# Patient Record
Sex: FEMALE
[De-identification: names, ages, dates, MRNs, and addresses within clinical notes are randomized per-mention and may not be internally consistent; named-entity substitution may affect disease eponyms.]

---

## 2020-02-02 ENCOUNTER — TRANSCRIPTION ENCOUNTER (OUTPATIENT)
Age: 40
End: 2020-02-02

## 2020-02-03 ENCOUNTER — EMERGENCY (EMERGENCY)
Facility: HOSPITAL | Age: 40
LOS: 1 days | Discharge: ANOTHER HOSPITAL | End: 2020-02-03
Attending: EMERGENCY MEDICINE
Payer: COMMERCIAL

## 2020-02-03 ENCOUNTER — INPATIENT (INPATIENT)
Facility: HOSPITAL | Age: 40
LOS: 24 days | Discharge: NURSING, REHAB OR LONG-TERM CARE | DRG: 20 | End: 2020-02-28
Attending: NEUROLOGICAL SURGERY | Admitting: NEUROLOGICAL SURGERY
Payer: COMMERCIAL

## 2020-02-03 VITALS — OXYGEN SATURATION: 100 % | HEART RATE: 76 BPM

## 2020-02-03 VITALS
OXYGEN SATURATION: 98 % | HEART RATE: 70 BPM | RESPIRATION RATE: 18 BRPM | HEIGHT: 63 IN | DIASTOLIC BLOOD PRESSURE: 70 MMHG | WEIGHT: 130.07 LBS | SYSTOLIC BLOOD PRESSURE: 136 MMHG

## 2020-02-03 VITALS — HEART RATE: 82 BPM | SYSTOLIC BLOOD PRESSURE: 115 MMHG | DIASTOLIC BLOOD PRESSURE: 60 MMHG | RESPIRATION RATE: 16 BRPM

## 2020-02-03 DIAGNOSIS — I61.9 NONTRAUMATIC INTRACEREBRAL HEMORRHAGE, UNSPECIFIED: ICD-10-CM

## 2020-02-03 LAB
ALBUMIN SERPL ELPH-MCNC: 3.6 G/DL — SIGNIFICANT CHANGE UP (ref 3.5–5)
ALBUMIN SERPL ELPH-MCNC: 4.2 G/DL — SIGNIFICANT CHANGE UP (ref 3.3–5)
ALP SERPL-CCNC: 48 U/L — SIGNIFICANT CHANGE UP (ref 40–120)
ALP SERPL-CCNC: 48 U/L — SIGNIFICANT CHANGE UP (ref 40–120)
ALT FLD-CCNC: 26 U/L — SIGNIFICANT CHANGE UP (ref 10–45)
ALT FLD-CCNC: 33 U/L DA — SIGNIFICANT CHANGE UP (ref 10–60)
AMPHET UR-MCNC: NEGATIVE — SIGNIFICANT CHANGE UP
ANION GAP SERPL CALC-SCNC: 11 MMOL/L — SIGNIFICANT CHANGE UP (ref 5–17)
ANION GAP SERPL CALC-SCNC: 13 MMOL/L — SIGNIFICANT CHANGE UP (ref 5–17)
ANION GAP SERPL CALC-SCNC: 14 MMOL/L — SIGNIFICANT CHANGE UP (ref 5–17)
APAP SERPL-MCNC: <2 UG/ML — LOW (ref 10–30)
APPEARANCE UR: CLEAR — SIGNIFICANT CHANGE UP
APTT BLD: 27.1 SEC — LOW (ref 27.5–36.3)
AST SERPL-CCNC: 28 U/L — SIGNIFICANT CHANGE UP (ref 10–40)
AST SERPL-CCNC: 29 U/L — SIGNIFICANT CHANGE UP (ref 10–40)
BACTERIA # UR AUTO: ABNORMAL /HPF
BARBITURATES UR SCN-MCNC: NEGATIVE — SIGNIFICANT CHANGE UP
BASE EXCESS BLDA CALC-SCNC: -1.6 MMOL/L — SIGNIFICANT CHANGE UP (ref -2–2)
BASOPHILS # BLD AUTO: 0.03 K/UL — SIGNIFICANT CHANGE UP (ref 0–0.2)
BASOPHILS # BLD AUTO: 0.05 K/UL — SIGNIFICANT CHANGE UP (ref 0–0.2)
BASOPHILS NFR BLD AUTO: 0.2 % — SIGNIFICANT CHANGE UP (ref 0–2)
BASOPHILS NFR BLD AUTO: 0.5 % — SIGNIFICANT CHANGE UP (ref 0–2)
BENZODIAZ UR-MCNC: NEGATIVE — SIGNIFICANT CHANGE UP
BILIRUB SERPL-MCNC: 0.4 MG/DL — SIGNIFICANT CHANGE UP (ref 0.2–1.2)
BILIRUB SERPL-MCNC: 0.6 MG/DL — SIGNIFICANT CHANGE UP (ref 0.2–1.2)
BILIRUB UR-MCNC: NEGATIVE — SIGNIFICANT CHANGE UP
BLD GP AB SCN SERPL QL: NEGATIVE — SIGNIFICANT CHANGE UP
BLOOD GAS COMMENTS ARTERIAL: SIGNIFICANT CHANGE UP
BUN SERPL-MCNC: 13 MG/DL — SIGNIFICANT CHANGE UP (ref 7–23)
BUN SERPL-MCNC: 14 MG/DL — SIGNIFICANT CHANGE UP (ref 7–18)
BUN SERPL-MCNC: 8 MG/DL — SIGNIFICANT CHANGE UP (ref 7–23)
CALCIUM SERPL-MCNC: 7.6 MG/DL — LOW (ref 8.4–10.5)
CALCIUM SERPL-MCNC: 7.8 MG/DL — LOW (ref 8.4–10.5)
CALCIUM SERPL-MCNC: 8.4 MG/DL — SIGNIFICANT CHANGE UP (ref 8.4–10.5)
CARBAMAZEPINE SERPL-MCNC: <0.5 UG/ML — LOW (ref 4–12)
CHLORIDE SERPL-SCNC: 102 MMOL/L — SIGNIFICANT CHANGE UP (ref 96–108)
CHLORIDE SERPL-SCNC: 104 MMOL/L — SIGNIFICANT CHANGE UP (ref 96–108)
CHLORIDE SERPL-SCNC: 109 MMOL/L — HIGH (ref 96–108)
CO2 SERPL-SCNC: 18 MMOL/L — LOW (ref 22–31)
CO2 SERPL-SCNC: 20 MMOL/L — LOW (ref 22–31)
CO2 SERPL-SCNC: 21 MMOL/L — LOW (ref 22–31)
COCAINE METAB.OTHER UR-MCNC: NEGATIVE — SIGNIFICANT CHANGE UP
COLOR SPEC: YELLOW — SIGNIFICANT CHANGE UP
CREAT SERPL-MCNC: 0.56 MG/DL — SIGNIFICANT CHANGE UP (ref 0.5–1.3)
CREAT SERPL-MCNC: 0.63 MG/DL — SIGNIFICANT CHANGE UP (ref 0.5–1.3)
CREAT SERPL-MCNC: 0.87 MG/DL — SIGNIFICANT CHANGE UP (ref 0.5–1.3)
DIFF PNL FLD: ABNORMAL
EOSINOPHIL # BLD AUTO: 0 K/UL — SIGNIFICANT CHANGE UP (ref 0–0.5)
EOSINOPHIL # BLD AUTO: 0.07 K/UL — SIGNIFICANT CHANGE UP (ref 0–0.5)
EOSINOPHIL NFR BLD AUTO: 0 % — SIGNIFICANT CHANGE UP (ref 0–6)
EOSINOPHIL NFR BLD AUTO: 0.7 % — SIGNIFICANT CHANGE UP (ref 0–6)
EPI CELLS # UR: ABNORMAL /HPF
ETHANOL SERPL-MCNC: <3 MG/DL — SIGNIFICANT CHANGE UP (ref 0–10)
GAS PNL BLDA: SIGNIFICANT CHANGE UP
GAS PNL BLDA: SIGNIFICANT CHANGE UP
GLUCOSE SERPL-MCNC: 174 MG/DL — HIGH (ref 70–99)
GLUCOSE SERPL-MCNC: 183 MG/DL — HIGH (ref 70–99)
GLUCOSE SERPL-MCNC: 210 MG/DL — HIGH (ref 70–99)
GLUCOSE UR QL: 1000 MG/DL
HCG SERPL-ACNC: <1 MIU/ML — SIGNIFICANT CHANGE UP
HCG SERPL-ACNC: <2 MIU/ML — SIGNIFICANT CHANGE UP
HCG UR QL: NEGATIVE — SIGNIFICANT CHANGE UP
HCO3 BLDA-SCNC: 23 MMOL/L — SIGNIFICANT CHANGE UP (ref 23–27)
HCT VFR BLD CALC: 40.9 % — SIGNIFICANT CHANGE UP (ref 34.5–45)
HCT VFR BLD CALC: 42.8 % — SIGNIFICANT CHANGE UP (ref 34.5–45)
HGB BLD-MCNC: 13.6 G/DL — SIGNIFICANT CHANGE UP (ref 11.5–15.5)
HGB BLD-MCNC: 13.7 G/DL — SIGNIFICANT CHANGE UP (ref 11.5–15.5)
HOROWITZ INDEX BLDA+IHG-RTO: 50 — SIGNIFICANT CHANGE UP
IMM GRANULOCYTES NFR BLD AUTO: 0.4 % — SIGNIFICANT CHANGE UP (ref 0–1.5)
IMM GRANULOCYTES NFR BLD AUTO: 0.5 % — SIGNIFICANT CHANGE UP (ref 0–1.5)
INR BLD: 0.97 RATIO — SIGNIFICANT CHANGE UP (ref 0.88–1.16)
KETONES UR-MCNC: NEGATIVE — SIGNIFICANT CHANGE UP
LEUKOCYTE ESTERASE UR-ACNC: NEGATIVE — SIGNIFICANT CHANGE UP
LITHIUM SERPL-MCNC: <0.2 MMOL/L — LOW (ref 0.6–1.2)
LYMPHOCYTES # BLD AUTO: 1.03 K/UL — SIGNIFICANT CHANGE UP (ref 1–3.3)
LYMPHOCYTES # BLD AUTO: 2.8 K/UL — SIGNIFICANT CHANGE UP (ref 1–3.3)
LYMPHOCYTES # BLD AUTO: 26.8 % — SIGNIFICANT CHANGE UP (ref 13–44)
LYMPHOCYTES # BLD AUTO: 6.3 % — LOW (ref 13–44)
MAGNESIUM SERPL-MCNC: 1.7 MG/DL — SIGNIFICANT CHANGE UP (ref 1.6–2.6)
MCHC RBC-ENTMCNC: 29.6 PG — SIGNIFICANT CHANGE UP (ref 27–34)
MCHC RBC-ENTMCNC: 29.6 PG — SIGNIFICANT CHANGE UP (ref 27–34)
MCHC RBC-ENTMCNC: 32 GM/DL — SIGNIFICANT CHANGE UP (ref 32–36)
MCHC RBC-ENTMCNC: 33.3 GM/DL — SIGNIFICANT CHANGE UP (ref 32–36)
MCV RBC AUTO: 89.1 FL — SIGNIFICANT CHANGE UP (ref 80–100)
MCV RBC AUTO: 92.4 FL — SIGNIFICANT CHANGE UP (ref 80–100)
METHADONE UR-MCNC: NEGATIVE — SIGNIFICANT CHANGE UP
MONOCYTES # BLD AUTO: 0.6 K/UL — SIGNIFICANT CHANGE UP (ref 0–0.9)
MONOCYTES # BLD AUTO: 0.73 K/UL — SIGNIFICANT CHANGE UP (ref 0–0.9)
MONOCYTES NFR BLD AUTO: 4.4 % — SIGNIFICANT CHANGE UP (ref 2–14)
MONOCYTES NFR BLD AUTO: 5.7 % — SIGNIFICANT CHANGE UP (ref 2–14)
NEUTROPHILS # BLD AUTO: 14.61 K/UL — HIGH (ref 1.8–7.4)
NEUTROPHILS # BLD AUTO: 6.88 K/UL — SIGNIFICANT CHANGE UP (ref 1.8–7.4)
NEUTROPHILS NFR BLD AUTO: 65.9 % — SIGNIFICANT CHANGE UP (ref 43–77)
NEUTROPHILS NFR BLD AUTO: 88.6 % — HIGH (ref 43–77)
NITRITE UR-MCNC: NEGATIVE — SIGNIFICANT CHANGE UP
NRBC # BLD: 0 /100 WBCS — SIGNIFICANT CHANGE UP (ref 0–0)
NRBC # BLD: 0 /100 WBCS — SIGNIFICANT CHANGE UP (ref 0–0)
OPIATES UR-MCNC: NEGATIVE — SIGNIFICANT CHANGE UP
PCO2 BLDA: 39 MMHG — SIGNIFICANT CHANGE UP (ref 32–46)
PCP SPEC-MCNC: SIGNIFICANT CHANGE UP
PCP UR-MCNC: NEGATIVE — SIGNIFICANT CHANGE UP
PH BLDA: 7.38 — SIGNIFICANT CHANGE UP (ref 7.35–7.45)
PH UR: 6.5 — SIGNIFICANT CHANGE UP (ref 5–8)
PHOSPHATE SERPL-MCNC: 3 MG/DL — SIGNIFICANT CHANGE UP (ref 2.5–4.5)
PLATELET # BLD AUTO: 216 K/UL — SIGNIFICANT CHANGE UP (ref 150–400)
PLATELET # BLD AUTO: 221 K/UL — SIGNIFICANT CHANGE UP (ref 150–400)
PO2 BLDA: 251 MMHG — HIGH (ref 74–108)
POTASSIUM SERPL-MCNC: 3 MMOL/L — LOW (ref 3.5–5.3)
POTASSIUM SERPL-MCNC: 3.5 MMOL/L — SIGNIFICANT CHANGE UP (ref 3.5–5.3)
POTASSIUM SERPL-MCNC: 4.1 MMOL/L — SIGNIFICANT CHANGE UP (ref 3.5–5.3)
POTASSIUM SERPL-SCNC: 3 MMOL/L — LOW (ref 3.5–5.3)
POTASSIUM SERPL-SCNC: 3.5 MMOL/L — SIGNIFICANT CHANGE UP (ref 3.5–5.3)
POTASSIUM SERPL-SCNC: 4.1 MMOL/L — SIGNIFICANT CHANGE UP (ref 3.5–5.3)
PROT SERPL-MCNC: 6.6 G/DL — SIGNIFICANT CHANGE UP (ref 6–8.3)
PROT SERPL-MCNC: 6.9 G/DL — SIGNIFICANT CHANGE UP (ref 6–8.3)
PROT UR-MCNC: 15
PROTHROM AB SERPL-ACNC: 11.1 SEC — SIGNIFICANT CHANGE UP (ref 10–12.9)
RBC # BLD: 4.59 M/UL — SIGNIFICANT CHANGE UP (ref 3.8–5.2)
RBC # BLD: 4.63 M/UL — SIGNIFICANT CHANGE UP (ref 3.8–5.2)
RBC # FLD: 11.9 % — SIGNIFICANT CHANGE UP (ref 10.3–14.5)
RBC # FLD: 12.2 % — SIGNIFICANT CHANGE UP (ref 10.3–14.5)
RBC CASTS # UR COMP ASSIST: ABNORMAL /HPF (ref 0–2)
RH IG SCN BLD-IMP: POSITIVE — SIGNIFICANT CHANGE UP
RH IG SCN BLD-IMP: POSITIVE — SIGNIFICANT CHANGE UP
SALICYLATES SERPL-MCNC: <1.7 MG/DL — LOW (ref 2.8–20)
SAO2 % BLDA: 100 % — HIGH (ref 92–96)
SODIUM SERPL-SCNC: 136 MMOL/L — SIGNIFICANT CHANGE UP (ref 135–145)
SODIUM SERPL-SCNC: 136 MMOL/L — SIGNIFICANT CHANGE UP (ref 135–145)
SODIUM SERPL-SCNC: 140 MMOL/L — SIGNIFICANT CHANGE UP (ref 135–145)
SP GR SPEC: 1.01 — SIGNIFICANT CHANGE UP (ref 1.01–1.02)
THC UR QL: NEGATIVE — SIGNIFICANT CHANGE UP
TSH SERPL-MCNC: 1.83 UU/ML — SIGNIFICANT CHANGE UP (ref 0.34–4.82)
UROBILINOGEN FLD QL: NEGATIVE — SIGNIFICANT CHANGE UP
VALPROATE SERPL-MCNC: <3 UG/ML — LOW (ref 50–100)
WBC # BLD: 10.44 K/UL — SIGNIFICANT CHANGE UP (ref 3.8–10.5)
WBC # BLD: 16.48 K/UL — HIGH (ref 3.8–10.5)
WBC # FLD AUTO: 10.44 K/UL — SIGNIFICANT CHANGE UP (ref 3.8–10.5)
WBC # FLD AUTO: 16.48 K/UL — HIGH (ref 3.8–10.5)
WBC UR QL: SIGNIFICANT CHANGE UP /HPF (ref 0–5)

## 2020-02-03 PROCEDURE — 69990 MICROSURGERY ADD-ON: CPT

## 2020-02-03 PROCEDURE — 84443 ASSAY THYROID STIM HORMONE: CPT

## 2020-02-03 PROCEDURE — 71045 X-RAY EXAM CHEST 1 VIEW: CPT | Mod: 26

## 2020-02-03 PROCEDURE — 70496 CT ANGIOGRAPHY HEAD: CPT | Mod: 26

## 2020-02-03 PROCEDURE — 99291 CRITICAL CARE FIRST HOUR: CPT

## 2020-02-03 PROCEDURE — 81025 URINE PREGNANCY TEST: CPT

## 2020-02-03 PROCEDURE — 81001 URINALYSIS AUTO W/SCOPE: CPT

## 2020-02-03 PROCEDURE — 94002 VENT MGMT INPAT INIT DAY: CPT

## 2020-02-03 PROCEDURE — 70498 CT ANGIOGRAPHY NECK: CPT | Mod: 26

## 2020-02-03 PROCEDURE — 70450 CT HEAD/BRAIN W/O DYE: CPT

## 2020-02-03 PROCEDURE — 96374 THER/PROPH/DIAG INJ IV PUSH: CPT | Mod: XU

## 2020-02-03 PROCEDURE — 80156 ASSAY CARBAMAZEPINE TOTAL: CPT

## 2020-02-03 PROCEDURE — 93005 ELECTROCARDIOGRAM TRACING: CPT

## 2020-02-03 PROCEDURE — 31500 INSERT EMERGENCY AIRWAY: CPT

## 2020-02-03 PROCEDURE — 61322 CRNEC/CRNOT DCMPRV W/O LOBEC: CPT

## 2020-02-03 PROCEDURE — 36415 COLL VENOUS BLD VENIPUNCTURE: CPT

## 2020-02-03 PROCEDURE — 96376 TX/PRO/DX INJ SAME DRUG ADON: CPT | Mod: XU

## 2020-02-03 PROCEDURE — 70250 X-RAY EXAM OF SKULL: CPT | Mod: 26

## 2020-02-03 PROCEDURE — 84702 CHORIONIC GONADOTROPIN TEST: CPT

## 2020-02-03 PROCEDURE — 99291 CRITICAL CARE FIRST HOUR: CPT | Mod: 25

## 2020-02-03 PROCEDURE — 82962 GLUCOSE BLOOD TEST: CPT

## 2020-02-03 PROCEDURE — 93888 INTRACRANIAL LIMITED STUDY: CPT | Mod: 26

## 2020-02-03 PROCEDURE — 61697 BRAIN ANEURYSM REPR COMPLX: CPT

## 2020-02-03 PROCEDURE — 82803 BLOOD GASES ANY COMBINATION: CPT

## 2020-02-03 PROCEDURE — 70450 CT HEAD/BRAIN W/O DYE: CPT | Mod: 26,77,76

## 2020-02-03 PROCEDURE — 85027 COMPLETE CBC AUTOMATED: CPT

## 2020-02-03 PROCEDURE — 99223 1ST HOSP IP/OBS HIGH 75: CPT | Mod: 57

## 2020-02-03 PROCEDURE — 70450 CT HEAD/BRAIN W/O DYE: CPT | Mod: 26

## 2020-02-03 PROCEDURE — 80178 ASSAY OF LITHIUM: CPT

## 2020-02-03 PROCEDURE — 80164 ASSAY DIPROPYLACETIC ACD TOT: CPT

## 2020-02-03 PROCEDURE — 92240 ICG ANGIOGRAPHY I&R UNI/BI: CPT | Mod: 26

## 2020-02-03 PROCEDURE — 99292 CRITICAL CARE ADDL 30 MIN: CPT | Mod: 25

## 2020-02-03 PROCEDURE — 80307 DRUG TEST PRSMV CHEM ANLYZR: CPT

## 2020-02-03 PROCEDURE — 80053 COMPREHEN METABOLIC PANEL: CPT

## 2020-02-03 PROCEDURE — 96375 TX/PRO/DX INJ NEW DRUG ADDON: CPT | Mod: XU

## 2020-02-03 RX ORDER — ETOMIDATE 2 MG/ML
20 INJECTION INTRAVENOUS ONCE
Refills: 0 | Status: COMPLETED | OUTPATIENT
Start: 2020-02-03 | End: 2020-02-03

## 2020-02-03 RX ORDER — LEVETIRACETAM 250 MG/1
500 TABLET, FILM COATED ORAL EVERY 12 HOURS
Refills: 0 | Status: DISCONTINUED | OUTPATIENT
Start: 2020-02-03 | End: 2020-02-04

## 2020-02-03 RX ORDER — DEXMEDETOMIDINE HYDROCHLORIDE IN 0.9% SODIUM CHLORIDE 4 UG/ML
0.2 INJECTION INTRAVENOUS
Qty: 200 | Refills: 0 | Status: DISCONTINUED | OUTPATIENT
Start: 2020-02-03 | End: 2020-02-04

## 2020-02-03 RX ORDER — CALCIUM GLUCONATE 100 MG/ML
1 VIAL (ML) INTRAVENOUS ONCE
Refills: 0 | Status: COMPLETED | OUTPATIENT
Start: 2020-02-03 | End: 2020-02-04

## 2020-02-03 RX ORDER — CHLORHEXIDINE GLUCONATE 213 G/1000ML
15 SOLUTION TOPICAL EVERY 12 HOURS
Refills: 0 | Status: DISCONTINUED | OUTPATIENT
Start: 2020-02-03 | End: 2020-02-07

## 2020-02-03 RX ORDER — CHLORHEXIDINE GLUCONATE 213 G/1000ML
15 SOLUTION TOPICAL EVERY 12 HOURS
Refills: 0 | Status: DISCONTINUED | OUTPATIENT
Start: 2020-02-03 | End: 2020-02-04

## 2020-02-03 RX ORDER — PANTOPRAZOLE SODIUM 20 MG/1
40 TABLET, DELAYED RELEASE ORAL
Refills: 0 | Status: DISCONTINUED | OUTPATIENT
Start: 2020-02-03 | End: 2020-02-04

## 2020-02-03 RX ORDER — ACETAMINOPHEN 500 MG
650 TABLET ORAL EVERY 6 HOURS
Refills: 0 | Status: DISCONTINUED | OUTPATIENT
Start: 2020-02-03 | End: 2020-02-12

## 2020-02-03 RX ORDER — SODIUM CHLORIDE 9 MG/ML
1000 INJECTION INTRAMUSCULAR; INTRAVENOUS; SUBCUTANEOUS
Refills: 0 | Status: DISCONTINUED | OUTPATIENT
Start: 2020-02-03 | End: 2020-02-03

## 2020-02-03 RX ORDER — SODIUM CHLORIDE 9 MG/ML
500 INJECTION INTRAMUSCULAR; INTRAVENOUS; SUBCUTANEOUS ONCE
Refills: 0 | Status: COMPLETED | OUTPATIENT
Start: 2020-02-03 | End: 2020-02-03

## 2020-02-03 RX ORDER — NIMODIPINE 60 MG/10ML
60 SOLUTION ORAL EVERY 4 HOURS
Refills: 0 | Status: DISCONTINUED | OUTPATIENT
Start: 2020-02-03 | End: 2020-02-04

## 2020-02-03 RX ORDER — SODIUM CHLORIDE 9 MG/ML
3 INJECTION INTRAMUSCULAR; INTRAVENOUS; SUBCUTANEOUS ONCE
Refills: 0 | Status: COMPLETED | OUTPATIENT
Start: 2020-02-03 | End: 2020-02-03

## 2020-02-03 RX ORDER — SODIUM CHLORIDE 9 MG/ML
1000 INJECTION INTRAMUSCULAR; INTRAVENOUS; SUBCUTANEOUS
Refills: 0 | Status: DISCONTINUED | OUTPATIENT
Start: 2020-02-03 | End: 2020-02-04

## 2020-02-03 RX ORDER — MANNITOL
60 POWDER (GRAM) MISCELLANEOUS ONCE
Refills: 0 | Status: DISCONTINUED | OUTPATIENT
Start: 2020-02-03 | End: 2020-02-03

## 2020-02-03 RX ORDER — PROPOFOL 10 MG/ML
50 INJECTION, EMULSION INTRAVENOUS
Qty: 500 | Refills: 0 | Status: DISCONTINUED | OUTPATIENT
Start: 2020-02-03 | End: 2020-02-04

## 2020-02-03 RX ORDER — ONDANSETRON 8 MG/1
4 TABLET, FILM COATED ORAL ONCE
Refills: 0 | Status: COMPLETED | OUTPATIENT
Start: 2020-02-03 | End: 2020-02-03

## 2020-02-03 RX ORDER — NIMODIPINE 60 MG/10ML
60 SOLUTION ORAL EVERY 4 HOURS
Refills: 0 | Status: DISCONTINUED | OUTPATIENT
Start: 2020-02-03 | End: 2020-02-03

## 2020-02-03 RX ORDER — FOSPHENYTOIN 50 MG/ML
900 INJECTION INTRAMUSCULAR; INTRAVENOUS ONCE
Refills: 0 | Status: COMPLETED | OUTPATIENT
Start: 2020-02-03 | End: 2020-02-03

## 2020-02-03 RX ORDER — PROPOFOL 10 MG/ML
20 INJECTION, EMULSION INTRAVENOUS
Qty: 1000 | Refills: 0 | Status: DISCONTINUED | OUTPATIENT
Start: 2020-02-03 | End: 2020-02-03

## 2020-02-03 RX ORDER — PROPOFOL 10 MG/ML
10 INJECTION, EMULSION INTRAVENOUS
Qty: 1000 | Refills: 0 | Status: DISCONTINUED | OUTPATIENT
Start: 2020-02-03 | End: 2020-02-07

## 2020-02-03 RX ORDER — CHLORHEXIDINE GLUCONATE 213 G/1000ML
15 SOLUTION TOPICAL
Refills: 0 | Status: DISCONTINUED | OUTPATIENT
Start: 2020-02-03 | End: 2020-02-04

## 2020-02-03 RX ORDER — NICARDIPINE HYDROCHLORIDE 30 MG/1
5 CAPSULE, EXTENDED RELEASE ORAL
Qty: 40 | Refills: 0 | Status: DISCONTINUED | OUTPATIENT
Start: 2020-02-03 | End: 2020-02-04

## 2020-02-03 RX ORDER — SUCCINYLCHOLINE CHLORIDE 100 MG/5ML
100 SYRINGE (ML) INTRAVENOUS ONCE
Refills: 0 | Status: COMPLETED | OUTPATIENT
Start: 2020-02-03 | End: 2020-02-03

## 2020-02-03 RX ORDER — ONDANSETRON 8 MG/1
4 TABLET, FILM COATED ORAL ONCE
Refills: 0 | Status: DISCONTINUED | OUTPATIENT
Start: 2020-02-03 | End: 2020-02-07

## 2020-02-03 RX ORDER — CHLORHEXIDINE GLUCONATE 213 G/1000ML
1 SOLUTION TOPICAL
Refills: 0 | Status: DISCONTINUED | OUTPATIENT
Start: 2020-02-03 | End: 2020-02-21

## 2020-02-03 RX ORDER — FOSPHENYTOIN 50 MG/ML
900 INJECTION INTRAMUSCULAR; INTRAVENOUS ONCE
Refills: 0 | Status: DISCONTINUED | OUTPATIENT
Start: 2020-02-03 | End: 2020-02-03

## 2020-02-03 RX ORDER — LEVETIRACETAM 250 MG/1
1000 TABLET, FILM COATED ORAL ONCE
Refills: 0 | Status: COMPLETED | OUTPATIENT
Start: 2020-02-03 | End: 2020-02-03

## 2020-02-03 RX ADMIN — SODIUM CHLORIDE 3 MILLILITER(S): 9 INJECTION INTRAMUSCULAR; INTRAVENOUS; SUBCUTANEOUS at 12:04

## 2020-02-03 RX ADMIN — PROPOFOL 3.54 MICROGRAM(S)/KG/MIN: 10 INJECTION, EMULSION INTRAVENOUS at 11:24

## 2020-02-03 RX ADMIN — CHLORHEXIDINE GLUCONATE 1 APPLICATION(S): 213 SOLUTION TOPICAL at 22:11

## 2020-02-03 RX ADMIN — FENTANYL CITRATE 50 MICROGRAM(S): 50 INJECTION INTRAVENOUS at 19:30

## 2020-02-03 RX ADMIN — FOSPHENYTOIN 136 MILLIGRAM(S) PE: 50 INJECTION INTRAMUSCULAR; INTRAVENOUS at 11:45

## 2020-02-03 RX ADMIN — ONDANSETRON 4 MILLIGRAM(S): 8 TABLET, FILM COATED ORAL at 12:03

## 2020-02-03 RX ADMIN — LEVETIRACETAM 400 MILLIGRAM(S): 250 TABLET, FILM COATED ORAL at 19:35

## 2020-02-03 RX ADMIN — DEXMEDETOMIDINE HYDROCHLORIDE IN 0.9% SODIUM CHLORIDE 2.95 MICROGRAM(S)/KG/HR: 4 INJECTION INTRAVENOUS at 22:10

## 2020-02-03 RX ADMIN — ETOMIDATE 20 MILLIGRAM(S): 2 INJECTION INTRAVENOUS at 11:16

## 2020-02-03 RX ADMIN — Medication 2 MILLIGRAM(S): at 12:03

## 2020-02-03 RX ADMIN — Medication 2 MILLIGRAM(S): at 11:00

## 2020-02-03 RX ADMIN — SODIUM CHLORIDE 75 MILLILITER(S): 9 INJECTION INTRAMUSCULAR; INTRAVENOUS; SUBCUTANEOUS at 22:12

## 2020-02-03 RX ADMIN — CHLORHEXIDINE GLUCONATE 15 MILLILITER(S): 213 SOLUTION TOPICAL at 22:11

## 2020-02-03 RX ADMIN — NIMODIPINE 60 MILLIGRAM(S): 60 SOLUTION ORAL at 22:11

## 2020-02-03 RX ADMIN — LEVETIRACETAM 400 MILLIGRAM(S): 250 TABLET, FILM COATED ORAL at 22:11

## 2020-02-03 RX ADMIN — Medication 100 MILLIGRAM(S): at 11:16

## 2020-02-03 RX ADMIN — NICARDIPINE HYDROCHLORIDE 25 MG/HR: 30 CAPSULE, EXTENDED RELEASE ORAL at 23:00

## 2020-02-03 NOTE — ED PROVIDER NOTE - PROGRESS NOTE DETAILS
Attempts made to contact next of kin. Left message at pt employment "[employer]". No next of kin in previous encounter in [facility].

## 2020-02-03 NOTE — H&P ADULT - NSHPPHYSICALEXAM_GEN_ALL_CORE
Intubated, sedation held, R pupil 5 and reactive, L pupil 4 and reactive, flexion posturing uppers, TF lowers

## 2020-02-03 NOTE — ED PROVIDER NOTE - CLINICAL SUMMARY MEDICAL DECISION MAKING FREE TEXT BOX
40 yo with AMS unknown PMHx. Concern for acute neurological event, seizures, less likely tox/infectious. Will send to stat CT head with seizure precautions. Will obtain labs, toxicology screen and will reassess.

## 2020-02-03 NOTE — BRIEF OPERATIVE NOTE - NSICDXBRIEFPROCEDURE_GEN_ALL_CORE_FT
PROCEDURES:  Craniotomy for aneurysm repair 03-Feb-2020 17:35:06  Fernando Lund  Craniectomy for evacuation of intracerebellar hematoma 03-Feb-2020 17:34:57  Fernando Lund

## 2020-02-03 NOTE — ED PROVIDER NOTE - OBJECTIVE STATEMENT
38 yo female with unknown PMhx, BIBA from airport as notification. Pt found unresponsive in bathroom of airplane 15 minutes prior to landing. Patient had a 20 second episode of seizure like activity prior to arrival of ED. Patient has been non verbal but is moving all extremities. A complete history is limited due to mental status, history obtained primarily by EMS. 40 yo female with unknown PMhx, BIBA from airport as notification. Pt found unresponsive in bathroom of airplane 15 minutes prior to landing. Patient had a 20 second episode of seizure like activity prior to arrival of ED. Patient has been non verbal but is moving all extremities. A complete history is limited due to mental status, history obtained primarily through EMS.

## 2020-02-03 NOTE — PROGRESS NOTE ADULT - ASSESSMENT
Summary: 39F who was found unresponsive in an airplane and found to have IPH and SAH. HH5, MF 3. 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)    NEURO:  q1h neuro checks  L sided EVD attempted by NSGY residents  CT now per NSGY  CT tomorrow AM  Stroke code measures: A1c, LDL  Delayed Cerebral Ischemia: TCDs, euvolemia, Nimodipine 60 q4H  Pain control w/ Tylenol prn and Oxy 5/10 prn  Bedrest    PULM:  Intubated  Peridex  CXR, ABG    CARDS:  -160  Nicardipine gtt as needed    RENAL:  IVF    GASTRO:  NPO  Bowel regimen  ---> Stress ulcer prophylaxis:  PPI    HEME:  monitor H/H    ---> DVT prophylaxis: SCDs, hold anticoagulation given fresh post-op    ENDO:  euglycemia    ID:  Monitor for fevers    Code status:  Full code  Disposition:  ICU    This patient was at high risk of neurologic deterioration and/or death due to: re-hemorrhage, seizures, DCI.     Time spent:  45 minutes Summary: 39F who was found unresponsive in an airplane and found to have IPH and SAH. HH5, MF 3. 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)    NEURO:  q1h neuro checks  L sided EVD attempted by NSGY residents  CT now per NSGY  CT tomorrow AM  Stroke code measures: A1c, LDL  Delayed Cerebral Ischemia: TCDs, euvolemia, Nimodipine 60 q4H  Given Keppra 1g, and placed on Keppra 500 BID   Sedation w/ Precedex if needed  Bedrest    PULM:  Intubated  Peridex  CXR, ABG    CARDS:  -160  Nicardipine gtt as needed  TTE  EKG, Trop    RENAL:  IVF    GASTRO:  NPO  Bowel regimen  ---> Stress ulcer prophylaxis:  PPI    HEME:  monitor H/H    ---> DVT prophylaxis: SCDs, hold anticoagulation given fresh post-op    ENDO:  euglycemia    ID:  Monitor for fevers    Code status:  Full code  Disposition:  ICU    This patient was at high risk of neurologic deterioration and/or death due to: re-hemorrhage, seizures, DCI.     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)    NEURO:  q1h neuro checks  EVD in place for monitoring  CT tomorrow AM  Stroke code measures: A1c, LDL  Delayed Cerebral Ischemia: TCDs, euvolemia, Nimodipine  Given Keppra 1g, and placed on Keppra 500 BID   EEG r/o subclinical seizures  Sedation w/ Precedex if needed  Bedrest    PULM:  Intubated  Peridex  CXR, ABG  Wean vent as tolerated    CARDS:  -160mmHg  Nicardipine gtt as needed  TTE  EKG, Trop    RENAL:  IVF    GASTRO:  NPO  Bowel regimen  ---> Stress ulcer prophylaxis:  PPI    HEME:  monitor H/H    ---> DVT prophylaxis: SCDs, hold anticoagulation given fresh post-op    ENDO:  euglycemia    ID:  Monitor for fevers    Code status:  Full code  Disposition:  ICU    Updated family at bedside.    This patient was at high risk of neurologic deterioration and/or death due to: re-hemorrhage, seizures, DCI.     Time spent:  45 minutes

## 2020-02-03 NOTE — ED ADULT NURSE REASSESSMENT NOTE - NS ED NURSE REASSESS COMMENT FT1
neuro surgery resident to RN to open mannitol infusion wide open at this time. patient comfort and safety provided. neuro surgery Martin resident to RN to open mannitol infusion wide open at this time. patient comfort and safety provided.

## 2020-02-03 NOTE — ED ADULT TRIAGE NOTE - CHIEF COMPLAINT QUOTE
NOTIFICATION FOR UNRESPONSIVENESS. EMS REPORTS PT WAS FOUND UNRESPONSIVE ON PLANE 15 MINS PRIOR TO LANDING

## 2020-02-03 NOTE — ED ADULT NURSE NOTE - OBJECTIVE STATEMENT
pt brought in as notification pt was unresponsive . pt noticed having episode of seizure . pt was intubated with et tube size 7.5 lip line 22 .

## 2020-02-03 NOTE — ED PROVIDER NOTE - CLINICAL SUMMARY MEDICAL DECISION MAKING FREE TEXT BOX
Teodora: natasha intubated and posturing. Known head bleed. Atraumatic. Neurosurgery at bedside asap. May need OR for ICH.

## 2020-02-03 NOTE — H&P ADULT - HISTORY OF PRESENT ILLNESS
39F unknown medical Hx, found unresponsive on airplane, transferred to [place], subsequently transferred to [facility].    Arrive intubated and sedated, no family or medical Hx available.

## 2020-02-03 NOTE — ED PROVIDER NOTE - CHIEF COMPLAINT
The patient is a 39y Female complaining of The patient is a 39y Female complaining of intracerebral hemorrhage

## 2020-02-03 NOTE — ED ADULT NURSE NOTE - NSIMPLEMENTINTERV_GEN_ALL_ED
Implemented All Fall Risk Interventions:  [place] to call system. Call bell, personal items and telephone within reach. Instruct patient to call for assistance. Room bathroom lighting operational. Non-slip footwear when patient is off stretcher. Physically safe environment: no spills, clutter or unnecessary equipment. Stretcher in lowest position, wheels locked, appropriate side rails in place. Provide visual cue, wrist band, yellow gown, etc. Monitor gait and stability. Monitor for mental status changes and reorient to person, place, and time. Review medications for side effects contributing to fall risk. Reinforce activity limits and safety measures with patient and family.

## 2020-02-03 NOTE — BRIEF OPERATIVE NOTE - PRIMARY SURGEON
bj Consent (Nose)/Introductory Paragraph: The rationale for Mohs was explained to the patient and consent was obtained. The risks, benefits and alternatives to therapy were discussed in detail. Specifically, the risks of nasal deformity, changes in the flow of air through the nose, infection, scarring, bleeding, prolonged wound healing, incomplete removal, allergy to anesthesia, nerve injury and recurrence were addressed. Prior to the procedure, the treatment site was clearly identified and confirmed by the patient. All components of Universal Protocol/PAUSE Rule completed.

## 2020-02-03 NOTE — ED PROVIDER NOTE - PHYSICAL EXAMINATION
General: Intubated and sedated, not responsive  HEENT: PERRLA, moist mucous membranes  Neurology: Obtunded, sedated, decorticate posturing, withdraws to pain  Respiratory: On ventilator, intubated, CTA B/L, normal respiratory effort, no wheezes, crackles, rales  CV: RRR, S1S2, no murmurs, rubs or gallops  Abdominal: Soft, NT, ND +BS  Extremities: No edema, + peripheral pulses  Incisions: no abrasions or signs of truama  Tubes: PIV, ETT

## 2020-02-03 NOTE — PROGRESS NOTE ADULT - SUBJECTIVE AND OBJECTIVE BOX
SUMMARY: 39F unknown medical Hx, found unresponsive on airplane, transferred to [place], subsequently transferred to [facility].    Arrive intubated and sedated, no family or medical Hx available. (03 Feb 2020 14:10)    After coming to [facility] ED, patient underwent repeat imaging and was taken to OR emergently.     ADMISSION SCORES:  GCS: 3T  Hunt-Gonzalez: 5  modified Couch: 3  ICH score: 2    VITALS:  T(C): , Max: 37.7 (02-03-20 @ 18:00)  HR:  (72 - 95)  BP:  (127/52 - 144/96)  ABP:  (140/64 - 164/71)  RR:  (14 - 20)  SpO2:  (100% - 100%)  Wt(kg): --  Device: Avea, Mode: AC/ CMV (Assist Control/ Continuous Mandatory Ventilation), RR (machine): 20, TV (machine): 400, FiO2: 50, PEEP: 5, ITime: 1, MAP: 8, PIP: 17    LABS:  Na: 136 (02-03 @ 13:14)  K: 3.5 (02-03 @ 13:14)  Cl: 102 (02-03 @ 13:14)  CO2: 20 (02-03 @ 13:14)  BUN: 13 (02-03 @ 13:14)  Cr: 0.56 (02-03 @ 13:14)  Glu: 183(02-03 @ 13:14)    Hgb: 13.6 (02-03 @ 13:14)  Hct: 40.9 (02-03 @ 13:14)  WBC: 16.48 (02-03 @ 13:14)  Plt: 221 (02-03 @ 13:14)  PT: 11.1 (02-03 @ 13:14)  INR: 0.97 (02-03 @ 13:14)  aPTT: 27.1 (02-03 @ 13:14)    IMAGING:   Recent imaging studies were reviewed.    MEDICATIONS:  acetaminophen   Tablet .. 650 milliGRAM(s) Oral every 6 hours PRN  chlorhexidine 4% Liquid 1 Application(s) Topical <User Schedule>  levETIRAcetam  IVPB 500 milliGRAM(s) IV Intermittent every 12 hours  levETIRAcetam  IVPB 1000 milliGRAM(s) IV Intermittent once  niCARdipine Infusion 5 mG/Hr IV Continuous <Continuous>  pantoprazole    Tablet 40 milliGRAM(s) Oral before breakfast  sodium chloride 0.9%. 1000 milliLiter(s) IV Continuous <Continuous>    EXAMINATION:  General:  Intubated.  HEENT:  MMM. Pupils 3mm, sluggishly reactive b/l.  Neuro: No EO. b/l UE - extension. b/l LE - trace TF  Cards:  RRR.   Respiratory:  Limited exam. CTAB.  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 She arrived intubated and sedated. 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     ADMISSION SCORES:  GCS: 3T  Hunt-Gonzalez: 5  modified Couch: 3  ICH score: 2    VITALS/DATA/ORDERS: [x] Reviewed    EXAMINATION:  General:  Intubated.  HEENT:  MMM. Right pupil reactive, left slightly smaller and sluggish. +Corneal on the right, but not left, absent Doll's.  Neuro: No eye opening, no commands, RUE localizes, LUE flexion, RLE w/d, LLE TF  Cards:  RRR.   Respiratory: Clear  Abdomen:  soft, +BS  Extremities:  no edema  Skin:  warm/dry

## 2020-02-03 NOTE — H&P ADULT - ASSESSMENT
39F unknown PMHx transfer from , CTA in ED shows R PComm aneurysm and associated SAH and IPH  - Mannitol 60g given  - Emergent OR for R crani and clipping

## 2020-02-03 NOTE — ED ADULT NURSE NOTE - OBJECTIVE STATEMENT
CHECK ONBASE FOR SCAN     patient is a 39 year old female who presents to the ED from [facility] (transfer) via EMS for neuro consult. as per EMS, patient was flying to [facility] when 15 minutes before landing patient was found unresponsive in the bathroom of the airplane. patient then sent to [facility] where patient was intubated with ET tube of 7.5, and lip of 22. patient was found to have right sided intraparenchymal bleed with positive 3mm by 3mm shift. patient was given phosphenotone and ativan for seizures at [facility] and started on propofol 40mcg/kg with a weight of 59kg. patient has kay placed with 50cc clear yellow urine at [facility]. patient arrives to the ED, intubated with ET tube of 7.5 at a lip of 22. vent settings of PEEP 5, TV= 400, RR=20, O2= 50%. propofol infusing at 40mcg/kg/hr and paused as per MD Jackson for neuro resident assessment and restarted as ordered after assessment. patient has right upper extremity posturing noted, responds to pain on upper and lower extremity. patient does not respond to pain stimulation on left upper and lower extremity. PERRL. 4mm pupils noted bilaterally. lung sounds CTA bilaterally. cap refill <3sec. +2 radial and dorsal pulses noted. skin intact. belongings sent to safe in ED. patient sent to CT scan and to OR. NSR on monitor. VSS. MD at the bedside. patient is a 39 year old female who presents to the ED from [facility] (transfer) via EMS for neuro consult. as per EMS, patient was flying to [facility] when 15 minutes before landing patient was found unresponsive in the bathroom of the airplane and given Narcan with no change. patient then sent to [facility] where patient was intubated with ET tube of 7.5, and lip of 22. patient was found to have right sided intraparenchymal bleed with positive 3mm by 3mm shift. patient was given phosphenotone and ativan for seizures at [facility] and started on propofol 40mcg/kg with a weight of 59kg. patient has kay placed with 50cc clear yellow urine at [facility]. patient arrives to the ED, intubated with ET tube of 7.5 at a lip of 22. vent settings of PEEP 5, TV= 400, RR=20, O2= 50% as ordered. propofol infusing at 40mcg/kg/hr and paused as per MD Watson for neuro resident assessment and restarted as ordered after assessment. patient has right upper extremity posturing noted, responds to pain on right upper and lower extremities. patient does not respond to pain stimulation on left upper and lower extremity. PERRL. 4mm pupils noted bilaterally. lung sounds CTA bilaterally. cap refill <3sec. +2 radial and dorsal pulses noted. skin intact. belongings sent to safe in ED. patient sent to CT scan and to OR. NSR on monitor. VSS. MD at the bedside.

## 2020-02-03 NOTE — CHART NOTE - NSCHARTNOTEFT_GEN_A_CORE
CAPRINI SCORE [CLOT] Score on Admission for     AGE RELATED RISK FACTORS                                                       MOBILITY RELATED FACTORS  [ ] Age 41-60 years                                            (1 Point)                  [ ] Bed rest                                                        (1 Point)  [ ] Age: 61-74 years                                           (2 Points)                [ ] Plaster cast                                                   (2 Points)  [ ] Age= 75 years                                              (3 Points)                  [ ] Bed bound for more than 72 hours         (2 Points)    DISEASE RELATED RISK FACTORS                                               GENDER SPECIFIC FACTORS  [ ] Edema in the lower extremities                       (1 Point)           [ ] Pregnancy                                                          (1 Point)  [ ] Varicose veins                                               (1 Point)                  [ ] Post-partum < 6 weeks                                   (1 Point)             [ ] BMI > 25 Kg/m2                                            (1 Point)                  [ ] Hormonal therapy  or oral contraception   (1 Point)                 [ ] Sepsis (in the previous month)                        (1 Point)            [ ] History of pregnancy complications             (1 point)  [ ] Pneumonia or serious lung disease                                            [ ] Unexplained or recurrent               (1 Point)           (in the previous month)                               (1 Point)  [ ] Abnormal pulmonary function test                     (1 Point)                 SURGERY RELATED RISK FACTORS (include planned surgeries)  [ ] Acute myocardial infarction                              (1 Point)                 [ ]  Section                                             (1 Point)  [ ] Congestive heart failure (in the previous month)  (1 Point)        [ ] Minor surgery                                                  (1 Point)   [ ] Inflammatory bowel disease                             (1 Point)                 [ ] Arthroscopic surgery                                        (2 Points)  [ ] Central venous access                                      (2 Points)  [ ] History / presence of malignancy                   (2 points)   [X] General surgery lasting more than 45 minutes   (2 Points)       [X] Stroke (in the previous month)                          (5 Points)               [ ] Elective arthroplasty                                         (5 Points)                                                                                                                                               HEMATOLOGY RELATED FACTORS                                                 TRAUMA RELATED RISK FACTORS  [ ] Prior episodes of VTE                                     (3 Points)                [ ] Fracture of the hip, pelvis, or leg                       (5 Points)  [ ] Positive family history for VTE                         (3 Points)             [ ] Acute spinal cord injury (in the previous month)  (5 Points)  [ ] Prothrombin 82811 A                                     (3 Points)                [ ] Paralysis  (less than 1 month)                             (5 Points)  [ ] Factor V Leiden                                             (3 Points)                   [ ] Multiple Trauma within 1 month                        (5 Points)  [ ] Lupus anticoagulants                                     (3 Points)                                                           [ ] Anticardiolipin antibodies                               (3 Points)                                                       [ ] High homocysteine in the blood                      (3 Points)                                             [ ] Other congenital or acquired thrombophilia      (3 Points)                                                [ ] Heparin induced thrombocytopenia                  (3 Points)                                          Total Score [       7   ]    Risk:  Very low 0   Low 1 to 2   Moderate 3 to 4   High =5       VTE Prophylaxis Recommendations:  [X] mechanical pneumatic compression devices                                      [ ] contraindicated: _____________________  [ ] chemoprophylaxis                                                                                    [X] contraindicated ___fresh post op__________________    **** HIGH LIKELIHOOD DVT PRESENT ON ADMISSION  [X] (please order LE dopplers within 24 hours of admission)

## 2020-02-03 NOTE — ED PROVIDER NOTE - OBJECTIVE STATEMENT
39F no known med hx transferred from OSH intubated sedated 39F no known med hx transferred from OSH intubated sedated after being found to have an large intracerebral hemorrhage. Pt was on a plane flight back from [place] to [place] when she was found unreponsive in a airplane bathroom. SHe was brought to OSH where she was intuabted for airway protection. CT scans showed large hemorrhage. Pt transferred to [facility] for nsgy intervention. Pt arrives on prop drip with stable vitals, decorticate posturing.

## 2020-02-04 LAB
ANION GAP SERPL CALC-SCNC: 9 MMOL/L — SIGNIFICANT CHANGE UP (ref 5–17)
APTT BLD: 27.7 SEC — SIGNIFICANT CHANGE UP (ref 27.5–36.3)
BUN SERPL-MCNC: 8 MG/DL — SIGNIFICANT CHANGE UP (ref 7–23)
CALCIUM SERPL-MCNC: 8 MG/DL — LOW (ref 8.4–10.5)
CHLORIDE SERPL-SCNC: 106 MMOL/L — SIGNIFICANT CHANGE UP (ref 96–108)
CHOLEST SERPL-MCNC: 133 MG/DL — SIGNIFICANT CHANGE UP (ref 10–199)
CO2 SERPL-SCNC: 24 MMOL/L — SIGNIFICANT CHANGE UP (ref 22–31)
CREAT SERPL-MCNC: 0.55 MG/DL — SIGNIFICANT CHANGE UP (ref 0.5–1.3)
GAS PNL BLDA: SIGNIFICANT CHANGE UP
GLUCOSE SERPL-MCNC: 142 MG/DL — HIGH (ref 70–99)
HBA1C BLD-MCNC: 5.3 % — SIGNIFICANT CHANGE UP (ref 4–5.6)
HCG SERPL-ACNC: <2 MIU/ML — SIGNIFICANT CHANGE UP
HCT VFR BLD CALC: 36.2 % — SIGNIFICANT CHANGE UP (ref 34.5–45)
HDLC SERPL-MCNC: 65 MG/DL — SIGNIFICANT CHANGE UP
HGB BLD-MCNC: 11.8 G/DL — SIGNIFICANT CHANGE UP (ref 11.5–15.5)
INR BLD: 1.13 RATIO — SIGNIFICANT CHANGE UP (ref 0.88–1.16)
LACTATE SERPL-SCNC: 1.7 MMOL/L — SIGNIFICANT CHANGE UP (ref 0.7–2)
LIPID PNL WITH DIRECT LDL SERPL: 62 MG/DL — SIGNIFICANT CHANGE UP
MAGNESIUM SERPL-MCNC: 2.1 MG/DL — SIGNIFICANT CHANGE UP (ref 1.6–2.6)
MCHC RBC-ENTMCNC: 29.4 PG — SIGNIFICANT CHANGE UP (ref 27–34)
MCHC RBC-ENTMCNC: 32.6 GM/DL — SIGNIFICANT CHANGE UP (ref 32–36)
MCV RBC AUTO: 90.3 FL — SIGNIFICANT CHANGE UP (ref 80–100)
NRBC # BLD: 0 /100 WBCS — SIGNIFICANT CHANGE UP (ref 0–0)
PCP SPEC-MCNC: SIGNIFICANT CHANGE UP
PHOSPHATE SERPL-MCNC: 2.2 MG/DL — LOW (ref 2.5–4.5)
PLATELET # BLD AUTO: 194 K/UL — SIGNIFICANT CHANGE UP (ref 150–400)
POTASSIUM SERPL-MCNC: 3.5 MMOL/L — SIGNIFICANT CHANGE UP (ref 3.5–5.3)
POTASSIUM SERPL-SCNC: 3.5 MMOL/L — SIGNIFICANT CHANGE UP (ref 3.5–5.3)
PROTHROM AB SERPL-ACNC: 13 SEC — HIGH (ref 10–12.9)
RBC # BLD: 4.01 M/UL — SIGNIFICANT CHANGE UP (ref 3.8–5.2)
RBC # FLD: 12.2 % — SIGNIFICANT CHANGE UP (ref 10.3–14.5)
SODIUM SERPL-SCNC: 139 MMOL/L — SIGNIFICANT CHANGE UP (ref 135–145)
TOTAL CHOLESTEROL/HDL RATIO MEASUREMENT: 2 RATIO — LOW (ref 3.3–7.1)
TRIGL SERPL-MCNC: 29 MG/DL — SIGNIFICANT CHANGE UP (ref 10–149)
WBC # BLD: 11.37 K/UL — HIGH (ref 3.8–10.5)
WBC # FLD AUTO: 11.37 K/UL — HIGH (ref 3.8–10.5)

## 2020-02-04 PROCEDURE — 93306 TTE W/DOPPLER COMPLETE: CPT | Mod: 26

## 2020-02-04 PROCEDURE — 99292 CRITICAL CARE ADDL 30 MIN: CPT

## 2020-02-04 PROCEDURE — 71045 X-RAY EXAM CHEST 1 VIEW: CPT | Mod: 26

## 2020-02-04 PROCEDURE — 70496 CT ANGIOGRAPHY HEAD: CPT | Mod: 26

## 2020-02-04 PROCEDURE — 93888 INTRACRANIAL LIMITED STUDY: CPT | Mod: 26

## 2020-02-04 PROCEDURE — 99291 CRITICAL CARE FIRST HOUR: CPT

## 2020-02-04 PROCEDURE — 93970 EXTREMITY STUDY: CPT | Mod: 26

## 2020-02-04 RX ORDER — PANTOPRAZOLE SODIUM 20 MG/1
40 TABLET, DELAYED RELEASE ORAL DAILY
Refills: 0 | Status: DISCONTINUED | OUTPATIENT
Start: 2020-02-04 | End: 2020-02-04

## 2020-02-04 RX ORDER — SODIUM CHLORIDE 5 G/100ML
1000 INJECTION, SOLUTION INTRAVENOUS
Refills: 0 | Status: DISCONTINUED | OUTPATIENT
Start: 2020-02-04 | End: 2020-02-06

## 2020-02-04 RX ORDER — FENTANYL CITRATE 50 UG/ML
12.5 INJECTION INTRAVENOUS
Refills: 0 | Status: DISCONTINUED | OUTPATIENT
Start: 2020-02-04 | End: 2020-02-05

## 2020-02-04 RX ORDER — FAMOTIDINE 10 MG/ML
20 INJECTION INTRAVENOUS
Refills: 0 | Status: DISCONTINUED | OUTPATIENT
Start: 2020-02-04 | End: 2020-02-05

## 2020-02-04 RX ORDER — LEVETIRACETAM 250 MG/1
500 TABLET, FILM COATED ORAL
Refills: 0 | Status: DISCONTINUED | OUTPATIENT
Start: 2020-02-04 | End: 2020-02-05

## 2020-02-04 RX ORDER — MAGNESIUM SULFATE 500 MG/ML
2 VIAL (ML) INJECTION ONCE
Refills: 0 | Status: COMPLETED | OUTPATIENT
Start: 2020-02-04 | End: 2020-02-04

## 2020-02-04 RX ORDER — NIMODIPINE 60 MG/10ML
60 SOLUTION ORAL EVERY 4 HOURS
Refills: 0 | Status: DISCONTINUED | OUTPATIENT
Start: 2020-02-04 | End: 2020-02-19

## 2020-02-04 RX ORDER — SODIUM CHLORIDE 9 MG/ML
1000 INJECTION INTRAMUSCULAR; INTRAVENOUS; SUBCUTANEOUS
Refills: 0 | Status: DISCONTINUED | OUTPATIENT
Start: 2020-02-04 | End: 2020-02-04

## 2020-02-04 RX ORDER — POTASSIUM CHLORIDE 20 MEQ
40 PACKET (EA) ORAL ONCE
Refills: 0 | Status: COMPLETED | OUTPATIENT
Start: 2020-02-04 | End: 2020-02-04

## 2020-02-04 RX ORDER — FENTANYL CITRATE 50 UG/ML
25 INJECTION INTRAVENOUS
Refills: 0 | Status: DISCONTINUED | OUTPATIENT
Start: 2020-02-04 | End: 2020-02-04

## 2020-02-04 RX ORDER — IPRATROPIUM/ALBUTEROL SULFATE 18-103MCG
3 AEROSOL WITH ADAPTER (GRAM) INHALATION ONCE
Refills: 0 | Status: COMPLETED | OUTPATIENT
Start: 2020-02-04 | End: 2020-02-04

## 2020-02-04 RX ORDER — LEVETIRACETAM 250 MG/1
500 TABLET, FILM COATED ORAL
Refills: 0 | Status: DISCONTINUED | OUTPATIENT
Start: 2020-02-04 | End: 2020-02-04

## 2020-02-04 RX ORDER — SENNA PLUS 8.6 MG/1
2 TABLET ORAL AT BEDTIME
Refills: 0 | Status: DISCONTINUED | OUTPATIENT
Start: 2020-02-04 | End: 2020-02-09

## 2020-02-04 RX ORDER — CALCIUM GLUCONATE 100 MG/ML
2 VIAL (ML) INTRAVENOUS ONCE
Refills: 0 | Status: DISCONTINUED | OUTPATIENT
Start: 2020-02-04 | End: 2020-02-04

## 2020-02-04 RX ORDER — CALCIUM GLUCONATE 100 MG/ML
1 VIAL (ML) INTRAVENOUS ONCE
Refills: 0 | Status: COMPLETED | OUTPATIENT
Start: 2020-02-04 | End: 2020-02-04

## 2020-02-04 RX ORDER — NICARDIPINE HYDROCHLORIDE 30 MG/1
3 CAPSULE, EXTENDED RELEASE ORAL
Qty: 40 | Refills: 0 | Status: DISCONTINUED | OUTPATIENT
Start: 2020-02-04 | End: 2020-02-05

## 2020-02-04 RX ORDER — DEXMEDETOMIDINE HYDROCHLORIDE IN 0.9% SODIUM CHLORIDE 4 UG/ML
0.2 INJECTION INTRAVENOUS
Qty: 200 | Refills: 0 | Status: DISCONTINUED | OUTPATIENT
Start: 2020-02-04 | End: 2020-02-05

## 2020-02-04 RX ORDER — FENTANYL CITRATE 50 UG/ML
50 INJECTION INTRAVENOUS ONCE
Refills: 0 | Status: DISCONTINUED | OUTPATIENT
Start: 2020-02-04 | End: 2020-02-03

## 2020-02-04 RX ORDER — POLYETHYLENE GLYCOL 3350 17 G/17G
17 POWDER, FOR SOLUTION ORAL
Refills: 0 | Status: DISCONTINUED | OUTPATIENT
Start: 2020-02-04 | End: 2020-02-09

## 2020-02-04 RX ORDER — POTASSIUM PHOSPHATE, MONOBASIC POTASSIUM PHOSPHATE, DIBASIC 236; 224 MG/ML; MG/ML
15 INJECTION, SOLUTION INTRAVENOUS ONCE
Refills: 0 | Status: COMPLETED | OUTPATIENT
Start: 2020-02-04 | End: 2020-02-04

## 2020-02-04 RX ADMIN — NIMODIPINE 60 MILLIGRAM(S): 60 SOLUTION ORAL at 17:48

## 2020-02-04 RX ADMIN — Medication 40 MILLIEQUIVALENT(S): at 23:31

## 2020-02-04 RX ADMIN — Medication 3 MILLILITER(S): at 23:23

## 2020-02-04 RX ADMIN — NIMODIPINE 60 MILLIGRAM(S): 60 SOLUTION ORAL at 10:01

## 2020-02-04 RX ADMIN — SODIUM CHLORIDE 500 MILLILITER(S): 9 INJECTION INTRAMUSCULAR; INTRAVENOUS; SUBCUTANEOUS at 00:06

## 2020-02-04 RX ADMIN — CHLORHEXIDINE GLUCONATE 1 APPLICATION(S): 213 SOLUTION TOPICAL at 21:51

## 2020-02-04 RX ADMIN — CHLORHEXIDINE GLUCONATE 15 MILLILITER(S): 213 SOLUTION TOPICAL at 05:44

## 2020-02-04 RX ADMIN — Medication 100 GRAM(S): at 00:05

## 2020-02-04 RX ADMIN — NIMODIPINE 60 MILLIGRAM(S): 60 SOLUTION ORAL at 06:08

## 2020-02-04 RX ADMIN — FENTANYL CITRATE 25 MICROGRAM(S): 50 INJECTION INTRAVENOUS at 09:19

## 2020-02-04 RX ADMIN — Medication 200 GRAM(S): at 02:16

## 2020-02-04 RX ADMIN — Medication 50 GRAM(S): at 01:00

## 2020-02-04 RX ADMIN — POTASSIUM PHOSPHATE, MONOBASIC POTASSIUM PHOSPHATE, DIBASIC 62.5 MILLIMOLE(S): 236; 224 INJECTION, SOLUTION INTRAVENOUS at 23:30

## 2020-02-04 RX ADMIN — FENTANYL CITRATE 25 MICROGRAM(S): 50 INJECTION INTRAVENOUS at 19:15

## 2020-02-04 RX ADMIN — NIMODIPINE 60 MILLIGRAM(S): 60 SOLUTION ORAL at 21:50

## 2020-02-04 RX ADMIN — Medication 100 GRAM(S): at 23:31

## 2020-02-04 RX ADMIN — SODIUM CHLORIDE 70 MILLILITER(S): 9 INJECTION INTRAMUSCULAR; INTRAVENOUS; SUBCUTANEOUS at 23:00

## 2020-02-04 RX ADMIN — NICARDIPINE HYDROCHLORIDE 15 MG/HR: 30 CAPSULE, EXTENDED RELEASE ORAL at 23:00

## 2020-02-04 RX ADMIN — DEXMEDETOMIDINE HYDROCHLORIDE IN 0.9% SODIUM CHLORIDE 2.95 MICROGRAM(S)/KG/HR: 4 INJECTION INTRAVENOUS at 23:00

## 2020-02-04 RX ADMIN — FENTANYL CITRATE 25 MICROGRAM(S): 50 INJECTION INTRAVENOUS at 19:30

## 2020-02-04 RX ADMIN — FENTANYL CITRATE 25 MICROGRAM(S): 50 INJECTION INTRAVENOUS at 09:34

## 2020-02-04 RX ADMIN — CHLORHEXIDINE GLUCONATE 15 MILLILITER(S): 213 SOLUTION TOPICAL at 17:17

## 2020-02-04 RX ADMIN — Medication 650 MILLIGRAM(S): at 06:00

## 2020-02-04 RX ADMIN — FAMOTIDINE 20 MILLIGRAM(S): 10 INJECTION INTRAVENOUS at 17:17

## 2020-02-04 RX ADMIN — NIMODIPINE 60 MILLIGRAM(S): 60 SOLUTION ORAL at 13:45

## 2020-02-04 RX ADMIN — LEVETIRACETAM 400 MILLIGRAM(S): 250 TABLET, FILM COATED ORAL at 05:45

## 2020-02-04 RX ADMIN — Medication 1 DROP(S): at 05:45

## 2020-02-04 RX ADMIN — LEVETIRACETAM 500 MILLIGRAM(S): 250 TABLET, FILM COATED ORAL at 17:17

## 2020-02-04 RX ADMIN — Medication 650 MILLIGRAM(S): at 06:30

## 2020-02-04 NOTE — DIETITIAN INITIAL EVALUATION ADULT. - REASON INDICATOR FOR ASSESSMENT
Pt initial assessment for NSCU length of stay    Source: RN, Medical record Pt initial assessment for NSCU length of stay    Source: RN, Medical record, Family members at bedside (Brother, Mother, Father)

## 2020-02-04 NOTE — DIETITIAN INITIAL EVALUATION ADULT. - ENERGY NEEDS
Pertinent information: 39 year old female found unresponsive on a plane, transferred to [place], then to [facility] on 2/3.  Found R PCOM aneurysm, associated SAH and IPH.  Mannitol dose given yesterday.  Pt now POD#1 s/p emergent right craniectomy, removal of hematoma, and clipping of PCOM aneurysm.  Left EVD placed yesterday (2/3).  Plan for CTA today (2/4).  Pt currently intubated, on CPAP per RN.

## 2020-02-04 NOTE — PHARMACOTHERAPY INTERVENTION NOTE - COMMENTS
Confirmed home medications with patient's significant other, updated in Outpatient Medication Review.    Patient is not on any medications, but takes supplements regularly at unknown doses or frequency.    probiotics  vitamin C  vitamin B    Edilma Vogel, PharmD  PGY-1 Pharmacy Resident  [phone]

## 2020-02-04 NOTE — DIETITIAN INITIAL EVALUATION ADULT. - ETIOLOGY
Increased physiological demand in setting of wound healing Increased physiological demand in setting of brain injury and neurosurgical intervention

## 2020-02-04 NOTE — DIETITIAN INITIAL EVALUATION ADULT. - OTHER INFO
Pt intubated at this time, no family at bedside.  u Pt intubated at this time.  Subjective information obtained from family members at bedside and RN.    PTA: Family noted that pt had good appetite and PO intakes PTA.  Mother notes that pt ate a "generally healthy diet" (fish, salad, steak).  Did not follow any particular meal plan PTA but avoided shellfish (rash) and gluten (stomach upset) due to food allergies.  Family confirmed pt with no history of chewing/swallowing difficulties or GI distress.  Per pharmacy, pt was taking probiotics, vitamin C and vitamin B PTA.    Initial assessment: Pt with no current diet order at time of interview.  RN notes that pt had no GI distress (N+V, constipation, diarrhea) since admission yesterday (2/3).  Per flowsheet, no pressure injuries or edema.    Nutrition education deferred at this time, given that pt is intubated and has no diet order.

## 2020-02-04 NOTE — DIETITIAN INITIAL EVALUATION ADULT. - ADD RECOMMEND
1) If EN initiated, see EN recommendations above.  2) If PO diet initiated, recommend Regular diet (no therapeutic restrictions) as tolerated.  Defer diet consistency to team.  3) Monitor nutritional intake, tolerance to diet prescription, weights, labs and skin integrity.  4) Continue to obtain subjective information as able.  5) RD will remain available to make recommendations as needed.

## 2020-02-04 NOTE — PROGRESS NOTE ADULT - SUBJECTIVE AND OBJECTIVE BOX
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 She arrived intubated and sedated. 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     ADMISSION SCORES:  GCS: 3T  Hunt-Gonzalez: 5  modified Couch: 3  ICH score: 2     ICU Vital Signs Last 24 Hrs  T(C): 36.9 (2020 03:00), Max: 37.9 (2020 19:00)  T(F): 98.4 (2020 03:00), Max: 100.2 (2020 19:00)  HR: 74 (2020 06:00) (54 - 107)  BP: 127/52 (2020 12:52) (127/52 - 144/96)  ABP: 135/85 (2020 06:00) (99/47 - 164/71)  ABP(mean): 97 (2020 06:00) (27 - 108)  RR: 17 (2020 06:00) (13 - 20)  SpO2: 100% (2020 06:00) (100% - 100%)      2020 07:01  -  2020 07:00  --------------------------------------------------------  IN:    dexmedetomidine Infusion: 36 mL    IV PiggyBack: 500 mL    niCARdipine Infusion: 30 mL    propofol Infusion: 36 mL    Sodium Chloride 0.9% IV Bolus: 500 mL    sodium chloride 0.9%.: 975 mL  Total IN: 2077 mL    OUT:    Bulb: 160 mL    External Ventricular Device: 5 mL    Incontinent per Collection Ba mL    Indwelling Catheter - Urethral: 1175 mL  Total OUT: 1490 mL    Total NET: 587 mL    Home Medications:  MEDICATIONS  (STANDING):  chlorhexidine 0.12% Liquid 15 milliLiter(s) Oral Mucosa every 12 hours  chlorhexidine 0.12% Liquid 15 milliLiter(s) Oral Mucosa two times a day  chlorhexidine 4% Liquid 1 Application(s) Topical <User Schedule>  levETIRAcetam  IVPB 500 milliGRAM(s) IV Intermittent every 12 hours  niMODipine Oral Solution 60 milliGRAM(s) Oral every 4 hours  pantoprazole    Tablet 40 milliGRAM(s) Oral before breakfast  sodium chloride 0.9%. 1000 milliLiter(s) (75 mL/Hr) IV Continuous <Continuous>    MEDICATIONS  (PRN):  acetaminophen   Tablet .. 650 milliGRAM(s) Oral every 6 hours PRN Temp greater or equal to 38C (100.4F), Mild Pain (1 - 3)  artificial tears (preservative free) Ophthalmic Solution 1 Drop(s) Both EYES three times a day PRN Dry Eyes  fentaNYL    Injectable 25 MICROGram(s) IV Push every 2 hours PRN severe agitation               11.8   11.37 )-----------( 194      ( 2020 00:10 )             36.2     PT/INR - ( 2020 13:14 )   PT: 11.1 sec;   INR: 0.97 ratio         PTT - ( 2020 13:14 )  PTT:27.1 sec  02-    140  |  109<H>  |  8   ----------------------------<  174<H>  4.1   |  18<L>  |  0.63    Ca    7.6<L>      2020 23:13  Phos  3.0     02-03  Mg     1.7     02-03      TPro  6.6  /  Alb  4.2  /  TBili  0.6  /  DBili  x   /  AST  28  /  ALT  26  /  AlkPhos  48  02-03  Lactate- 3.o  Lipids- NL  HGBA1C- 5.3    CT Head No Cont (20 @ 13:08) >  IMPRESSION: Right temporal parenchymal hemorrhage with diffuse subarachnoid hemorrhage slightly greater on the right. Slight prominence of the left temporal horn.    EXAMINATION:  General:  Intubated.  HEENT:  MMM. Right pupil reactive, left slightly smaller and sluggish. +Corneal on the right, but not left, absent Doll's.  Neuro: No eye opening, no commands, RUE localizes, LUE flexion, RLE w/d, LLE TF  Cards:  RRR.   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. She arrived intubated and sedated. After coming to 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     2/3/20- Clipping R PCOMM              EVD placed      ADMISSION SCORES:  GCS: 3T  Hunt-Gonzalez: 5  modified Couch: 3  ICH score: 2     ICU Vital Signs Last 24 Hrs  T(C): 36.9 (2020 03:00), Max: 37.9 (2020 19:00)  T(F): 98.4 (2020 03:00), Max: 100.2 (2020 19:00)  HR: 74 (2020 06:00) (54 - 107)  BP: 127/52 (2020 12:52) (127/52 - 144/96)  ABP: 135/85 (2020 06:00) (99/47 - 164/71)  ABP(mean): 97 (2020 06:00) (27 - 108)  RR: 17 (2020 06:00) (13 - 20)  SpO2: 100% (2020 06:00) (100% - 100%)      2020 07:01  -  2020 07:00  --------------------------------------------------------  IN:    dexmedetomidine Infusion: 36 mL    IV PiggyBack: 500 mL    niCARdipine Infusion: 30 mL    propofol Infusion: 36 mL    Sodium Chloride 0.9% IV Bolus: 500 mL    sodium chloride 0.9%.: 975 mL  Total IN: 2077 mL    OUT:    Bulb: 160 mL    External Ventricular Device: 5 mL    Incontinent per Collection Ba mL    Indwelling Catheter - Urethral: 1175 mL  Total OUT: 1490 mL    Total NET: 587 mL    Home Medications:  MEDICATIONS  (STANDING):  chlorhexidine 0.12% Liquid 15 milliLiter(s) Oral Mucosa every 12 hours  chlorhexidine 0.12% Liquid 15 milliLiter(s) Oral Mucosa two times a day  chlorhexidine 4% Liquid 1 Application(s) Topical <User Schedule>  levETIRAcetam  IVPB 500 milliGRAM(s) IV Intermittent every 12 hours  niMODipine Oral Solution 60 milliGRAM(s) Oral every 4 hours  pantoprazole    Tablet 40 milliGRAM(s) Oral before breakfast  sodium chloride 0.9%. 1000 milliLiter(s) (75 mL/Hr) IV Continuous <Continuous>    MEDICATIONS  (PRN):  acetaminophen   Tablet .. 650 milliGRAM(s) Oral every 6 hours PRN Temp greater or equal to 38C (100.4F), Mild Pain (1 - 3)  artificial tears (preservative free) Ophthalmic Solution 1 Drop(s) Both EYES three times a day PRN Dry Eyes  fentaNYL    Injectable 25 MICROGram(s) IV Push every 2 hours PRN severe agitation    TCD - Nl                11.8   11.37 )-----------( 194      ( 2020 00:10 )             36.2     PT/INR - ( 2020 13:14 )   PT: 11.1 sec;   INR: 0.97 ratio         PTT - ( 2020 13:14 )  PTT:27.1 sec  02-03    140  |  109<H>  |  8   ----------------------------<  174<H>  4.1   |  18<L>  |  0.63    Ca    7.6<L>      2020 23:13  Phos  3.0     02-03  Mg     1.7     -03      TPro  6.6  /  Alb  4.2  /  TBili  0.6  /  DBili  x   /  AST  28  /  ALT  26  /  AlkPhos  48  02-03  Lactate- 3.o  Lipids- NL  HGBA1C- 5.3    ABG - ( 2020 23:09 )  pH, Arterial: 7.40  pH, Blood: x     /  pCO2: 33    /  pO2: 211   / HCO3: 20    / Base Excess: -3.8  /  SaO2: 100       CT Head No Cont (20 @ 13:08) >  IMPRESSION: Right temporal parenchymal hemorrhage with diffuse subarachnoid hemorrhage slightly greater on the right. Slight prominence of the left temporal horn.    EXAMINATION:  General:  Intubated.  HEENT:  MMM. Right pupil reactive, left slightly smaller and sluggish. +Corneal on the right, but not left, absent Doll's.  Neuro: No eye opening, no commands, RUE localizes, LUE flexion, RLE w/d, LLE TF  Cards:  RRR.   Respiratory: Clear  Abdomen:  soft, +BS  Extremities:  no edema

## 2020-02-04 NOTE — DIETITIAN INITIAL EVALUATION ADULT. - PHYSICAL APPEARANCE
well nourished/other (specify)/Height: 5'3", Current weight: 130 lbs +/- 10%, BMI: 23, IBW: 115 lbs +/- 10%, 113%IBW Weight history: Family unable to recall UBW but denies any recent significant weight changes for the patient; father notes pt has had the same weight "for 15 years."    Per interview observation, no visual signs of fat loss or muscle wasting.

## 2020-02-04 NOTE — SPEECH LANGUAGE PATHOLOGY EVALUATION - SLP DIAGNOSIS
Consult received and appreciated. As per discussion with LAY Kirk, Pt remains intubated, on sedation. Therefore not appropriate for assessment of communication at this time. Will f/u at a later date, as appropriate.

## 2020-02-04 NOTE — PROGRESS NOTE ADULT - SUBJECTIVE AND OBJECTIVE BOX
PBD #1 pcomm clip, CTA done today showing patent pcomm    vitals/labs/meds/imaging reviewed  intubated EO to voice, nods appropriately, oriented x3 given choice, PERRL, EOMI, RUE 4+/5, RLE 3/5, L spont moves fingers/toes but ext/TF with stimulation

## 2020-02-04 NOTE — PROGRESS NOTE ADULT - ASSESSMENT
s/p Rt craniectomy, clipping of Pcomm aneurysm, evacuation of hematoma  - s/p EVD@ 15. ICP low teens. CTH with tract hemorrhage.  Repeat in AM  - Angio at some point

## 2020-02-04 NOTE — PROGRESS NOTE ADULT - ASSESSMENT
ASSESSMENT/PLAN: HH5, MF 3 subarachnoid hemorrhage, post-bleed day 1  s/p emergent R craniectomy and removal of hematoma, and clipping of PCOM aneurysm (2/3/2020)    NEURO:  q1h neuro checks  EVD in place for monitoring  CT tomorrow AM  Stroke code measures: A1c, LDL  Delayed Cerebral Ischemia: TCDs, euvolemia, Nimodipine  Given Keppra 1g, and placed on Keppra 500 BID   EEG r/o subclinical seizures  Sedation w/ Precedex if needed  Bedrest    PULM:  Intubated  Peridex  CXR, ABG  Wean vent as tolerated    CARDS:  -160mmHg  Nicardipine gtt as needed  TTE  EKG, Trop    RENAL:  IVF    GASTRO:  NPO  Bowel regimen  ---> Stress ulcer prophylaxis:  PPI    HEME:  monitor H/H    ---> DVT prophylaxis: SCDs, hold anticoagulation given fresh post-op    ENDO:  euglycemia    ID:  Monitor for fevers    Code status:  Full code  Disposition:  ICU    Updated family at bedside.    This patient was at high risk of neurologic deterioration and/or death due to: re-hemorrhage, seizures, DCI.     Time spent:  45 minutes ASSESSMENT/PLAN: HH5, MF 3 subarachnoid hemorrhage, post-bleed day 1  s/p emergent R craniectomy and removal of hematoma, and clipping of PCOM aneurysm (2/3/2020)    NEURO:  q1h neuro checks  EVD in place for monitoring  CT/ CTA today   Stroke code measures: A1c, LDL  Delayed Cerebral Ischemia: TCDs, euvolemia, Nimodipine  Given Keppra 1g, and placed on Keppra 500 BID   EEG r/o subclinical seizures  Sedation w/ Precedex if needed  Bedrest    PULM:  Intubated  Peridex  CXR, ABG  Wean vent as tolerated    CARDS:  -160mmHg  Nicardipine gtt as needed  TTE  EKG, Trop    RENAL:  IVF    GASTRO:  NPO  Bowel regimen  ---> Stress ulcer prophylaxis:  PPI    HEME:  monitor H/H    ---> DVT prophylaxis: SCDs, hold anticoagulation given fresh post-op    ENDO:  euglycemia    ID:  Monitor for fevers    Code status:  Full code  Disposition:  ICU    Updated family at bedside.    This patient was at high risk of neurologic deterioration and/or death due to: re-hemorrhage, seizures, DCI.     Time spent:  45 minutes ASSESSMENT/PLAN: HH5, MF 3 subarachnoid hemorrhage, post-bleed day 1  s/p emergent R craniectomy and removal of hematoma, and clipping of PCOM aneurysm (2/3/2020)    NEURO:  q1h neuro checks  EVD in place for monitoring  CT/ CTA today   Stroke code measures: A1c, LDL- as above   Delayed Cerebral Ischemia: TCDs, euvolemia, Nimodipine  Given Keppra 1g, and placed on Keppra 500 BID   Sedation  Precedex if needed  POT/OT - splint LUE and LLE    PULM:  Intubated  Wean CPAP - 5/5   Peridex  CXR,- NL  ABG- Nl   Wean vent as tolerated  Repeat Lactate     CARDS:  -160mmHg  Nicardipine gtt as needed  TTE- P   EKG, Trop    RENAL:  IVF- total fluids at 70 cc/hr     GASTRO:  NPO  Bowel regimen  ---> Stress ulcer prophylaxis:  PPI    HEME:  monitor H/H - No CBC    ---> DVT prophylaxis: SCDs, hold anticoagulation given fresh post-op- start after CT in am     ENDO:  euglycemia    ID:  Monitor for fevers    Code status:  Full code  Disposition:  ICU    Updated family at bedside.    This patient was at high risk of neurologic deterioration and/or death due to: re-hemorrhage, seizures, DCI.     Time spent:  45 minutes ASSESSMENT/PLAN: HH5, MF 3 subarachnoid hemorrhage, post-bleed day 1  s/p emergent R craniectomy and removal of hematoma, and clipping of PCOM aneurysm (2/3/2020)    NEURO:  q1h neuro checks  EVD in place for monitoring  CT/ CTA today   Stroke code measures: A1c, LDL- as above   Delayed Cerebral Ischemia: TCDs, euvolemia, Nimodipine  Given Keppra 1g, and placed on Keppra 500 BID   Sedation  Precedex if needed  POT/OT - splint LUE and LLE  Check TOX screen    PULM:  Intubated  Wean CPAP - 5/5   Peridex  CXR,- NL  ABG- Nl   Wean vent as tolerated  Repeat Lactate     CARDS:  -160mmHg  Nicardipine gtt as needed  TTE- P   EKG, Trop    RENAL:  IVF- total fluids at 70 cc/hr     GASTRO:  NPO  Bowel regimen  ---> Stress ulcer prophylaxis:  PPI    HEME:  monitor H/H - No CBC    ---> DVT prophylaxis: SCDs, hold anticoagulation given fresh post-op- start after CT in am     ENDO:  euglycemia    ID:  Monitor for fevers    Code status:  Full code  Disposition:  ICU    Updated family at bedside.    This patient was at high risk of neurologic deterioration and/or death due to: re-hemorrhage, seizures, DCI.     Time spent:  45 minutes

## 2020-02-04 NOTE — DIETITIAN INITIAL EVALUATION ADULT. - FACTORS AFF FOOD INTAKE
Pt with no diet order at this time; Currently intubated Pt with no diet order at this time; Intubation status

## 2020-02-04 NOTE — PROGRESS NOTE ADULT - SUBJECTIVE AND OBJECTIVE BOX
Patient seen and examined at bedside.    --Anticoagulation--    T(C): 37.9 (02-03-20 @ 19:00), Max: 37.9 (02-03-20 @ 19:00)  HR: 74 (02-04-20 @ 00:18) (72 - 107)  BP: 127/52 (02-03-20 @ 12:52) (127/52 - 144/96)  RR: 17 (02-03-20 @ 21:30) (13 - 20)  SpO2: 100% (02-04-20 @ 00:18) (100% - 100%)  Wt(kg): --    Exam:  Intubated,   Pupils R 3 reactive, L 2 sluggish, R corneal, L no corneal, cough and gag+ve   RUE localizing, LUE flexor posturing, LLE TF, RLE wds

## 2020-02-04 NOTE — DIETITIAN INITIAL EVALUATION ADULT. - ENTERAL
1) If EN initiated, start Jevity 1.2 @20 ml/hr and advance by 10 ml/hr q 4-6 hours as tolerated by patient to reach goal rate of 70 ml/hr x 24 hours.  Tube feeding regimen at goal rate provides 2016 calories, 93 grams of protein, 34 calories/kg and 1.57 grams of protein/kg of dosing wt 59 kg.

## 2020-02-04 NOTE — AIRWAY REMOVAL NOTE  ADULT & PEDS - ARTIFICAL AIRWAY REMOVAL COMMENTS
Written order for extubation verified. The patient was identified by full name and birth date compared to the identification band. Present during the procedure was Roxane RIVAS.

## 2020-02-04 NOTE — PROGRESS NOTE ADULT - ASSESSMENT
[id number] R temporal ICH s/p craniectomy and clip of pcomm aneurysm PBD#1    repeat CTH in am for tract hemorrhage follow up  pre op for clip check angio  minimized sedation  adequate pain control  VSP/DCI  d/c keppra  -160; nicardipine gtt prn  intubated, VAP bundle, pressure support as tolerated  bowel regimen  IVF  SCDs  monitor for fevers  maintain euglycemia    family--mother and sister updated at bedside

## 2020-02-04 NOTE — CHART NOTE - NSCHARTNOTEFT_GEN_A_CORE
Transcranial doppler exam #1 (2/4/2020) 1045am  mean velocity  cm/sec                              Left         Right  MIKHAIL                    57           74  MCA                   55           72  PCA                     23,24       x  VERT -Could not insonate, patient is intubated.  BA                                x  Technically difficult study.  Official report to follow.  Karen

## 2020-02-05 ENCOUNTER — TRANSCRIPTION ENCOUNTER (OUTPATIENT)
Age: 40
End: 2020-02-05

## 2020-02-05 ENCOUNTER — APPOINTMENT (OUTPATIENT)
Dept: NEUROLOGY | Facility: CLINIC | Age: 40
End: 2020-02-05
Payer: COMMERCIAL

## 2020-02-05 LAB
ANION GAP SERPL CALC-SCNC: 10 MMOL/L — SIGNIFICANT CHANGE UP (ref 5–17)
ANION GAP SERPL CALC-SCNC: 10 MMOL/L — SIGNIFICANT CHANGE UP (ref 5–17)
ANION GAP SERPL CALC-SCNC: 11 MMOL/L — SIGNIFICANT CHANGE UP (ref 5–17)
BASOPHILS # BLD AUTO: 0.04 K/UL — SIGNIFICANT CHANGE UP (ref 0–0.2)
BASOPHILS NFR BLD AUTO: 0.3 % — SIGNIFICANT CHANGE UP (ref 0–2)
BUN SERPL-MCNC: 5 MG/DL — LOW (ref 7–23)
BUN SERPL-MCNC: 6 MG/DL — LOW (ref 7–23)
BUN SERPL-MCNC: 6 MG/DL — LOW (ref 7–23)
CALCIUM SERPL-MCNC: 8 MG/DL — LOW (ref 8.4–10.5)
CALCIUM SERPL-MCNC: 8.2 MG/DL — LOW (ref 8.4–10.5)
CALCIUM SERPL-MCNC: 8.5 MG/DL — SIGNIFICANT CHANGE UP (ref 8.4–10.5)
CHLORIDE SERPL-SCNC: 107 MMOL/L — SIGNIFICANT CHANGE UP (ref 96–108)
CHLORIDE SERPL-SCNC: 112 MMOL/L — HIGH (ref 96–108)
CHLORIDE SERPL-SCNC: 112 MMOL/L — HIGH (ref 96–108)
CO2 SERPL-SCNC: 20 MMOL/L — LOW (ref 22–31)
CO2 SERPL-SCNC: 21 MMOL/L — LOW (ref 22–31)
CO2 SERPL-SCNC: 22 MMOL/L — SIGNIFICANT CHANGE UP (ref 22–31)
CREAT SERPL-MCNC: 0.44 MG/DL — LOW (ref 0.5–1.3)
CREAT SERPL-MCNC: 0.44 MG/DL — LOW (ref 0.5–1.3)
CREAT SERPL-MCNC: 0.5 MG/DL — SIGNIFICANT CHANGE UP (ref 0.5–1.3)
EOSINOPHIL # BLD AUTO: 0 K/UL — SIGNIFICANT CHANGE UP (ref 0–0.5)
EOSINOPHIL NFR BLD AUTO: 0 % — SIGNIFICANT CHANGE UP (ref 0–6)
GLUCOSE SERPL-MCNC: 129 MG/DL — HIGH (ref 70–99)
GLUCOSE SERPL-MCNC: 130 MG/DL — HIGH (ref 70–99)
GLUCOSE SERPL-MCNC: 132 MG/DL — HIGH (ref 70–99)
HCT VFR BLD CALC: 32.1 % — LOW (ref 34.5–45)
HGB BLD-MCNC: 10.5 G/DL — LOW (ref 11.5–15.5)
IMM GRANULOCYTES NFR BLD AUTO: 0.5 % — SIGNIFICANT CHANGE UP (ref 0–1.5)
LYMPHOCYTES # BLD AUTO: 1.4 K/UL — SIGNIFICANT CHANGE UP (ref 1–3.3)
LYMPHOCYTES # BLD AUTO: 9.4 % — LOW (ref 13–44)
MAGNESIUM SERPL-MCNC: 2 MG/DL — SIGNIFICANT CHANGE UP (ref 1.6–2.6)
MCHC RBC-ENTMCNC: 29.9 PG — SIGNIFICANT CHANGE UP (ref 27–34)
MCHC RBC-ENTMCNC: 32.7 GM/DL — SIGNIFICANT CHANGE UP (ref 32–36)
MCV RBC AUTO: 91.5 FL — SIGNIFICANT CHANGE UP (ref 80–100)
MONOCYTES # BLD AUTO: 0.94 K/UL — HIGH (ref 0–0.9)
MONOCYTES NFR BLD AUTO: 6.3 % — SIGNIFICANT CHANGE UP (ref 2–14)
NEUTROPHILS # BLD AUTO: 12.5 K/UL — HIGH (ref 1.8–7.4)
NEUTROPHILS NFR BLD AUTO: 83.5 % — HIGH (ref 43–77)
NRBC # BLD: 0 /100 WBCS — SIGNIFICANT CHANGE UP (ref 0–0)
PHOSPHATE SERPL-MCNC: 2.2 MG/DL — LOW (ref 2.5–4.5)
PLATELET # BLD AUTO: 171 K/UL — SIGNIFICANT CHANGE UP (ref 150–400)
POTASSIUM SERPL-MCNC: 3.6 MMOL/L — SIGNIFICANT CHANGE UP (ref 3.5–5.3)
POTASSIUM SERPL-MCNC: 3.6 MMOL/L — SIGNIFICANT CHANGE UP (ref 3.5–5.3)
POTASSIUM SERPL-MCNC: 3.8 MMOL/L — SIGNIFICANT CHANGE UP (ref 3.5–5.3)
POTASSIUM SERPL-SCNC: 3.6 MMOL/L — SIGNIFICANT CHANGE UP (ref 3.5–5.3)
POTASSIUM SERPL-SCNC: 3.6 MMOL/L — SIGNIFICANT CHANGE UP (ref 3.5–5.3)
POTASSIUM SERPL-SCNC: 3.8 MMOL/L — SIGNIFICANT CHANGE UP (ref 3.5–5.3)
RBC # BLD: 3.51 M/UL — LOW (ref 3.8–5.2)
RBC # FLD: 12.5 % — SIGNIFICANT CHANGE UP (ref 10.3–14.5)
SODIUM SERPL-SCNC: 139 MMOL/L — SIGNIFICANT CHANGE UP (ref 135–145)
SODIUM SERPL-SCNC: 143 MMOL/L — SIGNIFICANT CHANGE UP (ref 135–145)
SODIUM SERPL-SCNC: 143 MMOL/L — SIGNIFICANT CHANGE UP (ref 135–145)
WBC # BLD: 14.96 K/UL — HIGH (ref 3.8–10.5)
WBC # FLD AUTO: 14.96 K/UL — HIGH (ref 3.8–10.5)

## 2020-02-05 PROCEDURE — 36226 PLACE CATH VERTEBRAL ART: CPT | Mod: 50,58

## 2020-02-05 PROCEDURE — 70450 CT HEAD/BRAIN W/O DYE: CPT | Mod: 26

## 2020-02-05 PROCEDURE — 99292 CRITICAL CARE ADDL 30 MIN: CPT

## 2020-02-05 PROCEDURE — 36224 PLACE CATH CAROTD ART: CPT | Mod: 50,58

## 2020-02-05 PROCEDURE — 76377 3D RENDER W/INTRP POSTPROCES: CPT | Mod: 26

## 2020-02-05 PROCEDURE — 36227 PLACE CATH XTRNL CAROTID: CPT | Mod: 50,58

## 2020-02-05 PROCEDURE — 99291 CRITICAL CARE FIRST HOUR: CPT

## 2020-02-05 RX ORDER — OXYCODONE HYDROCHLORIDE 5 MG/1
10 TABLET ORAL EVERY 4 HOURS
Refills: 0 | Status: DISCONTINUED | OUTPATIENT
Start: 2020-02-05 | End: 2020-02-12

## 2020-02-05 RX ORDER — POTASSIUM CHLORIDE 20 MEQ
40 PACKET (EA) ORAL ONCE
Refills: 0 | Status: COMPLETED | OUTPATIENT
Start: 2020-02-05 | End: 2020-02-05

## 2020-02-05 RX ORDER — SODIUM CHLORIDE 5 G/100ML
500 INJECTION, SOLUTION INTRAVENOUS
Refills: 0 | Status: DISCONTINUED | OUTPATIENT
Start: 2020-02-05 | End: 2020-02-05

## 2020-02-05 RX ORDER — IPRATROPIUM/ALBUTEROL SULFATE 18-103MCG
3 AEROSOL WITH ADAPTER (GRAM) INHALATION EVERY 6 HOURS
Refills: 0 | Status: DISCONTINUED | OUTPATIENT
Start: 2020-02-05 | End: 2020-02-05

## 2020-02-05 RX ORDER — SODIUM CHLORIDE 9 MG/ML
500 INJECTION INTRAMUSCULAR; INTRAVENOUS; SUBCUTANEOUS ONCE
Refills: 0 | Status: COMPLETED | OUTPATIENT
Start: 2020-02-05 | End: 2020-02-05

## 2020-02-05 RX ORDER — ENOXAPARIN SODIUM 100 MG/ML
40 INJECTION SUBCUTANEOUS
Refills: 0 | Status: DISCONTINUED | OUTPATIENT
Start: 2020-02-05 | End: 2020-02-14

## 2020-02-05 RX ORDER — OXYCODONE HYDROCHLORIDE 5 MG/1
5 TABLET ORAL EVERY 4 HOURS
Refills: 0 | Status: DISCONTINUED | OUTPATIENT
Start: 2020-02-05 | End: 2020-02-12

## 2020-02-05 RX ORDER — IPRATROPIUM/ALBUTEROL SULFATE 18-103MCG
3 AEROSOL WITH ADAPTER (GRAM) INHALATION EVERY 6 HOURS
Refills: 0 | Status: DISCONTINUED | OUTPATIENT
Start: 2020-02-05 | End: 2020-02-28

## 2020-02-05 RX ADMIN — NIMODIPINE 60 MILLIGRAM(S): 60 SOLUTION ORAL at 05:33

## 2020-02-05 RX ADMIN — NIMODIPINE 60 MILLIGRAM(S): 60 SOLUTION ORAL at 17:50

## 2020-02-05 RX ADMIN — NIMODIPINE 60 MILLIGRAM(S): 60 SOLUTION ORAL at 14:19

## 2020-02-05 RX ADMIN — ENOXAPARIN SODIUM 40 MILLIGRAM(S): 100 INJECTION SUBCUTANEOUS at 21:28

## 2020-02-05 RX ADMIN — Medication 650 MILLIGRAM(S): at 05:30

## 2020-02-05 RX ADMIN — CHLORHEXIDINE GLUCONATE 1 APPLICATION(S): 213 SOLUTION TOPICAL at 21:28

## 2020-02-05 RX ADMIN — Medication 40 MILLIEQUIVALENT(S): at 14:19

## 2020-02-05 RX ADMIN — SODIUM CHLORIDE 1000 MILLILITER(S): 9 INJECTION INTRAMUSCULAR; INTRAVENOUS; SUBCUTANEOUS at 23:27

## 2020-02-05 RX ADMIN — FAMOTIDINE 20 MILLIGRAM(S): 10 INJECTION INTRAVENOUS at 05:33

## 2020-02-05 RX ADMIN — NIMODIPINE 60 MILLIGRAM(S): 60 SOLUTION ORAL at 21:28

## 2020-02-05 RX ADMIN — SODIUM CHLORIDE 50 MILLILITER(S): 5 INJECTION, SOLUTION INTRAVENOUS at 03:00

## 2020-02-05 RX ADMIN — NIMODIPINE 60 MILLIGRAM(S): 60 SOLUTION ORAL at 01:37

## 2020-02-05 RX ADMIN — NIMODIPINE 60 MILLIGRAM(S): 60 SOLUTION ORAL at 10:43

## 2020-02-05 RX ADMIN — SODIUM CHLORIDE 30 MILLILITER(S): 5 INJECTION, SOLUTION INTRAVENOUS at 07:00

## 2020-02-05 RX ADMIN — Medication 650 MILLIGRAM(S): at 06:00

## 2020-02-05 NOTE — CHART NOTE - NSCHARTNOTEFT_GEN_A_CORE
Interventional Neuro Radiology  Pre-Procedure Note ASTER    This is a 39y ____ hand dominant Female      HPI:  39F unknown medical Hx, found unresponsive on airplane, transferred to [place], subsequently transferred to [facility].    Arrive intubated and sedated, no family or medical Hx available. (03 Feb 2020 14:10)      Allergies: Gluten (Stomach Upset)  penicillins (Anaphylaxis; Hives)  sulfa drugs (Unknown)      PAST MEDICAL & SURGICAL HISTORY:  No pertinent past medical history  No significant past surgical history      Social History:     FAMILY HISTORY:      Current Medications: acetaminophen   Tablet .. 650 milliGRAM(s) Oral every 6 hours PRN  albuterol/ipratropium for Nebulization. 3 milliLiter(s) Nebulizer every 6 hours  artificial tears (preservative free) Ophthalmic Solution 1 Drop(s) Both EYES three times a day PRN  chlorhexidine 4% Liquid 1 Application(s) Topical <User Schedule>  famotidine    Tablet 20 milliGRAM(s) Oral two times a day  niCARdipine Infusion 3 mG/Hr IV Continuous <Continuous>  niMODipine Oral Solution 60 milliGRAM(s) Oral every 4 hours  oxyCODONE    IR 5 milliGRAM(s) Oral every 4 hours PRN  oxyCODONE    IR 10 milliGRAM(s) Oral every 4 hours PRN  polyethylene glycol 3350 17 Gram(s) Oral two times a day  senna 2 Tablet(s) Oral at bedtime  sodium chloride 2% . 1000 milliLiter(s) IV Continuous <Continuous>  sodium chloride 3%. 500 milliLiter(s) IV Continuous <Continuous>      Labs:                         11.8   11.37 )-----------( 194      ( 04 Feb 2020 00:10 )             36.2       02-05    139  |  107  |  6<L>  ----------------------------<  129<H>  3.6   |  22  |  0.50    Ca    8.2<L>      05 Feb 2020 05:46  Phos  2.2     02-05  Mg     2.0     02-05    TPro  6.6  /  Alb  4.2  /  TBili  0.6  /  DBili  x   /  AST  28  /  ALT  26  /  AlkPhos  48  02-03      HCG Quantitative, Serum: <2.0 mIU/mL (02-04 @ 21:09)      Blood Bank: 02-03-20  O  --  Positive        Assessment/Plan:     This is a 39y  year old right  hand dominant Female  presents with   Patient presents to neuro-IR for selective cerebral angiography.   Procedure, goals, risks, benefits and alternatives  were discussed with patient and patient's family.  All questions were answered.  Risks include but are not limited to stroke, vessel injury, hemorrhage, and or right  groin hematoma.  Patient demonstrates understanding  of all risks involved with this procedure and wishes to continue.   Appropriate  content was obtained from patient and consent is in the patient's chart. Interventional Neuro Radiology  Pre-Procedure Note PAELVI    This is a 39 year old right hand dominant female with unknown medical Hx, found unresponsive on airplane, transferred to [place], subsequently transferred to [facility].    Arrive intubated and sedated, no family or medical Hx available. (03 Feb 2020 14:10)    Allergies: Gluten (Stomach Upset)  penicillins (Anaphylaxis; Hives)  sulfa drugs (Unknown)  no pertinent past medical history  no significant past surgical history  Social History:   FAMILY HISTORY:    Current Medications: acetaminophen   Tablet 650 milliGRAM(s) Oral every 6 hours PRN  albuterol/ipratropium for Nebulization. 3 milliLiter(s) Nebulizer every 6 hours  artificial tears (preservative free) Ophthalmic Solution 1 Drop(s) Both EYES three times a day PRN  chlorhexidine 4% Liquid 1 Application(s) Topical <User Schedule>  famotidine    Tablet 20 milliGRAM(s) Oral two times a day  niCARdipine Infusion 3 mG/Hr IV Continuous <Continuous>  niMODipine Oral Solution 60 milliGRAM(s) Oral every 4 hours  oxyCODONE    IR 5 milliGRAM(s) Oral every 4 hours PRN  oxyCODONE    IR 10 milliGRAM(s) Oral every 4 hours PRN  polyethylene glycol 3350 17 Gram(s) Oral two times a day  senna 2 Tablet(s) Oral at bedtime  sodium chloride 2% . 1000 milliLiter(s) IV Continuous   sodium chloride 3%. 500 milliLiter(s) IV Continuous       Labs:                         11.8   11.37 )-----------( 194      ( 04 Feb 2020 00:10 )             36.2       02-05    139  |  107  |  6<L>  ----------------------------<  129<H>  3.6   |  22  |  0.50    Ca    8.2<L>      05 Feb 2020 05:46  Phos  2.2     02-05  Mg     2.0     02-05    TPro  6.6  /  Alb  4.2  /  TBili  0.6  /  DBili  x   /  AST  28  /  ALT  26  /  AlkPhos  48  02-03    HCG Quantitative, Serum: <2.0 mIU/mL (02-04 @ 21:09)    Blood Bank: 0 positive available        Assessment/Plan:   This is a 39y  year old right  hand dominant Female  presents with   Patient presents to neuro-IR for selective cerebral angiography.   Procedure, goals, risks, benefits and alternatives  were discussed with patient and patient's family.  All questions were answered.  Risks include but are not limited to stroke, vessel injury, hemorrhage, and or right  groin hematoma.  Patient demonstrates understanding  of all risks involved with this procedure and wishes to continue.   Appropriate  content was obtained from patient and consent is in the patient's chart. Interventional Neuro Radiology  Pre-Procedure Note ASTER    This is a 39 year old right hand dominant female with unknown medical Hx, found unresponsive on airplane, transferred to [place], subsequently transferred to [facility].    Arrive intubated and sedated, no family or medical Hx available. (03 Feb 2020 14:10)    Allergies: Gluten (Stomach Upset)  penicillins (Anaphylaxis; Hives)  sulfa drugs (Unknown)  PMHX: no pertinent past medical history  no significant past surgical history  Social History: non-smoker   FAMILY HISTORY: non-contributory     Current Medications: acetaminophen   Tablet 650 milliGRAM(s) Oral every 6 hours PRN  albuterol/ipratropium for Nebulization. 3 milliLiter(s) Nebulizer every 6 hours  artificial tears (preservative free) Ophthalmic Solution 1 Drop(s) Both EYES three times a day PRN  chlorhexidine 4% Liquid 1 Application(s) Topical <User Schedule>  famotidine    Tablet 20 milliGRAM(s) Oral two times a day  niCARdipine Infusion 3 mG/Hr IV Continuous <Continuous>  niMODipine Oral Solution 60 milliGRAM(s) Oral every 4 hours  oxyCODONE    IR 5 milliGRAM(s) Oral every 4 hours PRN  oxyCODONE    IR 10 milliGRAM(s) Oral every 4 hours PRN  polyethylene glycol 3350 17 Gram(s) Oral two times a day  senna 2 Tablet(s) Oral at bedtime  sodium chloride 2% . 1000 milliLiter(s) IV Continuous   sodium chloride 3%. 500 milliLiter(s) IV Continuous       Labs:                         11.8   11.37 )-----------( 194      ( 04 Feb 2020 00:10 )             36.2       02-05    139  |  107  |  6<L>  ----------------------------<  129<H>  3.6   |  22  |  0.50    Ca    8.2<L>      05 Feb 2020 05:46  Phos  2.2     02-05  Mg     2.0     02-05    TPro  6.6  /  Alb  4.2  /  TBili  0.6  /  DBili  x   /  AST  28  /  ALT  26  /  AlkPhos  48  02-03    HCG Quantitative, Serum: <2.0 mIU/mL (02-04 @ 21:09)    Blood Bank: 0 positive available        Assessment/Plan:   This is a 39y  year old right  hand dominant Female  presents with   Patient presents to neuro-IR for selective cerebral angiography.   Procedure, goals, risks, benefits and alternatives  were discussed with patient and patient's family.  All questions were answered.  Risks include but are not limited to stroke, vessel injury, hemorrhage, and or right  groin hematoma.  Patient demonstrates understanding  of all risks involved with this procedure and wishes to continue.   Appropriate  content was obtained from patient and consent is in the patient's chart. Interventional Neuro Radiology  Pre-Procedure Note PA-C    This is a 39 year old right hand dominant female with unknown medical Hx, found unresponsive on airplane, transferred to [place], subsequently transferred to [facility]. Patient is now transported to Neuro IR for a selective cerebral angiography to monitor aneurysm.     Allergies: Gluten (Stomach Upset), penicillins (Anaphylaxis; Hives), sulfa drugs (Unknown)  PMHX: no pertinent past medical history  PSHX: no significant past surgical history  Social History: non-smoker   FAMILY HISTORY: non-contributory     Current Medications: acetaminophen   Tablet 650 milliGRAM(s) Oral every 6 hours PRN  albuterol/ipratropium for Nebulization. 3 milliLiter(s) Nebulizer every 6 hours  artificial tears (preservative free) Ophthalmic Solution 1 Drop(s) Both EYES three times a day PRN  chlorhexidine 4% Liquid 1 Application(s) Topical <User Schedule>  famotidine    Tablet 20 milliGRAM(s) Oral two times a day  niCARdipine Infusion 3 mG/Hr IV Continuous <Continuous>  niMODipine Oral Solution 60 milliGRAM(s) Oral every 4 hours  oxyCODONE    IR 5 milliGRAM(s) Oral every 4 hours PRN  oxyCODONE    IR 10 milliGRAM(s) Oral every 4 hours PRN  polyethylene glycol 3350 17 Gram(s) Oral two times a day  senna 2 Tablet(s) Oral at bedtime  sodium chloride 2% . 1000 milliLiter(s) IV Continuous   sodium chloride 3%. 500 milliLiter(s) IV Continuous       Labs:                         11.8   11.37 )-----------( 194      ( 04 Feb 2020 00:10 )             36.2       02-05    139  |  107  |  6<L>  ----------------------------<  129<H>  3.6   |  22  |  0.50    Ca    8.2<L>      05 Feb 2020 05:46  Phos  2.2     02-05  Mg     2.0     02-05    TPro  6.6  /  Alb  4.2  /  TBili  0.6  /  DBili  x   /  AST  28  /  ALT  26  /  AlkPhos  48  02-03    HCG Quantitative, Serum: <2.0 mIU/mL (02-04 @ 21:09)    Blood Bank: 0 positive available        Assessment/Plan:   This is a 39y  year old right  hand dominant Female  presents with   Patient presents to neuro-IR for selective cerebral angiography.   Procedure, goals, risks, benefits and alternatives  were discussed with patient and patient's family.  All questions were answered.  Risks include but are not limited to stroke, vessel injury, hemorrhage, and or right  groin hematoma.  Patient demonstrates understanding  of all risks involved with this procedure and wishes to continue.   Appropriate  content was obtained from patient and consent is in the patient's chart.

## 2020-02-05 NOTE — PROGRESS NOTE ADULT - SUBJECTIVE AND OBJECTIVE BOX
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 She arrived intubated and sedated. 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     ADMISSION SCORES:  GCS: 3T  Hunt-Gonzalez: 5  modified Couch: 3  ICH score: 2    HOSPITAL COURSE:  2/3/20- Clipping R PCOMM              EVD placed   20 - Extubated overnight    ICU Vital Signs Last 24 Hrs  T(C): 37.7 (2020 05:00), Max: 37.7 (2020 05:00)  T(F): 99.9 (2020 05:00), Max: 99.9 (2020 05:00)  HR: 84 (2020 06:00) (66 - 107)  BP: --  BP(mean): --  ABP: 132/48 (2020 06:00) (111/50 - 155/59)  ABP(mean): 74 (2020 06:00) (68 - 101)  RR: 22 (2020 06:00) (9 - 25)  SpO2: 100% (2020 06:00) (98% - 100%)    I&O's Detail    2020 07:01  -  2020 07:00  --------------------------------------------------------  IN:    dexmedetomidine Infusion: 40 mL    Enteral Tube Flush: 370 mL    IV PiggyBack: 300 mL    niCARdipine Infusion: 340 mL    sodium chloride 0.9%: 850 mL    sodium chloride 2% .: 400 mL    sodium chloride 3%.: 30 mL  Total IN: 2330 mL    OUT:    Bulb: 100 mL    External Ventricular Device: 3 mL    Incontinent per Collection Ba mL  Total OUT: 2253 mL    Total NET: 77 mL    MEDICATIONS  (STANDING):  albuterol/ipratropium for Nebulization. 3 milliLiter(s) Nebulizer every 6 hours  chlorhexidine 4% Liquid 1 Application(s) Topical <User Schedule>  famotidine    Tablet 20 milliGRAM(s) Oral two times a day  niCARdipine Infusion 3 mG/Hr (15 mL/Hr) IV Continuous <Continuous>  niMODipine Oral Solution 60 milliGRAM(s) Oral every 4 hours  polyethylene glycol 3350 17 Gram(s) Oral two times a day  senna 2 Tablet(s) Oral at bedtime  sodium chloride 2% . 1000 milliLiter(s) (50 mL/Hr) IV Continuous <Continuous>  sodium chloride 3%. 500 milliLiter(s) (30 mL/Hr) IV Continuous <Continuous>    MEDICATIONS  (PRN):  acetaminophen   Tablet .. 650 milliGRAM(s) Oral every 6 hours PRN Temp greater or equal to 38C (100.4F), Mild Pain (1 - 3)  artificial tears (preservative free) Ophthalmic Solution 1 Drop(s) Both EYES three times a day PRN Dry Eyes  oxyCODONE    IR 5 milliGRAM(s) Oral every 4 hours PRN Moderate Pain (4 - 6)  oxyCODONE    IR 10 milliGRAM(s) Oral every 4 hours PRN Severe Pain (7 - 10)    LABS:                          11.8   11.37 )-----------( 194      ( 2020 00:10 )             36.2     02-05    139  |  107  |  6<L>  ----------------------------<  129<H>  3.6   |  22  |  0.50    Ca    8.2<L>      2020 05:46  Phos  2.2     02-05  Mg     2.0     -05    TPro  6.6  /  Alb  4.2  /  TBili  0.6  /  DBili  x   /  AST  28  /  ALT  26  /  AlkPhos  48  02-03    Lactate- 3.o  Lipids- NL  HGBA1C- 5.3      CT Head No Cont (20 @ 13:08) >  IMPRESSION: Right temporal parenchymal hemorrhage with diffuse subarachnoid hemorrhage slightly greater on the right. Slight prominence of the left temporal horn.    EXAMINATION:  General:  Intubated.  HEENT:  MMM. Right pupil reactive, left slightly smaller and sluggish. +Corneal on the right, but not left, absent Doll's.  Neuro: No eye opening, no commands, RUE localizes, LUE flexion, RLE w/d, LLE TF  Cards:  RRR.   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 She arrived intubated and sedated. 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     ADMISSION SCORES:  GCS: 3T  Hunt-Gonzalez: 5  modified Couch: 3  ICH score: 2    HOSPITAL COURSE:  2/3/20- Clipping R PCOMM              EVD placed   20 - Extubated overnight    ICU Vital Signs Last 24 Hrs  T(C): 37.7 (2020 05:00), Max: 37.7 (2020 05:00)  T(F): 99.9 (2020 05:00), Max: 99.9 (2020 05:00)  HR: 84 (2020 06:00) (66 - 107)  BP: --  BP(mean): --  ABP: 132/48 (2020 06:00) (111/50 - 155/59)  ABP(mean): 74 (2020 06:00) (68 - 101)  RR: 22 (2020 06:00) (9 - 25)  SpO2: 100% (2020 06:00) (98% - 100%)    I&O's Detail    2020 07:01  -  2020 07:00  --------------------------------------------------------  IN:    dexmedetomidine Infusion: 40 mL    Enteral Tube Flush: 370 mL    IV PiggyBack: 300 mL    niCARdipine Infusion: 340 mL    sodium chloride 0.9%: 850 mL    sodium chloride 2% .: 400 mL    sodium chloride 3%.: 30 mL  Total IN: 2330 mL    OUT:    Bulb: 100 mL    External Ventricular Device: 3 mL    Incontinent per Collection Ba mL  Total OUT: 2253 mL    Total NET: 77 mL    MEDICATIONS  (STANDING):  albuterol/ipratropium for Nebulization. 3 milliLiter(s) Nebulizer every 6 hours  chlorhexidine 4% Liquid 1 Application(s) Topical <User Schedule>  famotidine    Tablet 20 milliGRAM(s) Oral two times a day  niCARdipine Infusion 3 mG/Hr (15 mL/Hr) IV Continuous <Continuous>  niMODipine Oral Solution 60 milliGRAM(s) Oral every 4 hours  polyethylene glycol 3350 17 Gram(s) Oral two times a day  senna 2 Tablet(s) Oral at bedtime  sodium chloride 2% . 1000 milliLiter(s) (50 mL/Hr) IV Continuous <Continuous>  sodium chloride 3%. 500 milliLiter(s) (30 mL/Hr) IV Continuous <Continuous>    MEDICATIONS  (PRN):  acetaminophen   Tablet .. 650 milliGRAM(s) Oral every 6 hours PRN Temp greater or equal to 38C (100.4F), Mild Pain (1 - 3)  artificial tears (preservative free) Ophthalmic Solution 1 Drop(s) Both EYES three times a day PRN Dry Eyes  oxyCODONE    IR 5 milliGRAM(s) Oral every 4 hours PRN Moderate Pain (4 - 6)  oxyCODONE    IR 10 milliGRAM(s) Oral every 4 hours PRN Severe Pain (7 - 10)    LABS:                          11.8   11.37 )-----------( 194      ( 2020 00:10 )             36.2     02-05    139  |  107  |  6<L>  ----------------------------<  129<H>  3.6   |  22  |  0.50    Ca    8.2<L>      2020 05:46  Phos  2.2     02-05  Mg     2.0     -05    TPro  6.6  /  Alb  4.2  /  TBili  0.6  /  DBili  x   /  AST  28  /  ALT  26  /  AlkPhos  48  02-03    Lactate- 3.o  Lipids- NL  HGBA1C- 5.3      CT Head No Cont (20 @ 13:08) >  IMPRESSION: Right temporal parenchymal hemorrhage with diffuse subarachnoid hemorrhage slightly greater on the right. Slight prominence of the left temporal horn.    EXAMINATION:  General:  Intubated.  HEENT:  MMM. Right pupil reactive, left slightly smaller and sluggish. +Corneal on the right, but not left, absent Doll's.  Neuro: No eye opening, no commands, RUE localizes, LUE 2/5, RLE w/d, LLE TF  Cards:  RRR.   Respiratory: Clear  Abdomen:  soft, +BS  Extremities:  no edema

## 2020-02-05 NOTE — PROGRESS NOTE ADULT - ASSESSMENT
[id number] R temporal ICH s/p craniectomy and clip of pcomm aneurysm PBD#2    adequate pain control  VSP/DCI ppx  d/c keppra  -180  euvolemia  chest PT/respiratory care  tube feeding/bowel regimen  IVL  SCDs, lovenox ppx  monitor for fevers  maintain euglycemia [id number] R temporal ICH s/p craniectomy and clip of pcomm aneurysm PBD#2    adequate pain control  VSP/DCI ppx  d/c keppra  -180  euvolemia  chest PT/respiratory care  tube feeding/bowel regimen  hypertonica, Na goal 135-145  SCDs, lovenox ppx  monitor for fevers  maintain euglycemia

## 2020-02-05 NOTE — PROGRESS NOTE ADULT - ASSESSMENT
s/p Rt craniectomy, clipping of Pcomm aneurysm, evacuation of hematoma  - s/p EVD@ 15. ICP low teens.   - Extubated overnight  - CTH in AM  - Angio today s/p Rt craniectomy, clipping of Pcomm aneurysm, evacuation of hematoma  - s/p EVD@ 15. ICP low teens.   - Extubated overnight  - CTH in AM  - Angio today  -TCDs

## 2020-02-05 NOTE — PHYSICAL THERAPY INITIAL EVALUATION ADULT - GENERAL OBSERVATIONS, REHAB EVAL
received supine with head of bed elevated +evd, +ngt, +jolanta, +iv, + bilateral sequential compression devices, +R wrist restraint

## 2020-02-05 NOTE — PROGRESS NOTE ADULT - SUBJECTIVE AND OBJECTIVE BOX
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 She arrived intubated and sedated. 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     2/3/20- Clipping R PCOMM              EVD placed      ADMISSION SCORES:  GCS: 3T  Hunt-Gonzalez: 5  modified Couch: 3  ICH score: 2    ICU Vital Signs Last 24 Hrs  T(C): 37.7 (2020 05:00), Max: 37.7 (2020 05:00)  T(F): 99.9 (2020 05:00), Max: 99.9 (2020 05:00)  HR: 84 (2020 06:00) (66 - 107)  ABP: 132/48 (2020 06:00) (111/50 - 155/59)  ABP(mean): 74 (2020 06:00) (68 - 101)  RR: 22 (2020 06:00) (9 - 25)  SpO2: 100% (2020 06:00) (98% - 100%)      2020 07:01  -  2020 07:00  --------------------------------------------------------  IN:    dexmedetomidine Infusion: 40 mL    Enteral Tube Flush: 370 mL    IV PiggyBack: 300 mL    niCARdipine Infusion: 340 mL    sodium chloride 0.9%: 850 mL    sodium chloride 2% .: 400 mL    sodium chloride 3%.: 30 mL  Total IN: 2330 mL    OUT:    Bulb: 100 mL    External Ventricular Device: 3 mL    Incontinent per Collection Ba mL  Total OUT: 2253 mL    Total NET: 77 mL      MEDICATIONS  (STANDING):  albuterol/ipratropium for Nebulization. 3 milliLiter(s) Nebulizer every 6 hours  chlorhexidine 4% Liquid 1 Application(s) Topical <User Schedule>  famotidine    Tablet 20 milliGRAM(s) Oral two times a day  niCARdipine Infusion 3 mG/Hr (15 mL/Hr) IV Continuous <Continuous>  niMODipine Oral Solution 60 milliGRAM(s) Oral every 4 hours  polyethylene glycol 3350 17 Gram(s) Oral two times a day  senna 2 Tablet(s) Oral at bedtime  sodium chloride 2% . 1000 milliLiter(s) (50 mL/Hr) IV Continuous <Continuous>  sodium chloride 3%. 500 milliLiter(s) (30 mL/Hr) IV Continuous <Continuous>    MEDICATIONS  (PRN):  acetaminophen   Tablet .. 650 milliGRAM(s) Oral every 6 hours PRN Temp greater or equal to 38C (100.4F), Mild Pain (1 - 3)  artificial tears (preservative free) Ophthalmic Solution 1 Drop(s) Both EYES three times a day PRN Dry Eyes  oxyCODONE    IR 5 milliGRAM(s) Oral every 4 hours PRN Moderate Pain (4 - 6)  oxyCODONE    IR 10 milliGRAM(s) Oral every 4 hours PRN Severe Pain (7 - 10)      TCD - Nl                           11.8   11.37 )-----------( 194      ( 2020 00:10 )             36.2                  11.8   11.37 )-----------( 194      ( 2020 00:10 )             36.2     PT/INR - ( 2020 13:14 )   PT: 11.1 sec;   INR: 0.97 ratio         PTT - ( 2020 13:14 )  PTT:27.1 sec        139  |  107  |  6<L>  ----------------------------<  129<H>  3.6   |  22  |  0.50    Ca    8.2<L>      2020 05:46  Phos  2.2       Mg     2.0         TOX SCREEN- NEG    TPro  6.6  /  Alb  4.2  /  TBili  0.6  /  DBili  x   /  AST  28  /  ALT  26  /  AlkPhos  48  --    140  |  109<H>  |  8   ----------------------------<  174<H>  4.1   |  18<L>  |  0.63    Ca    7.6<L>      2020 23:13  Phos  3.0     02-03  Mg     1.7     02-03      TPro  6.6  /  Alb  4.2  /  TBili  0.6  /  DBili  x   /  AST  28  /  ALT  26  /  AlkPhos  48  02-03  Lactate- 3.o  Lipids- NL  HGBA1C- 5.3    ABG - ( 2020 23:09 )  pH, Arterial: 7.40  pH, Blood: x     /  pCO2: 33    /  pO2: 211   / HCO3: 20    / Base Excess: -3.8  /  SaO2: 100       CT Angio Head w/ IV Cont (20 @ 14:49) >  FINDINGS:     Brain CT:    Redemonstration of right pterional craniectomy and drainage catheter overlying the right frontal and temporal lobes. Extra-axial/extracalvarial hemorrhage is similar in appearance.    Redemonstration of left frontal approach ventriculostomy catheter in unchanged position. Hemorrhagic left frontal shunt tract is similar in appearance.    Increased, but still small hemorrhage layering in the occipital horns. Ventricles slightly increased in size, but no hydrocephalus. No midline shift.    Redemonstration of right paraclinoid aneurysm clip.    Interval appearance of large acute right MCA territory infarct which involves the posterior limb of the right internal capsule, suggesting additional compromise of the right anterior choroidal artery.    Decreased cisternal subarachnoid hemorrhage. Similar scattered parenchymal hemorrhage in the right anterior temporal region.    CTA brain:    There is good flow-related enhancement of the bilateral anterior, middle, and posterior cerebral arteries, and within the basilar artery. The intracranial / skull base internal carotid and intracranial vertebral arteries demonstrate normal flow-related enhancement.     There is no patent vascular aneurysm or flow-limiting stenosis. Right posterior communicating artery is patent.    IMPRESSION:    CT brain:    Interval appearance of a large acute right MCA territory infarct which involves the posterior limb of the right internal capsule, suggesting additional compromise of the right anterior choroidal artery.    Decreased cisternal subarachnoid hemorrhage. Similar scattered parenchymal hemorrhage in the right anterior temporal region.    : VA Duplex Lower Ext Vein Scan, Bilat (20 @ 13:42) >    IMPRESSION:     No evidence of deep venous thrombosis in either lower extremity.            CT Head No Cont (20 @ 13:08) >  IMPRESSION: Right temporal parenchymal hemorrhage with diffuse subarachnoid hemorrhage slightly greater on the right. Slight prominence of the left temporal horn.    EXAMINATION:  General:  Intubated.  HEENT:  MMM. Right pupil reactive, left slightly smaller and sluggish. +Corneal on the right, but not left, absent Doll's.  Neuro: No eye opening, no commands, RUE localizes, LUE flexion, RLE w/d, LLE TF  Cards:  RRR.   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 She arrived intubated and sedated. 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     2/3/20- Clipping R PCOMM              EVD placed  -  Extubated overnight. Clip check angio this AM - clip in good position. No vasospasm    ADMISSION SCORES:  GCS: 3T  Hunt-Gonzalez: 5  modified Couch: 3  ICH score: 2    ICU Vital Signs Last 24 Hrs  T(C): 37.7 (2020 05:00), Max: 37.7 (2020 05:00)  T(F): 99.9 (2020 05:00), Max: 99.9 (2020 05:00)  HR: 84 (2020 06:00) (66 - 107)  ABP: 132/48 (2020 06:00) (111/50 - 155/59)  ABP(mean): 74 (2020 06:00) (68 - 101)  RR: 22 (2020 06:00) (9 - 25)  SpO2: 100% (2020 06:00) (98% - 100%)-  RA      2020 07:01  -  2020 07:00  --------------------------------------------------------  IN:    dexmedetomidine Infusion: 40 mL    Enteral Tube Flush: 370 mL    IV PiggyBack: 300 mL    niCARdipine Infusion: 340 mL    sodium chloride 0.9%: 850 mL    sodium chloride 2% .: 400 mL    sodium chloride 3%.: 30 mL  Total IN: 2330 mL    OUT:    Bulb: 100 mL    External Ventricular Device: 3 mL    Incontinent per Collection Ba mL  Total OUT: 2253 mL    Total NET: 77 mL      MEDICATIONS  (STANDING):  albuterol/ipratropium for Nebulization. 3 milliLiter(s) Nebulizer every 6 hours prn  chlorhexidine 4% Liquid 1 Application(s) Topical <User Schedule>  famotidine    Tablet 20 milliGRAM(s) Oral two times a day  niMODipine Oral Solution 60 milliGRAM(s) Oral every 4 hours  polyethylene glycol 3350 17 Gram(s) Oral two times a day  senna 2 Tablet(s) Oral at bedtime  sodium chloride 2% . 1000 milliLiter(s) (50 mL/Hr) IV Continuous <Continuous>  sodium chloride 3%. 500 milliLiter(s) (30 mL/Hr) IV Continuous <Continuous>    MEDICATIONS  (PRN):  acetaminophen   Tablet .. 650 milliGRAM(s) Oral every 6 hours PRN Temp greater or equal to 38C (100.4F), Mild Pain (1 - 3)  artificial tears (preservative free) Ophthalmic Solution 1 Drop(s) Both EYES three times a day PRN Dry Eyes  oxyCODONE    IR 5 milliGRAM(s) Oral every 4 hours PRN Moderate Pain (4 - 6)  oxyCODONE    IR 10 milliGRAM(s) Oral every 4 hours PRN Severe Pain (7 - 10)      TCD - Nl                           11.8   11.37 )-----------( 194      ( 2020 00:10 )             36.2                  11.8   11.37 )-----------( 194      ( 2020 00:10 )             36.2     PT/INR - ( 2020 13:14 )   PT: 11.1 sec;   INR: 0.97 ratio         PTT - ( 2020 13:14 )  PTT:27.1 sec        139  |  107  |  6<L>  ----------------------------<  129<H>  3.6   |  22  |  0.50    Ca    8.2<L>      2020 05:46  Phos  2.2       Mg     2.0         TOX SCREEN- NEG    TPro  6.6  /  Alb  4.2  /  TBili  0.6  /  DBili  x   /  AST  28  /  ALT  26  /  AlkPhos  48  --    140  |  109<H>  |  8   ----------------------------<  174<H>  4.1   |  18<L>  |  0.63    Ca    7.6<L>      2020 23:13  Phos  3.0     02-03  Mg     1.7     02-03      TPro  6.6  /  Alb  4.2  /  TBili  0.6  /  DBili  x   /  AST  28  /  ALT  26  /  AlkPhos  48  02-03  Lactate- 3.o  Lipids- NL  HGBA1C- 5.3    ABG - ( 2020 23:09 )  pH, Arterial: 7.40  pH, Blood: x     /  pCO2: 33    /  pO2: 211   / HCO3: 20    / Base Excess: -3.8  /  SaO2: 100           CT Angio Head w/ IV Cont (20 @ 14:49) >  FINDINGS: < from: CT Head No Cont (20 @ 08:05) >  IMPRESSION:    Scattered foci of parenchymal hemorrhage and edema in the right anterior temporal region redemonstrated.    Similar-appearing acute infarct involving the posterior limb of the right internal capsule.    Previously seen low density involving the right frontal and temporal lobes suggesting acute right MCA territory infarct is less prominent on the current examination.    < end of copied text >      Brain CT:    Redemonstration of right pterional craniectomy and drainage catheter overlying the right frontal and temporal lobes. Extra-axial/extracalvarial hemorrhage is similar in appearance.    Redemonstration of left frontal approach ventriculostomy catheter in unchanged position. Hemorrhagic left frontal shunt tract is similar in appearance.    Increased, but still small hemorrhage layering in the occipital horns. Ventricles slightly increased in size, but no hydrocephalus. No midline shift.    Redemonstration of right paraclinoid aneurysm clip.    Interval appearance of large acute right MCA territory infarct which involves the posterior limb of the right internal capsule, suggesting additional compromise of the right anterior choroidal artery.    Decreased cisternal subarachnoid hemorrhage. Similar scattered parenchymal hemorrhage in the right anterior temporal region.    CTA brain:    There is good flow-related enhancement of the bilateral anterior, middle, and posterior cerebral arteries, and within the basilar artery. The intracranial / skull base internal carotid and intracranial vertebral arteries demonstrate normal flow-related enhancement.     There is no patent vascular aneurysm or flow-limiting stenosis. Right posterior communicating artery is patent.    IMPRESSION:    CT brain:    Interval appearance of a large acute right MCA territory infarct which involves the posterior limb of the right internal capsule, suggesting additional compromise of the right anterior choroidal artery.    Decreased cisternal subarachnoid hemorrhage. Similar scattered parenchymal hemorrhage in the right anterior temporal region.    : VA Duplex Lower Ext Vein Scan, Bilat (20 @ 13:42) >    IMPRESSION:     No evidence of deep venous thrombosis in either lower extremity.            CT Head No Cont (20 @ 13:08) >  IMPRESSION: Right temporal parenchymal hemorrhage with diffuse subarachnoid hemorrhage slightly greater on the right. Slight prominence of the left temporal horn.    EXAMINATION:  General:  extubated.  HEENT:  MMM. hypohonic , oriented x2 2mm and reactive B/L , Track B/L , L facial   Neuro: Follows commands RUE 5/5 and RLE - safeguard and wiggles toes, LUE - spont LUE antigravity weaker than R, LLE - flexes knee yet unable to elevate distal leg off bed  Cards:  RRR.   Respiratory: Clear  Abdomen:  soft, +BS  Extremities:  PATRICE hand edema

## 2020-02-05 NOTE — CHART NOTE - NSCHARTNOTEFT_GEN_A_CORE
Interventional Neuro- Radiology   Procedure Note PA-KARYN    Procedure: Selective Cerebral Angiography   Pre- Procedure Diagnosis:  Post- Procedure Diagnosis:    : Dr Peña Laird  Physician Assistant: Lara Gao PA-C    Nurse:  Radiologic Tech:  Anesthesiologist:  Sheath:      I/Os: EBL less than 10cc  IV fluids:     cc   Urine output     cc  Contrast Omnipaque 240           Vitals: BP         HR      Spo2         Preliminary Report:  Using a 4 [place] short sheath to the right groin under MAC sedation via left vertebral artery,  left internal carotid artery, left external carotid artery, right vertebral artery, right internal carotid artery, right external carotid artery a selective cerebral angiography was performed and demonstrated                       Official note to follow.  Patient tolerated procedure well, hemodynamically stable, no change in neurological status compared to baseline.  Results discussed with neuro ICU team, patient and patient's family. Right groin sheath was removed, manual compression held to hemostasis for 20 minutes, no active bleeding, no hematoma, quick clot and safeguard balloon dressing applied at Interventional Neuro- Radiology   Procedure Note PA-C    Procedure: Selective Cerebral Angiography   Pre- Procedure Diagnosis:  Post- Procedure Diagnosis:    : Dr Peña Laird  Physician Assistant:     Nurse:  Radiologic Tech:  Anesthesiologist:  Sheath:      I/Os: EBL less than 10cc  IV fluids:     cc   Urine output     cc  Contrast Omnipaque 240           Vitals: BP         HR      Spo2         Preliminary Report:  Using a 4 [place] short sheath to the right groin under MAC sedation via left vertebral artery,  left internal carotid artery, left external carotid artery, right vertebral artery, right internal carotid artery, right external carotid artery a selective cerebral angiography was performed and demonstrated                       Official note to follow.  Patient tolerated procedure well, hemodynamically stable, no change in neurological status compared to baseline.  Results discussed with neuro ICU team, patient and patient's family. Right groin sheath was removed, manual compression held to hemostasis for 20 minutes, no active bleeding, no hematoma, quick clot and safeguard balloon dressing applied at

## 2020-02-05 NOTE — PROGRESS NOTE ADULT - ASSESSMENT
ASSESSMENT/PLAN: HH5, MF 3 subarachnoid hemorrhage, post-bleed day 2  s/p emergent R craniectomy and removal of hematoma, and clipping of PCOM aneurysm (2/3/2020)    NEURO:  q1h neuro checks  EVD in place for monitoring  Stroke code measures: A1c, LDL- as above   Delayed Cerebral Ischemia: TCDs, euvolemia, Nimodipine  Given Keppra 1g, and placed on Keppra 500 BID   Sedation  Precedex if needed  POT/OT - splint LUE and LLE  TOX screen    PULM:  Intubated  Wean CPAP - 5/5   Peridex  CXR,- NL  ABG- Nl   Wean vent as tolerated  Repeat Lactate     CARDS:  -160mmHg  Nicardipine gtt as needed  TTE-   Nl LV systolic function and Nl Valves      RENAL:  IVF- total fluids at 70 cc/hr     GASTRO:  NPO  Bowel regimen  ---> Stress ulcer prophylaxis:  PPI    HEME:  monitor H/H - No CBC    ---> DVT prophylaxis: SCDs, hold anticoagulation given fresh post-op- start after CT in am     ENDO:  euglycemia    ID:  Monitor for fevers    Code status:  Full code  Disposition:  ICU    Updated family at bedside.    This patient was at high risk of neurologic deterioration and/or death due to: re-hemorrhage, seizures, DCI.     Time spent:  45 minutes ASSESSMENT/PLAN: HH5, MF 3 subarachnoid hemorrhage, post-bleed day 3  s/p emergent R craniectomy and removal of hematoma, and clipping of PCOM aneurysm (2/3/2020)    NEURO:  q1h neuro checks  EVD in place for monitoring  Stroke code measures: A1c, LDL- as above   Delayed Cerebral Ischemia: TCDs, euvolemia, Nimodipine  Given Keppra 1g, and placed on Keppra 500 BID   POT/OT - splint LUE and LLE  TOX screen- neg  Helmet ordered     PULM: Extubated   CXR,- NL  ABG- Nl   Maintain Sats 92%  If remains hypophonic ENT review her vocal cords     CARDS:  -180mmHg    TTE-Nl LV systolic function and Nl Valves      RENAL:  IVF- 2 % naCl t 75 cc/hr     GASTRO:  Jevity 60cc/hr  Bowel regimen  ---> Stress ulcer prophylaxis: No need   Last BM 2/5/20    HEME:  monitor H/H - No CBC    ---> DVT prophylaxis: SCDs,lovenoc 40mg q hs    ENDO:  euglycemia    ID:  Monitor for fevers    Code status:  Full code  Disposition:  ICU    Updated family at bedside.    This patient was at high risk of neurologic deterioration and/or death due to: re-hemorrhage, seizures, DCI.     Time spent:  45 minutes

## 2020-02-05 NOTE — PHYSICAL THERAPY INITIAL EVALUATION ADULT - ADDITIONAL COMMENTS
per sister/brother/partner at bedside: lives alone in apartment, no steps to enter or inside, right hand dominant, no glasses, doesn't drive (uses mass transit) per sister/brother/partner at bedside: lives alone in apartment, no steps to enter or inside, right hand dominant, no glasses, doesn't drive (uses mass transit)    +inattention to L side  +impulsiveness and decreased safety awareness noted throughout session

## 2020-02-05 NOTE — PROGRESS NOTE ADULT - SUBJECTIVE AND OBJECTIVE BOX
Patient seen and examined at bedside.    --Anticoagulation--    T(C): 37.2 (02-04-20 @ 23:00), Max: 37.7 (02-04-20 @ 07:00)  HR: 85 (02-05-20 @ 01:00) (54 - 107)  BP: --  RR: 17 (02-05-20 @ 01:00) (9 - 25)  SpO2: 100% (02-05-20 @ 01:00) (98% - 100%)  Wt(kg): --    Exam:  EO to voice, pupils 4 mm R b/l,  FC,   RUE 4+/5, LUE flexor, RLE AG, LLE TF

## 2020-02-05 NOTE — CHART NOTE - NSCHARTNOTEFT_GEN_A_CORE
Interventional Neuro- Radiology   Procedure Note    Procedure: Selective Cerebral Angiography   Pre- Procedure Diagnosis: Right PCOMM artery aneurysm s/p clipping   Post- Procedure Diagnosis: Complete micro surgical clipping of right PCOMM artery aneurysm     : Dr. Sisi MD    Physician Assistant: Chelsey Collins PA-C    RN: Leonor     Anesthesia: Dr. Susana MD (MAC)      I/Os:  Fluids: 200cc DTV  EVD: 6cc   Contrast: 104cc   Estimated Blood Loss: <10cc    Preliminary Report:  Under MAC, using a 4Fr short sheath to the right/ left/ bilateral groin examination of left vertebral artery/ left common carotid artery/ left external carotid artery/ right vertebral artery/ right common carotid artery/ right external carotid artery via selective cerebral angiography demonstrates complete aneurysm occlusion of right pcomm artery aneurysm, right pcomm and anterior choroidal artery patent, non flow limiting stenosis of the right PCOMM. Left scalp fistula at the EVD site. ( Official note to follow).    Patient tolerated procedure well, vital signs stable, hemodynamically stable, no change in neurological status compared to baseline. Results discussed with neurosurgery/ patient's family. Groin sheath d/c'ed, manual compression held to hemostasis, no active bleeding, no hematoma, quick clot and safeguard balloon dressing applied at 9:45h. STAT labs, CBC/BMP at 13:00h. Patient transferred to [facility] for further care/ monitoring.    Chelsey Collins PA-C  [phone]

## 2020-02-05 NOTE — PROGRESS NOTE ADULT - SUBJECTIVE AND OBJECTIVE BOX
PBD #2 pcomm clip, CTA done today showing patent pcomm  clip check angio today, good position    vitals/labs/meds/imaging reviewed  EO to voice, nods appropriately, able to mouth her name, oriented x3 given choice, PERRL, EOMI, RUE 4+/5, RLE 3/5,*** PBD #2 pcomm clip, R craniectomy  clip check angio today, good position    vitals/labs/meds/imaging reviewed  EO to stim, nods appropriately, able to say her name/hospital/2020, PERRL, RUE 5/5, RLE 5/5, LUE minimal WD, LLE spont proximally, can bend knee up, but unable to hold foot up antigravity

## 2020-02-05 NOTE — PRE-ANESTHESIA EVALUATION ADULT - NSANTHADDINFOFT_GEN_ALL_CORE
IVAS; GETA as required.  Plan d/w Dr. Laird, Pt's mother. Risks/alternatives d/w pt's mother.  All questions answered.

## 2020-02-05 NOTE — PHYSICAL THERAPY INITIAL EVALUATION ADULT - PERTINENT HX OF CURRENT PROBLEM, REHAB EVAL
Found unresponsive on airplane, taken to [facility] 2/3, +SAH, transferred to [facility].  arrived intub & sedated. HH: 5, mFisher: 3, ICH score: 2. CT 2/3: R temporal parenchymal hemorrhage with diffuse SAH slightly greater on R, Slight prominence of the L temporal horn. CTA 2/3: R posterior communicating artery aneurysm. s/p emergent R craniectomy, pcom aneurysm clipped, removal of hematoma 2/3, s/p L EVD 2/3. s/p angio 2/5: Complete micro surgical clipping of R PCOMM artery aneurysm

## 2020-02-05 NOTE — CHART NOTE - NSCHARTNOTEFT_GEN_A_CORE
Measure and deliver Danmar hard shell protective helmet. left bedside to be used for out of bed activity. Reviewed with family present. Contact information given. To notify office with any issues questions or concerns.  Evin KAMARA  [facility]  [phone]

## 2020-02-05 NOTE — PHYSICAL THERAPY INITIAL EVALUATION ADULT - PRECAUTIONS/LIMITATIONS, REHAB EVAL
CT brain 2/4: Interval appearance of a large acute R MCA territory infarct which involves the posterior limb of the R internal capsule, suggesting additional compromise of the R anterior choroidal artery, Decreased cisternal SAH, Similar scattered parenchymal hemorrhage in the R anterior temporal region, Increased, but still small hemorrhage layering in the occipital horns, Ventricles slightly increased in size, but no hydrocephalus. No midline shift. CTA brain 2/4: No patent vascular aneurysm or flow-limiting stenosis. Patent right posterior communicating artery. CT brain 2/4: Interval appearance of a large acute R MCA territory infarct which involves the posterior limb of the R internal capsule, suggesting additional compromise of the R anterior choroidal artery, Decreased cisternal SAH, Similar scattered parenchymal hemorrhage in the R anterior temporal region, Increased, but still small hemorrhage layering in the occipital horns, Ventricles slightly increased in size, but no hydrocephalus. No midline shift. CTA brain 2/4: No patent vascular aneurysm or flow-limiting stenosis. Patent right posterior communicating artery./fall precautions

## 2020-02-06 LAB
AMPHET UR-MCNC: NEGATIVE — SIGNIFICANT CHANGE UP
ANION GAP SERPL CALC-SCNC: 13 MMOL/L — SIGNIFICANT CHANGE UP (ref 5–17)
ANION GAP SERPL CALC-SCNC: 14 MMOL/L — SIGNIFICANT CHANGE UP (ref 5–17)
BARBITURATES, URINE.: NEGATIVE — SIGNIFICANT CHANGE UP
BENZODIAZ UR-MCNC: NEGATIVE — SIGNIFICANT CHANGE UP
BUN SERPL-MCNC: 6 MG/DL — LOW (ref 7–23)
BUN SERPL-MCNC: 8 MG/DL — SIGNIFICANT CHANGE UP (ref 7–23)
CALCIUM SERPL-MCNC: 8.5 MG/DL — SIGNIFICANT CHANGE UP (ref 8.4–10.5)
CALCIUM SERPL-MCNC: 8.8 MG/DL — SIGNIFICANT CHANGE UP (ref 8.4–10.5)
CHLORIDE SERPL-SCNC: 112 MMOL/L — HIGH (ref 96–108)
CHLORIDE SERPL-SCNC: 115 MMOL/L — HIGH (ref 96–108)
CO2 SERPL-SCNC: 20 MMOL/L — LOW (ref 22–31)
CO2 SERPL-SCNC: 20 MMOL/L — LOW (ref 22–31)
COCAINE METAB.OTHER UR-MCNC: NEGATIVE — SIGNIFICANT CHANGE UP
CREAT SERPL-MCNC: 0.45 MG/DL — LOW (ref 0.5–1.3)
CREAT SERPL-MCNC: 0.47 MG/DL — LOW (ref 0.5–1.3)
CREATININE, URINE THERAPEUTIC: 75 MG/DL — SIGNIFICANT CHANGE UP
GLUCOSE SERPL-MCNC: 139 MG/DL — HIGH (ref 70–99)
GLUCOSE SERPL-MCNC: 168 MG/DL — HIGH (ref 70–99)
MAGNESIUM SERPL-MCNC: 2.1 MG/DL — SIGNIFICANT CHANGE UP (ref 1.6–2.6)
METHADONE UR-MCNC: NEGATIVE — SIGNIFICANT CHANGE UP
METHAQUALONE UR QL: NEGATIVE — SIGNIFICANT CHANGE UP
METHAQUALONE UR-MCNC: NEGATIVE — SIGNIFICANT CHANGE UP
OPIATES UR-MCNC: NEGATIVE — SIGNIFICANT CHANGE UP
PCP UR-MCNC: NEGATIVE — SIGNIFICANT CHANGE UP
PHOSPHATE SERPL-MCNC: 2.2 MG/DL — LOW (ref 2.5–4.5)
POTASSIUM SERPL-MCNC: 3.5 MMOL/L — SIGNIFICANT CHANGE UP (ref 3.5–5.3)
POTASSIUM SERPL-MCNC: 3.6 MMOL/L — SIGNIFICANT CHANGE UP (ref 3.5–5.3)
POTASSIUM SERPL-SCNC: 3.5 MMOL/L — SIGNIFICANT CHANGE UP (ref 3.5–5.3)
POTASSIUM SERPL-SCNC: 3.6 MMOL/L — SIGNIFICANT CHANGE UP (ref 3.5–5.3)
PROPOXYPH UR QL: NEGATIVE — SIGNIFICANT CHANGE UP
SODIUM SERPL-SCNC: 145 MMOL/L — SIGNIFICANT CHANGE UP (ref 135–145)
SODIUM SERPL-SCNC: 149 MMOL/L — HIGH (ref 135–145)
THC UR QL: NEGATIVE — SIGNIFICANT CHANGE UP

## 2020-02-06 PROCEDURE — 99292 CRITICAL CARE ADDL 30 MIN: CPT

## 2020-02-06 PROCEDURE — 99291 CRITICAL CARE FIRST HOUR: CPT

## 2020-02-06 PROCEDURE — 93888 INTRACRANIAL LIMITED STUDY: CPT | Mod: 26

## 2020-02-06 RX ORDER — POTASSIUM PHOSPHATE, MONOBASIC POTASSIUM PHOSPHATE, DIBASIC 236; 224 MG/ML; MG/ML
15 INJECTION, SOLUTION INTRAVENOUS ONCE
Refills: 0 | Status: COMPLETED | OUTPATIENT
Start: 2020-02-06 | End: 2020-02-06

## 2020-02-06 RX ORDER — POTASSIUM CHLORIDE 20 MEQ
20 PACKET (EA) ORAL ONCE
Refills: 0 | Status: COMPLETED | OUTPATIENT
Start: 2020-02-06 | End: 2020-02-06

## 2020-02-06 RX ADMIN — NIMODIPINE 60 MILLIGRAM(S): 60 SOLUTION ORAL at 01:42

## 2020-02-06 RX ADMIN — CHLORHEXIDINE GLUCONATE 1 APPLICATION(S): 213 SOLUTION TOPICAL at 22:11

## 2020-02-06 RX ADMIN — NIMODIPINE 60 MILLIGRAM(S): 60 SOLUTION ORAL at 14:28

## 2020-02-06 RX ADMIN — NIMODIPINE 60 MILLIGRAM(S): 60 SOLUTION ORAL at 10:45

## 2020-02-06 RX ADMIN — OXYCODONE HYDROCHLORIDE 5 MILLIGRAM(S): 5 TABLET ORAL at 01:35

## 2020-02-06 RX ADMIN — Medication 20 MILLIEQUIVALENT(S): at 01:41

## 2020-02-06 RX ADMIN — POTASSIUM PHOSPHATE, MONOBASIC POTASSIUM PHOSPHATE, DIBASIC 62.5 MILLIMOLE(S): 236; 224 INJECTION, SOLUTION INTRAVENOUS at 13:07

## 2020-02-06 RX ADMIN — SODIUM CHLORIDE 50 MILLILITER(S): 5 INJECTION, SOLUTION INTRAVENOUS at 13:08

## 2020-02-06 RX ADMIN — NIMODIPINE 60 MILLIGRAM(S): 60 SOLUTION ORAL at 05:23

## 2020-02-06 RX ADMIN — NIMODIPINE 60 MILLIGRAM(S): 60 SOLUTION ORAL at 18:32

## 2020-02-06 RX ADMIN — ENOXAPARIN SODIUM 40 MILLIGRAM(S): 100 INJECTION SUBCUTANEOUS at 22:11

## 2020-02-06 RX ADMIN — OXYCODONE HYDROCHLORIDE 5 MILLIGRAM(S): 5 TABLET ORAL at 00:49

## 2020-02-06 RX ADMIN — NIMODIPINE 60 MILLIGRAM(S): 60 SOLUTION ORAL at 22:11

## 2020-02-06 NOTE — SWALLOW BEDSIDE ASSESSMENT ADULT - PHARYNGEAL PHASE
wet quality after 2nd trial. Pt suctioned/Delayed pharyngeal swallow/Decreased laryngeal elevation/Wet vocal quality post oral intake/Multiple swallows

## 2020-02-06 NOTE — PROGRESS NOTE ADULT - ASSESSMENT
s/p Rt craniectomy, clipping of Pcomm aneurysm, evacuation of hematoma  - s/p EVD@ 15. ICP low teens.   - CTH in AM  - TCDs

## 2020-02-06 NOTE — SWALLOW BEDSIDE ASSESSMENT ADULT - SLP GENERAL OBSERVATIONS
OOB to chair, L HP and L facial droop. R wrist restraint in place that pt continually looked to remove, restless and moving continually in chair, highly distractible and perseverative (motor and verbal), kept eyes closed mainly and required prompts to alert, very poor eye contact- fleeting (1-2 sec, with max prompts), inattentive to L. Speech lacked prosody/intonation. Slow rate, poor articulatory precision., hypophonic. + EVD OOB to chair, L HP and L facial droop. + Helmet+ R wrist restraint in place that pt continually looked to remove, restless and moving continually in chair, highly distractible and perseverative (motor and verbal), kept eyes closed mainly and required prompts to alert, very poor eye contact- fleeting (1-2 sec, with max prompts), inattentive to L. Speech lacked prosody/intonation. Slow rate, poor articulatory precision., hypophonic. + EVD

## 2020-02-06 NOTE — DISCHARGE NOTE NURSING/CASE MANAGEMENT/SOCIAL WORK - PATIENT PORTAL LINK FT
You can access the FollowMyHealth Patient Portal offered by [facility] by registering at the following website: [email or web address]. By joining [facility]’s FollowMyHealth portal, you will also be able to view your health information using other applications (apps) compatible with our system.

## 2020-02-06 NOTE — OCCUPATIONAL THERAPY INITIAL EVALUATION ADULT - GENERAL OBSERVATIONS, REHAB EVAL
Patient received semi-supine in bed, +tele, BP cuff, pulse ox, +IV, a-line, EVD, primafit sequentials donned

## 2020-02-06 NOTE — PROGRESS NOTE ADULT - ASSESSMENT
HH4MF3 R temporal ICH s/p craniectomy and clip of pcomm aneurysm PBD#3    adequate pain control  VSP/DCI ppx  -180  euvolemia  chest PT/respiratory care  tube feeding/bowel regimen  s/s re eval  d/c hypertonics, Na goal 135-145  SCDs, lovenox ppx  monitor for fevers  maintain euglycemia HH4MF3 R temporal ICH s/p craniectomy and clip of pcomm aneurysm PBD#3    adequate pain control  VSP/DCI ppx, tolerating nimodipine  -180  euvolemia  incentive spirometry  tube feeding/bowel regimen  s/s re eval  d/c hypertonics, Na goal 135-145  SCDs, lovenox ppx  monitor for fevers  maintain euglycemia

## 2020-02-06 NOTE — SPEECH LANGUAGE PATHOLOGY EVALUATION - SLP EXECUTIVE FUNCTION DEFICITS NOTED
Periods of apnea noted for about 20 seconds. Oral cavity suctioned for same thick yellow mucus with minimal gag reflex or cough noted. Repositioned frequently. Skin is warm to the touch. mental flexibility/self-monitoring/impulsivity/inhibition/insight/awareness/self-correction

## 2020-02-06 NOTE — OCCUPATIONAL THERAPY INITIAL EVALUATION ADULT - PERTINENT HX OF CURRENT PROBLEM, REHAB EVAL
39F unknown medical Hx, found unresponsive on airplane, transferred to [place], subsequently transferred to [facility].

## 2020-02-06 NOTE — CHART NOTE - NSCHARTNOTEFT_GEN_A_CORE
Transcranial doppler exam #1 (2/4/2020) 1045am  mean velocity  cm/sec                              Left         Right  MIKHAIL                    57           74  MCA                   55           72  PCA                     23,24       x  VERT -Could not insonate, patient is intubated.  BA                                x  Technically difficult study.  Official report to follow.  Karen    Transcranial doppler exam #2 (2/6/2020) 11:15am  mean velocity  cm/sec                              Left         Right  MIKHAIL                   74            73  MCA                  78            124  PCA                    23,30        x  Official report to follow.  Karen

## 2020-02-06 NOTE — SPEECH LANGUAGE PATHOLOGY EVALUATION - COMMENTS
OOB to chair, L HP and L facial droop. R wrist restraint in place that pt continually looked to remove, restless and moving continually in chair, highly distractible and perseverative (motor and verbal), kept eyes closed mainly and required prompts to alert, very poor eye contact- fleeting (1-2 sec, with max prompts), inattentive to L. Speech lacked prosody/intonation. Slow rate, poor articulatory precision., hypophonic. + EVD

## 2020-02-06 NOTE — OCCUPATIONAL THERAPY INITIAL EVALUATION ADULT - PLANNED THERAPY INTERVENTIONS, OT EVAL
strengthening/ADL retraining/bed mobility training/cognitive, visual perceptual/transfer training/balance training/neuromuscular re-education/ROM

## 2020-02-06 NOTE — OCCUPATIONAL THERAPY INITIAL EVALUATION ADULT - LIVES WITH, PROFILE
Pt lives with parents in a pvt home with 2 steps to enter, 1 flight to bedroom. (I) ADLs and functional transfers without AD/DME. +/parents

## 2020-02-06 NOTE — PROGRESS NOTE ADULT - SUBJECTIVE AND OBJECTIVE BOX
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 She arrived intubated and sedated. 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     ADMISSION SCORES:  GCS: 3T  Hunt-Gonzalez: 5  modified Couch: 3  ICH score: 2    HOSPITAL COURSE:  2/3/20- Clipping R PCOMM              EVD placed   20 - Extubated overnight    ICU Vital Signs Last 24 Hrs  T(C): 37.1 (2020 05:00), Max: 37.7 (2020 08:17)  T(F): 98.7 (2020 05:00), Max: 99.2 (2020 23:00)  HR: 80 (2020 07:00) (71 - 106)  BP: --  BP(mean): --  ABP: 165/78 (2020 07:00) (119/67 - 165/78)  ABP(mean): 103 (2020 07:00) (78 - 108)  RR: 19 (2020 07:00) (15 - 24)  SpO2: 100% (2020 07:00) (96% - 100%)    I&O's Detail    2020 07:01  -  2020 07:00  --------------------------------------------------------  IN:    Enteral Tube Flush: 120 mL    ns in tub fed  [id number]: 360 mL    Sodium Chloride 0.9% IV Bolus: 500 mL    sodium chloride 2% .: 1400 mL    sodium chloride 3%: 150 mL  Total IN: 2530 mL    OUT:    Bulb: 65 mL    External Ventricular Device: 74 mL    Incontinent per Collection Ba mL  Total OUT: 2389 mL    Total NET: 141 mL    MEDICATIONS  (STANDING):  chlorhexidine 4% Liquid 1 Application(s) Topical <User Schedule>  enoxaparin Injectable 40 milliGRAM(s) SubCutaneous <User Schedule>  niMODipine Oral Solution 60 milliGRAM(s) Oral every 4 hours  polyethylene glycol 3350 17 Gram(s) Oral two times a day  senna 2 Tablet(s) Oral at bedtime  sodium chloride 2% . 1000 milliLiter(s) (50 mL/Hr) IV Continuous <Continuous>    MEDICATIONS  (PRN):  acetaminophen   Tablet .. 650 milliGRAM(s) Oral every 6 hours PRN Temp greater or equal to 38C (100.4F), Mild Pain (1 - 3)  albuterol/ipratropium for Nebulization. 3 milliLiter(s) Nebulizer every 6 hours PRN Shortness of Breath and/or Wheezing  artificial tears (preservative free) Ophthalmic Solution 1 Drop(s) Both EYES three times a day PRN Dry Eyes  oxyCODONE    IR 5 milliGRAM(s) Oral every 4 hours PRN Moderate Pain (4 - 6)  oxyCODONE    IR 10 milliGRAM(s) Oral every 4 hours PRN Severe Pain (7 - 10)    LABS:                           10.5   14.96 )-----------( 171      ( 2020 14:31 )             32.1     02-06    145  |  112<H>  |  6<L>  ----------------------------<  139<H>  3.5   |  20<L>  |  0.47<L>    Ca    8.5      2020 04:16  Phos  2.2     02-06  Mg     2.1     02-06    Lactate- 3.o  Lipids- NL  HGBA1C- 5.3      CT Head No Cont (20 @ 13:08) >  IMPRESSION: Right temporal parenchymal hemorrhage with diffuse subarachnoid hemorrhage slightly greater on the right. Slight prominence of the left temporal horn.    EXAMINATION:  General:  Intubated.  HEENT:  MMM. Right pupil reactive, left slightly smaller and sluggish. +Corneal on the right, but not left, absent Doll's.  Neuro: No eye opening, no commands, RUE localizes, LUE 2/5, RLE w/d, LLE TF  Cards:  RRR.   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 She arrived intubated and sedated. 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     ADMISSION SCORES:  GCS: 3T  Hunt-Gonzalez: 5  modified Couch: 3  ICH score: 2    HOSPITAL COURSE:  2/3/20- Clipping R PCOMM              EVD placed   20 - Extubated overnight  20- 500cc Bolus    ICU Vital Signs Last 24 Hrs  T(C): 37.1 (2020 05:00), Max: 37.7 (2020 08:17)  T(F): 98.7 (2020 05:00), Max: 99.2 (2020 23:00)  HR: 80 (2020 07:00) (71 - 106)  ABP: 165/78 (2020 07:00) (119/67 - 165/78)  ABP(mean): 103 (2020 07:00) (78 - 108)  RR: 19 (2020 07:00) (15 - 24)  SpO2: 100% (2020 07:00) (96% - 100%)    I&O's Detail    2020 07:01  -  2020 07:00  --------------------------------------------------------  IN:    Enteral Tube Flush: 120 mL    ns in tub fed  [id number]: 360 mL    Sodium Chloride 0.9% IV Bolus: 500 mL    sodium chloride 2% .: 1400 mL    sodium chloride 3%: 150 mL  Total IN: 2530 mL    OUT:    Bulb: 65 mL    External Ventricular Device: 74 mL    Incontinent per Collection Ba mL  Total OUT: 2389 mL    Total NET: 141 mL- EXt catheter- no incont    MEDICATIONS  (STANDING):  chlorhexidine 4% Liquid 1 Application(s) Topical <User Schedule>  enoxaparin Injectable 40 milliGRAM(s) SubCutaneous <User Schedule>  niMODipine Oral Solution 60 milliGRAM(s) Oral every 4 hours  polyethylene glycol 3350 17 Gram(s) Oral two times a day  senna 2 Tablet(s) Oral at bedtime  sodium chloride 2% . 1000 milliLiter(s) (50 mL/Hr) IV Continuous <Continuous>    MEDICATIONS  (PRN):  acetaminophen   Tablet .. 650 milliGRAM(s) Oral every 6 hours PRN Temp greater or equal to 38C (100.4F), Mild Pain (1 - 3)  albuterol/ipratropium for Nebulization. 3 milliLiter(s) Nebulizer every 6 hours PRN Shortness of Breath and/or Wheezing  artificial tears (preservative free) Ophthalmic Solution 1 Drop(s) Both EYES three times a day PRN Dry Eyes  oxyCODONE    IR 5 milliGRAM(s) Oral every 4 hours PRN Moderate Pain (4 - 6)  oxyCODONE    IR 10 milliGRAM(s) Oral every 4 hours PRN Severe Pain (7 - 10)    LABS:                           10.5   14.96 )-----------( 171      ( 2020 14:31 )             32.1     02-06    145  |  112<H>  |  6<L>  ----------------------------<  139<H>  3.5   |  20<L>  |  0.47<L>    Ca    8.5      2020 04:16  Phos  2.2     02-06  Mg     2.1     02-06    Lactate- 3.o  Lipids- NL  HGBA1C- 5.3      CT Head No Cont (20 @ 13:08) >  IMPRESSION: Right temporal parenchymal hemorrhage with diffuse subarachnoid hemorrhage slightly greater on the right. Slight prominence of the left temporal horn.    EXAMINATION:  General:  Intubated.  HEENT:  MMM. Right pupil reactive, left slightly smaller and sluggish. +Corneal on the right, but not left, absent Doll's.  Neuro: No eye opening, no commands, RUE localizes, LUE 2/5, RLE w/d, LLE TF  Cards:  RRR.   Respiratory: Clear  Abdomen:  soft, +BS  Extremities:  no edema

## 2020-02-06 NOTE — OCCUPATIONAL THERAPY INITIAL EVALUATION ADULT - COGNITIVE, VISUAL PERCEPTUAL, OT EVAL
Pt will attend to simple task for 3 min without cues for increased arousal/redirection within 4 weeks

## 2020-02-06 NOTE — PROGRESS NOTE ADULT - ASSESSMENT
ASSESSMENT/PLAN: HH5, MF 3 subarachnoid hemorrhage, post-bleed day 3  s/p emergent R craniectomy and removal of hematoma, and clipping of PCOM aneurysm (2/3/2020)    NEURO:  q1h neuro checks  EVD in place for monitoring  Repeat angio today per NSGY  Stroke code measures: A1c, LDL- as above   Delayed Cerebral Ischemia: TCDs, euvolemia, Nimodipine  Given Keppra 1g, and placed on Keppra 500 BID   Sedation  Precedex if needed  POT/OT - splint LUE and LLE  Check TOX screen    PULM:  Extubated 2/5  SpO2 > 92%  Incentive spirometry, if able    CARDS:  -160mmHg  Nicardipine gtt as needed  TTE- P   EKG, Trop    RENAL:  IVF- total fluids at 70 cc/hr     GASTRO:  NPO  Bowel regimen  ---> Stress ulcer prophylaxis:  PPI    HEME:  monitor H/H - No CBC    ---> DVT prophylaxis: SCDs, Lovenox 40 sc daily    ENDO:  euglycemia    ID:  Monitor for fevers    Code status:  Full code  Disposition:  ICU    Updated family at bedside.    This patient was at high risk of neurologic deterioration and/or death due to: re-hemorrhage, seizures, DCI.     Time spent:  45 minutes ASSESSMENT/PLAN: HH5, MF 3 subarachnoid hemorrhage, post-bleed day 3  s/p emergent R craniectomy and removal of hematoma, and clipping of PCOM aneurysm (2/3/2020)    NEURO:  q1h neuro checks  EVD in place for monitoring  Repeat angio today per NSGY  Stroke code measures: A1c, LDL- as above   Delayed Cerebral Ischemia: TCDs, euvolemia, Nimodipine  Given Keppra 1g, and placed on Keppra 500 BID   Sedation  Precedex if needed  POT/OT - splint LUE and LLE  Check TOX screen    PULM:  Extubated 2/5  SpO2 > 92%  Incentive spirometry, if able    CARDS:  -160mmHg  Nicardipine gtt as needed  TTE- P   EKG, Trop    RENAL:  IVF- total fluids at 70 cc/hr     GASTRO:  NPO  Bowel regimen- last BM - 2/6/20  ---> Stress ulcer prophylaxis:  PPI    HEME:  monitor H/H - No CBC    ---> DVT prophylaxis: SCDs, Lovenox 40 sc daily    ENDO:  euglycemia    ID:  Monitor for fevers    Code status:  Full code  Disposition:  ICU    Updated family at bedside.    This patient was at high risk of neurologic deterioration and/or death due to: re-hemorrhage, seizures, DCI.     Time spent:  45 minutes ASSESSMENT/PLAN: HH5, MF 3 subarachnoid hemorrhage, post-bleed day 3  s/p emergent R craniectomy and removal of hematoma, and clipping of PCOM aneurysm (2/3/2020)    NEURO:  q1h neuro checks  Continue EVD @ 15cm  Stroke code measures: A1c, LDL- as above   Delayed Cerebral Ischemia: TCDs, euvolemia, Nimodipine  Given Keppra 1g, and placed on Keppra 500 BID   Pain control w/ Tylenol prn, Oxy 5/10 prn  POT/OT - splint LUE and LLE  Check TOX screen    PULM:  Extubated 2/5  SpO2 > 92%  Incentive spirometry, if able    CARDS:  - 180 mHg  TTE- Nl LV function, no wall motion abnormalities    GASTRO:  TF  Speech/Swallow eval  Bowel regimen- last BM - 2/6/20  ---> Stress ulcer prophylaxis:  Not indicated    RENAL:  Goal: Eunatremia  On 2%NS @ 50 ml/hr  BMP q12H    ENDO:  euglycemia    HEME:  monitor H/H - No CBC    ---> DVT prophylaxis: SCDs, Lovenox 40 sc daily    ID:  Monitor for fevers    MISC: Tea colored urine - Monitor for now  Cr normal.     Code status:  Full code  Disposition:  ICU    Updated family at bedside.    This patient was at high risk of neurologic deterioration and/or death due to: re-hemorrhage, seizures, DCI.     Time spent:  45 minutes

## 2020-02-06 NOTE — DISCHARGE NOTE NURSING/CASE MANAGEMENT/SOCIAL WORK - NSDCPEPTSTRK_GEN_ALL_CORE
Stroke education booklet/Stroke warning signs and symptoms/Signs and symptoms of stroke/Risk factors for stroke/Stroke support groups for patients, families, and friends/Call 911 for stroke/Need for follow up after discharge/Prescribed medications

## 2020-02-06 NOTE — SWALLOW BEDSIDE ASSESSMENT ADULT - SWALLOW EVAL: DIAGNOSIS
Pt s/p SAH 2/2 aneurysm-> clipping. Patient presents with: 1) nasim-pharyngeal dysphagia with poor oral management, delay in trigger of the swallow reflex, s/s suggestive of pharyngeal residue, and s/s aspiration/laryngeal penetration on the most conservative p.o. texture 2) Dysarthria 3) Cognitive -linguistic impairment Pt s/p SAH 2/2 aneurysm-> crani/clipping. Patient presents with: 1) nasim-pharyngeal dysphagia with poor oral management, delay in trigger of the swallow reflex, s/s suggestive of pharyngeal residue, and s/s aspiration/laryngeal penetration on the most conservative p.o. texture 2) Dysarthria 3) Cognitive -linguistic impairment

## 2020-02-06 NOTE — SPEECH LANGUAGE PATHOLOGY EVALUATION - SLP DIAGNOSIS
Pt adm with SAH 2/2 aneurysm rupture-> crani. Now presents with marked cognitive-linguistic deficits and dysarthria Pt adm with SAH 2/2 aneurysm rupture-> crani/clipping Now presents with marked cognitive-linguistic deficits and dysarthria

## 2020-02-06 NOTE — PROGRESS NOTE ADULT - SUBJECTIVE AND OBJECTIVE BOX
Discussed with anesthesia, felt surgery might be the best course of action although risks are high. Proceed to surgery. TCD--R     vitals/labs/meds/imaging reviewed  EO to stim, nods appropriately, able to say her name/hospital/2020, [facility], RUE 5/5, RLE 5/5, LUE 3/5, LLE 4/5 TCD--R     vitals/labs/meds/imaging reviewed  EO spont, more alert, fully oriented, PERRL, RUE 5/5, RLE 5/5, LUE + drift, HG 3/5, tri 4-/5, bi 4+/5, del 4-/5, LLE 4/5 prox 5/5 PF/DF

## 2020-02-06 NOTE — OCCUPATIONAL THERAPY INITIAL EVALUATION ADULT - ADDITIONAL COMMENTS
CT Head 2/5-Scattered foci of parenchymal hemorrhage and edema in the right anterior temporal region redemonstrated. Similar-appearing acute infarct involving the posterior limb of the right internal capsule. Previously seen low density involving the right frontal and temporal lobes suggesting acute right MCA territory infarct is less prominent on the current examination.

## 2020-02-06 NOTE — PROGRESS NOTE ADULT - SUBJECTIVE AND OBJECTIVE BOX
Patient seen and examined at bedside.    --Anticoagulation--  enoxaparin Injectable 40 milliGRAM(s) SubCutaneous <User Schedule>    T(C): 37.1 (02-06-20 @ 05:00), Max: 37.7 (02-05-20 @ 08:17)  HR: 80 (02-06-20 @ 07:00) (71 - 106)  BP: --  RR: 19 (02-06-20 @ 07:00) (15 - 24)  SpO2: 100% (02-06-20 @ 07:00) (96% - 100%)  Wt(kg): --    Exam:  EO to voice, pupils 4 mm R b/l,  FC,   RUE 4+/5, LUE flexor, RLE AG, LLE wds

## 2020-02-07 LAB
ANION GAP SERPL CALC-SCNC: 14 MMOL/L — SIGNIFICANT CHANGE UP (ref 5–17)
ANION GAP SERPL CALC-SCNC: 14 MMOL/L — SIGNIFICANT CHANGE UP (ref 5–17)
APPEARANCE CSF: ABNORMAL
APPEARANCE UR: CLEAR — SIGNIFICANT CHANGE UP
APTT BLD: 33.5 SEC — SIGNIFICANT CHANGE UP (ref 27.5–36.3)
BILIRUB UR-MCNC: NEGATIVE — SIGNIFICANT CHANGE UP
BUN SERPL-MCNC: 10 MG/DL — SIGNIFICANT CHANGE UP (ref 7–23)
BUN SERPL-MCNC: 12 MG/DL — SIGNIFICANT CHANGE UP (ref 7–23)
CALCIUM SERPL-MCNC: 9.1 MG/DL — SIGNIFICANT CHANGE UP (ref 8.4–10.5)
CALCIUM SERPL-MCNC: 9.1 MG/DL — SIGNIFICANT CHANGE UP (ref 8.4–10.5)
CHLORIDE SERPL-SCNC: 113 MMOL/L — HIGH (ref 96–108)
CHLORIDE SERPL-SCNC: 113 MMOL/L — HIGH (ref 96–108)
CO2 SERPL-SCNC: 21 MMOL/L — LOW (ref 22–31)
CO2 SERPL-SCNC: 22 MMOL/L — SIGNIFICANT CHANGE UP (ref 22–31)
COLOR CSF: ABNORMAL
COLOR SPEC: SIGNIFICANT CHANGE UP
CREAT SERPL-MCNC: 0.48 MG/DL — LOW (ref 0.5–1.3)
CREAT SERPL-MCNC: 0.51 MG/DL — SIGNIFICANT CHANGE UP (ref 0.5–1.3)
DIFF PNL FLD: NEGATIVE — SIGNIFICANT CHANGE UP
GLUCOSE CSF-MCNC: 97 MG/DL — HIGH (ref 40–70)
GLUCOSE SERPL-MCNC: 144 MG/DL — HIGH (ref 70–99)
GLUCOSE SERPL-MCNC: 168 MG/DL — HIGH (ref 70–99)
GLUCOSE UR QL: NEGATIVE — SIGNIFICANT CHANGE UP
GRAM STN FLD: SIGNIFICANT CHANGE UP
INR BLD: 1.18 RATIO — HIGH (ref 0.88–1.16)
KETONES UR-MCNC: NEGATIVE — SIGNIFICANT CHANGE UP
LACTATE CSF-MCNC: 2.7 MMOL/L — HIGH (ref 1.1–2.4)
LEUKOCYTE ESTERASE UR-ACNC: NEGATIVE — SIGNIFICANT CHANGE UP
LYMPHOCYTES # CSF: 22 % — LOW (ref 40–80)
MAGNESIUM SERPL-MCNC: 2.2 MG/DL — SIGNIFICANT CHANGE UP (ref 1.6–2.6)
MONOS+MACROS NFR CSF: 19 % — SIGNIFICANT CHANGE UP (ref 15–45)
NEUTROPHILS # CSF: 43 % — HIGH (ref 0–6)
NITRITE UR-MCNC: NEGATIVE — SIGNIFICANT CHANGE UP
NRBC NFR CSF: 1 /UL — SIGNIFICANT CHANGE UP (ref 0–5)
NRBC NFR CSF: 2 % — HIGH (ref 0–0)
OTHER CELLS CSF MANUAL: 16 % — HIGH (ref 0–0)
PH UR: 7 — SIGNIFICANT CHANGE UP (ref 5–8)
PHOSPHATE SERPL-MCNC: 4.3 MG/DL — SIGNIFICANT CHANGE UP (ref 2.5–4.5)
POTASSIUM SERPL-MCNC: 3.3 MMOL/L — LOW (ref 3.5–5.3)
POTASSIUM SERPL-MCNC: 3.7 MMOL/L — SIGNIFICANT CHANGE UP (ref 3.5–5.3)
POTASSIUM SERPL-SCNC: 3.3 MMOL/L — LOW (ref 3.5–5.3)
POTASSIUM SERPL-SCNC: 3.7 MMOL/L — SIGNIFICANT CHANGE UP (ref 3.5–5.3)
PROT CSF-MCNC: 20 MG/DL — SIGNIFICANT CHANGE UP (ref 15–45)
PROT UR-MCNC: ABNORMAL
PROTHROM AB SERPL-ACNC: 13.6 SEC — HIGH (ref 10–12.9)
RBC # CSF: HIGH /UL (ref 0–0)
SODIUM SERPL-SCNC: 148 MMOL/L — HIGH (ref 135–145)
SODIUM SERPL-SCNC: 149 MMOL/L — HIGH (ref 135–145)
SP GR SPEC: 1.03 — HIGH (ref 1.01–1.02)
SPECIMEN SOURCE: SIGNIFICANT CHANGE UP
TUBE TYPE: SIGNIFICANT CHANGE UP
UROBILINOGEN FLD QL: NEGATIVE — SIGNIFICANT CHANGE UP

## 2020-02-07 PROCEDURE — 93888 INTRACRANIAL LIMITED STUDY: CPT | Mod: 26

## 2020-02-07 PROCEDURE — 88108 CYTOPATH CONCENTRATE TECH: CPT | Mod: 26

## 2020-02-07 PROCEDURE — 61026 INJECTION INTO BRAIN CANAL: CPT

## 2020-02-07 PROCEDURE — 99291 CRITICAL CARE FIRST HOUR: CPT

## 2020-02-07 PROCEDURE — 99292 CRITICAL CARE ADDL 30 MIN: CPT

## 2020-02-07 PROCEDURE — 70450 CT HEAD/BRAIN W/O DYE: CPT | Mod: 26

## 2020-02-07 PROCEDURE — 99221 1ST HOSP IP/OBS SF/LOW 40: CPT

## 2020-02-07 PROCEDURE — 71045 X-RAY EXAM CHEST 1 VIEW: CPT | Mod: 26

## 2020-02-07 RX ORDER — POTASSIUM CHLORIDE 20 MEQ
20 PACKET (EA) ORAL ONCE
Refills: 0 | Status: COMPLETED | OUTPATIENT
Start: 2020-02-07 | End: 2020-02-07

## 2020-02-07 RX ORDER — POTASSIUM CHLORIDE 20 MEQ
40 PACKET (EA) ORAL ONCE
Refills: 0 | Status: COMPLETED | OUTPATIENT
Start: 2020-02-07 | End: 2020-02-07

## 2020-02-07 RX ORDER — VANCOMYCIN HCL 1 G
500 VIAL (EA) INTRAVENOUS EVERY 12 HOURS
Refills: 0 | Status: DISCONTINUED | OUTPATIENT
Start: 2020-02-07 | End: 2020-02-08

## 2020-02-07 RX ORDER — POTASSIUM CHLORIDE 20 MEQ
20 PACKET (EA) ORAL
Refills: 0 | Status: COMPLETED | OUTPATIENT
Start: 2020-02-07 | End: 2020-02-07

## 2020-02-07 RX ORDER — ACETAMINOPHEN 500 MG
1000 TABLET ORAL ONCE
Refills: 0 | Status: COMPLETED | OUTPATIENT
Start: 2020-02-07 | End: 2020-02-07

## 2020-02-07 RX ADMIN — Medication 650 MILLIGRAM(S): at 07:47

## 2020-02-07 RX ADMIN — Medication 1000 MILLIGRAM(S): at 03:05

## 2020-02-07 RX ADMIN — Medication 20 MILLIEQUIVALENT(S): at 05:20

## 2020-02-07 RX ADMIN — Medication 400 MILLIGRAM(S): at 02:35

## 2020-02-07 RX ADMIN — Medication 650 MILLIGRAM(S): at 19:15

## 2020-02-07 RX ADMIN — NIMODIPINE 60 MILLIGRAM(S): 60 SOLUTION ORAL at 05:20

## 2020-02-07 RX ADMIN — CHLORHEXIDINE GLUCONATE 1 APPLICATION(S): 213 SOLUTION TOPICAL at 22:29

## 2020-02-07 RX ADMIN — OXYCODONE HYDROCHLORIDE 5 MILLIGRAM(S): 5 TABLET ORAL at 13:00

## 2020-02-07 RX ADMIN — ENOXAPARIN SODIUM 40 MILLIGRAM(S): 100 INJECTION SUBCUTANEOUS at 22:00

## 2020-02-07 RX ADMIN — Medication 650 MILLIGRAM(S): at 20:00

## 2020-02-07 RX ADMIN — Medication 20 MILLIEQUIVALENT(S): at 05:21

## 2020-02-07 RX ADMIN — OXYCODONE HYDROCHLORIDE 5 MILLIGRAM(S): 5 TABLET ORAL at 12:30

## 2020-02-07 RX ADMIN — NIMODIPINE 60 MILLIGRAM(S): 60 SOLUTION ORAL at 14:18

## 2020-02-07 RX ADMIN — NIMODIPINE 60 MILLIGRAM(S): 60 SOLUTION ORAL at 02:39

## 2020-02-07 RX ADMIN — NIMODIPINE 60 MILLIGRAM(S): 60 SOLUTION ORAL at 17:33

## 2020-02-07 RX ADMIN — NIMODIPINE 60 MILLIGRAM(S): 60 SOLUTION ORAL at 22:29

## 2020-02-07 RX ADMIN — Medication 650 MILLIGRAM(S): at 08:17

## 2020-02-07 RX ADMIN — NIMODIPINE 60 MILLIGRAM(S): 60 SOLUTION ORAL at 09:39

## 2020-02-07 RX ADMIN — Medication 40 MILLIEQUIVALENT(S): at 17:33

## 2020-02-07 RX ADMIN — Medication 20 MILLIEQUIVALENT(S): at 03:38

## 2020-02-07 NOTE — PROGRESS NOTE ADULT - ASSESSMENT
HH4 mF3 subarachnoid hemorrhage  - TCDs, -180mmHg  - Nimodipine  - Start antibiotics for cellulitis  - UE doppler

## 2020-02-07 NOTE — PROGRESS NOTE ADULT - SUBJECTIVE AND OBJECTIVE BOX
Left antecubital redness and cord.     Awake, alert, fully oriented, mild left facial, LUE 4/5, LLE 4/5, right side 5/5

## 2020-02-07 NOTE — CHART NOTE - NSCHARTNOTEFT_GEN_A_CORE
Nutrition Follow Up Note  Patient seen for: Nutrition follow up     Pt is a 40 yo F who was found unresponsive on an airplane; taken to [facility] where she was found to have a subarachnoid hemorrhage and subsequently transferred to [facility]. Arrived intubated and sedated. Taken to OR emergently for craniectomy and clipping of right posterior communication artery aneurysm and placement of EVD. Extubated 20. SG drain removed this morning ().     Source: RN, comprehensive chart review, interdisciplinary medical rounds     Diet: NPO with tube feed via nasogastric feeds: Jevity 1.2 at 50ml/hr x 24 hrs. Provides: 1200ml, 1440kcal and 67g protein. Per discussion with RN, no signs/symptoms of EN intolerance. See below for EN provision and stool count. Per RN, pt failed dysphagia screen.     EN provision (per nursing flow sheet):   (): 450ml (thus far)  (): 1160ml (97% of goal)  (): 80ml (EN initiated)    Stool count (per nursing flow sheet): on bowel regimen as ordered. Per RN, rate of BM's have slowed down.   (): x 5  (): x 5    Daily Weight in k.9 (02-04)  % Weight Change    Pertinent Medications: MEDICATIONS  (STANDING):  chlorhexidine 4% Liquid 1 Application(s) Topical <User Schedule>  enoxaparin Injectable 40 milliGRAM(s) SubCutaneous <User Schedule>  niMODipine Oral Solution 60 milliGRAM(s) Oral every 4 hours  polyethylene glycol 3350 17 Gram(s) Oral two times a day  senna 2 Tablet(s) Oral at bedtime    MEDICATIONS  (PRN):  acetaminophen   Tablet .. 650 milliGRAM(s) Oral every 6 hours PRN Temp greater or equal to 38C (100.4F), Mild Pain (1 - 3)  albuterol/ipratropium for Nebulization. 3 milliLiter(s) Nebulizer every 6 hours PRN Shortness of Breath and/or Wheezing  artificial tears (preservative free) Ophthalmic Solution 1 Drop(s) Both EYES three times a day PRN Dry Eyes  oxyCODONE    IR 5 milliGRAM(s) Oral every 4 hours PRN Moderate Pain (4 - 6)  oxyCODONE    IR 10 milliGRAM(s) Oral every 4 hours PRN Severe Pain (7 - 10)    Pertinent Labs:  @ 03:50: Na 149<H>, BUN 10, Cr 0.51, <H>, K+ 3.3<L>, Phos 4.3, Mg 2.2, Alk Phos --, ALT/SGPT --, AST/SGOT --, HbA1c --   @ 16:58: Na 149<H>, BUN 8, Cr 0.45<L>, <H>, K+ 3.6, Phos --, Mg --, Alk Phos --, ALT/SGPT --, AST/SGOT --, HbA1c --    Finger Sticks:      Skin per nursing documentation: surgical incision right head s/p craniectomy  Edema: 2+ generalized;  left arm;  right arm    Estimated Needs:   [x] no change since previous assessment: based on dosing wt 59kg  (30-35kcal/kg): 1767-2061kcal   (1.4-1.6g protein/kg): 82-94g protein  [ ] recalculated:     Previous Nutrition Diagnosis: Increased nutrient needs  Nutrition Diagnosis is: Remains appropriate, ongoing with provision of enteral feeds    Interventions:     Recommend  1) Recommend increase EN goal rate: Jevity 1.2 at 70ml/hr x 24 hrs. To provide 1680ml, 2016kcal and 93g protein. Based on dosing wt 59kg, provides: 34kcal/kg and 1.6g protein/kg.   2) Monitor GI tolerance. RD to remain available to adjust EN formulary, volume/rate PRN.   3) Monitor wt trends, nutrition related labs, skin integrity, hydration status, bowel regularity.   4) If PO diet initiated, consider no therapeutic restrictions with addition of Ensure Enlive twice daily (thickened appropriately) 2/2 increased energy needs.     Monitoring and Evaluation:     Continue to monitor Nutritional intake, Tolerance to diet prescription, weights, labs, skin integrity    RD remains available upon request and will follow up per protocol

## 2020-02-07 NOTE — CONSULT NOTE ADULT - SUBJECTIVE AND OBJECTIVE BOX
Patient is a 39y old  Female who presents with a chief complaint of SAH, IPH (07 Feb 2020 07:21)    HPI:  EDER WALTERS is a 38yo Female with unknown medical Hx who was found unresponsive on an airplane and taken to [place] on 2/3/20, where she was found to have a subarachnoid hemorrhage and subsequently transferred to [facility]. She arrived intubated and sedated.     Hospital Course:    At [facility] ED, CTA showed R temporal IPH, diffuse SAH, and R PComm artery aneurysm. Taken to the OR emergently for craniectomy and clipping of right PCOM artery aneurysm.     2/3/20- Craniectomy for clipping R PCOMM and evacuation of hematoma. EVD placed   2/4/20 - Extubated  2/5/20 - Selective cerebral angiography and Complete micro surgical clipping of right PCOMM artery aneurysm by Neuro-IR Sisi.  2/6/20- 500cc Bolus  2/7/20 EVD clamped    On keppra for seizure ppx. Repeat CT head 2/7 shows post op changes and hemorrhagic transformation in the right temporal lobe.    SLP eval 2/6 showed marked cognitive-linguistic deficits, dysarthria and nasim-pharyngeal dysphagia; recommend NPO. NG tube in place, on tube feeds.    REVIEW OF SYSTEMS  Constitutional - Denies fevers, chills  HEENT - Denies changes in vision or hearing  Respiratory - Denies cough, dyspnea  Cardiovascular - Denies chest pain, palpitations  Gastrointestinal - Denies n/v, constipation, bowel incontinence  Genitourinary - +external female catether  Neurological - Denies weakness, numbness, headaches  Skin - Denies rashes  Musculoskeletal - Denies arthralgia, myalgias, back pain  Psychiatric - Denies depressed mood, anxiety    PAST MEDICAL & SURGICAL HISTORY  No pertinent past medical history  No significant past surgical history    SOCIAL HISTORY  Lives alone in [facility], [facility]. May live with parents after discharge, in , [place], 1 flight to bedroom.  PTA Independent in ambulation and ADLs. Right handed. Doesn't drive (takes public transit). Works as physical therapist??    CURRENT FUNCTIONAL STATUS  Bed mobility - Leeann  Transfers - Leeann  Gait - modA 3 steps bed to chair  ADL's - maxA    FAMILY HISTORY: noncontributory     ALLERGIES  Gluten (Stomach Upset)  penicillins (Anaphylaxis; Hives)  shellfish (Anaphylaxis)  sulfa drugs (Unknown)    MEDICATIONS   acetaminophen   Tablet .. 650 milliGRAM(s) Oral every 6 hours PRN  albuterol/ipratropium for Nebulization. 3 milliLiter(s) Nebulizer every 6 hours PRN  artificial tears (preservative free) Ophthalmic Solution 1 Drop(s) Both EYES three times a day PRN  chlorhexidine 4% Liquid 1 Application(s) Topical <User Schedule>  enoxaparin Injectable 40 milliGRAM(s) SubCutaneous <User Schedule>  niMODipine Oral Solution 60 milliGRAM(s) Oral every 4 hours  oxyCODONE    IR 5 milliGRAM(s) Oral every 4 hours PRN  oxyCODONE    IR 10 milliGRAM(s) Oral every 4 hours PRN  polyethylene glycol 3350 17 Gram(s) Oral two times a day  senna 2 Tablet(s) Oral at bedtime    ----------------------------------------------------------------------------------------  PHYSICAL EXAM  VITALS  T(C): 37.2 (02-07 @ 07:00)  HR: 72 (02-07 @ 07:00)  BP: 163/79 (02-06 @ 18:00)  RR: 19 (02-07 @ 07:00)  SpO2: 100% (02-07 @ 07:00)    Constitutional - NAD, Comfortable  HEENT - NCAT. +NG tube  Chest - No respiratory distress. No use of accessory muscles of respiration.  Cardiovascular - Warm, well perfused  Abdomen - nondistended  Extremities - no C/C/E  Neurologic Exam -                    Cognitive - Awake, Alert, AAO to self, place, date, year, situation     Communication - Fluent, No dysarthria     Cranial Nerves - L facial droop     Motor -                     LEFT    UE - ShAB 5/5, EF 5/5, EE 5/5, WE 5/5,  5/5                    RIGHT UE - ShAB 5/5, EF 5/5, EE 5/5, WE 5/5,  5/5                    LEFT    LE - HF 5/5, KE 5/5, DF 5/5, PF 5/5                    RIGHT LE - HF 5/5, KE 5/5, DF 5/5, PF 5/5        Sensory - Intact to LT     Reflexes - DTR Intact, No primitive reflexive     Coordination - FTN intact  Psychiatric - Mood stable, Affect WNL    RECENT LABS/IMAGING                        10.5   14.96 )-----------( 171      ( 05 Feb 2020 14:31 )             32.1     02-07    149<H>  |  113<H>  |  10  ----------------------------<  168<H>  3.3<L>   |  22  |  0.51    Ca    9.1      07 Feb 2020 03:50  Phos  4.3     02-07  Mg     2.2     02-07    < from: CT Angio Head w/ IV Cont (02.03.20 @ 13:08) >  IMPRESSION: Right temporal parenchymal hemorrhage with diffuse subarachnoid hemorrhage slightly greater on the right. Slight prominence of the left temporal horn. CTA demonstrates a right posterior communicating artery aneurysm.    < from: CT Head No Cont (02.07.20 @ 08:10) >  INTERPRETATION:  Clinical indication: Status post surgery. Subarachnoid hemorrhage.  This exam is compared with prior noncontrast head CT performed on February 5, 2020. Postop changes compatible with a right temporal frontal craniectomy is again seen. There is increased herniation of brain parenchyma through the craniectomy defect identified.     Abnormal low-attenuation involving the right temporal cortical and subcortical region is again identified. This finding does demonstrate small area of associated high attenuation and is likely compatible with an evolving hemorrhagic infarct in the right MCA territory. Evolving infarct is also seen involving the anterior aspect of the right thalamus.    There is evidence of left frontal shunt catheter again seen and unchanged in position. Associated area of hemorrhage is again identified along shunt catheter tract with as well as edema. Slight increase in size of the temporal horns are identified. Aneurysm clips involving the right parasellar region are again identified. Bifrontal and posterior interhemispheric subdural collections are identified. Right maxilla polyp is retention cyst is seen.   Both mastoid eminence appear clear. Right-sided NG tube is seen Extracalvarial soft tissue swelling and staples are seen in the postop region. Small amount of extra-axial air seen in the right frontal and postop region.   Impression: Increase parenchyma is seen herniating through the craniectomy defect. Evolving infarcts are identified as described above. Slight increase in size of the temporal horns seen when compared prior exam. Patient is a 39y old  Female who presents with a chief complaint of SAH, IPH (07 Feb 2020 07:21)    HPI:  EDER WALTERS is a 38yo Female with unknown medical Hx who was found unresponsive on an airplane and taken to [place] on 2/3/20, where she was found to have a subarachnoid hemorrhage and subsequently transferred to [facility]. She arrived intubated and sedated.     Hospital Course:    At [facility] ED, CTA showed R temporal IPH, diffuse SAH, and R PComm artery aneurysm. Taken to the OR emergently for craniectomy and clipping of right PCOM artery aneurysm.     2/3/20- Craniectomy for clipping R PCOMM and evacuation of hematoma. EVD placed   2/4/20 - Extubated  2/5/20 - Selective cerebral angiography and Complete micro surgical clipping of right PCOMM artery aneurysm by Neuro-IR Sisi.  2/6/20- 500cc Bolus  2/7/20 EVD clamped    On keppra for seizure ppx. Repeat CT head 2/7 shows post op changes and hemorrhagic transformation in the right temporal lobe.    SLP eval 2/6 showed marked cognitive-linguistic deficits, dysarthria and nasim-pharyngeal dysphagia; recommend NPO. NG tube in place, on tube feeds.    REVIEW OF SYSTEMS  Constitutional - Denies fevers, chills  HEENT - Denies changes in vision or hearing  Respiratory - Denies cough, dyspnea  Cardiovascular - Denies chest pain, palpitations  Gastrointestinal - Denies n/v, constipation, bowel incontinence  Genitourinary - +external female catether  Neurological - Denies weakness, numbness, headaches  Skin - Denies rashes  Musculoskeletal - Denies arthralgia, myalgias, back pain  Psychiatric - Denies depressed mood, anxiety    PAST MEDICAL & SURGICAL HISTORY  No pertinent past medical history  No significant past surgical history    SOCIAL HISTORY  Lives alone in [facility], [facility]. Parents and 2 sisters live in [place]. Brother lives in [place]. 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  PTA Independent in ambulation and ADLs. Right handed. Doesn't drive (takes public transit). 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    CURRENT FUNCTIONAL STATUS  Bed mobility - Leeann  Transfers - Leeann  Gait - modA 3 steps bed to chair  ADL's - maxA    FAMILY HISTORY: noncontributory     ALLERGIES  Gluten (Stomach Upset)  penicillins (Anaphylaxis; Hives)  shellfish (Anaphylaxis)  sulfa drugs (Unknown)    MEDICATIONS   acetaminophen   Tablet .. 650 milliGRAM(s) Oral every 6 hours PRN  albuterol/ipratropium for Nebulization. 3 milliLiter(s) Nebulizer every 6 hours PRN  artificial tears (preservative free) Ophthalmic Solution 1 Drop(s) Both EYES three times a day PRN  chlorhexidine 4% Liquid 1 Application(s) Topical <User Schedule>  enoxaparin Injectable 40 milliGRAM(s) SubCutaneous <User Schedule>  niMODipine Oral Solution 60 milliGRAM(s) Oral every 4 hours  oxyCODONE    IR 5 milliGRAM(s) Oral every 4 hours PRN  oxyCODONE    IR 10 milliGRAM(s) Oral every 4 hours PRN  polyethylene glycol 3350 17 Gram(s) Oral two times a day  senna 2 Tablet(s) Oral at bedtime    ----------------------------------------------------------------------------------------  PHYSICAL EXAM  VITALS  T(C): 37.2 (02-07 @ 07:00)  HR: 72 (02-07 @ 07:00)  BP: 163/79 (02-06 @ 18:00)  RR: 19 (02-07 @ 07:00)  SpO2: 100% (02-07 @ 07:00)    Constitutional - NAD, Comfortable  HEENT - Craniectomy dressing intact, +NG tube  Chest - No respiratory distress. No use of accessory muscles of respiration.  Cardiovascular - Warm, well perfused  Abdomen - nondistended  Extremities - RUE restraint  Neurologic Exam -                    Cognitive - Lethargic but arousable to verbal stimuli, Alert, AAO to self, place, month, year, situation     Communication - Fluent, dysarthric. Slowed responses. Follows commands.      Memory - recalls all addresses to home and workplace appropriately     Concentration - unable to recite reverse months     Cranial Nerves - L facial droop, tongue left deviated. Left pupil more dilated, reactive bilateral.      Motor -                     LEFT    UE - ShAB 4+/5, EF 5/5, EE 5/5, WE 4/5,  1/5                    RIGHT UE - 5/5                    LEFT    LE - HF 5/5, KE 5/5, DF45/5, PF 5/5                    RIGHT LE - 5/5        Sensory - Intact to LT     Reflexes - 2+ bilateral patellar, 1+ left biceps, right biceps deferred due to nearby IV     Coordination - FTN impaired on left, apraxic  Psychiatric - Mood stable, Affect WNL    RECENT LABS/IMAGING                        10.5   14.96 )-----------( 171      ( 05 Feb 2020 14:31 )             32.1     02-07    149<H>  |  113<H>  |  10  ----------------------------<  168<H>  3.3<L>   |  22  |  0.51    Ca    9.1      07 Feb 2020 03:50  Phos  4.3     02-07  Mg     2.2     02-07    < from: CT Angio Head w/ IV Cont (02.03.20 @ 13:08) >  IMPRESSION: Right temporal parenchymal hemorrhage with diffuse subarachnoid hemorrhage slightly greater on the right. Slight prominence of the left temporal horn. CTA demonstrates a right posterior communicating artery aneurysm.    < from: CT Head No Cont (02.07.20 @ 08:10) >  INTERPRETATION:  Clinical indication: Status post surgery. Subarachnoid hemorrhage.  This exam is compared with prior noncontrast head CT performed on February 5, 2020. Postop changes compatible with a right temporal frontal craniectomy is again seen. There is increased herniation of brain parenchyma through the craniectomy defect identified.     Abnormal low-attenuation involving the right temporal cortical and subcortical region is again identified. This finding does demonstrate small area of associated high attenuation and is likely compatible with an evolving hemorrhagic infarct in the right MCA territory. Evolving infarct is also seen involving the anterior aspect of the right thalamus.    There is evidence of left frontal shunt catheter again seen and unchanged in position. Associated area of hemorrhage is again identified along shunt catheter tract with as well as edema. Slight increase in size of the temporal horns are identified. Aneurysm clips involving the right parasellar region are again identified. Bifrontal and posterior interhemispheric subdural collections are identified. Right maxilla polyp is retention cyst is seen.   Both mastoid eminence appear clear. Right-sided NG tube is seen Extracalvarial soft tissue swelling and staples are seen in the postop region. Small amount of extra-axial air seen in the right frontal and postop region.   Impression: Increase parenchyma is seen herniating through the craniectomy defect. Evolving infarcts are identified as described above. Slight increase in size of the temporal horns seen when compared prior exam. Patient is a 39y old  Female who presents with a chief complaint of SAH, IPH (07 Feb 2020 07:21)    HPI:  EDER WALTERS is a 38yo Female with unknown medical Hx who was found unresponsive on an airplane and taken to [place] on 2/3/20, where she was found to have a subarachnoid hemorrhage and subsequently transferred to [facility]. She arrived intubated and sedated.     Hospital Course:    At [facility] ED, CTA showed R temporal IPH, diffuse SAH, and R PComm artery aneurysm. Taken to the OR emergently for craniectomy and clipping of right PCOM artery aneurysm.     2/3/20- Craniectomy for clipping R PCOMM and evacuation of hematoma. EVD placed   2/4/20 - Extubated  2/5/20 - Selective cerebral angiography and Complete micro surgical clipping of right PCOMM artery aneurysm by Neuro-IR Sisi.  2/6/20- 500cc Bolus  2/7/20 EVD clamped    On keppra for seizure ppx. Repeat CT head 2/7 shows post op changes and hemorrhagic transformation in the right temporal lobe.    SLP eval 2/6 showed marked cognitive-linguistic deficits, dysarthria and nasim-pharyngeal dysphagia; recommend NPO. NG tube in place, on tube feeds.    REVIEW OF SYSTEMS  Constitutional - +Sleepy  HEENT - Denies changes in vision or hearing  Respiratory - Denies cough, dyspnea  Cardiovascular - Denies chest pain, palpitations  Gastrointestinal - Denies n/v, constipation, bowel incontinence  Genitourinary - +external female catether  Neurological - +Dizziness +HA  Skin - Denies rashes  Musculoskeletal - Denies arthralgia, myalgias, back pain  Psychiatric - Denies depressed mood, anxiety    PAST MEDICAL & SURGICAL HISTORY  No pertinent past medical history  No significant past surgical history    SOCIAL HISTORY  Lives alone in [facility], [facility]. Parents and 2 sisters live in [place]. Brother lives in [place]. 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  PTA Independent in ambulation and ADLs. Right handed. Doesn't drive (takes public transit). 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    CURRENT FUNCTIONAL STATUS  Bed mobility - Leeann  Transfers - Leeann  Gait - modA 3 steps bed to chair  ADL's - maxA    FAMILY HISTORY: noncontributory     ALLERGIES  Gluten (Stomach Upset)  penicillins (Anaphylaxis; Hives)  shellfish (Anaphylaxis)  sulfa drugs (Unknown)    MEDICATIONS   acetaminophen   Tablet .. 650 milliGRAM(s) Oral every 6 hours PRN  albuterol/ipratropium for Nebulization. 3 milliLiter(s) Nebulizer every 6 hours PRN  artificial tears (preservative free) Ophthalmic Solution 1 Drop(s) Both EYES three times a day PRN  chlorhexidine 4% Liquid 1 Application(s) Topical <User Schedule>  enoxaparin Injectable 40 milliGRAM(s) SubCutaneous <User Schedule>  niMODipine Oral Solution 60 milliGRAM(s) Oral every 4 hours  oxyCODONE    IR 5 milliGRAM(s) Oral every 4 hours PRN  oxyCODONE    IR 10 milliGRAM(s) Oral every 4 hours PRN  polyethylene glycol 3350 17 Gram(s) Oral two times a day  senna 2 Tablet(s) Oral at bedtime    ----------------------------------------------------------------------------------------  PHYSICAL EXAM  VITALS  T(C): 37.2 (02-07 @ 07:00)  HR: 72 (02-07 @ 07:00)  BP: 163/79 (02-06 @ 18:00)  RR: 19 (02-07 @ 07:00)  SpO2: 100% (02-07 @ 07:00)    Constitutional - NAD, Comfortable  HEENT - Craniectomy dressing intact, +NG tube  Chest - No respiratory distress. No use of accessory muscles of respiration.  Cardiovascular - Warm, well perfused  Abdomen - nondistended  Extremities - RUE restraint  Neurologic Exam -                    Cognitive - Lethargic but arousable to verbal stimuli, Alert, AAO to self, place, month, year, situation     Communication - Fluent, dysarthric. Slowed responses. Follows commands.      Memory - recalls all addresses to home and workplace appropriately     Concentration - unable to recite reverse months     Cranial Nerves - L facial droop, tongue left deviated. Left pupil more dilated, reactive bilateral.      Motor -                     LEFT    UE - ShAB 4+/5, EF 5/5, EE 5/5, WE 4/5,  1/5                    RIGHT UE - 5/5                    LEFT    LE - HF 5/5, KE 5/5, DF45/5, PF 5/5                    RIGHT LE - 5/5        Sensory - Intact to LT     Reflexes - 2+ bilateral patellar, 1+ left biceps, right biceps deferred due to nearby IV     Coordination - FTN impaired on left, apraxic  Psychiatric - Mood stable, Affect WNL    RECENT LABS/IMAGING                        10.5   14.96 )-----------( 171      ( 05 Feb 2020 14:31 )             32.1     02-07    149<H>  |  113<H>  |  10  ----------------------------<  168<H>  3.3<L>   |  22  |  0.51    Ca    9.1      07 Feb 2020 03:50  Phos  4.3     02-07  Mg     2.2     02-07    < from: CT Angio Head w/ IV Cont (02.03.20 @ 13:08) >  IMPRESSION: Right temporal parenchymal hemorrhage with diffuse subarachnoid hemorrhage slightly greater on the right. Slight prominence of the left temporal horn. CTA demonstrates a right posterior communicating artery aneurysm.    < from: CT Head No Cont (02.07.20 @ 08:10) >  INTERPRETATION:  Clinical indication: Status post surgery. Subarachnoid hemorrhage.  This exam is compared with prior noncontrast head CT performed on February 5, 2020. Postop changes compatible with a right temporal frontal craniectomy is again seen. There is increased herniation of brain parenchyma through the craniectomy defect identified.     Abnormal low-attenuation involving the right temporal cortical and subcortical region is again identified. This finding does demonstrate small area of associated high attenuation and is likely compatible with an evolving hemorrhagic infarct in the right MCA territory. Evolving infarct is also seen involving the anterior aspect of the right thalamus.    There is evidence of left frontal shunt catheter again seen and unchanged in position. Associated area of hemorrhage is again identified along shunt catheter tract with as well as edema. Slight increase in size of the temporal horns are identified. Aneurysm clips involving the right parasellar region are again identified. Bifrontal and posterior interhemispheric subdural collections are identified. Right maxilla polyp is retention cyst is seen.   Both mastoid eminence appear clear. Right-sided NG tube is seen Extracalvarial soft tissue swelling and staples are seen in the postop region. Small amount of extra-axial air seen in the right frontal and postop region.   Impression: Increase parenchyma is seen herniating through the craniectomy defect. Evolving infarcts are identified as described above. Slight increase in size of the temporal horns seen when compared prior exam.

## 2020-02-07 NOTE — CONSULT NOTE ADULT - ASSESSMENT
EDER WALTERS is a 40yo Female admitted for IPH/SAH due to PCOM aneurysmal rupture s/p craniectomy and IR clipping of R PCOM, now with dysphagia, gait instability, ADL impairments and functional impairments.     Precautions: Fall, aspiration    Rehab:  ***INCOMPLETE*** EDER WALTERS is a 40yo Female admitted for IPH/SAH due to PCOM aneurysmal rupture s/p craniectomy and IR clipping of R PCOM, now with dysphagia, gait instability, ADL impairments and functional impairments.     Precautions: Fall, aspiration. Wear helmet when OOB.    Rehab:  - Continue bedside therapy as well as OOB throughout the day with mobilization with staff to maintain cardiopulmonary function and prevention of secondary complications related to debility.   - When medically stable, recommend ACUTE inpatient rehabilitation consisting of 3 hours of therapy/day & 24 hour RN/daily PM&R physician for comorbid medical management. Patient will be able to tolerate 3 hours a day.

## 2020-02-07 NOTE — PROGRESS NOTE ADULT - SUBJECTIVE AND OBJECTIVE BOX
Patient seen and examined at bedside.    --Anticoagulation--  enoxaparin Injectable 40 milliGRAM(s) SubCutaneous <User Schedule>    T(C): 37.4 (02-06-20 @ 23:00), Max: 37.4 (02-06-20 @ 23:00)  HR: 91 (02-07-20 @ 02:00) (63 - 91)  BP: 163/79 (02-06-20 @ 18:00) (134/78 - 163/79)  RR: 17 (02-07-20 @ 02:00) (17 - 24)  SpO2: 100% (02-07-20 @ 02:00) (98% - 100%)  Wt(kg): --    Exam:  Arousable, EO spont,  OX3  pupils 4 mm R b/l,  FC,   RUE 5/5, RLE 5/5, LUE 4/5 HG 3/5, LLE 4/5, DF/PF 5/5

## 2020-02-07 NOTE — PROGRESS NOTE ADULT - ASSESSMENT
s/p Rt craniectomy, clipping of Pcomm aneurysm, evacuation of hematoma  - s/p EVD@ 15. ICP low teens.   - Trend SG FELICIA.  Dc this morning   - CTH in AM  - TCDs

## 2020-02-07 NOTE — CHART NOTE - NSCHARTNOTEFT_GEN_A_CORE
EDER [id number]      Drain type: [ ]SD [x]SG [] FELICIA [] HMV [] Lumbar drain [] EVD [] ICP [place] [] Abd drain     Patient's position while drain removed: Supine, EVD was clamped during procedure    [ x ] Patient tolerated well [] No complications [] complications:     Exit Site secured with: [x] __3 staples [] __ suture (please specify how many of each)     Additional Info: Follow up CT to follow

## 2020-02-07 NOTE — CHART NOTE - NSCHARTNOTEFT_GEN_A_CORE
Diagnosis: Bleeding in brain due to brain aneurysm   Indication: Fever workup    Using sterile technique, External Ventricular device was accessed for collection of CSF sample.  EVD was clamped distally and approximately 1cc of CSF was sterilely withdrawn and discarded.  Another 2cc of CSF was withdrawn and sent to lab in 2 separate vials for culture and analysis.  Access port was closed with disinfecting cap then EVD was unclamped.  ICPs monitored and patient tolerated procedure well. No complications.

## 2020-02-07 NOTE — PROGRESS NOTE ADULT - ASSESSMENT
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)    NEURO:  q1h neuro checks  Continue EVD @ 15cm  Stroke code measures: A1c, LDL- as above   Delayed Cerebral Ischemia: TCDs, euvolemia, Nimodipine  Given Keppra 1g, and placed on Keppra 500 BID   Pain control w/ Tylenol prn, Oxy 5/10 prn  POT/OT - splint LUE and LLE  Check TOX screen    PULM:  Extubated 2/5  SpO2 > 92%  Incentive spirometry, if able    CARDS:  - 180 mHg  TTE- Nl LV function, no wall motion abnormalities    GASTRO:  TF  Speech/Swallow eval  Bowel regimen- last BM - 2/6/20  ---> Stress ulcer prophylaxis:  Not indicated    RENAL:  Goal: Eunatremia  On 2%NS @ 50 ml/hr  BMP q12H    ENDO:  euglycemia    HEME:  monitor H/H - No CBC    ---> DVT prophylaxis: SCDs, Lovenox 40 sc daily    ID:  Monitor for fevers    MISC: Tea colored urine - Monitor for now  Cr normal.     Code status:  Full code  Disposition:  ICU    Updated family at bedside.    This patient was at high risk of neurologic deterioration and/or death due to: re-hemorrhage, seizures, DCI.     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)    NEURO:  q1h neuro checks  Continue EVD @ 15cm- 2cc/hr   Stroke code measures: A1c, LDL- as above   Delayed Cerebral Ischemia: TCDs-  R MCA , euvolemia, Nimodipine  Pain control w/ Tylenol prn, Oxy 5/10 prn  POT/OT - splint LUE and LLE  Check TOX screen    PULM:  Extubated 2/5  SpO2 > 92%  Incentive spirometry, if able    CARDS:  - 180 mHg  TTE- Nl LV function, no wall motion abnormalities    GASTRO:  TF  Speech/Swallow eval- failed and will return 2/10/20  Bowel regimen- last BM - 2/6/20  ---> Stress ulcer prophylaxis:  Not indicated    RENAL:  Goal: Eunatremia  BMP q12H- check BMP     ENDO:  euglycemia    HEME:  monitor H/H - No CBC    ---> DVT prophylaxis: SCDs, Lovenox 40 sc daily    ID:  Monitor for fevers    MISC: Tea colored urine - Monitor for now  Cr normal.     Code status:  Full code  Disposition:  ICU    Updated family at bedside.    This patient was at high risk of neurologic deterioration and/or death due to: re-hemorrhage, seizures, DCI.     Time spent:  40 minutes

## 2020-02-07 NOTE — PROGRESS NOTE ADULT - SUBJECTIVE AND OBJECTIVE BOX
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 She arrived intubated and sedated. 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     ADMISSION SCORES:  GCS: 3T  Hunt-Gonzalez: 5  modified Couch: 3  ICH score: 2    HOSPITAL COURSE:  2/3/20- Clipping R PCOMM              EVD placed   20 - Extubated overnight  20- 500cc Bolus    ICU Vital Signs Last 24 Hrs  T(C): 36.3 (2020 05:00), Max: 37.4 (2020 23:00)  T(F): 97.3 (2020 05:00), Max: 99.4 (2020 23:00)  HR: 90 (2020 06:00) (63 - 91)  BP: 163/79 (2020 18:00) (134/78 - 163/79)  BP(mean): 99 (2020 18:00) (93 - 99)  ABP: 149/84 (2020 06:00) (121/74 - 176/90)  ABP(mean): 111 (2020 06:00) (65 - 124)  RR: 24 (2020 06:00) (17 - 24)  SpO2: 100% (2020 06:00) (100% - 100%)    I&O's Detail    2020 07:01  -  2020 07:00  --------------------------------------------------------  IN:    Enteral Tube Flush: 150 mL    IV PiggyBack: 350 mL    ns in tub fed  [id number]: 1230 mL    sodium chloride 2%: 650 mL  Total IN: 2380 mL    OUT:    Bulb: 15 mL    External Ventricular Device: 31 mL    Incontinent per Collection Ba mL  Total OUT: 1346 mL    Total NET: 1034 mL    MEDICATIONS  (STANDING):  chlorhexidine 4% Liquid 1 Application(s) Topical <User Schedule>  enoxaparin Injectable 40 milliGRAM(s) SubCutaneous <User Schedule>  niMODipine Oral Solution 60 milliGRAM(s) Oral every 4 hours  polyethylene glycol 3350 17 Gram(s) Oral two times a day  senna 2 Tablet(s) Oral at bedtime    MEDICATIONS  (PRN):  acetaminophen   Tablet .. 650 milliGRAM(s) Oral every 6 hours PRN Temp greater or equal to 38C (100.4F), Mild Pain (1 - 3)  albuterol/ipratropium for Nebulization. 3 milliLiter(s) Nebulizer every 6 hours PRN Shortness of Breath and/or Wheezing  artificial tears (preservative free) Ophthalmic Solution 1 Drop(s) Both EYES three times a day PRN Dry Eyes  oxyCODONE    IR 5 milliGRAM(s) Oral every 4 hours PRN Moderate Pain (4 - 6)  oxyCODONE    IR 10 milliGRAM(s) Oral every 4 hours PRN Severe Pain (7 - 10)      LABS:                          10.5   14.96 )-----------( 171      ( 2020 14:31 )             32.1     02-07    149<H>  |  113<H>  |  10  ----------------------------<  168<H>  3.3<L>   |  22  |  0.51    Ca    9.1      2020 03:50  Phos  4.3     02-07  Mg     2.2     02-07    Lactate- 3.o  Lipids- NL  HGBA1C- 5.3      CT Head No Cont (20 @ 13:08) >  IMPRESSION: Right temporal parenchymal hemorrhage with diffuse subarachnoid hemorrhage slightly greater on the right. Slight prominence of the left temporal horn.    EXAMINATION:  General:  Intubated.  HEENT:  MMM. Right pupil reactive, left slightly smaller and sluggish. +Corneal on the right, but not left, absent Doll's.  Neuro: No eye opening, no commands, RUE localizes, LUE 2/5, RLE w/d, LLE TF  Cards:  RRR.   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 She arrived intubated and sedated. 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     ADMISSION SCORES:  GCS: 3T  Hunt-Gonzalez: 5  modified Couch: 3  ICH score: 2    HOSPITAL COURSE:  2/3/20- Clipping R PCOMM              EVD placed   20 - Extubated overnight  20- 500cc Bolus      ICU Vital Signs Last 24 Hrs  T(C): 36.3 (2020 05:00), Max: 37.4 (2020 23:00)  T(F): 97.3 (2020 05:00), Max: 99.4 (2020 23:00)  HR: 90 (2020 06:00) (63 - 91)  BP: 163/79 (2020 18:00) (134/78 - 163/79)  BP(mean): 99 (2020 18:00) (93 - 99)  ABP: 149/84 (2020 06:00) (121/74 - 176/90)  ABP(mean): 111 (2020 06:00) (65 - 124)  RR: 24 (2020 06:00) (17 - 24)  SpO2: 100% (2020 06:00) (100% - 100%)    I&O's Detail    2020 07:01  -  2020 07:00  --------------------------------------------------------  IN:    Enteral Tube Flush: 150 mL    IV PiggyBack: 350 mL    ns in tub fed  [id number]: 1230 mL    sodium chloride 2%: 650 mL  Total IN: 2380 mL    OUT:    Bulb: 15 mL    External Ventricular Device: 31 mL    Incontinent per Collection Ba mL  Total OUT: 1346 mL    Total NET: 1034 mL- Incont    MEDICATIONS  (STANDING):  chlorhexidine 4% Liquid 1 Application(s) Topical <User Schedule>  enoxaparin Injectable 40 milliGRAM(s) SubCutaneous <User Schedule>  niMODipine Oral Solution 60 milliGRAM(s) Oral every 4 hours  polyethylene glycol 3350 17 Gram(s) Oral two times a day  senna 2 Tablet(s) Oral at bedtime    MEDICATIONS  (PRN):  acetaminophen   Tablet .. 650 milliGRAM(s) Oral every 6 hours PRN Temp greater or equal to 38C (100.4F), Mild Pain (1 - 3)  albuterol/ipratropium for Nebulization. 3 milliLiter(s) Nebulizer every 6 hours PRN Shortness of Breath and/or Wheezing  artificial tears (preservative free) Ophthalmic Solution 1 Drop(s) Both EYES three times a day PRN Dry Eyes  oxyCODONE    IR 5 milliGRAM(s) Oral every 4 hours PRN Moderate Pain (4 - 6)  oxyCODONE    IR 10 milliGRAM(s) Oral every 4 hours PRN Severe Pain (7 - 10)      LABS:                          10.5   14.96 )-----------( 171      ( 2020 14:31 )             32.1     02-07    149<H>  |  113<H>  |  10  ----------------------------<  168<H>  3.3<L>   |  22  |  0.51    Ca    9.1      2020 03:50  Phos  4.3     02-07  Mg     2.2     02-07    PT/INR - ( 2020 03:50 )   PT: 13.6 sec;   INR: 1.18 ratio         PTT - ( 2020 03:50 )  PTT:33.5 sec    Lactate- 3.o  Lipids- NL  HGBA1C- 5.3     CT Head No Cont (20 @ 08:10) >    INTERPRETATION:  Clinical indication: Status post surgery. Subarachnoid hemorrhage.    Multiple axial sections were performed base of skull to vertex without contrastenhancement.    This exam is compared with prior noncontrast head CT performed on 2020.    Postop changes compatible with a right temporal frontal craniectomy is again seen.    There is increased herniation of brain parenchyma through thecraniectomy defect identified.     Abnormal low-attenuation involving the right temporal cortical and subcortical region is again identified. This finding does demonstrate small area of associated high attenuation and is likely compatible with an evolving hemorrhagic infarct in the right MCA territory. Evolving infarct is also seen involving the anterior aspect of the right thalamus.    There is evidence of left frontal shunt catheter again seen and unchanged in position. Associated area of hemorrhage is again identified along shunt catheter tract with as well as edema.    Slight increase in size of the temporal horns are identified.    Aneurysm clips involving the right parasellar region are again identified.    Bifrontal and posterior interhemispheric subdural collections are identified.    Right maxilla polyp is retention cyst is seen.     Both mastoid eminence appear clear.    Right-sided NG tube is seen    Extracalvarial soft tissue swelling and staples are seen in the postop region.    Small amount of extra-axial air seen in the right frontal and postop region.     Impression: Increase parenchyma is seen herniating through the craniectomy defect.     Evolving infarcts are identified as described above.     Slight increase in size of the temporal horns seen when compared prior exam.    < end of copied text >    CT Head No Cont (20 @ 13:08) >  IMPRESSION: Right temporal parenchymal hemorrhage with diffuse subarachnoid hemorrhage slightly greater on the right. Slight prominence of the left temporal horn.    EXAMINATION:  General:  Intubated.  HEENT:  MMM. Right pupil reactive, left slightly smaller and sluggish. +Corneal on the right, but not left, absent Doll's.  Neuro: No eye opening, no commands, RUE localizes, LUE 2/5, RLE w/d, LLE TF  Cards:  RRR.   Respiratory: Clear  Abdomen:  soft, +BS  Extremities:  no edema

## 2020-02-07 NOTE — CHART NOTE - NSCHARTNOTEFT_GEN_A_CORE
Transcranial doppler exam #1 (2/4/2020) 1045am  mean velocity  cm/sec                              Left         Right  MIKHAIL                    57           74  MCA                   55           72  PCA                     23,24       x  VERT -Could not insonate, patient is intubated.  BA                                x  Technically difficult study.  Official report to follow.  Karen    Transcranial doppler exam #2 (2/6/2020) 11:15am  mean velocity  cm/sec                              Left         Right  MIKHAIL                   74            73  MCA                  78            124  PCA                    23,30        x  Official report to follow.  Karen    Transcranial doppler exam #3 (2/7/2020) 1045am  mean velocity  cm/sec                              Left         Right  MIKHAIL                    67           91  MCA                   79           125  PCA                      P2-34      x  Technically difficult study.  Official report to follow.  Karen

## 2020-02-08 LAB
ALBUMIN SERPL ELPH-MCNC: 3.4 G/DL — SIGNIFICANT CHANGE UP (ref 3.3–5)
ALP SERPL-CCNC: 82 U/L — SIGNIFICANT CHANGE UP (ref 40–120)
ALT FLD-CCNC: 38 U/L — SIGNIFICANT CHANGE UP (ref 10–45)
ANION GAP SERPL CALC-SCNC: 13 MMOL/L — SIGNIFICANT CHANGE UP (ref 5–17)
ANION GAP SERPL CALC-SCNC: 14 MMOL/L — SIGNIFICANT CHANGE UP (ref 5–17)
AST SERPL-CCNC: 30 U/L — SIGNIFICANT CHANGE UP (ref 10–40)
BILIRUB DIRECT SERPL-MCNC: <0.1 MG/DL — SIGNIFICANT CHANGE UP (ref 0–0.2)
BILIRUB INDIRECT FLD-MCNC: >0.2 MG/DL — SIGNIFICANT CHANGE UP (ref 0.2–1)
BILIRUB SERPL-MCNC: 0.3 MG/DL — SIGNIFICANT CHANGE UP (ref 0.2–1.2)
BUN SERPL-MCNC: 17 MG/DL — SIGNIFICANT CHANGE UP (ref 7–23)
BUN SERPL-MCNC: 18 MG/DL — SIGNIFICANT CHANGE UP (ref 7–23)
CALCIUM SERPL-MCNC: 9 MG/DL — SIGNIFICANT CHANGE UP (ref 8.4–10.5)
CALCIUM SERPL-MCNC: 9.2 MG/DL — SIGNIFICANT CHANGE UP (ref 8.4–10.5)
CHLORIDE SERPL-SCNC: 109 MMOL/L — HIGH (ref 96–108)
CHLORIDE SERPL-SCNC: 110 MMOL/L — HIGH (ref 96–108)
CO2 SERPL-SCNC: 23 MMOL/L — SIGNIFICANT CHANGE UP (ref 22–31)
CO2 SERPL-SCNC: 25 MMOL/L — SIGNIFICANT CHANGE UP (ref 22–31)
CREAT SERPL-MCNC: 0.49 MG/DL — LOW (ref 0.5–1.3)
CREAT SERPL-MCNC: 0.56 MG/DL — SIGNIFICANT CHANGE UP (ref 0.5–1.3)
GLUCOSE SERPL-MCNC: 133 MG/DL — HIGH (ref 70–99)
GLUCOSE SERPL-MCNC: 135 MG/DL — HIGH (ref 70–99)
HCT VFR BLD CALC: 34.2 % — LOW (ref 34.5–45)
HCT VFR BLD CALC: 35.3 % — SIGNIFICANT CHANGE UP (ref 34.5–45)
HGB BLD-MCNC: 11.1 G/DL — LOW (ref 11.5–15.5)
HGB BLD-MCNC: 11.2 G/DL — LOW (ref 11.5–15.5)
LIDOCAIN IGE QN: 49 U/L — SIGNIFICANT CHANGE UP (ref 7–60)
MAGNESIUM SERPL-MCNC: 2.3 MG/DL — SIGNIFICANT CHANGE UP (ref 1.6–2.6)
MAGNESIUM SERPL-MCNC: 2.3 MG/DL — SIGNIFICANT CHANGE UP (ref 1.6–2.6)
MCHC RBC-ENTMCNC: 29.2 PG — SIGNIFICANT CHANGE UP (ref 27–34)
MCHC RBC-ENTMCNC: 29.8 PG — SIGNIFICANT CHANGE UP (ref 27–34)
MCHC RBC-ENTMCNC: 31.7 GM/DL — LOW (ref 32–36)
MCHC RBC-ENTMCNC: 32.5 GM/DL — SIGNIFICANT CHANGE UP (ref 32–36)
MCV RBC AUTO: 91.7 FL — SIGNIFICANT CHANGE UP (ref 80–100)
MCV RBC AUTO: 92.2 FL — SIGNIFICANT CHANGE UP (ref 80–100)
NRBC # BLD: 0 /100 WBCS — SIGNIFICANT CHANGE UP (ref 0–0)
NRBC # BLD: 0 /100 WBCS — SIGNIFICANT CHANGE UP (ref 0–0)
PHOSPHATE SERPL-MCNC: 4.1 MG/DL — SIGNIFICANT CHANGE UP (ref 2.5–4.5)
PHOSPHATE SERPL-MCNC: 4.2 MG/DL — SIGNIFICANT CHANGE UP (ref 2.5–4.5)
PLATELET # BLD AUTO: 254 K/UL — SIGNIFICANT CHANGE UP (ref 150–400)
PLATELET # BLD AUTO: 271 K/UL — SIGNIFICANT CHANGE UP (ref 150–400)
POTASSIUM SERPL-MCNC: 3.7 MMOL/L — SIGNIFICANT CHANGE UP (ref 3.5–5.3)
POTASSIUM SERPL-MCNC: 3.8 MMOL/L — SIGNIFICANT CHANGE UP (ref 3.5–5.3)
POTASSIUM SERPL-SCNC: 3.7 MMOL/L — SIGNIFICANT CHANGE UP (ref 3.5–5.3)
POTASSIUM SERPL-SCNC: 3.8 MMOL/L — SIGNIFICANT CHANGE UP (ref 3.5–5.3)
PROCALCITONIN SERPL-MCNC: 0.05 NG/ML — SIGNIFICANT CHANGE UP (ref 0.02–0.1)
PROT SERPL-MCNC: 7.3 G/DL — SIGNIFICANT CHANGE UP (ref 6–8.3)
RBC # BLD: 3.73 M/UL — LOW (ref 3.8–5.2)
RBC # BLD: 3.83 M/UL — SIGNIFICANT CHANGE UP (ref 3.8–5.2)
RBC # FLD: 12.2 % — SIGNIFICANT CHANGE UP (ref 10.3–14.5)
RBC # FLD: 12.3 % — SIGNIFICANT CHANGE UP (ref 10.3–14.5)
SODIUM SERPL-SCNC: 146 MMOL/L — HIGH (ref 135–145)
SODIUM SERPL-SCNC: 148 MMOL/L — HIGH (ref 135–145)
WBC # BLD: 11.41 K/UL — HIGH (ref 3.8–10.5)
WBC # BLD: 12.57 K/UL — HIGH (ref 3.8–10.5)
WBC # FLD AUTO: 11.41 K/UL — HIGH (ref 3.8–10.5)
WBC # FLD AUTO: 12.57 K/UL — HIGH (ref 3.8–10.5)

## 2020-02-08 PROCEDURE — 93888 INTRACRANIAL LIMITED STUDY: CPT | Mod: 26

## 2020-02-08 PROCEDURE — 99291 CRITICAL CARE FIRST HOUR: CPT

## 2020-02-08 RX ORDER — POTASSIUM CHLORIDE 20 MEQ
40 PACKET (EA) ORAL ONCE
Refills: 0 | Status: COMPLETED | OUTPATIENT
Start: 2020-02-08 | End: 2020-02-08

## 2020-02-08 RX ORDER — VANCOMYCIN HCL 1 G
750 VIAL (EA) INTRAVENOUS EVERY 12 HOURS
Refills: 0 | Status: DISCONTINUED | OUTPATIENT
Start: 2020-02-08 | End: 2020-02-10

## 2020-02-08 RX ADMIN — Medication 100 MILLIGRAM(S): at 00:47

## 2020-02-08 RX ADMIN — OXYCODONE HYDROCHLORIDE 5 MILLIGRAM(S): 5 TABLET ORAL at 18:55

## 2020-02-08 RX ADMIN — NIMODIPINE 60 MILLIGRAM(S): 60 SOLUTION ORAL at 21:25

## 2020-02-08 RX ADMIN — NIMODIPINE 60 MILLIGRAM(S): 60 SOLUTION ORAL at 06:23

## 2020-02-08 RX ADMIN — NIMODIPINE 60 MILLIGRAM(S): 60 SOLUTION ORAL at 10:08

## 2020-02-08 RX ADMIN — OXYCODONE HYDROCHLORIDE 10 MILLIGRAM(S): 5 TABLET ORAL at 02:45

## 2020-02-08 RX ADMIN — OXYCODONE HYDROCHLORIDE 5 MILLIGRAM(S): 5 TABLET ORAL at 23:00

## 2020-02-08 RX ADMIN — NIMODIPINE 60 MILLIGRAM(S): 60 SOLUTION ORAL at 17:56

## 2020-02-08 RX ADMIN — OXYCODONE HYDROCHLORIDE 5 MILLIGRAM(S): 5 TABLET ORAL at 23:30

## 2020-02-08 RX ADMIN — Medication 650 MILLIGRAM(S): at 23:30

## 2020-02-08 RX ADMIN — OXYCODONE HYDROCHLORIDE 5 MILLIGRAM(S): 5 TABLET ORAL at 14:30

## 2020-02-08 RX ADMIN — OXYCODONE HYDROCHLORIDE 5 MILLIGRAM(S): 5 TABLET ORAL at 13:58

## 2020-02-08 RX ADMIN — Medication 650 MILLIGRAM(S): at 09:00

## 2020-02-08 RX ADMIN — Medication 650 MILLIGRAM(S): at 23:00

## 2020-02-08 RX ADMIN — NIMODIPINE 60 MILLIGRAM(S): 60 SOLUTION ORAL at 15:00

## 2020-02-08 RX ADMIN — OXYCODONE HYDROCHLORIDE 10 MILLIGRAM(S): 5 TABLET ORAL at 01:45

## 2020-02-08 RX ADMIN — NIMODIPINE 60 MILLIGRAM(S): 60 SOLUTION ORAL at 01:45

## 2020-02-08 RX ADMIN — OXYCODONE HYDROCHLORIDE 5 MILLIGRAM(S): 5 TABLET ORAL at 18:25

## 2020-02-08 RX ADMIN — Medication 100 MILLIGRAM(S): at 06:23

## 2020-02-08 RX ADMIN — Medication 650 MILLIGRAM(S): at 08:05

## 2020-02-08 RX ADMIN — CHLORHEXIDINE GLUCONATE 1 APPLICATION(S): 213 SOLUTION TOPICAL at 21:24

## 2020-02-08 RX ADMIN — ENOXAPARIN SODIUM 40 MILLIGRAM(S): 100 INJECTION SUBCUTANEOUS at 21:24

## 2020-02-08 RX ADMIN — Medication 250 MILLIGRAM(S): at 17:55

## 2020-02-08 RX ADMIN — Medication 40 MILLIEQUIVALENT(S): at 22:50

## 2020-02-08 NOTE — PROGRESS NOTE ADULT - ASSESSMENT
s/p Rt craniectomy, clipping of Pcomm aneurysm, evacuation of hematoma  - s/p EVD@ 15. ICP low teens.   - KAROL DUARTE dc'd  - CTH in AM  - TCDs  - LUE doppler   -  reval

## 2020-02-08 NOTE — CHART NOTE - NSCHARTNOTEFT_GEN_A_CORE
Transcranial doppler exam #1 (2/4/2020) 1045am  mean velocity  cm/sec                              Left         Right  MIKHAIL                    57           74  MCA                   55           72  PCA                     23,24       x  VERT -Could not insonate, patient is intubated.  BA                                x  Technically difficult study.  Official report to follow.  Karen    Transcranial doppler exam #2 (2/6/2020) 11:15am  mean velocity  cm/sec                              Left         Right  MIKHAIL                   74            73  MCA                  78            124  PCA                    23,30        x  Official report to follow.  Karen    Transcranial doppler exam #3 (2/7/2020) 1045am  mean velocity  cm/sec                              Left         Right  MIKHAIL                    67           91  MCA                   79           125  PCA                      P2-34      x  Technically difficult study.  Official report to follow.  Karen    Transcranial doppler exam #4 (2/8/2020) 1015am  mean velocity  cm/sec                              Left         Right  MIKHAIL                    73           84  MCA                   68           115  PCA                     31,28        x  Official report to follow.  Karen

## 2020-02-08 NOTE — PROGRESS NOTE ADULT - SUBJECTIVE AND OBJECTIVE BOX
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. She arrived intubated and sedated. After coming to 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     ADMISSION SCORES:  GCS: 3T  Hunt-Gonzalez: 5  modified Couch: 3  ICH score: 2    HOSPITAL COURSE:  2/3/20- Clipping R PCOMM              EVD placed   20 - Extubated overnight  20- 500cc Bolus  20 - Following commands on all extremities    ICU Vital Signs Last 24 Hrs  T(C): 37.1 (2020 05:00), Max: 38.4 (2020 19:00)  T(F): 98.8 (2020 05:00), Max: 101.2 (2020 19:00)  HR: 77 (2020 06:00) (61 - 83)  BP: 133/89 (2020 06:00) (128/67 - 151/84)  BP(mean): 103 (2020 06:00) (84 - 103)  ABP: 112/90 (2020 18:00) (112/90 - 171/87)  ABP(mean): 102 (2020 18:00) (98 - 123)  RR: 20 (2020 06:00) (16 - 22)  SpO2: 100% (2020 06:00) (98% - 100%)    I&O's Detail    2020 07:01  -  2020 07:00  --------------------------------------------------------  IN:    Enteral Tube Flush: 140 mL    IV PiggyBack: 200 mL    ns in tub fed  [id number]: 1150 mL  Total IN: 1490 mL    OUT:    External Ventricular Device: 1 mL    Incontinent per Collection Ba mL    Intermittent Catheterization - Urethral: 150 mL  Total OUT: 1151 mL    Total NET: 339 mL      LABS:    02-07    148<H>  |  113<H>  |  12  ----------------------------<  144<H>  3.7   |  21<L>  |  0.48<L>    Ca    9.1      2020 15:18  Phos  4.3       Mg     2.2     -      PT/INR - ( 2020 03:50 )   PT: 13.6 sec;   INR: 1.18 ratio         PTT - ( 2020 03:50 )  PTT:33.5 sec    Lactate- 3.o  Lipids- NL  HGBA1C- 5.3    MEDICATIONS  (STANDING):  chlorhexidine 4% Liquid 1 Application(s) Topical <User Schedule>  enoxaparin Injectable 40 milliGRAM(s) SubCutaneous <User Schedule>  niMODipine Oral Solution 60 milliGRAM(s) Oral every 4 hours  polyethylene glycol 3350 17 Gram(s) Oral two times a day  senna 2 Tablet(s) Oral at bedtime  vancomycin  IVPB 500 milliGRAM(s) IV Intermittent every 12 hours    MEDICATIONS  (PRN):  acetaminophen   Tablet .. 650 milliGRAM(s) Oral every 6 hours PRN Temp greater or equal to 38C (100.4F), Mild Pain (1 - 3)  albuterol/ipratropium for Nebulization. 3 milliLiter(s) Nebulizer every 6 hours PRN Shortness of Breath and/or Wheezing  artificial tears (preservative free) Ophthalmic Solution 1 Drop(s) Both EYES three times a day PRN Dry Eyes  oxyCODONE    IR 5 milliGRAM(s) Oral every 4 hours PRN Moderate Pain (4 - 6)  oxyCODONE    IR 10 milliGRAM(s) Oral every 4 hours PRN Severe Pain (7 - 10)       CT Head No Cont (20 @ 08:10) >    INTERPRETATION:  Clinical indication: Status post surgery. Subarachnoid hemorrhage.    Multiple axial sections were performed base of skull to vertex without contrastenhancement.    This exam is compared with prior noncontrast head CT performed on 2020.    Postop changes compatible with a right temporal frontal craniectomy is again seen.    There is increased herniation of brain parenchyma through thecraniectomy defect identified.     Abnormal low-attenuation involving the right temporal cortical and subcortical region is again identified. This finding does demonstrate small area of associated high attenuation and is likely compatible with an evolving hemorrhagic infarct in the right MCA territory. Evolving infarct is also seen involving the anterior aspect of the right thalamus.    There is evidence of left frontal shunt catheter again seen and unchanged in position. Associated area of hemorrhage is again identified along shunt catheter tract with as well as edema.    Slight increase in size of the temporal horns are identified.    Aneurysm clips involving the right parasellar region are again identified.    Bifrontal and posterior interhemispheric subdural collections are identified.    Right maxilla polyp is retention cyst is seen.     Both mastoid eminence appear clear.    Right-sided NG tube is seen    Extracalvarial soft tissue swelling and staples are seen in the postop region.    Small amount of extra-axial air seen in the right frontal and postop region.     Impression: Increase parenchyma is seen herniating through the craniectomy defect.     Evolving infarcts are identified as described above.     Slight increase in size of the temporal horns seen when compared prior exam.    < end of copied text >    CT Head No Cont (20 @ 13:08) >  IMPRESSION: Right temporal parenchymal hemorrhage with diffuse subarachnoid hemorrhage slightly greater on the right. Slight prominence of the left temporal horn.    EXAMINATION:  General:  Intubated.  HEENT:  MMM. Right pupil reactive, left slightly smaller and sluggish. +Corneal on the right, but not left, absent Doll's.  Neuro: No eye opening, no commands, RUE localizes, LUE 2/5, RLE w/d, LLE TF  Cards:  RRR.   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 She arrived intubated and sedated. 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     ADMISSION SCORES:  GCS: 3T  Hunt-Gonzalez: 5  modified Couch: 3  ICH score: 2    HOSPITAL COURSE:  2/3/20- Clipping R PCOMM              EVD placed   20 - Extubated overnight  20- 500cc Bolus  20 - Following commands on all extremities    ICU Vital Signs Last 24 Hrs  T(C): 37.1 (2020 05:00), Max: 38.4 (2020 19:00)  T(F): 98.8 (2020 05:00), Max: 101.2 (2020 19:00)  HR: 77 (2020 06:00) (61 - 83)  BP: 133/89 (2020 06:00) (128/67 - 151/84)  BP(mean): 103 (2020 06:00) (84 - 103)  ABP: 112/90 (2020 18:00) (112/90 - 171/87)  ABP(mean): 102 (2020 18:00) (98 - 123)  RR: 20 (2020 06:00) (16 - 22)  SpO2: 100% (2020 06:00) (98% - 100%)    I&O's Detail      2020 07:01  -  2020 07:00  --------------------------------------------------------  IN:    Enteral Tube Flush: 140 mL    IV PiggyBack: 200 mL    ns in tub fed  [id number]: 1200 mL  Total IN: 1540 mL    OUT:    External Ventricular Device: 1 mL    Incontinent per Collection Ba mL    Intermittent Catheterization - Urethral: 150 mL  Total OUT: 1151 mL    Total NET: 389 mL      2020 07:01  -  2020 12:36  --------------------------------------------------------  IN:    Enteral Tube Flush: 100 mL    ns in tub fed  [id number]: 200 mL  Total IN: 300 mL    OUT:    Incontinent per Collection Ba mL  Total OUT: 250 mL    Total NET: 50 mL            LABS:        148<H>  |  113<H>  |  12  ----------------------------<  144<H>  3.7   |  21<L>  |  0.48<L>    Ca    9.1      2020 15:18  Phos  4.3     02-07  Mg     2.2     02-07      PT/INR - ( 2020 03:50 )   PT: 13.6 sec;   INR: 1.18 ratio         PTT - ( 2020 03:50 )  PTT:33.5 sec    Lactate- 3.o  Lipids- NL  HGBA1C- 5.3    MEDICATIONS  (STANDING):  chlorhexidine 4% Liquid 1 Application(s) Topical <User Schedule>  enoxaparin Injectable 40 milliGRAM(s) SubCutaneous <User Schedule>  niMODipine Oral Solution 60 milliGRAM(s) Oral every 4 hours  polyethylene glycol 3350 17 Gram(s) Oral two times a day  senna 2 Tablet(s) Oral at bedtime  vancomycin  IVPB 500 milliGRAM(s) IV Intermittent every 12 hours    MEDICATIONS  (PRN):  acetaminophen   Tablet .. 650 milliGRAM(s) Oral every 6 hours PRN Temp greater or equal to 38C (100.4F), Mild Pain (1 - 3)  albuterol/ipratropium for Nebulization. 3 milliLiter(s) Nebulizer every 6 hours PRN Shortness of Breath and/or Wheezing  artificial tears (preservative free) Ophthalmic Solution 1 Drop(s) Both EYES three times a day PRN Dry Eyes  oxyCODONE    IR 5 milliGRAM(s) Oral every 4 hours PRN Moderate Pain (4 - 6)  oxyCODONE    IR 10 milliGRAM(s) Oral every 4 hours PRN Severe Pain (7 - 10)       Xray Chest 1 View- PORTABLE-Urgent (20 @ 13:10) >    INTERPRETATION:  CLINICAL INFORMATION: Line Placement    EXAM: Chest X-ray, AP View    COMPARISON: Chest x-ray 2020    FINDINGS:    Enteric tube courses below the diaphragm with the tip in the stomach. Right-sided PICC line with the tip in the SVC.    The lungs are clear.    Heart size cannot be accurately assessed in this projection.    No acute osseous findings.      IMPRESSION:     Clear lungs. Lines and tubes as above.         CT Head No Cont (20 @ 08:10) >    INTERPRETATION:  Clinical indication: Status post surgery. Subarachnoid hemorrhage.    Multiple axial sections were performed base of skull to vertex without contrastenhancement.    This exam is compared with prior noncontrast head CT performed on 2020.    Postop changes compatible with a right temporal frontal craniectomy is again seen.    There is increased herniation of brain parenchyma through thecraniectomy defect identified.     Abnormal low-attenuation involving the right temporal cortical and subcortical region is again identified. This finding does demonstrate small area of associated high attenuation and is likely compatible with an evolving hemorrhagic infarct in the right MCA territory. Evolving infarct is also seen involving the anterior aspect of the right thalamus.    There is evidence of left frontal shunt catheter again seen and unchanged in position. Associated area of hemorrhage is again identified along shunt catheter tract with as well as edema.    Slight increase in size of the temporal horns are identified.    Aneurysm clips involving the right parasellar region are again identified.    Bifrontal and posterior interhemispheric subdural collections are identified.    Right maxilla polyp is retention cyst is seen.     Both mastoid eminence appear clear.    Right-sided NG tube is seen    Extracalvarial soft tissue swelling and staples are seen in the postop region.    Small amount of extra-axial air seen in the right frontal and postop region.     Impression: Increase parenchyma is seen herniating through the craniectomy defect.     Evolving infarcts are identified as described above.     Slight increase in size of the temporal horns seen when compared prior exam.    < end of copied text >    CT Head No Cont (20 @ 13:08) >  IMPRESSION: Right temporal parenchymal hemorrhage with diffuse subarachnoid hemorrhage slightly greater on the right. Slight prominence of the left temporal horn.    EXAMINATION:  General:  Intubated.  HEENT:  MMM. Right pupil reactive, left slightly smaller and sluggish. +Corneal on the right, but not left, absent Doll's.  Neuro: No eye opening, no commands, RUE localizes, LUE 2/5, RLE w/d, LLE TF  Cards:  RRR.   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 She arrived intubated and sedated. 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     ADMISSION SCORES:  GCS: 3T  Hunt-Gonzalez: 5  modified Couch: 3  ICH score: 2    HOSPITAL COURSE:  2/3/20- Clipping R PCOMM              EVD placed   20 - Extubated overnight  20- 500cc Bolus  20 - Following commands on all extremities    ICU Vital Signs Last 24 Hrs  T(C): 37.1 (2020 05:00), Max: 38.4 (2020 19:00)  T(F): 98.8 (2020 05:00), Max: 101.2 (2020 19:00)  HR: 77 (2020 06:00) (61 - 83)  BP: 133/89 (2020 06:00) (128/67 - 151/84)  BP(mean): 103 (2020 06:00) (84 - 103)  ABP: 112/90 (2020 18:00) (112/90 - 171/87)  ABP(mean): 102 (2020 18:00) (98 - 123)  RR: 20 (2020 06:00) (16 - 22)  SpO2: 100% (2020 06:00) (98% - 100%)    I&O's Detail      2020 07:01  -  2020 07:00  --------------------------------------------------------  IN:    Enteral Tube Flush: 140 mL    IV PiggyBack: 200 mL    ns in tub fed  [id number]: 1200 mL  Total IN: 1540 mL    OUT:    External Ventricular Device: 1 mL    Incontinent per Collection Ba mL    Intermittent Catheterization - Urethral: 150 mL  Total OUT: 1151 mL    Total NET: 389 mL      2020 07:01  -  2020 12:36  --------------------------------------------------------  IN:    Enteral Tube Flush: 100 mL    ns in tub fed  [id number]: 200 mL  Total IN: 300 mL    OUT:    Incontinent per Collection Ba mL  Total OUT: 250 mL    Total NET: 50 mL            LABS:        148<H>  |  113<H>  |  12  ----------------------------<  144<H>  3.7   |  21<L>  |  0.48<L>    Ca    9.1      2020 15:18  Phos  4.3     02-07  Mg     2.2     02-07      PT/INR - ( 2020 03:50 )   PT: 13.6 sec;   INR: 1.18 ratio         PTT - ( 2020 03:50 )  PTT:33.5 sec    Lactate- 3.o  Lipids- NL  HGBA1C- 5.3    MEDICATIONS  (STANDING):  chlorhexidine 4% Liquid 1 Application(s) Topical <User Schedule>  enoxaparin Injectable 40 milliGRAM(s) SubCutaneous <User Schedule>  niMODipine Oral Solution 60 milliGRAM(s) Oral every 4 hours  polyethylene glycol 3350 17 Gram(s) Oral two times a day  senna 2 Tablet(s) Oral at bedtime  vancomycin  IVPB 500 milliGRAM(s) IV Intermittent every 12 hours    MEDICATIONS  (PRN):  acetaminophen   Tablet .. 650 milliGRAM(s) Oral every 6 hours PRN Temp greater or equal to 38C (100.4F), Mild Pain (1 - 3)  albuterol/ipratropium for Nebulization. 3 milliLiter(s) Nebulizer every 6 hours PRN Shortness of Breath and/or Wheezing  artificial tears (preservative free) Ophthalmic Solution 1 Drop(s) Both EYES three times a day PRN Dry Eyes  oxyCODONE    IR 5 milliGRAM(s) Oral every 4 hours PRN Moderate Pain (4 - 6)  oxyCODONE    IR 10 milliGRAM(s) Oral every 4 hours PRN Severe Pain (7 - 10)       Xray Chest 1 View- PORTABLE-Urgent (20 @ 13:10) >    INTERPRETATION:  CLINICAL INFORMATION: Line Placement    EXAM: Chest X-ray, AP View    COMPARISON: Chest x-ray 2020    FINDINGS:    Enteric tube courses below the diaphragm with the tip in the stomach. Right-sided PICC line with the tip in the SVC.    The lungs are clear.    Heart size cannot be accurately assessed in this projection.    No acute osseous findings.      IMPRESSION:     Clear lungs. Lines and tubes as above.         CT Head No Cont (20 @ 08:10) >    INTERPRETATION:  Clinical indication: Status post surgery. Subarachnoid hemorrhage.    Multiple axial sections were performed base of skull to vertex without contrastenhancement.    This exam is compared with prior noncontrast head CT performed on 2020.    Postop changes compatible with a right temporal frontal craniectomy is again seen.    There is increased herniation of brain parenchyma through thecraniectomy defect identified.     Abnormal low-attenuation involving the right temporal cortical and subcortical region is again identified. This finding does demonstrate small area of associated high attenuation and is likely compatible with an evolving hemorrhagic infarct in the right MCA territory. Evolving infarct is also seen involving the anterior aspect of the right thalamus.    There is evidence of left frontal shunt catheter again seen and unchanged in position. Associated area of hemorrhage is again identified along shunt catheter tract with as well as edema.    Slight increase in size of the temporal horns are identified.    Aneurysm clips involving the right parasellar region are again identified.    Bifrontal and posterior interhemispheric subdural collections are identified.    Right maxilla polyp is retention cyst is seen.     Both mastoid eminence appear clear.    Right-sided NG tube is seen    Extracalvarial soft tissue swelling and staples are seen in the postop region.    Small amount of extra-axial air seen in the right frontal and postop region.     Impression: Increase parenchyma is seen herniating through the craniectomy defect.     Evolving infarcts are identified as described above.     Slight increase in size of the temporal horns seen when compared prior exam.    < end of copied text >    CT Head No Cont (20 @ 13:08) >  IMPRESSION: Right temporal parenchymal hemorrhage with diffuse subarachnoid hemorrhage slightly greater on the right. Slight prominence of the left temporal horn.    EXAMINATION:  General:  Calm.   HEENT:  MMM. EO spontaneously.   Neuro: A&O x3. L facial. EOMI. Dysarthria - improved.   RUE - 5/5  LUE drift; LUE 4+/5 flexion, 4/5 extension, 4/5 HG  RLE - 5/5  LLE - 4/5 hip flexion, 4+/5 knee extension, 5/5 DF and PF  Cards:  RRR.   Respiratory: Clear  Abdomen:  soft, +BS  Extremities:  no edema. LUE - erythema, swelling. Cord palpated.  RUE - erythema - marked.

## 2020-02-08 NOTE — PROGRESS NOTE ADULT - ASSESSMENT
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)    NEURO:  q1h neuro checks  Continue EVD @ 15cm- 2cc/hr   Stroke code measures: A1c, LDL- as above   Delayed Cerebral Ischemia: TCDs-  R MCA , euvolemia, Nimodipine  Pain control w/ Tylenol prn, Oxy 5/10 prn  POT/OT - splint LUE and LLE  Check TOX screen    PULM:  Extubated 2/5  SpO2 > 92%  Incentive spirometry, if able    CARDS:  - 180 mHg  TTE- Nl LV function, no wall motion abnormalities    GASTRO:  TF  Speech/Swallow eval- failed and will return 2/10/20  Bowel regimen- last BM - 2/6/20  ---> Stress ulcer prophylaxis:  Not indicated    RENAL:  Goal: Eunatremia  BMP q12H- check BMP     ENDO:  euglycemia    HEME:  monitor H/H - No CBC    ---> DVT prophylaxis: SCDs, Lovenox 40 sc daily    ID:  Monitor for fevers    MISC: Tea colored urine - Monitor for now  Cr normal.     Code status:  Full code  Disposition:  ICU    Updated family at bedside.    This patient was at high risk of neurologic deterioration and/or death due to: re-hemorrhage, seizures, DCI.     Time spent:  40 minutes 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)    NEURO:  q1h neuro checks  Continue EVD @ 15cm  Stroke code measures: A1c, LDL- as above   Delayed Cerebral Ischemia: TCDs, euvolemia, Nimodipine  Pain control w/ Tylenol prn, Oxy 5/10 prn  POT/OT - splint LUE and LLE  Check TOX screen    PULM:  Extubated 2/5  SpO2 > 92%  Incentive spirometry, if able    CARDS:  - 180 mHg  TTE- Nl LV function, no wall motion abnormalities    GASTRO:  TF  Speech/Swallow eval- failed and will return 2/10/20  Bowel regimen- last BM - 2/7/20  ---> Stress ulcer prophylaxis:  Not indicated    RENAL:  Goal: Eunatremia  Hypernatremia - Will recheck BMP, and start freewater if hypernatremic    ENDO:  euglycemia    HEME:  monitor H/H  ---> DVT prophylaxis: SCDs, Lovenox 40 sc daily    ID:  LUE swelling/erythema  - Will increase Vanco to 750 mg q12h  - Vanco trough after 4th dose (prior to 2/10 AM dose)  - LUE doppler    Code status:  Full code  Disposition:  ICU    Updated family at bedside.    This patient was at high risk of neurologic deterioration and/or death due to: re-hemorrhage, seizures, DCI.     Time spent:  40 minutes 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)    NEURO:  q1h neuro checks  Continue EVD @ 15cm  Stroke code measures: A1c, LDL- as above   Delayed Cerebral Ischemia: TCDs, euvolemia, Nimodipine  Pain control w/ Tylenol prn, Oxy 5/10 prn  OOB with helmet/PT/OT    PULM:  Extubated 2/5  SpO2 > 92%  Incentive spirometry, if able    CARDS:  - 180 mHg  TTE- Nl LV function, no wall motion abnormalities    GASTRO:  TF  Speech/Swallow eval- failed and will return 2/10/20  Bowel regimen- last BM - 2/7/20  ---> Stress ulcer prophylaxis:  Not indicated    RENAL:  Goal: Eunatremia  Hypernatremia - Will recheck BMP, and start freewater if hypernatremic    ENDO:  euglycemia    HEME:  monitor H/H  ---> DVT prophylaxis: SCDs, Lovenox 40 sc daily    ID:  LUE swelling/erythema  - Will increase Vanco to 750 mg q12h  - Vanco trough after 4th dose (prior to 2/10 AM dose)  - LUE doppler    Code status:  Full code  Disposition:  ICU    Updated family at bedside.    This patient was at high risk of neurologic deterioration and/or death due to: re-hemorrhage, seizures, DCI.     Time spent:  40 minutes

## 2020-02-08 NOTE — PROGRESS NOTE ADULT - ASSESSMENT
HH4 mF3 subarachnoid hemorrhage  - TCDs, -180mmHg  - Nimodipine  - empiric vancomycin for cellulitis  - UE doppler pending  Continue EVD @ 15cm HH4 mF3 subarachnoid hemorrhage  - TCDs, -180mmHg  - Nimodipine  - empiric vancomycin for cellulitis  - UE doppler pending  - Continue EVD @ 15cm  - Na goal 140-150  - Tube feeds at goal HH4 mF3 subarachnoid hemorrhage  - TCDs, -180mmHg  - Nimodipine  - empiric vancomycin for cellulitis  - UE doppler pending  - Continue EVD @ 15cm  - Na goal 140-150  - Tube feeds at goal- speech and swallow reeval 2/10

## 2020-02-08 NOTE — PROGRESS NOTE ADULT - SUBJECTIVE AND OBJECTIVE BOX
Chief complaint:   Patient is a 39y old  Female who presents with a chief complaint of SAH, IPH (08 Feb 2020 07:36)    HPI:  39F unknown medical Hx, found unresponsive on airplane, transferred to [place], subsequently transferred to [facility].    Arrive intubated and sedated, no family or medical Hx available. (03 Feb 2020 14:10)      DAY EVENTS:      ROS: [ ]  Unable to assess due to mental status   All other systems negative    -----------------------------------------------------------------------------------------------------------------------------------------------------------------------------------  ICU Vital Signs Last 24 Hrs  T(C): 37.8 (08 Feb 2020 19:00), Max: 38.3 (08 Feb 2020 07:00)  T(F): 100 (08 Feb 2020 19:00), Max: 101 (08 Feb 2020 07:00)  HR: 84 (08 Feb 2020 20:00) (66 - 93)  BP: 137/85 (08 Feb 2020 20:00) (128/67 - 185/147)  BP(mean): 100 (08 Feb 2020 20:00) (84 - 160)  ABP: --  ABP(mean): --  RR: 19 (08 Feb 2020 20:00) (11 - 24)  SpO2: 100% (08 Feb 2020 20:00) (100% - 100%)      I&O's Summary    07 Feb 2020 07:01  -  08 Feb 2020 07:00  --------------------------------------------------------  IN: 1540 mL / OUT: 1151 mL / NET: 389 mL    08 Feb 2020 07:01  -  08 Feb 2020 20:53  --------------------------------------------------------  IN: 1100 mL / OUT: 458 mL / NET: 642 mL        MEDICATIONS  (STANDING):  chlorhexidine 4% Liquid 1 Application(s) Topical <User Schedule>  enoxaparin Injectable 40 milliGRAM(s) SubCutaneous <User Schedule>  niMODipine Oral Solution 60 milliGRAM(s) Oral every 4 hours  polyethylene glycol 3350 17 Gram(s) Oral two times a day  senna 2 Tablet(s) Oral at bedtime  vancomycin  IVPB 750 milliGRAM(s) IV Intermittent every 12 hours    NEUROIMAGING:   Recent imaging studies were reviewed.    LAB RESULTS:                          11.1   12.57 )-----------( 254      ( 08 Feb 2020 13:18 )             34.2       PT/INR - ( 07 Feb 2020 03:50 )   PT: 13.6 sec;   INR: 1.18 ratio         PTT - ( 07 Feb 2020 03:50 )  PTT:33.5 sec    02-08    146<H>  |  109<H>  |  17  ----------------------------<  133<H>  3.7   |  23  |  0.49<L>    Ca    9.0      08 Feb 2020 13:19  Phos  4.2     02-08  Mg     2.3     02-08    TPro  7.3  /  Alb  3.4  /  TBili  0.3  /  DBili  <0.1  /  AST  30  /  ALT  38  /  AlkPhos  82  02-08    Culture - CSF with Gram Stain (collected 02-07-20 @ 22:53)  Source: .CSF CSF  Gram Stain (02-07-20 @ 23:28):    polymorphonuclear leukocytes seen    No organisms seen    by cytocentrifuge  Preliminary Report (02-08-20 @ 15:58):    No growth      -----------------------------------------------------------------------------------------------------------------------------------------------------------------------------------    PHYSICAL EXAM:  General: Calm, laying in bed  HEENT: MMM  Neuro:  -Mental status- No acute distress, AOx3, conversational, following commands  -CN- PERRL 3mm, EOMI, tongue midline, face symmetric  -Motor- full strength in all ext  -Sensation- intact to LT   -Coordination- no dysmetria noted    CV: RRR  Pulm: Clear to auscultation  Abd: Soft, nontender, nondistended  Ext: No edema  Skin: warm, dry Chief complaint:   Patient is a 39y old  Female who presents with a chief complaint of SAH, IPH (08 Feb 2020 07:36)    HPI:  39F unknown medical Hx, found unresponsive on airplane, transferred to [place], subsequently transferred to [facility].    Arrive intubated and sedated, no family or medical Hx available. (03 Feb 2020 14:10)      DAY EVENTS: bilateral phlebitis from IV infiltration  vanco dose increased      ROS: no complaints  All other systems negative    -----------------------------------------------------------------------------------------------------------------------------------------------------------------------------------  ICU Vital Signs Last 24 Hrs  T(C): 37.8 (08 Feb 2020 19:00), Max: 38.3 (08 Feb 2020 07:00)  T(F): 100 (08 Feb 2020 19:00), Max: 101 (08 Feb 2020 07:00)  HR: 84 (08 Feb 2020 20:00) (66 - 93)  BP: 137/85 (08 Feb 2020 20:00) (128/67 - 185/147)  BP(mean): 100 (08 Feb 2020 20:00) (84 - 160)  ABP: --  ABP(mean): --  RR: 19 (08 Feb 2020 20:00) (11 - 24)  SpO2: 100% (08 Feb 2020 20:00) (100% - 100%)      I&O's Summary    07 Feb 2020 07:01  -  08 Feb 2020 07:00  --------------------------------------------------------  IN: 1540 mL / OUT: 1151 mL / NET: 389 mL    08 Feb 2020 07:01  -  08 Feb 2020 20:53  --------------------------------------------------------  IN: 1100 mL / OUT: 458 mL / NET: 642 mL        MEDICATIONS  (STANDING):  chlorhexidine 4% Liquid 1 Application(s) Topical <User Schedule>  enoxaparin Injectable 40 milliGRAM(s) SubCutaneous <User Schedule>  niMODipine Oral Solution 60 milliGRAM(s) Oral every 4 hours  polyethylene glycol 3350 17 Gram(s) Oral two times a day  senna 2 Tablet(s) Oral at bedtime  vancomycin  IVPB 750 milliGRAM(s) IV Intermittent every 12 hours    NEUROIMAGING:   Recent imaging studies were reviewed.    LAB RESULTS:                          11.1   12.57 )-----------( 254      ( 08 Feb 2020 13:18 )             34.2       PT/INR - ( 07 Feb 2020 03:50 )   PT: 13.6 sec;   INR: 1.18 ratio         PTT - ( 07 Feb 2020 03:50 )  PTT:33.5 sec    02-08    146<H>  |  109<H>  |  17  ----------------------------<  133<H>  3.7   |  23  |  0.49<L>    Ca    9.0      08 Feb 2020 13:19  Phos  4.2     02-08  Mg     2.3     02-08    TPro  7.3  /  Alb  3.4  /  TBili  0.3  /  DBili  <0.1  /  AST  30  /  ALT  38  /  AlkPhos  82  02-08    Culture - CSF with Gram Stain (collected 02-07-20 @ 22:53)  Source: .CSF CSF  Gram Stain (02-07-20 @ 23:28):    polymorphonuclear leukocytes seen    No organisms seen    by cytocentrifuge  Preliminary Report (02-08-20 @ 15:58):    No growth    -----------------------------------------------------------------------------------------------------------------------------------------------------------------------------------    PHYSICAL EXAM:  General: Calm, laying in bed  HEENT: MMM  Neuro:  -Mental status- No acute distress, AOx3, conversational, following commands  -CN- PERRL 3mm, EOMI, tongue midline, face symmetric  -Motor-   LUE 4/5  LLE 4/5  RUE/RLE 5/5  -Sensation- intact to LT   -Coordination- no dysmetria noted    CV: RRR  Pulm: Clear to auscultation  Abd: Soft, nontender, nondistended  Ext: No edema  Skin: warm, dry Chief complaint:   Patient is a 39y old  Female who presents with a chief complaint of SAH, IPH (08 Feb 2020 07:36)    HPI:  39F unknown medical Hx, found unresponsive on airplane, transferred to [place], subsequently transferred to [facility].    Arrive intubated and sedated, no family or medical Hx available. (03 Feb 2020 14:10)      DAY EVENTS: bilateral phlebitis from IV infiltration  vanco dose increased      ROS: no complaints  All other systems negative    -----------------------------------------------------------------------------------------------------------------------------------------------------------------------------------  ICU Vital Signs Last 24 Hrs  T(C): 37.8 (08 Feb 2020 19:00), Max: 38.3 (08 Feb 2020 07:00)  T(F): 100 (08 Feb 2020 19:00), Max: 101 (08 Feb 2020 07:00)  HR: 84 (08 Feb 2020 20:00) (66 - 93)  BP: 137/85 (08 Feb 2020 20:00) (128/67 - 185/147)  BP(mean): 100 (08 Feb 2020 20:00) (84 - 160)  ABP: --  ABP(mean): --  RR: 19 (08 Feb 2020 20:00) (11 - 24)  SpO2: 100% (08 Feb 2020 20:00) (100% - 100%)      I&O's Summary    07 Feb 2020 07:01  -  08 Feb 2020 07:00  --------------------------------------------------------  IN: 1540 mL / OUT: 1151 mL / NET: 389 mL    08 Feb 2020 07:01  -  08 Feb 2020 20:53  --------------------------------------------------------  IN: 1100 mL / OUT: 458 mL / NET: 642 mL        MEDICATIONS  (STANDING):  chlorhexidine 4% Liquid 1 Application(s) Topical <User Schedule>  enoxaparin Injectable 40 milliGRAM(s) SubCutaneous <User Schedule>  niMODipine Oral Solution 60 milliGRAM(s) Oral every 4 hours  polyethylene glycol 3350 17 Gram(s) Oral two times a day  senna 2 Tablet(s) Oral at bedtime  vancomycin  IVPB 750 milliGRAM(s) IV Intermittent every 12 hours    NEUROIMAGING:   Recent imaging studies were reviewed.    LAB RESULTS:                          11.1   12.57 )-----------( 254      ( 08 Feb 2020 13:18 )             34.2       PT/INR - ( 07 Feb 2020 03:50 )   PT: 13.6 sec;   INR: 1.18 ratio         PTT - ( 07 Feb 2020 03:50 )  PTT:33.5 sec    02-08    146<H>  |  109<H>  |  17  ----------------------------<  133<H>  3.7   |  23  |  0.49<L>    Ca    9.0      08 Feb 2020 13:19  Phos  4.2     02-08  Mg     2.3     02-08    TPro  7.3  /  Alb  3.4  /  TBili  0.3  /  DBili  <0.1  /  AST  30  /  ALT  38  /  AlkPhos  82  02-08    Culture - CSF with Gram Stain (collected 02-07-20 @ 22:53)  Source: .CSF CSF  Gram Stain (02-07-20 @ 23:28):    polymorphonuclear leukocytes seen    No organisms seen    by cytocentrifuge  Preliminary Report (02-08-20 @ 15:58):    No growth    -----------------------------------------------------------------------------------------------------------------------------------------------------------------------------------    PHYSICAL EXAM:  General: Calm, laying in bed  HEENT: MMM  Neuro:  -Mental status- No acute distress, AOx3, dysarthria, following commands, fluctuating exam  -CN- PERRL 3mm, EOMI, tongue midline, L facial droop  -Motor-   LUE 4/5  LLE 4/5  RUE/RLE 5/5  -Sensation- intact to LT   -Coordination- no dysmetria noted    CV: RRR  Pulm: Clear to auscultation  Abd: Soft, nontender, nondistended  Ext: No edema  Skin: warm, dry

## 2020-02-08 NOTE — PROGRESS NOTE ADULT - SUBJECTIVE AND OBJECTIVE BOX
Patient seen and examined at bedside.    --Anticoagulation--  enoxaparin Injectable 40 milliGRAM(s) SubCutaneous <User Schedule>    T(C): 37.9 (02-08-20 @ 03:00), Max: 38.4 (02-07-20 @ 19:00)  HR: 66 (02-08-20 @ 03:00) (61 - 90)  BP: 140/83 (02-08-20 @ 03:00) (128/67 - 151/84)  RR: 20 (02-08-20 @ 03:00) (16 - 24)  SpO2: 100% (02-08-20 @ 03:00) (98% - 100%)  Wt(kg): --    Exam:  AOX3, EO spont, pupils 4 mm R b/l,  mild left facial, FC,   R 5/5, L 4/5

## 2020-02-09 LAB
ANION GAP SERPL CALC-SCNC: 13 MMOL/L — SIGNIFICANT CHANGE UP (ref 5–17)
BUN SERPL-MCNC: 23 MG/DL — SIGNIFICANT CHANGE UP (ref 7–23)
CALCIUM SERPL-MCNC: 9.4 MG/DL — SIGNIFICANT CHANGE UP (ref 8.4–10.5)
CHLORIDE SERPL-SCNC: 107 MMOL/L — SIGNIFICANT CHANGE UP (ref 96–108)
CO2 SERPL-SCNC: 24 MMOL/L — SIGNIFICANT CHANGE UP (ref 22–31)
CREAT SERPL-MCNC: 0.54 MG/DL — SIGNIFICANT CHANGE UP (ref 0.5–1.3)
GLUCOSE SERPL-MCNC: 147 MG/DL — HIGH (ref 70–99)
HCT VFR BLD CALC: 36.5 % — SIGNIFICANT CHANGE UP (ref 34.5–45)
HGB BLD-MCNC: 11.5 G/DL — SIGNIFICANT CHANGE UP (ref 11.5–15.5)
MAGNESIUM SERPL-MCNC: 2.3 MG/DL — SIGNIFICANT CHANGE UP (ref 1.6–2.6)
MCHC RBC-ENTMCNC: 29 PG — SIGNIFICANT CHANGE UP (ref 27–34)
MCHC RBC-ENTMCNC: 31.5 GM/DL — LOW (ref 32–36)
MCV RBC AUTO: 91.9 FL — SIGNIFICANT CHANGE UP (ref 80–100)
NRBC # BLD: 0 /100 WBCS — SIGNIFICANT CHANGE UP (ref 0–0)
PHOSPHATE SERPL-MCNC: 4 MG/DL — SIGNIFICANT CHANGE UP (ref 2.5–4.5)
PLATELET # BLD AUTO: 293 K/UL — SIGNIFICANT CHANGE UP (ref 150–400)
POTASSIUM SERPL-MCNC: 3.6 MMOL/L — SIGNIFICANT CHANGE UP (ref 3.5–5.3)
POTASSIUM SERPL-SCNC: 3.6 MMOL/L — SIGNIFICANT CHANGE UP (ref 3.5–5.3)
RBC # BLD: 3.97 M/UL — SIGNIFICANT CHANGE UP (ref 3.8–5.2)
RBC # FLD: 11.9 % — SIGNIFICANT CHANGE UP (ref 10.3–14.5)
SODIUM SERPL-SCNC: 144 MMOL/L — SIGNIFICANT CHANGE UP (ref 135–145)
WBC # BLD: 12.47 K/UL — HIGH (ref 3.8–10.5)
WBC # FLD AUTO: 12.47 K/UL — HIGH (ref 3.8–10.5)

## 2020-02-09 PROCEDURE — 99292 CRITICAL CARE ADDL 30 MIN: CPT

## 2020-02-09 PROCEDURE — 99291 CRITICAL CARE FIRST HOUR: CPT

## 2020-02-09 RX ADMIN — Medication 250 MILLIGRAM(S): at 18:00

## 2020-02-09 RX ADMIN — NIMODIPINE 60 MILLIGRAM(S): 60 SOLUTION ORAL at 14:38

## 2020-02-09 RX ADMIN — OXYCODONE HYDROCHLORIDE 5 MILLIGRAM(S): 5 TABLET ORAL at 11:15

## 2020-02-09 RX ADMIN — NIMODIPINE 60 MILLIGRAM(S): 60 SOLUTION ORAL at 09:49

## 2020-02-09 RX ADMIN — Medication 650 MILLIGRAM(S): at 20:00

## 2020-02-09 RX ADMIN — NIMODIPINE 60 MILLIGRAM(S): 60 SOLUTION ORAL at 21:53

## 2020-02-09 RX ADMIN — OXYCODONE HYDROCHLORIDE 5 MILLIGRAM(S): 5 TABLET ORAL at 04:45

## 2020-02-09 RX ADMIN — OXYCODONE HYDROCHLORIDE 5 MILLIGRAM(S): 5 TABLET ORAL at 04:15

## 2020-02-09 RX ADMIN — ENOXAPARIN SODIUM 40 MILLIGRAM(S): 100 INJECTION SUBCUTANEOUS at 21:53

## 2020-02-09 RX ADMIN — Medication 650 MILLIGRAM(S): at 20:30

## 2020-02-09 RX ADMIN — NIMODIPINE 60 MILLIGRAM(S): 60 SOLUTION ORAL at 17:22

## 2020-02-09 RX ADMIN — OXYCODONE HYDROCHLORIDE 5 MILLIGRAM(S): 5 TABLET ORAL at 11:45

## 2020-02-09 RX ADMIN — CHLORHEXIDINE GLUCONATE 1 APPLICATION(S): 213 SOLUTION TOPICAL at 21:54

## 2020-02-09 RX ADMIN — NIMODIPINE 60 MILLIGRAM(S): 60 SOLUTION ORAL at 05:58

## 2020-02-09 RX ADMIN — Medication 250 MILLIGRAM(S): at 05:04

## 2020-02-09 RX ADMIN — OXYCODONE HYDROCHLORIDE 5 MILLIGRAM(S): 5 TABLET ORAL at 15:42

## 2020-02-09 RX ADMIN — NIMODIPINE 60 MILLIGRAM(S): 60 SOLUTION ORAL at 02:07

## 2020-02-09 NOTE — PROVIDER CONTACT NOTE (OTHER) - ASSESSMENT
Patient has bilateral upper extremity drift. Patient is alert and oriented x4. Patient has no drift on lower extremities.

## 2020-02-09 NOTE — PROGRESS NOTE ADULT - SUBJECTIVE AND OBJECTIVE BOX
Patient seen and examined at bedside.    --Anticoagulation--  enoxaparin Injectable 40 milliGRAM(s) SubCutaneous <User Schedule>    T(C): 37.6 (02-09-20 @ 03:00), Max: 38.7 (02-08-20 @ 23:00)  HR: 74 (02-09-20 @ 02:00) (66 - 99)  BP: 144/86 (02-09-20 @ 02:00) (129/71 - 185/147)  RR: 17 (02-09-20 @ 02:00) (11 - 24)  SpO2: 100% (02-09-20 @ 02:00) (100% - 100%)  Wt(kg): --    Exam: AOX3, EO spont, pupils 4 mm R b/l,  mild left facial, FC,   R 5/5, L 4/5

## 2020-02-09 NOTE — PROGRESS NOTE ADULT - ASSESSMENT
HH4 mF3 subarachnoid hemorrhage  - TCDs, -180mmHg  - Nimodipine  - empiric vancomycin for cellulitis  - UE doppler pending  - Continue EVD @ 15cm  - Na goal 140-150  - Tube feeds at goal- speech and swallow reeval 2/10 HH4 mF3 subarachnoid hemorrhage  - TCDs, -180mmHg  - Nimodipine  - empiric vancomycin for cellulitis  - UE doppler pending  - Continue EVD @ 15cm  - Na goal 140-150  - Tube feeds at goal- speech and swallow reeval 2/10  - Wound consult for IV infiltration?

## 2020-02-09 NOTE — PROGRESS NOTE ADULT - ASSESSMENT
s/p Rt craniectomy, clipping of Pcomm aneurysm, evacuation of hematoma    - s/p EVD@ 15. ICP low teens.   - SBP goal 100-180  - SG FELICIA dc'd  - TCDs  - LUE doppler   - SS re-eval   - Pan cultured due to fever, including CSF which appears sterile

## 2020-02-09 NOTE — PROGRESS NOTE ADULT - SUBJECTIVE AND OBJECTIVE BOX
Chief complaint:   Patient is a 39y old  Female who presents with a chief complaint of SAH, IPH (09 Feb 2020 06:25)    HPI:  39F unknown medical Hx, found unresponsive on airplane, transferred to [place], subsequently transferred to [facility].    Arrive intubated and sedated, no family or medical Hx available. (03 Feb 2020 14:10)      Stay Summary:      OVERNIGHT EVENTS:      ROS: [ ]  Unable to assess due to mental status   All other systems negative    -----------------------------------------------------------------------------------------------------------------------------------------------------------------------------------  ICU Vital Signs Last 24 Hrs  T(C): 37 (09 Feb 2020 15:00), Max: 38.7 (08 Feb 2020 23:00)  T(F): 98.6 (09 Feb 2020 15:00), Max: 101.7 (08 Feb 2020 23:00)  HR: 96 (09 Feb 2020 18:00) (70 - 99)  BP: 129/90 (09 Feb 2020 18:00) (124/74 - 150/90)  BP(mean): 103 (09 Feb 2020 18:00) (90 - 120)  ABP: --  ABP(mean): --  RR: 20 (09 Feb 2020 18:00) (16 - 23)  SpO2: 100% (09 Feb 2020 18:00) (100% - 100%)      I&O's Summary    08 Feb 2020 07:01  -  09 Feb 2020 07:00  --------------------------------------------------------  IN: 2215 mL / OUT: 958 mL / NET: 1257 mL    09 Feb 2020 07:01  -  09 Feb 2020 19:50  --------------------------------------------------------  IN: 685 mL / OUT: 556 mL / NET: 129 mL        MEDICATIONS  (STANDING):  chlorhexidine 4% Liquid 1 Application(s) Topical <User Schedule>  enoxaparin Injectable 40 milliGRAM(s) SubCutaneous <User Schedule>  niMODipine Oral Solution 60 milliGRAM(s) Oral every 4 hours  vancomycin  IVPB 750 milliGRAM(s) IV Intermittent every 12 hours      RESPIRATORY:        NEUROIMAGING:   Recent imaging studies were reviewed.    LAB RESULTS:                          11.2   11.41 )-----------( 271      ( 08 Feb 2020 21:29 )             35.3           02-08    148<H>  |  110<H>  |  18  ----------------------------<  135<H>  3.8   |  25  |  0.56    Ca    9.2      08 Feb 2020 21:29  Phos  4.1     02-08  Mg     2.3     02-08    TPro  7.3  /  Alb  3.4  /  TBili  0.3  /  DBili  <0.1  /  AST  30  /  ALT  38  /  AlkPhos  82  02-08                -----------------------------------------------------------------------------------------------------------------------------------------------------------------------------------      PHYSICAL EXAM:  General: Calm, laying in bed  HEENT: MMM  Neuro:  -Mental status- No acute distress, AOx3, dysarthria, following commands, fluctuating exam  -CN- PERRL 3mm, EOMI, tongue midline, L facial droop  -Motor-   LUE 4/5 drift  LLE 4/5  RUE 5/5 drift  RLE 5/5  -Sensation- intact to LT   -Coordination- no dysmetria noted    CV: RRR  Pulm: Clear to auscultation  Abd: Soft, nontender, nondistended  Ext: No edema  Skin: warm, dry Chief complaint:   Patient is a 39y old  Female who presents with a chief complaint of SAH, IPH (09 Feb 2020 06:25)    HPI:  39F unknown medical Hx, found unresponsive on airplane, transferred to [place], subsequently transferred to [facility].    Arrive intubated and sedated, no family or medical Hx available. (03 Feb 2020 14:10)    DAY EVENTS: increased drift in upper ext  EVD at 15    ROS: headache improved oxy and tylenol  All other systems negative    -----------------------------------------------------------------------------------------------------------------------------------------------------------------------------------  ICU Vital Signs Last 24 Hrs  T(C): 37 (09 Feb 2020 15:00), Max: 38.7 (08 Feb 2020 23:00)  T(F): 98.6 (09 Feb 2020 15:00), Max: 101.7 (08 Feb 2020 23:00)  HR: 96 (09 Feb 2020 18:00) (70 - 99)  BP: 129/90 (09 Feb 2020 18:00) (124/74 - 150/90)  BP(mean): 103 (09 Feb 2020 18:00) (90 - 120)  ABP: --  ABP(mean): --  RR: 20 (09 Feb 2020 18:00) (16 - 23)  SpO2: 100% (09 Feb 2020 18:00) (100% - 100%)      I&O's Summary    08 Feb 2020 07:01  -  09 Feb 2020 07:00  --------------------------------------------------------  IN: 2215 mL / OUT: 958 mL / NET: 1257 mL    09 Feb 2020 07:01  -  09 Feb 2020 19:50  --------------------------------------------------------  IN: 685 mL / OUT: 556 mL / NET: 129 mL        MEDICATIONS  (STANDING):  chlorhexidine 4% Liquid 1 Application(s) Topical <User Schedule>  enoxaparin Injectable 40 milliGRAM(s) SubCutaneous <User Schedule>  niMODipine Oral Solution 60 milliGRAM(s) Oral every 4 hours  vancomycin  IVPB 750 milliGRAM(s) IV Intermittent every 12 hours    NEUROIMAGING:   Recent imaging studies were reviewed.    LAB RESULTS:                          11.2   11.41 )-----------( 271      ( 08 Feb 2020 21:29 )             35.3     02-08    148<H>  |  110<H>  |  18  ----------------------------<  135<H>  3.8   |  25  |  0.56    Ca    9.2      08 Feb 2020 21:29  Phos  4.1     02-08  Mg     2.3     02-08    TPro  7.3  /  Alb  3.4  /  TBili  0.3  /  DBili  <0.1  /  AST  30  /  ALT  38  /  AlkPhos  82  02-08      -----------------------------------------------------------------------------------------------------------------------------------------------------------------------------------      PHYSICAL EXAM:  General: Calm, laying in bed  HEENT: MMM  Neuro:  -Mental status- No acute distress, AOx3, dysarthria, following commands, fluctuating exam  -CN- PERRL 3mm, EOMI, tongue midline, L facial droop  -Motor-   LUE 4/5 drift  LLE 4/5  RUE 5/5 drift  RLE 5/5  -Sensation- intact to LT   -Coordination- no dysmetria noted    CV: RRR  Pulm: Clear to auscultation  Abd: Soft, nontender, nondistended  Ext: No edema  Skin: warm, dry

## 2020-02-09 NOTE — PROGRESS NOTE ADULT - ASSESSMENT
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)    NEURO:  q1h neuro checks  Continue EVD @ 15cm  Stroke code measures: A1c, LDL- as above   Delayed Cerebral Ischemia: TCDs, euvolemia, Nimodipine  Pain control w/ Tylenol prn, Oxy 5/10 prn  OOB with helmet/PT/OT    PULM:  Extubated 2/5  SpO2 > 92%  Incentive spirometry, if able    CARDS:  - 180 mHg  TTE- Nl LV function, no wall motion abnormalities    GASTRO:  TF  Speech/Swallow eval- failed and will return 2/10/20  Bowel regimen- last BM - 2/7/20  ---> Stress ulcer prophylaxis:  Not indicated    RENAL:  Goal: Eunatremia  Hypernatremia - Will recheck BMP, and start freewater if hypernatremic    ENDO:  euglycemia    HEME:  monitor H/H  ---> DVT prophylaxis: SCDs, Lovenox 40 sc daily    ID:  LUE swelling/erythema  - Will increase Vanco to 750 mg q12h  - Vanco trough after 4th dose (prior to 2/10 AM dose)  - LUE doppler    Code status:  Full code  Disposition:  ICU    Updated family at bedside.    This patient was at high risk of neurologic deterioration and/or death due to: re-hemorrhage, seizures, DCI.     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)    NEURO:  q1h neuro checks  Continue EVD @ 15cm- 8 cc/24 hr- consider clamping EVD early week ( has Craniectomy)  Stroke code measures: A1c, LDL- as above   Delayed Cerebral Ischemia: TCD- improvings, euvolemia, Nimodipine  Pain control w/ Tylenol prn, Oxy 5/10 prn  OOB with helmet/PT/OT    PULM:  Extubated 2/5  SpO2 > 92%  Incentive spirometry, if able    CARDS:  - 180 mHg  TTE- Nl LV function, no wall motion abnormalities    GASTRO:  TF  Speech/Swallow eval- failed and will return 2/10/20  Bowel regimen loose and D/C'd 2/8/20- last BM - 2/9/20  ---> Stress ulcer prophylaxis:  Not indicated    RENAL:  Goal: Eunatremia  Hypernatremia - Will recheck BMP, and start freewater if hypernatremic    ENDO:  euglycemia    HEME:  monitor H/H  ---> DVT prophylaxis: SCDs, Lovenox 40 sc daily    ID:  MONICA septic  thrombophlebitis- on Vanco hx of allergy to PCN /Cephalosporin   - Will increase Vanco to 750 mg q12h  - Vanco trough after 4th dose (2/10 - 6 am )  - LUE doppler- P  Repeat cultures - 2/8 - neg   Check PCR for staph Aureus    Code status:  Full code  Disposition:  ICU    Updated family at bedside.    This patient was at high risk of neurologic deterioration and/or death due to: re-hemorrhage, seizures, DCI.     Time spent:  40 minutes

## 2020-02-09 NOTE — PROGRESS NOTE ADULT - SUBJECTIVE AND OBJECTIVE BOX
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 She arrived intubated and sedated. After coming to [facility] ED, she underwent repeat imaging and was taken to the OR emergently for craniectomy and clipping of right posterior communicating artery aneurysm.     ADMISSION SCORES:  GCS: 3T  Hunt-Gonzalez: 5  modified Couch: 3  ICH score: 2    HOSPITAL COURSE:  2/3/20- Clipping R PCOMM              EVD placed   20 - Extubated overnight  20- 500cc Bolus  20 - Following commands on all extremities        ICU Vital Signs Last 24 Hrs  T(C): 37.3 (2020 05:00), Max: 38.7 (2020 23:00)  T(F): 99.2 (2020 05:00), Max: 101.7 (2020 23:00)  HR: 75 (2020 06:00) (66 - 99)  BP: 150/90 (2020 06:00) (129/86 - 185/147)  BP(mean): 108 (2020 06:00) (92 - 160)  RR: 17 (2020 06:00) (11 - 24)  SpO2: 100% (2020 06:00) (100% - 100%)      20 @ 07:  -  20 @ 07:00  --------------------------------------------------------  IN: 1540 mL / OUT: 1151 mL / NET: 389 mL    20 @ 07:01  -  20 @ 06:26  --------------------------------------------------------  IN: 2165 mL / OUT: 958 mL / NET: 1207 mL        acetaminophen   Tablet .. 650 milliGRAM(s) Oral every 6 hours PRN  albuterol/ipratropium for Nebulization. 3 milliLiter(s) Nebulizer every 6 hours PRN  artificial tears (preservative free) Ophthalmic Solution 1 Drop(s) Both EYES three times a day PRN  chlorhexidine 4% Liquid 1 Application(s) Topical <User Schedule>  enoxaparin Injectable 40 milliGRAM(s) SubCutaneous <User Schedule>  niMODipine Oral Solution 60 milliGRAM(s) Oral every 4 hours  oxyCODONE    IR 5 milliGRAM(s) Oral every 4 hours PRN  oxyCODONE    IR 10 milliGRAM(s) Oral every 4 hours PRN  vancomycin  IVPB 750 milliGRAM(s) IV Intermittent every 12 hours                          11.2   11.41 )-----------( 271      ( 2020 21:29 )             35.3     02-08    148<H>  |  110<H>  |  18  ----------------------------<  135<H>  3.8   |  25  |  0.56    Ca    9.2      2020 21:29  Phos  4.1     02-08  Mg     2.3     02-08    TPro  7.3  /  Alb  3.4  /  TBili  0.3  /  DBili  <0.1  /  AST  30  /  ALT  38  /  AlkPhos  82  02-08    LIVER FUNCTIONS - ( 2020 13:19 )  Alb: 3.4 g/dL / Pro: 7.3 g/dL / ALK PHOS: 82 U/L / ALT: 38 U/L / AST: 30 U/L / GGT: x               I&O's Detail      2020 07:01  -  2020 07:00  --------------------------------------------------------  IN:    Enteral Tube Flush: 140 mL    IV PiggyBack: 200 mL    ns in tub fed  [id number]: 1200 mL  Total IN: 1540 mL    OUT:    External Ventricular Device: 1 mL    Incontinent per Collection Ba mL    Intermittent Catheterization - Urethral: 150 mL  Total OUT: 1151 mL    Total NET: 389 mL      2020 07:01  -  2020 12:36  --------------------------------------------------------  IN:    Enteral Tube Flush: 100 mL    ns in tub fed  [id number]: 200 mL  Total IN: 300 mL    OUT:    Incontinent per Collection Ba mL  Total OUT: 250 mL    Total NET: 50 mL            LABS:        148<H>  |  113<H>  |  12  ----------------------------<  144<H>  3.7   |  21<L>  |  0.48<L>    Ca    9.1      2020 15:18  Phos  4.3     02  Mg     2.2     02-07      PT/INR - ( 2020 03:50 )   PT: 13.6 sec;   INR: 1.18 ratio         PTT - ( 2020 03:50 )  PTT:33.5 sec    Lactate- 3.o  Lipids- NL  HGBA1C- 5.3       Xray Chest 1 View- PORTABLE-Urgent (20 @ 13:10) >    INTERPRETATION:  CLINICAL INFORMATION: Line Placement    EXAM: Chest X-ray, AP View    COMPARISON: Chest x-ray 2020    FINDINGS:    Enteric tube courses below the diaphragm with the tip in the stomach. Right-sided PICC line with the tip in the SVC.    The lungs are clear.    Heart size cannot be accurately assessed in this projection.    No acute osseous findings.      IMPRESSION:     Clear lungs. Lines and tubes as above.         CT Head No Cont (20 @ 08:10) >    INTERPRETATION:  Clinical indication: Status post surgery. Subarachnoid hemorrhage.    Multiple axial sections were performed base of skull to vertex without contrastenhancement.    This exam is compared with prior noncontrast head CT performed on 2020.    Postop changes compatible with a right temporal frontal craniectomy is again seen.    There is increased herniation of brain parenchyma through thecraniectomy defect identified.     Abnormal low-attenuation involving the right temporal cortical and subcortical region is again identified. This finding does demonstrate small area of associated high attenuation and is likely compatible with an evolving hemorrhagic infarct in the right MCA territory. Evolving infarct is also seen involving the anterior aspect of the right thalamus.    There is evidence of left frontal shunt catheter again seen and unchanged in position. Associated area of hemorrhage is again identified along shunt catheter tract with as well as edema.    Slight increase in size of the temporal horns are identified.    Aneurysm clips involving the right parasellar region are again identified.    Bifrontal and posterior interhemispheric subdural collections are identified.    Right maxilla polyp is retention cyst is seen.     Both mastoid eminence appear clear.    Right-sided NG tube is seen    Extracalvarial soft tissue swelling and staples are seen in the postop region.    Small amount of extra-axial air seen in the right frontal and postop region.     Impression: Increase parenchyma is seen herniating through the craniectomy defect.     Evolving infarcts are identified as described above.     Slight increase in size of the temporal horns seen when compared prior exam.    < end of copied text >    CT Head No Cont (20 @ 13:08) >  IMPRESSION: Right temporal parenchymal hemorrhage with diffuse subarachnoid hemorrhage slightly greater on the right. Slight prominence of the left temporal horn.    EXAMINATION:  General:  Calm.   HEENT:  MMM. EO spontaneously.   Neuro: A&O x3. L facial. EOMI. Dysarthria - improved.   RUE - 5/5  LUE drift; LUE 4+/5 flexion, 4/5 extension, 4/5 HG  RLE - 5/5  LLE - 4/5 hip flexion, 4+/5 knee extension, 5/5 DF and PF  Cards:  RRR.   Respiratory: Clear  Abdomen:  soft, +BS  Extremities:  no edema. LUE - erythema, swelling. Cord palpated.  RUE - erythema - marked. 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 She arrived intubated and sedated. 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     ADMISSION SCORES:  GCS: 3T  Hunt-Gonzalez: 5  modified Couch: 3  ICH score: 2    HOSPITAL COURSE:  2/3/20- Clipping R PCOMM              EVD placed   20 - Extubated overnight  20- 500cc Bolus  20 - Following commands on all extremities  20- R  cm2 from 129        ICU Vital Signs Last 24 Hrs  T(C): 37.3 (2020 05:00), Max: 38.7 (2020 23:00)  T(F): 99.2 (2020 05:00), Max: 101.7 (2020 23:00)  HR: 75 (2020 06:00) (66 - 99)  BP: 150/90 (2020 06:00) (129/86 - 185/147)  BP(mean): 108 (2020 06:00) (92 - 160)  RR: 17 (2020 06:00) (11 - 24)  SpO2: 100% (2020 06:00) (100% - 100%)        2020 07:01  -  2020 07:00  --------------------------------------------------------  IN:    Enteral Tube Flush: 765 mL    IV PiggyBack: 250 mL    ns in tub fed  [id number]: 1200 mL  Total IN: 2215 mL    OUT:    External Ventricular Device: 8 mL    Incontinent per Collection Ba mL  Total OUT: 958 mL    Total NET: 1257 mL      2020 07:01  -  2020 13:28  --------------------------------------------------------  IN:    ns in tub fed  [id number]: 150 mL  Total IN: 150 mL    OUT:    Incontinent per Collection Ba mL  Total OUT: 300 mL    Total NET: -150 mL        20 @ 07:01  -  20 @ 07:00  --------------------------------------------------------  IN: 1540 mL / OUT: 1151 mL / NET: 389 mL    20 @ 07:01  -  20 @ 06:26  --------------------------------------------------------  IN: 2165 mL / OUT: 958 mL / NET: 1207 mL        acetaminophen   Tablet .. 650 milliGRAM(s) Oral every 6 hours PRN  albuterol/ipratropium for Nebulization. 3 milliLiter(s) Nebulizer every 6 hours PRN  artificial tears (preservative free) Ophthalmic Solution 1 Drop(s) Both EYES three times a day PRN  chlorhexidine 4% Liquid 1 Application(s) Topical <User Schedule>  enoxaparin Injectable 40 milliGRAM(s) SubCutaneous <User Schedule>  niMODipine Oral Solution 60 milliGRAM(s) Oral every 4 hours  oxyCODONE    IR 5 milliGRAM(s) Oral every 4 hours PRN  oxyCODONE    IR 10 milliGRAM(s) Oral every 4 hours PRN  vancomycin  IVPB 750 milliGRAM(s) IV Intermittent every 12 hours                          11.2   11.41 )-----------( 271      ( 2020 21:29 )             35.3         148<H>  |  110<H>  |  18  ----------------------------<  135<H>  3.8   |  25  |  0.56    Ca    9.2      2020 21:29  Phos  4.1     02-08  Mg     2.3     02-08    TPro  7.3  /  Alb  3.4  /  TBili  0.3  /  DBili  <0.1  /  AST  30  /  ALT  38  /  AlkPhos  82  02-08    LIVER FUNCTIONS - ( 2020 13:19 )  Alb: 3.4 g/dL / Pro: 7.3 g/dL / ALK PHOS: 82 U/L / ALT: 38 U/L / AST: 30 U/L / GGT: x               I&O's Detail    UA - 20 - NEG      PT/INR - ( 2020 03:50 )   PT: 13.6 sec;   INR: 1.18 ratio         PTT - ( 2020 03:50 )  PTT:33.5 sec    Lactate- 3.o  Lipids- NL  HGBA1C- 5.3       Xray Chest 1 View- PORTABLE-Urgent (20 @ 13:10) >    INTERPRETATION:  CLINICAL INFORMATION: Line Placement    EXAM: Chest X-ray, AP View    COMPARISON: Chest x-ray 2020    FINDINGS:    Enteric tube courses below the diaphragm with the tip in the stomach. Right-sided PICC line with the tip in the SVC.    The lungs are clear.    Heart size cannot be accurately assessed in this projection.    No acute osseous findings.      IMPRESSION:     Clear lungs. Lines and tubes as above.         CT Head No Cont (20 @ 08:10) >    INTERPRETATION:  Clinical indication: Status post surgery. Subarachnoid hemorrhage.    Multiple axial sections were performed base of skull to vertex without contrastenhancement.    This exam is compared with prior noncontrast head CT performed on 2020.    Postop changes compatible with a right temporal frontal craniectomy is again seen.    There is increased herniation of brain parenchyma through thecraniectomy defect identified.     Abnormal low-attenuation involving the right temporal cortical and subcortical region is again identified. This finding does demonstrate small area of associated high attenuation and is likely compatible with an evolving hemorrhagic infarct in the right MCA territory. Evolving infarct is also seen involving the anterior aspect of the right thalamus.    There is evidence of left frontal shunt catheter again seen and unchanged in position. Associated area of hemorrhage is again identified along shunt catheter tract with as well as edema.    Slight increase in size of the temporal horns are identified.    Aneurysm clips involving the right parasellar region are again identified.    Bifrontal and posterior interhemispheric subdural collections are identified.    Right maxilla polyp is retention cyst is seen.     Both mastoid eminence appear clear.    Right-sided NG tube is seen    Extracalvarial soft tissue swelling and staples are seen in the postop region.    Small amount of extra-axial air seen in the right frontal and postop region.     Impression: Increase parenchyma is seen herniating through the craniectomy defect.     Evolving infarcts are identified as described above.     Slight increase in size of the temporal horns seen when compared prior exam.        CT Head No Cont (20 @ 13:08)   IMPRESSION: Right temporal parenchymal hemorrhage with diffuse subarachnoid hemorrhage slightly greater on the right. Slight prominence of the left temporal horn.    EXAMINATION:  General:  Calm.   HEENT:  MMM. EO spontaneously.   Neuro: A&O x3. L facial. EOMI. Dysarthria - improved.   RUE - 5/5  LUE drift; LUE 5/5 flexion, 4+/5 extension, 4+/5 HG  RLE - 5/5  LLE - 4+/5 hip flexion, 4+/5 knee extension, 5/5 DF and PF  Cards:  RRR.   Respiratory: Clear  Abdomen:  soft, +BS  Extremities:  no edema. LUE - erythema, swelling. Cord palpated.  RUE - erythema - marked.

## 2020-02-10 LAB
ANION GAP SERPL CALC-SCNC: 11 MMOL/L — SIGNIFICANT CHANGE UP (ref 5–17)
BUN SERPL-MCNC: 23 MG/DL — SIGNIFICANT CHANGE UP (ref 7–23)
CALCIUM SERPL-MCNC: 8.8 MG/DL — SIGNIFICANT CHANGE UP (ref 8.4–10.5)
CHLORIDE SERPL-SCNC: 104 MMOL/L — SIGNIFICANT CHANGE UP (ref 96–108)
CO2 SERPL-SCNC: 25 MMOL/L — SIGNIFICANT CHANGE UP (ref 22–31)
CREAT SERPL-MCNC: 0.5 MG/DL — SIGNIFICANT CHANGE UP (ref 0.5–1.3)
CSF COMMENTS: SIGNIFICANT CHANGE UP
GLUCOSE SERPL-MCNC: 124 MG/DL — HIGH (ref 70–99)
HCT VFR BLD CALC: 35.4 % — SIGNIFICANT CHANGE UP (ref 34.5–45)
HGB BLD-MCNC: 11.4 G/DL — LOW (ref 11.5–15.5)
MAGNESIUM SERPL-MCNC: 2.3 MG/DL — SIGNIFICANT CHANGE UP (ref 1.6–2.6)
MCHC RBC-ENTMCNC: 29.4 PG — SIGNIFICANT CHANGE UP (ref 27–34)
MCHC RBC-ENTMCNC: 32.2 GM/DL — SIGNIFICANT CHANGE UP (ref 32–36)
MCV RBC AUTO: 91.2 FL — SIGNIFICANT CHANGE UP (ref 80–100)
NRBC # BLD: 0 /100 WBCS — SIGNIFICANT CHANGE UP (ref 0–0)
PHOSPHATE SERPL-MCNC: 3.4 MG/DL — SIGNIFICANT CHANGE UP (ref 2.5–4.5)
PLATELET # BLD AUTO: 311 K/UL — SIGNIFICANT CHANGE UP (ref 150–400)
POTASSIUM SERPL-MCNC: 3.8 MMOL/L — SIGNIFICANT CHANGE UP (ref 3.5–5.3)
POTASSIUM SERPL-SCNC: 3.8 MMOL/L — SIGNIFICANT CHANGE UP (ref 3.5–5.3)
RBC # BLD: 3.88 M/UL — SIGNIFICANT CHANGE UP (ref 3.8–5.2)
RBC # FLD: 11.9 % — SIGNIFICANT CHANGE UP (ref 10.3–14.5)
SODIUM SERPL-SCNC: 140 MMOL/L — SIGNIFICANT CHANGE UP (ref 135–145)
VANCOMYCIN TROUGH SERPL-MCNC: <4 UG/ML — LOW (ref 10–20)
WBC # BLD: 10.99 K/UL — HIGH (ref 3.8–10.5)
WBC # FLD AUTO: 10.99 K/UL — HIGH (ref 3.8–10.5)

## 2020-02-10 PROCEDURE — 99292 CRITICAL CARE ADDL 30 MIN: CPT

## 2020-02-10 PROCEDURE — 99291 CRITICAL CARE FIRST HOUR: CPT

## 2020-02-10 PROCEDURE — 93888 INTRACRANIAL LIMITED STUDY: CPT | Mod: 26

## 2020-02-10 RX ORDER — LABETALOL HCL 100 MG
5 TABLET ORAL ONCE
Refills: 0 | Status: DISCONTINUED | OUTPATIENT
Start: 2020-02-10 | End: 2020-02-10

## 2020-02-10 RX ORDER — POTASSIUM CHLORIDE 20 MEQ
40 PACKET (EA) ORAL ONCE
Refills: 0 | Status: COMPLETED | OUTPATIENT
Start: 2020-02-10 | End: 2020-02-10

## 2020-02-10 RX ORDER — VANCOMYCIN HCL 1 G
1250 VIAL (EA) INTRAVENOUS EVERY 12 HOURS
Refills: 0 | Status: DISCONTINUED | OUTPATIENT
Start: 2020-02-10 | End: 2020-02-11

## 2020-02-10 RX ORDER — ONDANSETRON 8 MG/1
4 TABLET, FILM COATED ORAL ONCE
Refills: 0 | Status: COMPLETED | OUTPATIENT
Start: 2020-02-10 | End: 2020-02-10

## 2020-02-10 RX ADMIN — NIMODIPINE 60 MILLIGRAM(S): 60 SOLUTION ORAL at 14:14

## 2020-02-10 RX ADMIN — NIMODIPINE 60 MILLIGRAM(S): 60 SOLUTION ORAL at 10:07

## 2020-02-10 RX ADMIN — OXYCODONE HYDROCHLORIDE 5 MILLIGRAM(S): 5 TABLET ORAL at 00:00

## 2020-02-10 RX ADMIN — OXYCODONE HYDROCHLORIDE 5 MILLIGRAM(S): 5 TABLET ORAL at 23:00

## 2020-02-10 RX ADMIN — Medication 40 MILLIEQUIVALENT(S): at 01:55

## 2020-02-10 RX ADMIN — Medication 650 MILLIGRAM(S): at 05:09

## 2020-02-10 RX ADMIN — OXYCODONE HYDROCHLORIDE 5 MILLIGRAM(S): 5 TABLET ORAL at 17:17

## 2020-02-10 RX ADMIN — Medication 650 MILLIGRAM(S): at 13:51

## 2020-02-10 RX ADMIN — Medication 166.67 MILLIGRAM(S): at 06:36

## 2020-02-10 RX ADMIN — OXYCODONE HYDROCHLORIDE 5 MILLIGRAM(S): 5 TABLET ORAL at 23:30

## 2020-02-10 RX ADMIN — NIMODIPINE 60 MILLIGRAM(S): 60 SOLUTION ORAL at 05:09

## 2020-02-10 RX ADMIN — ENOXAPARIN SODIUM 40 MILLIGRAM(S): 100 INJECTION SUBCUTANEOUS at 22:03

## 2020-02-10 RX ADMIN — NIMODIPINE 60 MILLIGRAM(S): 60 SOLUTION ORAL at 22:03

## 2020-02-10 RX ADMIN — OXYCODONE HYDROCHLORIDE 5 MILLIGRAM(S): 5 TABLET ORAL at 00:30

## 2020-02-10 RX ADMIN — NIMODIPINE 60 MILLIGRAM(S): 60 SOLUTION ORAL at 01:56

## 2020-02-10 RX ADMIN — ONDANSETRON 4 MILLIGRAM(S): 8 TABLET, FILM COATED ORAL at 04:19

## 2020-02-10 RX ADMIN — Medication 650 MILLIGRAM(S): at 05:39

## 2020-02-10 RX ADMIN — OXYCODONE HYDROCHLORIDE 5 MILLIGRAM(S): 5 TABLET ORAL at 17:47

## 2020-02-10 RX ADMIN — Medication 166.67 MILLIGRAM(S): at 17:18

## 2020-02-10 RX ADMIN — NIMODIPINE 60 MILLIGRAM(S): 60 SOLUTION ORAL at 18:07

## 2020-02-10 RX ADMIN — Medication 650 MILLIGRAM(S): at 13:21

## 2020-02-10 RX ADMIN — CHLORHEXIDINE GLUCONATE 1 APPLICATION(S): 213 SOLUTION TOPICAL at 22:04

## 2020-02-10 NOTE — PROVIDER CONTACT NOTE (OTHER) - SITUATION
Patient is increasingly lethargic. Patient has a drift on both upper extremities, and left lower extremity.

## 2020-02-10 NOTE — PROGRESS NOTE ADULT - ASSESSMENT
HH4 mF3 subarachnoid hemorrhage, post-bleed day 7  - TCDs, euvolemia, nimodipine  - -220mmHg  - Mobilize  - Pain control  - Vanco for LUE cellulitis

## 2020-02-10 NOTE — SWALLOW BEDSIDE ASSESSMENT ADULT - ASR SWALLOW LINGUAL MOBILITY
reduced rate of motion and strength
reduced depression  reduced strength and rate of motion/impaired anterior elevation

## 2020-02-10 NOTE — CHART NOTE - NSCHARTNOTEFT_GEN_A_CORE
Transcranial doppler exam #1 (2/4/2020) 1045am  mean velocity  cm/sec                              Left         Right  MIKHAIL                    57           74  MCA                   55           72  PCA                     23,24       x  VERT -Could not insonate, patient is intubated.  BA                                x  Technically difficult study.  Official report to follow.  Karen    Transcranial doppler exam #2 (2/6/2020) 11:15am  mean velocity  cm/sec                              Left         Right  MIKHAIL                   74            73  MCA                  78            124  PCA                    23,30        x  Official report to follow.  Karen    Transcranial doppler exam #3 (2/7/2020) 1045am  mean velocity  cm/sec                              Left         Right  MIKHAIL                    67           91  MCA                   79           125  PCA                      P2-34      x  Technically difficult study.  Official report to follow.  Karen    Transcranial doppler exam #4 (2/8/2020) 1015am  mean velocity  cm/sec                              Left         Right  MIKHAIL                    73           84  MCA                   68           115  PCA                     31,28        x  Official report to follow.  Karen    Transcranial doppler exam #5 (2/10/2020) 11am  mean velocity  cm/sec                              Left         Right  MIKHAIL                    68           93  MCA                   71           133  PCA                     29,34        x  Official report to follow.  Karen

## 2020-02-10 NOTE — SWALLOW BEDSIDE ASSESSMENT ADULT - SLP GENERAL OBSERVATIONS
Pt asleep upon contact, did not arouse to initial verbal stimulation. RN repositioned upright. Lights on. Required 1 full min of continual auditory stim (calling pt name) to alert. Fell asleep periodically throughout assessment. Restlessness resolved. Calm, but attention remains variable. Noted reduction in L neglect. Noted evidence of delayed processing and response times. Flat affect. Poor safety awareness, reduced ST and LT memory. Impaired problem solving. Dysnomia evident. Able to complete some basic mental math problems (involving money). Clock drawing impaired. Speech less dysarthric , but still with articulatory imprecision, slow rate, poor prosody. Intelligibility and volume improving. L weakness appeared to be subjectively less pronounced. No longer in wrist restraint.

## 2020-02-10 NOTE — SWALLOW BEDSIDE ASSESSMENT ADULT - ASR SWALLOW LABIAL MOBILITY
reduced rate of motion and strength/impaired retraction
reduced strength/impaired retraction/impaired pursing

## 2020-02-10 NOTE — PROGRESS NOTE ADULT - ASSESSMENT
s/p Rt craniectomy, clipping of Pcomm aneurysm, evacuation of hematoma    - s/p EVD@ 15. ICP low teens. CTH in AM.  Will discuss clamping EVD  - SBP goal 100-180  - TCDs  - LUE doppler   - SS re-eval   - Pan cultured due to fever, including CSF which appears sterile. Vancomycin for cellulitis

## 2020-02-10 NOTE — SWALLOW BEDSIDE ASSESSMENT ADULT - ORAL PHASE
Oral transit time noted to be up to   6 seconds./Delayed oral transit time Oral transit time noted to be 4-5   seconds./Delayed oral transit time

## 2020-02-10 NOTE — PROGRESS NOTE ADULT - SUBJECTIVE AND OBJECTIVE BOX
Failed dysphagia, planned for FEEST    Awakens to voice, oriented fully, left facial, left side 4/5, right side full strength

## 2020-02-10 NOTE — PROGRESS NOTE ADULT - SUBJECTIVE AND OBJECTIVE BOX
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 She arrived intubated and sedated. After coming to [facility] ED, she underwent repeat imaging and was taken to the OR emergently for craniectomy and clipping of right posterior communicating artery aneurysm.     ADMISSION SCORES:  GCS: 3T  Hunt-Gonzalez: 5  modified Couch: 3  ICH score: 2    HOSPITAL COURSE:  2/3/20- Clipping R PCOMM              EVD placed   2/5/20 - Extubated overnight  2/6/20- 500cc Bolus  2/7/20 - Following commands on all extremities  2/8/20- R  cm2 from 129    No acute overnight events             ICU Vital Signs Last 24 Hrs  T(C): 36.8 (10 Feb 2020 03:00), Max: 37.8 (09 Feb 2020 19:00)  T(F): 98.3 (10 Feb 2020 03:00), Max: 100 (09 Feb 2020 19:00)  HR: 88 (10 Feb 2020 05:00) (72 - 99)  BP: 152/113 (10 Feb 2020 05:00) (124/74 - 152/113)  BP(mean): 124 (10 Feb 2020 05:00) (90 - 135)  RR: 20 (10 Feb 2020 05:00) (16 - 23)  SpO2: 100% (10 Feb 2020 05:00) (100% - 100%)      02-09-20 @ 07:01  -  02-10-20 @ 07:00  --------------------------------------------------------  IN: 1385 mL / OUT: 1056 mL / NET: 329 mL        acetaminophen   Tablet .. 650 milliGRAM(s) Oral every 6 hours PRN  albuterol/ipratropium for Nebulization. 3 milliLiter(s) Nebulizer every 6 hours PRN  artificial tears (preservative free) Ophthalmic Solution 1 Drop(s) Both EYES three times a day PRN  chlorhexidine 4% Liquid 1 Application(s) Topical <User Schedule>  enoxaparin Injectable 40 milliGRAM(s) SubCutaneous <User Schedule>  niMODipine Oral Solution 60 milliGRAM(s) Oral every 4 hours  oxyCODONE    IR 5 milliGRAM(s) Oral every 4 hours PRN  oxyCODONE    IR 10 milliGRAM(s) Oral every 4 hours PRN  vancomycin  IVPB 1250 milliGRAM(s) IV Intermittent every 12 hours                            11.5   12.47 )-----------( 293      ( 09 Feb 2020 22:18 )             36.5     02-09    144  |  107  |  23  ----------------------------<  147<H>  3.6   |  24  |  0.54    Ca    9.4      09 Feb 2020 22:18  Phos  4.0     02-09  Mg     2.3     02-09    TPro  7.3  /  Alb  3.4  /  TBili  0.3  /  DBili  <0.1  /  AST  30  /  ALT  38  /  AlkPhos  82  02-08    LIVER FUNCTIONS - ( 08 Feb 2020 13:19 )  Alb: 3.4 g/dL / Pro: 7.3 g/dL / ALK PHOS: 82 U/L / ALT: 38 U/L / AST: 30 U/L / GGT: x             EXAMINATION:  General:  Calm.   HEENT:  MMM. EO spontaneously.   Neuro: A&O x3. L facial. EOMI. Dysarthria - improved.   RUE - 5/5  LUE drift; LUE 5/5 flexion, 4+/5 extension, 4+/5 HG  RLE - 5/5  LLE - 4+/5 hip flexion, 4+/5 knee extension, 5/5 DF and PF  Cards:  RRR.   Respiratory: Clear  Abdomen:  soft, +BS  Extremities:  no edema. LUE - erythema, swelling. Cord palpated.  RUE - erythema - marked.

## 2020-02-10 NOTE — PROVIDER CONTACT NOTE (OTHER) - ASSESSMENT
Patient is increasingly lethargic. Patient has a drift on both upper extremities, and left lower extremity. Patient is full strength on right side, 4+ on left side.

## 2020-02-10 NOTE — SWALLOW BEDSIDE ASSESSMENT ADULT - PHARYNGEAL PHASE
Delayed pharyngeal swallow/Multiple swallows Delayed cough post oral intake/Delayed pharyngeal swallow/Delayed throat clear post oral intake/Multiple swallows/after all of above trials were presented, pt with delayed and prolonged cough. May be suggestive of pooled pharyngeal residue -> into airway. Testing d/c and pt suctioned

## 2020-02-10 NOTE — PROGRESS NOTE ADULT - SUBJECTIVE AND OBJECTIVE BOX
Patient seen and examined at bedside.    --Anticoagulation--  enoxaparin Injectable 40 milliGRAM(s) SubCutaneous <User Schedule>    T(C): 37.1 (02-09-20 @ 23:00), Max: 37.8 (02-09-20 @ 19:00)  HR: 81 (02-10-20 @ 02:00) (72 - 99)  BP: 144/74 (02-10-20 @ 02:00) (124/74 - 150/90)  RR: 19 (02-10-20 @ 02:00) (16 - 23)  SpO2: 100% (02-10-20 @ 02:00) (100% - 100%)  Wt(kg): --    Exam:  AOX3, EO spont, pupils 4 mm R b/l,  mild left facial, FC,   R 5/5, L 4/5

## 2020-02-10 NOTE — SWALLOW BEDSIDE ASSESSMENT ADULT - SWALLOW EVAL: DIAGNOSIS
Pt s/p SAH 2/2 aneurysm-> crani/clipping. Patient presents with: 1) nasim-pharyngeal dysphagia with delay in trigger of the swallow reflex and s/s suggestive of pharyngeal residue/aspiration/laryngeal penetration on conservative textures. 2) Dysarthria 3) Cognitive -linguistic impairment. Overall, improved as compared to prior exam

## 2020-02-10 NOTE — PROGRESS NOTE ADULT - ASSESSMENT
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)    NEURO:  q1h neuro checks  Continue EVD @ 15cm- 8 cc/24 hr- consider clamping EVD early week ( has Craniectomy)  Stroke code measures: A1c, LDL- as above   Delayed Cerebral Ischemia: TCD- improvings, euvolemia, Nimodipine  Pain control w/ Tylenol prn, Oxy 5/10 prn  OOB with helmet/PT/OT    PULM:  Extubated 2/5  SpO2 > 92%  Incentive spirometry, if able    CARDS:  - 180 mHg  TTE- Nl LV function, no wall motion abnormalities    GASTRO:  TF  Speech/Swallow eval- failed and will return 2/10/20  Bowel regimen loose and D/C'd 2/8/20- last BM - 2/9/20  ---> Stress ulcer prophylaxis:  Not indicated    RENAL:  Goal: Eunatremia  Hypernatremia - Will recheck BMP, and start freewater if hypernatremic    ENDO:  euglycemia    HEME:  monitor H/H  ---> DVT prophylaxis: SCDs, Lovenox 40 sc daily    ID:  MONICA septic  thrombophlebitis- on Vanco hx of allergy to PCN /Cephalosporin   - Will increase Vanco to 750 mg q12h  - Vanco trough after 4th dose (2/10 - 6 am )  - LUE doppler- P  Repeat cultures - 2/8 - neg   Check PCR for staph Aureus    Code status:  Full code  Disposition:  ICU    Updated family at bedside.    This patient was at high risk of neurologic deterioration and/or death due to: re-hemorrhage, seizures, DCI.     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)    NEURO:  q1h neuro checks  Continue EVD @ 15cm- 8 cc/24 hr- consider clamping EVD early week ( has Craniectomy)  Stroke code measures: A1c, LDL- as above   Delayed Cerebral Ischemia: TCD- improvings, euvolemia, Nimodipine  Pain control w/ Tylenol prn, Oxy 5/10 prn  OOB with helmet/PT/OT    PULM:  Extubated 2/5  SpO2 > 92%  Incentive spirometry, if able    CARDS:  - 220 mHg  TTE- Nl LV function, no wall motion abnormalities    GASTRO:  TF  Speech/Swallow eval- failed and will return 2/10/20  Bowel regimen loose and D/C'd 2/8/20- last BM - 2/9/20  ---> Stress ulcer prophylaxis:  Not indicated    RENAL:  Goal: Eunatremia  Hypernatremia - Will recheck BMP, and start freewater if hypernatremic    ENDO:  euglycemia    HEME:  monitor H/H  ---> DVT prophylaxis: SCDs, Lovenox 40 sc daily    ID:  MONICA septic  thrombophlebitis- on Vanco hx of allergy to PCN /Cephalosporin   - increase Vanco to 1250 mg q12h  - LUE doppler- P  Repeat cultures - 2/8 - neg   Check PCR for staph Aureus    Code status:  Full code  Disposition:  ICU    Updated family at bedside.    This patient was at high risk of neurologic deterioration and/or death due to: re-hemorrhage, seizures, DCI.     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)    NEURO:  q1h neuro checks  Continue EVD @ 15cm- 8 cc/24 hr- consider clamping EVD early week ( has Craniectomy)  Stroke code measures: A1c, LDL- as above   Delayed Cerebral Ischemia: TCD- improvings, euvolemia, Nimodipine  Pain control w/ Tylenol prn, Oxy 5/10 prn  OOB with helmet/PT/OT    PULM:  Extubated 2/5  SpO2 > 92%  Incentive spirometry, if able    CARDS:  - 220 mHg  TTE- Nl LV function, no wall motion abnormalities    GASTRO:  TF  Speech/Swallow eval- failed and will return 2/10/20  Bowel regimen loose and D/C'd 2/8/20- last BM - 2/9/20  ---> Stress ulcer prophylaxis:  Not indicated    RENAL:  Goal: Eunatremia  Hypernatremia - Will recheck BMP, and start freewater if hypernatremic    ENDO:  euglycemia    HEME:  monitor H/H  ---> DVT prophylaxis: SCDs, Lovenox 40 sc daily    ID:  MONICA septic  thrombophlebitis- on Vanco hx of allergy to PCN /Cephalosporin   - increase Vanco to 1250 mg q12h  - LUE doppler- P  Repeat cultures - 2/8 - neg   Check PCR for staph Aureus    Code status:  Full code  Disposition:  ICU    Updated family at bedside.    This patient was at high risk of neurologic deterioration and/or death due to: re-hemorrhage, seizures, DCI.     Time spent:  45 minutes

## 2020-02-11 DIAGNOSIS — J38.3 OTHER DISEASES OF VOCAL CORDS: ICD-10-CM

## 2020-02-11 LAB
ANION GAP SERPL CALC-SCNC: 14 MMOL/L — SIGNIFICANT CHANGE UP (ref 5–17)
BUN SERPL-MCNC: 25 MG/DL — HIGH (ref 7–23)
CALCIUM SERPL-MCNC: 9.1 MG/DL — SIGNIFICANT CHANGE UP (ref 8.4–10.5)
CHLORIDE SERPL-SCNC: 99 MMOL/L — SIGNIFICANT CHANGE UP (ref 96–108)
CO2 SERPL-SCNC: 24 MMOL/L — SIGNIFICANT CHANGE UP (ref 22–31)
CREAT SERPL-MCNC: 0.49 MG/DL — LOW (ref 0.5–1.3)
GLUCOSE SERPL-MCNC: 125 MG/DL — HIGH (ref 70–99)
MAGNESIUM SERPL-MCNC: 2.3 MG/DL — SIGNIFICANT CHANGE UP (ref 1.6–2.6)
PHOSPHATE SERPL-MCNC: 3.7 MG/DL — SIGNIFICANT CHANGE UP (ref 2.5–4.5)
POTASSIUM SERPL-MCNC: 3.9 MMOL/L — SIGNIFICANT CHANGE UP (ref 3.5–5.3)
POTASSIUM SERPL-SCNC: 3.9 MMOL/L — SIGNIFICANT CHANGE UP (ref 3.5–5.3)
SODIUM SERPL-SCNC: 137 MMOL/L — SIGNIFICANT CHANGE UP (ref 135–145)
VANCOMYCIN TROUGH SERPL-MCNC: 5.1 UG/ML — LOW (ref 10–20)

## 2020-02-11 PROCEDURE — 99292 CRITICAL CARE ADDL 30 MIN: CPT

## 2020-02-11 PROCEDURE — 99222 1ST HOSP IP/OBS MODERATE 55: CPT

## 2020-02-11 PROCEDURE — 93970 EXTREMITY STUDY: CPT | Mod: 26

## 2020-02-11 PROCEDURE — 99291 CRITICAL CARE FIRST HOUR: CPT

## 2020-02-11 PROCEDURE — 92616 FEES W/LARYNGEAL SENSE TEST: CPT | Mod: GC

## 2020-02-11 PROCEDURE — 70450 CT HEAD/BRAIN W/O DYE: CPT | Mod: 26

## 2020-02-11 PROCEDURE — 93888 INTRACRANIAL LIMITED STUDY: CPT | Mod: 26

## 2020-02-11 PROCEDURE — 71045 X-RAY EXAM CHEST 1 VIEW: CPT | Mod: 26

## 2020-02-11 RX ORDER — POTASSIUM CHLORIDE 20 MEQ
40 PACKET (EA) ORAL ONCE
Refills: 0 | Status: COMPLETED | OUTPATIENT
Start: 2020-02-11 | End: 2020-02-11

## 2020-02-11 RX ORDER — VANCOMYCIN HCL 1 G
1000 VIAL (EA) INTRAVENOUS EVERY 8 HOURS
Refills: 0 | Status: DISCONTINUED | OUTPATIENT
Start: 2020-02-11 | End: 2020-02-13

## 2020-02-11 RX ORDER — ONDANSETRON 8 MG/1
4 TABLET, FILM COATED ORAL EVERY 8 HOURS
Refills: 0 | Status: DISCONTINUED | OUTPATIENT
Start: 2020-02-11 | End: 2020-02-25

## 2020-02-11 RX ADMIN — Medication 650 MILLIGRAM(S): at 09:30

## 2020-02-11 RX ADMIN — CHLORHEXIDINE GLUCONATE 1 APPLICATION(S): 213 SOLUTION TOPICAL at 21:30

## 2020-02-11 RX ADMIN — Medication 650 MILLIGRAM(S): at 16:22

## 2020-02-11 RX ADMIN — Medication 250 MILLIGRAM(S): at 18:32

## 2020-02-11 RX ADMIN — NIMODIPINE 60 MILLIGRAM(S): 60 SOLUTION ORAL at 21:30

## 2020-02-11 RX ADMIN — ONDANSETRON 4 MILLIGRAM(S): 8 TABLET, FILM COATED ORAL at 16:22

## 2020-02-11 RX ADMIN — Medication 166.67 MILLIGRAM(S): at 05:20

## 2020-02-11 RX ADMIN — Medication 650 MILLIGRAM(S): at 16:52

## 2020-02-11 RX ADMIN — ENOXAPARIN SODIUM 40 MILLIGRAM(S): 100 INJECTION SUBCUTANEOUS at 21:30

## 2020-02-11 RX ADMIN — Medication 40 MILLIEQUIVALENT(S): at 02:18

## 2020-02-11 RX ADMIN — NIMODIPINE 60 MILLIGRAM(S): 60 SOLUTION ORAL at 02:18

## 2020-02-11 RX ADMIN — NIMODIPINE 60 MILLIGRAM(S): 60 SOLUTION ORAL at 06:21

## 2020-02-11 RX ADMIN — NIMODIPINE 60 MILLIGRAM(S): 60 SOLUTION ORAL at 10:10

## 2020-02-11 RX ADMIN — NIMODIPINE 60 MILLIGRAM(S): 60 SOLUTION ORAL at 14:06

## 2020-02-11 RX ADMIN — OXYCODONE HYDROCHLORIDE 5 MILLIGRAM(S): 5 TABLET ORAL at 22:00

## 2020-02-11 RX ADMIN — OXYCODONE HYDROCHLORIDE 5 MILLIGRAM(S): 5 TABLET ORAL at 21:30

## 2020-02-11 RX ADMIN — Medication 650 MILLIGRAM(S): at 09:00

## 2020-02-11 RX ADMIN — NIMODIPINE 60 MILLIGRAM(S): 60 SOLUTION ORAL at 18:03

## 2020-02-11 NOTE — CHART NOTE - NSCHARTNOTEFT_GEN_A_CORE
Transcranial doppler exam #1 (2/4/2020) 1045am  mean velocity  cm/sec                              Left         Right  MIKHAIL                    57           74  MCA                   55           72  PCA                     23,24       x  VERT -Could not insonate, patient is intubated.  BA                                x  Technically difficult study.  Official report to follow.  Karen    Transcranial doppler exam #2 (2/6/2020) 11:15am  mean velocity  cm/sec                              Left         Right  MIKHAIL                   74            73  MCA                  78            124  PCA                    23,30        x  Official report to follow.  Karen    Transcranial doppler exam #3 (2/7/2020) 1045am  mean velocity  cm/sec                              Left         Right  MIKHAIL                    67           91  MCA                   79           125  PCA                      P2-34      x  Technically difficult study.  Official report to follow.  SSylvia    Transcranial doppler exam #4 (2/8/2020) 1015am  mean velocity  cm/sec                              Left         Right  MIKHAIL                    73           84  MCA                   68           115  PCA                     31,28        x  Official report to follow.  Karen    Transcranial doppler exam #5 (2/10/2020) 11am  mean velocity  cm/sec                              Left         Right  MIKHAIL                    68           93  MCA                   71           133  PCA                     29,34        x  Official report to follow.  Karen    Transcranial doppler exam #6 (2/11/2020) 11am  mean velocity  cm/sec                              Left         Right  MIKHAIL                    70            85  MCA                   70            95  PCA                     34,24        x  Official report to follow.  Karen

## 2020-02-11 NOTE — CONSULT NOTE ADULT - SUBJECTIVE AND OBJECTIVE BOX
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 She arrived intubated and sedated. 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 Speech and swallow evaluated pt. today for dysphagia and saw ? posterior laryngeal edema and erythema. There was evidence of a posterior subglottic ? pedunculated lesion that moved into and out of  the trachea upon inspiration.         PAST MEDICAL & SURGICAL HISTORY:  No pertinent past medical history  No significant past surgical history    Allergies    penicillins (Anaphylaxis; Hives)  shellfish (Anaphylaxis)  sulfa drugs (Unknown)    Intolerances    Gluten (Stomach Upset)    MEDICATIONS  (STANDING):  chlorhexidine 4% Liquid 1 Application(s) Topical <User Schedule>  enoxaparin Injectable 40 milliGRAM(s) SubCutaneous <User Schedule>  niMODipine Oral Solution 60 milliGRAM(s) Oral every 4 hours  vancomycin  IVPB 1250 milliGRAM(s) IV Intermittent every 12 hours    MEDICATIONS  (PRN):  acetaminophen   Tablet .. 650 milliGRAM(s) Oral every 6 hours PRN Temp greater or equal to 38C (100.4F), Mild Pain (1 - 3)  albuterol/ipratropium for Nebulization. 3 milliLiter(s) Nebulizer every 6 hours PRN Shortness of Breath and/or Wheezing  artificial tears (preservative free) Ophthalmic Solution 1 Drop(s) Both EYES three times a day PRN Dry Eyes  oxyCODONE    IR 5 milliGRAM(s) Oral every 4 hours PRN Moderate Pain (4 - 6)  oxyCODONE    IR 10 milliGRAM(s) Oral every 4 hours PRN Severe Pain (7 - 10)      Social History: **??**    Family history: **??**    ROS:   ENT: all negative except as noted in HPI   CV: denies palpitations  Pulm: denies SOB, cough, hemoptysis  GI: denies change in apetite, indigestion, n/v  : denies pertinent urinary symptoms, urgency  Neuro: denies numbness/tingling, loss of sensation  Psych: denies anxiety  MS: denies muscle weakness, instability  Heme: denies easy bruising or bleeding  Endo: denies heat/cold intolerance, excessive sweating  Vascular: denies LE edema    Vital Signs Last 24 Hrs  T(C): 37.1 (11 Feb 2020 11:00), Max: 37.3 (10 Feb 2020 19:00)  T(F): 98.7 (11 Feb 2020 11:00), Max: 99.2 (10 Feb 2020 19:00)  HR: 88 (11 Feb 2020 14:00) (75 - 101)  BP: 141/112 (11 Feb 2020 14:00) (128/89 - 160/84)  BP(mean): 122 (11 Feb 2020 14:00) (94 - 132)  RR: 18 (11 Feb 2020 14:00) (15 - 26)  SpO2: 100% (11 Feb 2020 14:00) (97% - 100%)                          11.4   10.99 )-----------( 311      ( 10 Feb 2020 21:06 )             35.4    02-10    140  |  104  |  23  ----------------------------<  124<H>  3.8   |  25  |  0.50    Ca    8.8      10 Feb 2020 21:06  Phos  3.4     02-10  Mg     2.3     02-10         PHYSICAL EXAM:  Gen: NAD  Skin: No rashes, bruises, or lesions  Head: Rt. craniectomy  Face: no edema, erythema, or fluctuance. Parotid glands soft without mass  Eyes: no scleral injection  Nose: Nares bilaterally patent, no discharge  Mouth: No Stridor / Drooling / Trismus.  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 She arrived intubated and sedated. 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 Speech and swallow evaluated pt. today for dysphagia and saw evidence of a posterior subglottic ? pedunculated lesion that moved into and out of  the trachea upon inspiration.         PAST MEDICAL & SURGICAL HISTORY:  No pertinent past medical history  No significant past surgical history    Allergies    penicillins (Anaphylaxis; Hives)  shellfish (Anaphylaxis)  sulfa drugs (Unknown)    Intolerances    Gluten (Stomach Upset)    MEDICATIONS  (STANDING):  chlorhexidine 4% Liquid 1 Application(s) Topical <User Schedule>  enoxaparin Injectable 40 milliGRAM(s) SubCutaneous <User Schedule>  niMODipine Oral Solution 60 milliGRAM(s) Oral every 4 hours  vancomycin  IVPB 1250 milliGRAM(s) IV Intermittent every 12 hours    MEDICATIONS  (PRN):  acetaminophen   Tablet .. 650 milliGRAM(s) Oral every 6 hours PRN Temp greater or equal to 38C (100.4F), Mild Pain (1 - 3)  albuterol/ipratropium for Nebulization. 3 milliLiter(s) Nebulizer every 6 hours PRN Shortness of Breath and/or Wheezing  artificial tears (preservative free) Ophthalmic Solution 1 Drop(s) Both EYES three times a day PRN Dry Eyes  oxyCODONE    IR 5 milliGRAM(s) Oral every 4 hours PRN Moderate Pain (4 - 6)  oxyCODONE    IR 10 milliGRAM(s) Oral every 4 hours PRN Severe Pain (7 - 10)      Social History: **??**    Family history: No pertinent family history in first degree relative    ROS:   ENT: all negative except as noted in HPI   CV: denies palpitations  Pulm: denies SOB, cough, hemoptysis  GI: denies change in apetite, indigestion, n/v  : denies pertinent urinary symptoms, urgency  Neuro: denies numbness/tingling, loss of sensation  Psych: denies anxiety  MS: denies muscle weakness, instability  Heme: denies easy bruising or bleeding  Endo: denies heat/cold intolerance, excessive sweating  Vascular: denies LE edema    Vital Signs Last 24 Hrs  T(C): 37.1 (11 Feb 2020 11:00), Max: 37.3 (10 Feb 2020 19:00)  T(F): 98.7 (11 Feb 2020 11:00), Max: 99.2 (10 Feb 2020 19:00)  HR: 88 (11 Feb 2020 14:00) (75 - 101)  BP: 141/112 (11 Feb 2020 14:00) (128/89 - 160/84)  BP(mean): 122 (11 Feb 2020 14:00) (94 - 132)  RR: 18 (11 Feb 2020 14:00) (15 - 26)  SpO2: 100% (11 Feb 2020 14:00) (97% - 100%)                          11.4   10.99 )-----------( 311      ( 10 Feb 2020 21:06 )             35.4    02-10    140  |  104  |  23  ----------------------------<  124<H>  3.8   |  25  |  0.50    Ca    8.8      10 Feb 2020 21:06  Phos  3.4     02-10  Mg     2.3     02-10         PHYSICAL EXAM:  Gen: NAD  Skin: No rashes, bruises, or lesions  Head: Rt. craniectomy scar  Face: no edema, erythema, or fluctuance. Parotid glands soft without mass  Eyes: no scleral injection  Nose: Nares bilaterally patent, no discharge, keofeed tube in right nare  Mouth: No Stridor / Drooling / Trismus.  Mucosa moist, tongue/uvula midline, oropharynx clear  Neck: Flat, supple, no lymphadenopathy, trachea midline, no masses  Lymphatic: No lymphadenopathy  Resp: breathing comfortably, no stridor  CV: no peripheral edema/cyanosis  GI: nondistended   Peripheral vascular: no JVD or edema  Neuro: facial nerve intact, no facial droop          Based off of FEEST performed today by speech and swallow therapist (reviewed video):  Nasal passages clear. Nasopharynx/oropharynx/hypopharynx clear. Vocal cords fully mobile with b/l vocal process granulomas. Right is larger and flops in and out of airway but nonobstructive (2-3mm) and otherwise no lesions or masses noted. Postcricoid area clear without erythema or edema. Visualized subglottis clear. 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 She arrived intubated and sedated. 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 Speech and swallow evaluated pt. today for dysphagia and saw evidence of a posterior subglottic ? pedunculated lesion that moved into and out of  the trachea upon inspiration.         PAST MEDICAL & SURGICAL HISTORY:  No pertinent past medical history  No significant past surgical history    Allergies    penicillins (Anaphylaxis; Hives)  shellfish (Anaphylaxis)  sulfa drugs (Unknown)    Intolerances    Gluten (Stomach Upset)    MEDICATIONS  (STANDING):  chlorhexidine 4% Liquid 1 Application(s) Topical <User Schedule>  enoxaparin Injectable 40 milliGRAM(s) SubCutaneous <User Schedule>  niMODipine Oral Solution 60 milliGRAM(s) Oral every 4 hours  vancomycin  IVPB 1250 milliGRAM(s) IV Intermittent every 12 hours    MEDICATIONS  (PRN):  acetaminophen   Tablet .. 650 milliGRAM(s) Oral every 6 hours PRN Temp greater or equal to 38C (100.4F), Mild Pain (1 - 3)  albuterol/ipratropium for Nebulization. 3 milliLiter(s) Nebulizer every 6 hours PRN Shortness of Breath and/or Wheezing  artificial tears (preservative free) Ophthalmic Solution 1 Drop(s) Both EYES three times a day PRN Dry Eyes  oxyCODONE    IR 5 milliGRAM(s) Oral every 4 hours PRN Moderate Pain (4 - 6)  oxyCODONE    IR 10 milliGRAM(s) Oral every 4 hours PRN Severe Pain (7 - 10)      Social History: Denies hx of smoking    Family history: No pertinent family history in first degree relative    ROS:   ENT: all negative except as noted in HPI   CV: denies palpitations  Pulm: denies SOB, cough, hemoptysis  GI: denies change in apetite, indigestion, n/v  : denies pertinent urinary symptoms, urgency  Neuro: denies numbness/tingling, loss of sensation  Psych: denies anxiety  MS: denies muscle weakness, instability  Heme: denies easy bruising or bleeding  Endo: denies heat/cold intolerance, excessive sweating  Vascular: denies LE edema    Vital Signs Last 24 Hrs  T(C): 37.1 (11 Feb 2020 11:00), Max: 37.3 (10 Feb 2020 19:00)  T(F): 98.7 (11 Feb 2020 11:00), Max: 99.2 (10 Feb 2020 19:00)  HR: 88 (11 Feb 2020 14:00) (75 - 101)  BP: 141/112 (11 Feb 2020 14:00) (128/89 - 160/84)  BP(mean): 122 (11 Feb 2020 14:00) (94 - 132)  RR: 18 (11 Feb 2020 14:00) (15 - 26)  SpO2: 100% (11 Feb 2020 14:00) (97% - 100%)                          11.4   10.99 )-----------( 311      ( 10 Feb 2020 21:06 )             35.4    02-10    140  |  104  |  23  ----------------------------<  124<H>  3.8   |  25  |  0.50    Ca    8.8      10 Feb 2020 21:06  Phos  3.4     02-10  Mg     2.3     02-10         PHYSICAL EXAM:  Gen: NAD  Skin: No rashes, bruises, or lesions  Head: Rt. craniectomy scar  Face: no edema, erythema, or fluctuance. Parotid glands soft without mass  Eyes: no scleral injection  Nose: Nares bilaterally patent, no discharge, keofeed tube in right nare  Mouth: No Stridor / Drooling / Trismus.  Mucosa moist, tongue/uvula midline, oropharynx clear  Neck: Flat, supple, no lymphadenopathy, trachea midline, no masses  Lymphatic: No lymphadenopathy  Resp: breathing comfortably, no stridor  CV: no peripheral edema/cyanosis  GI: nondistended   Peripheral vascular: no JVD or edema  Neuro: facial nerve intact, no facial droop          Based off of FEEST performed today by speech and swallow therapist (reviewed video):  Nasal passages clear. Nasopharynx/oropharynx/hypopharynx clear. Vocal cords fully mobile with b/l vocal process granulomas. Right is larger and flops in and out of airway but nonobstructive (2-3mm) and otherwise no lesions or masses noted. Postcricoid area clear without erythema or edema. Visualized subglottis clear.

## 2020-02-11 NOTE — PROVIDER CONTACT NOTE (OTHER) - ASSESSMENT
EVD ICP is reading 22, waveform is dampened. EVD is not patent. Patient is neurologically unchanged, vitals are within parameter.

## 2020-02-11 NOTE — PROGRESS NOTE ADULT - SUBJECTIVE AND OBJECTIVE BOX
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 She arrived intubated and sedated. 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     ADMISSION SCORES:  GCS: 3T  Hunt-Gonzalez: 5  modified Couch: 3  ICH score: 2    HOSPITAL COURSE:  2/3/20- Clipping R PCOMM              EVD placed   2/5/20 - Extubated overnight  2/6/20- 500cc Bolus  2/7/20 - Following commands on all extremities  2/8/20- R  cm2 from 129    No acute overnight events     ICU Vital Signs Last 24 Hrs  T(C): 37.2 (11 Feb 2020 05:00), Max: 37.6 (10 Feb 2020 11:00)  T(F): 98.9 (11 Feb 2020 05:00), Max: 99.6 (10 Feb 2020 11:00)  HR: 86 (11 Feb 2020 06:00) (74 - 101)  BP: 143/123 (11 Feb 2020 06:00) (122/100 - 174/108)  BP(mean): 132 (11 Feb 2020 06:00) (97 - 132)  RR: 25 (11 Feb 2020 06:00) (15 - 26)  SpO2: 97% (11 Feb 2020 06:00) (75% - 100%)      02-09-20 @ 07:01  -  02-10-20 @ 07:00  --------------------------------------------------------  IN: 1685 mL / OUT: 1056 mL / NET: 629 mL    02-10-20 @ 07:01  -  02-11-20 @ 06:58  --------------------------------------------------------  IN: 2110 mL / OUT: 1059 mL / NET: 1051 mL        acetaminophen   Tablet .. 650 milliGRAM(s) Oral every 6 hours PRN  albuterol/ipratropium for Nebulization. 3 milliLiter(s) Nebulizer every 6 hours PRN  artificial tears (preservative free) Ophthalmic Solution 1 Drop(s) Both EYES three times a day PRN  chlorhexidine 4% Liquid 1 Application(s) Topical <User Schedule>  enoxaparin Injectable 40 milliGRAM(s) SubCutaneous <User Schedule>  niMODipine Oral Solution 60 milliGRAM(s) Oral every 4 hours  oxyCODONE    IR 5 milliGRAM(s) Oral every 4 hours PRN  oxyCODONE    IR 10 milliGRAM(s) Oral every 4 hours PRN  vancomycin  IVPB 1250 milliGRAM(s) IV Intermittent every 12 hours                            11.4   10.99 )-----------( 311      ( 10 Feb 2020 21:06 )             35.4     02-10    140  |  104  |  23  ----------------------------<  124<H>  3.8   |  25  |  0.50    Ca    8.8      10 Feb 2020 21:06  Phos  3.4     02-10  Mg     2.3     02-10      EXAMINATION:  General:  Calm.   HEENT:  MMM. EO spontaneously.   Neuro: A&O x3. L facial. EOMI. Dysarthria - improved.   RUE  5/5  LUE drift; LUE 5/5 flexion, 4+/5 extension, 4+/5 HG  RLE  5/5  LLE - 4+/5 hip flexion, 4+/5 knee extension, 5/5 DF and PF  Cards:  RRR.   Respiratory: Clear  Abdomen:  soft, +BS  Extremities:  no edema. LUE - erythema, swelling. Cord palpated.  RUE - erythema - marked.

## 2020-02-11 NOTE — PROGRESS NOTE ADULT - ASSESSMENT
s/p Rt craniectomy, clipping of Pcomm aneurysm, evacuation of hematoma    - s/p EVD@ 15. ICP low teens.   - SBP goal 100-180  - TCDs  - SS re-eval   - Vancomycin for cellulitis

## 2020-02-11 NOTE — PROGRESS NOTE ADULT - SUBJECTIVE AND OBJECTIVE BOX
failed FEEST    Awakens to voice, oriented fully, left facial, left side 4/5, right side full strength

## 2020-02-11 NOTE — SWALLOW FEES ASSESSMENT ADULT - DIAGNOSTIC IMPRESSIONS
Patient presents with nasim-pharyngeal dysphagia with impaired oral management, delay in trigger of the pharyngeal swallow reflex, post swallow pharyngeal stasis, and impaired a/w protection with silent laryngeal penetration/suspected aspiration of the most conservative p.o. textures.   Also noted: posterior laryngeal edema and erythema. There was evidence of a posterior subglottic ? pedunculated lesion that moved into and out fo the trachea upon inspiration. Suggest ENT consult for further laryngeal assessment, assessment, intervention (if indicated) Patient presents with nasim-pharyngeal dysphagia with impaired oral management, delay in trigger of the pharyngeal swallow reflex, post swallow pharyngeal stasis, and impaired a/w protection with silent laryngeal penetration/suspected aspiration of the most conservative p.o. textures.   Also noted: posterior laryngeal edema and erythema. There was evidence of a posterior subglottic ? pedunculated lesion that moved into and out of the trachea upon inspiration. Suggest ENT consult for further laryngeal assessment, assessment, intervention (if indicated)

## 2020-02-11 NOTE — SWALLOW FEES ASSESSMENT ADULT - ASPIRATION BEFORE THE SWALLOW - SILENT
Upon cued cough there was expectoration of material from the trachea that was then noted along the vocal folds and interarytenoid space

## 2020-02-11 NOTE — SWALLOW FEES ASSESSMENT ADULT - ORAL PHASE COMMENTS
Maximal spillover to the level of the valleculae Maximal spillover to the level of the valleculae and over the laryngeal surface of the  epiglottis. Maximal spillover to the level of the valleculae on initial trials. There was evidence of decompensation of swallow function as testing progressed. This ultimately resulted in at least a moderate degree of silent laryngeal penetration to at least the level of the vocal folds. Aspiration was suspected given the depth and that fact that the folds were not completed adducted.

## 2020-02-11 NOTE — PROVIDER CONTACT NOTE (OTHER) - ACTION/TREATMENT ORDERED:
LAY Trejo at bedside for assessment, neurosurgery notified. LAY Trejo at bedside to flush EVD as per neurosurgery.

## 2020-02-11 NOTE — SWALLOW FEES ASSESSMENT ADULT - LARYNGEAL PENETRATION DURING SWALLOW - COUGH
residue noted along the laryngeal surface of the epiglottis, ventricular folds, a-e folds, arytenoids,  and in the interarytenoid space/Mild

## 2020-02-11 NOTE — PROGRESS NOTE ADULT - SUBJECTIVE AND OBJECTIVE BOX
Patient seen and examined at bedside.    --Anticoagulation--    T(C): 37.3 (02-11-20 @ 07:00), Max: 37.6 (02-10-20 @ 11:00)  HR: 83 (02-11-20 @ 08:00) (74 - 101)  BP: 136/96 (02-11-20 @ 08:00) (122/100 - 160/84)  RR: 18 (02-11-20 @ 08:00) (15 - 26)  SpO2: 100% (02-11-20 @ 08:00) (97% - 100%)  Wt(kg): --    Exam:  AOX3, EO spont, pupils 4 mm R b/l,  mild left facial, FC,   R 5/5, L 4/5

## 2020-02-11 NOTE — SWALLOW FEES ASSESSMENT ADULT - PRELIMINARY ENDOSCOPIC EXAMINATIONS
Interarytenoid/Arytenoid edema/Interarytenoid/post-commissure edema/Interarytenoid/Arytenoid erythema

## 2020-02-11 NOTE — SWALLOW FEES ASSESSMENT ADULT - MODE OF PRESENTATION
spoon/fed by clinician/straw positioned in a chin tuck and L head rotation/spoon/fed by clinician/straw spoon/fed by clinician/Positioned in a chin tuck and L head rotation

## 2020-02-11 NOTE — CONSULT NOTE ADULT - ASSESSMENT
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 Pt.  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 ENT was called to evaluate for evidence of a posterior subglottic ? pedunculated lesion that moved in and out of  the trachea upon inspiration. 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 Pt.  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  ENT was called to evaluate for abnormal lesion noted during FEEST exam.

## 2020-02-11 NOTE — CHART NOTE - NSCHARTNOTEFT_GEN_A_CORE
Nutrition Follow Up Note  Patient seen for: Nutrition follow up    Pt is a 40 yo F who was found unresponsive on an airplane; taken to [facility] where she was found to have a subarachnoid hemorrhage and subsequently transferred to [facility]. Arrived intubated and sedated. Taken to OR emergently for craniectomy and clipping of right posterior communication artery aneurysm and placement of EVD. Extubated 20. Continues on antibiotics as ordered in context of cellulitis. Pt s/p swallow evaluation on 2/10; recommendations to continue NPO status with non-oral means of nutrition/hydration.     Source: RN, comprehensive chart review, interdisciplinary medical rounds     Diet: NPO with Tube Feeds: Jevity 1.2 at goal rate 50ml/hr x 24 hrs.     EN provision: (per nursing flow sheet);   (): 450ml (thus far; held x 1 hr  pt pulled out NGT)  (2/10): 1200ml (100% of goal)  (): 1200ml (100% of goal)  (): 1200ml (100% of goal)  (): 1200ml (100% of goal)    Stool count: (per nursing flow sheet):   (): x 1 (thus far)  (2/10): x 3  (): x 4    Daily Weight in k.9 (02-04)  % Weight Change: Current wt pending     Pertinent Medications: MEDICATIONS  (STANDING):  chlorhexidine 4% Liquid 1 Application(s) Topical <User Schedule>  enoxaparin Injectable 40 milliGRAM(s) SubCutaneous <User Schedule>  niMODipine Oral Solution 60 milliGRAM(s) Oral every 4 hours  vancomycin  IVPB 1250 milliGRAM(s) IV Intermittent every 12 hours    MEDICATIONS  (PRN):  acetaminophen   Tablet .. 650 milliGRAM(s) Oral every 6 hours PRN Temp greater or equal to 38C (100.4F), Mild Pain (1 - 3)  albuterol/ipratropium for Nebulization. 3 milliLiter(s) Nebulizer every 6 hours PRN Shortness of Breath and/or Wheezing  artificial tears (preservative free) Ophthalmic Solution 1 Drop(s) Both EYES three times a day PRN Dry Eyes  oxyCODONE    IR 5 milliGRAM(s) Oral every 4 hours PRN Moderate Pain (4 - 6)  oxyCODONE    IR 10 milliGRAM(s) Oral every 4 hours PRN Severe Pain (7 - 10)    Pertinent Labs: 02-10 @ 21:06: Na 140, BUN 23, Cr 0.50, <H>, K+ 3.8, Phos 3.4, Mg 2.3, Alk Phos --, ALT/SGPT --, AST/SGOT --, HbA1c --    Finger Sticks:      Skin per nursing documentation: right head surgical incision s/p craniectomy, no pressure injuries  Edema: none noted    Estimated Needs:   [x] no change since previous assessment  [ ] recalculated:     Previous Nutrition Diagnosis: Increased nutrient needs  Nutrition Diagnosis is: Remains appropriate, ongoing with enteral nutrition     Interventions:     Recommendations:   1) Recommend increase EN goal rate: Jevity 1.2 at 70ml/hr x 24 hrs. To provide 1680ml, 2016kcal and 93g protein. Based on dosing wt 59kg, provides: 34kcal/kg and 1.6g protein/kg.   2) Monitor GI tolerance. RD to remain available to adjust EN formulary, volume/rate PRN.   3) Monitor wt trends, nutrition related labs, skin integrity, hydration status, bowel regularity.   4) If PO diet initiated, consider no therapeutic restrictions. Defer consistency to medical team, SLP.      Monitoring and Evaluation:     Continue to monitor Nutritional intake, Tolerance to diet prescription, weights, labs, skin integrity    RD remains available upon request and will follow up per protocol

## 2020-02-11 NOTE — CONSULT NOTE ADULT - PROBLEM SELECTOR RECOMMENDATION 9
- b/l vocal cord granulomas noted (R>L)  - nonobstructive and likely related to recent intubation  - recommend PPI x 1 month  - f/u with ENT in 1 month as outpatient for rescope to ensure resolution  - please call with any questions

## 2020-02-11 NOTE — SWALLOW FEES ASSESSMENT ADULT - COMMENTS
+ KFT in place. Also noted: posterior laryngeal edema and erythema. There was evidence of a posterior subglottic ? pedunculated lesion that moved into and out of the trachea upon inspiration. Suggest ENT consult for further laryngeal assessment, assessment, intervention (if indicated). + minimal pooling of secretions in the pyriform sinuses and vallecular space. + KFT in place. Also noted: posterior laryngeal edema and erythema. Both the vocal folds and the ventricular folds were involved. There was also a region of erythema at the epiglottic petiole. There was evidence of a posterior subglottic ? pedunculated lesion that moved into and out of the trachea upon inspiration. Suggest ENT consult for further laryngeal assessment, assessment, intervention (if indicated). + minimal pooling of secretions in the pyriform sinuses and vallecular space. + KFT in place. Also noted: posterior laryngeal edema and erythema. Both the vocal folds and the ventricular folds were involved (TVC>false). There appear to be wounds in this area, with excess tissue proliferation and surrounding erythema. This affected the ability of the folds to completely adduct in the posterior larynx. Upon cued cough, there was a potion of this tissue that protruded from the immediate subglottic region and valved into the superior a/w. This came off the R VC. There was also a region of erythema at the epiglottic petiole. There was evidence of a posterior subglottic ? pedunculated lesion that moved into and out of the trachea upon inspiration. Suggest ENT consult for further laryngeal assessment, assessment, intervention (if indicated). + minimal pooling of secretions in the pyriform sinuses and vallecular space.

## 2020-02-11 NOTE — PROGRESS NOTE ADULT - ASSESSMENT
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)    NEURO:  q1h neuro checks  CTH in am raise EVD to 20   Stroke code measures: A1c, LDL- as above   Delayed Cerebral Ischemia: TCD- improvings, euvolemia, Nimodipine  Pain control w/ Tylenol prn, Oxy 5/10 prn  OOB with helmet/PT/OT    PULM:  Extubated 2/5  SpO2 > 92%  Incentive spirometry, if able    CARDS:  - 220 mHg  TTE- Nl LV function, no wall motion abnormalities    GASTRO:  TF  Speech/Swallow eval- failed and will return 2/10/20  Bowel regimen loose and D/C'd 2/8/20- last BM - 2/9/20  ---> Stress ulcer prophylaxis:  Not indicated    RENAL:  Goal: Eunatremia  Hypernatremia - Will recheck BMP, and start freewater if hypernatremic    ENDO:  euglycemia    HEME:  monitor H/H  ---> DVT prophylaxis: SCDs, Lovenox 40 sc daily    ID:  MONICA septic  thrombophlebitis- on Vanco hx of allergy to PCN /Cephalosporin   -  Vanco 1250 mg q12h  - LUE doppler- P  Repeat cultures - 2/8 - neg   Check PCR for staph Aureus    Code status:  Full code  Disposition:  ICU    Updated family at bedside.    This patient was at high risk of neurologic deterioration and/or death due to: re-hemorrhage, seizures, DCI.     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)    NEURO:  q1h neuro checks  CTH in am raise EVD to 20   Stroke code measures: A1c, LDL- as above   Delayed Cerebral Ischemia: TCD- improvings, euvolemia, Nimodipine  Pain control w/ Tylenol prn, Oxy 5/10 prn  OOB with helmet/PT/OT    PULM:  Extubated 2/5  SpO2 > 92%  Incentive spirometry, if able    CARDS:  - 220 mHg  TTE- Nl LV function, no wall motion abnormalities    GASTRO:  TF  Speech/Swallow eval- failed and will return 2/10/20  Bowel regimen loose and D/C'd 2/8/20- last BM - 2/9/20  ---> Stress ulcer prophylaxis:  Not indicated    RENAL:  Goal: Eunatremia  Hypernatremia - Free water     ENDO:  euglycemia    HEME:  monitor H/H  ---> DVT prophylaxis: SCDs, Lovenox 40 sc daily    ID:  MONICA septic  thrombophlebitis- on Vanco hx of allergy to PCN /Cephalosporin   -  Vanco 1250 mg q12h  Vancomycin tough tonight   Repeat cultures - 2/8 - neg   Check PCR for staph Aureus    Code status:  Full code  Disposition:  ICU    Updated family at bedside.    This patient was at high risk of neurologic deterioration and/or death due to: re-hemorrhage, seizures, DCI.     Time spent:  45 minutes

## 2020-02-11 NOTE — SWALLOW FEES ASSESSMENT ADULT - SLP GENERAL OBSERVATIONS
Pt asleep upon contacted, alerted to verbal and tactile stimulation and maintained appropriate level of alertness for exam. Remains cognitively impaired, dysarthric, dysphonic, with HP, but improving.

## 2020-02-12 LAB
ANION GAP SERPL CALC-SCNC: 12 MMOL/L — SIGNIFICANT CHANGE UP (ref 5–17)
ANION GAP SERPL CALC-SCNC: 13 MMOL/L — SIGNIFICANT CHANGE UP (ref 5–17)
BUN SERPL-MCNC: 20 MG/DL — SIGNIFICANT CHANGE UP (ref 7–23)
BUN SERPL-MCNC: 23 MG/DL — SIGNIFICANT CHANGE UP (ref 7–23)
CALCIUM SERPL-MCNC: 8.4 MG/DL — SIGNIFICANT CHANGE UP (ref 8.4–10.5)
CALCIUM SERPL-MCNC: 9.1 MG/DL — SIGNIFICANT CHANGE UP (ref 8.4–10.5)
CHLORIDE SERPL-SCNC: 100 MMOL/L — SIGNIFICANT CHANGE UP (ref 96–108)
CHLORIDE SERPL-SCNC: 101 MMOL/L — SIGNIFICANT CHANGE UP (ref 96–108)
CO2 SERPL-SCNC: 24 MMOL/L — SIGNIFICANT CHANGE UP (ref 22–31)
CO2 SERPL-SCNC: 25 MMOL/L — SIGNIFICANT CHANGE UP (ref 22–31)
CREAT SERPL-MCNC: 0.54 MG/DL — SIGNIFICANT CHANGE UP (ref 0.5–1.3)
CREAT SERPL-MCNC: 0.58 MG/DL — SIGNIFICANT CHANGE UP (ref 0.5–1.3)
GLUCOSE SERPL-MCNC: 125 MG/DL — HIGH (ref 70–99)
GLUCOSE SERPL-MCNC: 128 MG/DL — HIGH (ref 70–99)
MAGNESIUM SERPL-MCNC: 2.3 MG/DL — SIGNIFICANT CHANGE UP (ref 1.6–2.6)
PHOSPHATE SERPL-MCNC: 3 MG/DL — SIGNIFICANT CHANGE UP (ref 2.5–4.5)
POTASSIUM SERPL-MCNC: 4.1 MMOL/L — SIGNIFICANT CHANGE UP (ref 3.5–5.3)
POTASSIUM SERPL-MCNC: 4.2 MMOL/L — SIGNIFICANT CHANGE UP (ref 3.5–5.3)
POTASSIUM SERPL-SCNC: 4.1 MMOL/L — SIGNIFICANT CHANGE UP (ref 3.5–5.3)
POTASSIUM SERPL-SCNC: 4.2 MMOL/L — SIGNIFICANT CHANGE UP (ref 3.5–5.3)
SODIUM SERPL-SCNC: 137 MMOL/L — SIGNIFICANT CHANGE UP (ref 135–145)
SODIUM SERPL-SCNC: 138 MMOL/L — SIGNIFICANT CHANGE UP (ref 135–145)

## 2020-02-12 PROCEDURE — 99291 CRITICAL CARE FIRST HOUR: CPT

## 2020-02-12 PROCEDURE — 70450 CT HEAD/BRAIN W/O DYE: CPT | Mod: 26

## 2020-02-12 PROCEDURE — 93888 INTRACRANIAL LIMITED STUDY: CPT | Mod: 26

## 2020-02-12 PROCEDURE — 71045 X-RAY EXAM CHEST 1 VIEW: CPT | Mod: 26

## 2020-02-12 PROCEDURE — 99292 CRITICAL CARE ADDL 30 MIN: CPT

## 2020-02-12 RX ORDER — SODIUM CHLORIDE 9 MG/ML
500 INJECTION INTRAMUSCULAR; INTRAVENOUS; SUBCUTANEOUS ONCE
Refills: 0 | Status: COMPLETED | OUTPATIENT
Start: 2020-02-12 | End: 2020-02-12

## 2020-02-12 RX ORDER — OXYCODONE HYDROCHLORIDE 5 MG/1
10 TABLET ORAL EVERY 4 HOURS
Refills: 0 | Status: DISCONTINUED | OUTPATIENT
Start: 2020-02-12 | End: 2020-02-16

## 2020-02-12 RX ORDER — POTASSIUM CHLORIDE 20 MEQ
40 PACKET (EA) ORAL ONCE
Refills: 0 | Status: COMPLETED | OUTPATIENT
Start: 2020-02-12 | End: 2020-02-12

## 2020-02-12 RX ORDER — PANTOPRAZOLE SODIUM 20 MG/1
40 TABLET, DELAYED RELEASE ORAL DAILY
Refills: 0 | Status: DISCONTINUED | OUTPATIENT
Start: 2020-02-12 | End: 2020-02-16

## 2020-02-12 RX ORDER — ACETAMINOPHEN 500 MG
650 TABLET ORAL EVERY 6 HOURS
Refills: 0 | Status: DISCONTINUED | OUTPATIENT
Start: 2020-02-12 | End: 2020-02-18

## 2020-02-12 RX ORDER — OXYCODONE HYDROCHLORIDE 5 MG/1
5 TABLET ORAL EVERY 4 HOURS
Refills: 0 | Status: DISCONTINUED | OUTPATIENT
Start: 2020-02-12 | End: 2020-02-19

## 2020-02-12 RX ADMIN — Medication 250 MILLIGRAM(S): at 01:19

## 2020-02-12 RX ADMIN — OXYCODONE HYDROCHLORIDE 5 MILLIGRAM(S): 5 TABLET ORAL at 03:00

## 2020-02-12 RX ADMIN — SODIUM CHLORIDE 2000 MILLILITER(S): 9 INJECTION INTRAMUSCULAR; INTRAVENOUS; SUBCUTANEOUS at 20:10

## 2020-02-12 RX ADMIN — NIMODIPINE 60 MILLIGRAM(S): 60 SOLUTION ORAL at 21:26

## 2020-02-12 RX ADMIN — PANTOPRAZOLE SODIUM 40 MILLIGRAM(S): 20 TABLET, DELAYED RELEASE ORAL at 13:44

## 2020-02-12 RX ADMIN — Medication 650 MILLIGRAM(S): at 22:05

## 2020-02-12 RX ADMIN — Medication 250 MILLIGRAM(S): at 17:18

## 2020-02-12 RX ADMIN — OXYCODONE HYDROCHLORIDE 5 MILLIGRAM(S): 5 TABLET ORAL at 02:30

## 2020-02-12 RX ADMIN — Medication 250 MILLIGRAM(S): at 11:00

## 2020-02-12 RX ADMIN — NIMODIPINE 60 MILLIGRAM(S): 60 SOLUTION ORAL at 13:43

## 2020-02-12 RX ADMIN — OXYCODONE HYDROCHLORIDE 5 MILLIGRAM(S): 5 TABLET ORAL at 16:15

## 2020-02-12 RX ADMIN — Medication 40 MILLIEQUIVALENT(S): at 01:19

## 2020-02-12 RX ADMIN — NIMODIPINE 60 MILLIGRAM(S): 60 SOLUTION ORAL at 13:44

## 2020-02-12 RX ADMIN — ENOXAPARIN SODIUM 40 MILLIGRAM(S): 100 INJECTION SUBCUTANEOUS at 21:26

## 2020-02-12 RX ADMIN — NIMODIPINE 60 MILLIGRAM(S): 60 SOLUTION ORAL at 17:03

## 2020-02-12 RX ADMIN — NIMODIPINE 60 MILLIGRAM(S): 60 SOLUTION ORAL at 01:19

## 2020-02-12 RX ADMIN — Medication 650 MILLIGRAM(S): at 22:35

## 2020-02-12 RX ADMIN — OXYCODONE HYDROCHLORIDE 5 MILLIGRAM(S): 5 TABLET ORAL at 16:45

## 2020-02-12 RX ADMIN — OXYCODONE HYDROCHLORIDE 5 MILLIGRAM(S): 5 TABLET ORAL at 08:10

## 2020-02-12 RX ADMIN — OXYCODONE HYDROCHLORIDE 5 MILLIGRAM(S): 5 TABLET ORAL at 07:39

## 2020-02-12 RX ADMIN — SODIUM CHLORIDE 1000 MILLILITER(S): 9 INJECTION INTRAMUSCULAR; INTRAVENOUS; SUBCUTANEOUS at 14:30

## 2020-02-12 RX ADMIN — CHLORHEXIDINE GLUCONATE 1 APPLICATION(S): 213 SOLUTION TOPICAL at 21:26

## 2020-02-12 RX ADMIN — NIMODIPINE 60 MILLIGRAM(S): 60 SOLUTION ORAL at 05:30

## 2020-02-12 NOTE — PROGRESS NOTE ADULT - ASSESSMENT
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)    NEURO:  q1h neuro checks  EVD stopped working last night , no change in Exam   CT stereo today for cranioplasty planning   Stroke code measures: A1c, LDL- as above   Delayed Cerebral Ischemia: TCD, euvolemia, Nimodipine  Pain control w/ Tylenol prn, Oxy 5/10 prn  OOB with helmet/PT/OT    PULM:  Extubated 2/5  SpO2 > 92%  Incentive spirometry, if able    CARDS:  - 220 mHg  TTE- Nl LV function, no wall motion abnormalities    GASTRO:  TF  Speech/Swallow eval- failed FEEST 2/11   last BM - 2/9/20  ---> Stress ulcer prophylaxis:  Not indicated    RENAL:  Goal: Eunatremia  Hypernatremia - Free water     ENDO:  euglycemia    HEME:  monitor H/H  ---> DVT prophylaxis: SCDs, Lovenox 40 sc daily    ID:  MONICA septic  thrombophlebitis- on Vanco hx of allergy to PCN /Cephalosporin   -  Vanco 1000 mg q12h  Repeat cultures - 2/8 - neg     Code status:  Full code  Disposition:  ICU    Updated family at bedside.    This patient was at high risk of neurologic deterioration and/or death due to: re-hemorrhage, seizures, DCI.     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)    NEURO:  q1h neuro checks  EVD stopped working last night , no change in Exam   Clamp EVD today   CT stereo today for cranioplasty planning   Stroke code measures: A1c, LDL- as above   Delayed Cerebral Ischemia: TCD, euvolemia, Nimodipine  Pain control w/ Tylenol prn, Oxy 5/10 prn  OOB with helmet/PT/OT    PULM:  Extubated 2/5  SpO2 > 92%  Incentive spirometry, if able    CARDS:  - 220 mHg  TTE- Nl LV function, no wall motion abnormalities    GASTRO:  TF  Speech/Swallow eval- failed FEEST 2/11   last BM - 2/9/20  ---> Stress ulcer prophylaxis:  Not indicated    RENAL:  Goal: Eunatremia  Hypernatremia - Free water     ENDO:  euglycemia    HEME:  monitor H/H  ---> DVT prophylaxis: SCDs, Lovenox 40 sc daily    ID:  MONICA septic  thrombophlebitis- on Vanco hx of allergy to PCN /Cephalosporin   -  Vanco 1000 mg q12h  Repeat cultures - 2/8 - neg     Code status:  Full code  Disposition:  ICU    Updated family at bedside.    This patient was at high risk of neurologic deterioration and/or death due to: re-hemorrhage, seizures, DCI.     Time spent:  45 minutes

## 2020-02-12 NOTE — CHART NOTE - NSCHARTNOTEFT_GEN_A_CORE
Transcranial doppler exam #1 (2/4/2020) 1045am  mean velocity  cm/sec                              Left         Right  MIKHAIL                    57           74  MCA                   55           72  PCA                     23,24       x  VERT -Could not insonate, patient is intubated.  BA                                x  Technically difficult study.  Official report to follow.  SSylvia    Transcranial doppler exam #2 (2/6/2020) 11:15am  mean velocity  cm/sec                              Left         Right  MIKHAIL                   74            73  MCA                  78            124  PCA                    23,30        x  Official report to follow.  S.Castro    Transcranial doppler exam #3 (2/7/2020) 1045am  mean velocity  cm/sec                              Left         Right  MIKHAIL                    67           91  MCA                   79           125  PCA                      P2-34      x  Technically difficult study.  Official report to follow.  S.Castro    Transcranial doppler exam #4 (2/8/2020) 1015am  mean velocity  cm/sec                              Left         Right  MIKHAIL                    73           84  MCA                   68           115  PCA                     31,28        x  Official report to follow.  S.Castro    Transcranial doppler exam #5 (2/10/2020) 11am  mean velocity  cm/sec                              Left         Right  MIKHAIL                    68           93  MCA                   71           133  PCA                     29,34        x  Official report to follow.  S.Castro    Transcranial doppler exam #6 (2/11/2020) 11am  mean velocity  cm/sec                              Left         Right  MIKHAIL                    70            85  MCA                   70            95  PCA                     34,24        x  Official report to follow.  S.Castro    Transcranial doppler exam #7 (2/12/2020) 1430pm  mean velocity  cm/sec                              Left         Right  MIKHAIL                     61           75  MCA                   65             98  PCA                     P2-28        x  Official report to follow.  Karen

## 2020-02-12 NOTE — PROVIDER CONTACT NOTE (OTHER) - ASSESSMENT
EVD is not patent. EVD was not patent on 2/11 2200, LAY Trejo flushed EVD as per neurosurgery. EVD was patent afterward, waveform was dampened and ICPs were less then 15. At 0200, EVD is no longer patent, waveform is dampened. Patient neuro assessment is unchanged.

## 2020-02-12 NOTE — PROGRESS NOTE ADULT - SUBJECTIVE AND OBJECTIVE BOX
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 She arrived intubated and sedated. 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     ADMISSION SCORES:  GCS: 3T  Hunt-Gonzalez: 5  modified Couch: 3  ICH score: 2    HOSPITAL COURSE:  2/3/20- Clipping R PCOMM              EVD placed   2/5/20 - Extubated overnight  2/6/20- 500cc Bolus  2/7/20 - Following commands on all extremities  2/8/20- R  cm2 from 129  2/11: failed FEEST   overnight EVD stopped working, no change in neuro exam        ICU Vital Signs Last 24 Hrs  T(C): 37.1 (12 Feb 2020 05:00), Max: 37.6 (12 Feb 2020 03:00)  T(F): 98.8 (12 Feb 2020 05:00), Max: 99.7 (12 Feb 2020 03:00)  HR: 96 (12 Feb 2020 06:00) (72 - 112)  BP: 134/85 (12 Feb 2020 06:00) (130/83 - 159/96)  BP(mean): 99 (12 Feb 2020 06:00) (90 - 122)  RR: 18 (12 Feb 2020 06:00) (15 - 25)  SpO2: 100% (12 Feb 2020 06:00) (100% - 100%)      02-11-20 @ 07:01  -  02-12-20 @ 07:00  --------------------------------------------------------  IN: 1910 mL / OUT: 1303 mL / NET: 607 mL        acetaminophen   Tablet .. 650 milliGRAM(s) Oral every 6 hours PRN  albuterol/ipratropium for Nebulization. 3 milliLiter(s) Nebulizer every 6 hours PRN  artificial tears (preservative free) Ophthalmic Solution 1 Drop(s) Both EYES three times a day PRN  chlorhexidine 4% Liquid 1 Application(s) Topical <User Schedule>  enoxaparin Injectable 40 milliGRAM(s) SubCutaneous <User Schedule>  niMODipine Oral Solution 60 milliGRAM(s) Oral every 4 hours  ondansetron Injectable 4 milliGRAM(s) IV Push every 8 hours PRN  oxyCODONE    IR 5 milliGRAM(s) Oral every 4 hours PRN  oxyCODONE    IR 10 milliGRAM(s) Oral every 4 hours PRN  vancomycin  IVPB 1000 milliGRAM(s) IV Intermittent every 8 hours                            11.4   10.99 )-----------( 311      ( 10 Feb 2020 21:06 )             35.4     02-12    138  |  100  |  23  ----------------------------<  125<H>  4.2   |  25  |  0.58    Ca    9.1      12 Feb 2020 04:04  Phos  3.7     02-11  Mg     2.3     02-11            EXAMINATION:  General:  Calm.   HEENT:  MMM. EO spontaneously.   Neuro: A&O x3. L facial. EOMI. Dysarthria - improved.   RUE  5/5  LUE drift; LUE 5/5 flexion, 4+/5 extension, 4+/5 HG  RLE  5/5  LLE - 4+/5 hip flexion, 4+/5 knee extension, 5/5 DF and PF  Cards:  RRR.   Respiratory: Clear  Abdomen:  soft, +BS  Extremities:  no edema. LUE - erythema, swelling. Cord palpated.  RUE - erythema - marked.

## 2020-02-12 NOTE — PROGRESS NOTE ADULT - SUBJECTIVE AND OBJECTIVE BOX
Patient seen and examined at bedside.    --Anticoagulation--    T(C): 37.2 (02-11-20 @ 23:00), Max: 37.3 (02-11-20 @ 07:00)  HR: 95 (02-12-20 @ 02:00) (72 - 100)  BP: 159/96 (02-12-20 @ 02:00) (128/89 - 159/96)  RR: 20 (02-12-20 @ 02:00) (15 - 26)  SpO2: 100% (02-12-20 @ 02:00) (97% - 100%)  Wt(kg): --    Exam:  AOX3, EO spont, pupils 4 mm R b/l,  mild left facial, FC,   R 5/5, L 4/5

## 2020-02-12 NOTE — PROVIDER CONTACT NOTE (OTHER) - ACTION/TREATMENT ORDERED:
Neurosurgery notified. No interventions at this time, continue to assess output and ICPs hourly. As per ICU team patient will have a CT scan in the morning.

## 2020-02-12 NOTE — PROGRESS NOTE ADULT - SUBJECTIVE AND OBJECTIVE BOX
EVD clamp trial    Awakens to voice, oriented fully, left facial, left side 4/5, right side full strength

## 2020-02-12 NOTE — PROGRESS NOTE ADULT - ASSESSMENT
HH4 mF3 subarachnoid hemorrhage, post-bleed day 9  - EVD clamp trial, CTH in am, possible DC drain  - TCDs, euvolemia, nimodipine  - downtrending Na, goal 135-145  - -220mmHg  - Mobilize  - Pain control  - Vanco for LUE cellulitis, started on 2/7-2/8 overnight, complete 5day course on 2/13

## 2020-02-13 LAB
APPEARANCE UR: CLEAR — SIGNIFICANT CHANGE UP
BILIRUB UR-MCNC: NEGATIVE — SIGNIFICANT CHANGE UP
COLOR SPEC: YELLOW — SIGNIFICANT CHANGE UP
CULTURE RESULTS: SIGNIFICANT CHANGE UP
CULTURE RESULTS: SIGNIFICANT CHANGE UP
DIFF PNL FLD: NEGATIVE — SIGNIFICANT CHANGE UP
GLUCOSE UR QL: NEGATIVE — SIGNIFICANT CHANGE UP
KETONES UR-MCNC: NEGATIVE — SIGNIFICANT CHANGE UP
LEUKOCYTE ESTERASE UR-ACNC: ABNORMAL
NITRITE UR-MCNC: NEGATIVE — SIGNIFICANT CHANGE UP
PH UR: 6.5 — SIGNIFICANT CHANGE UP (ref 5–8)
PROT UR-MCNC: ABNORMAL
SP GR SPEC: 1.03 — HIGH (ref 1.01–1.02)
SPECIMEN SOURCE: SIGNIFICANT CHANGE UP
SPECIMEN SOURCE: SIGNIFICANT CHANGE UP
UROBILINOGEN FLD QL: NEGATIVE — SIGNIFICANT CHANGE UP
VANCOMYCIN TROUGH SERPL-MCNC: 11.4 UG/ML — SIGNIFICANT CHANGE UP (ref 10–20)

## 2020-02-13 PROCEDURE — 99292 CRITICAL CARE ADDL 30 MIN: CPT

## 2020-02-13 PROCEDURE — 71045 X-RAY EXAM CHEST 1 VIEW: CPT | Mod: 26

## 2020-02-13 PROCEDURE — 93888 INTRACRANIAL LIMITED STUDY: CPT | Mod: 26

## 2020-02-13 PROCEDURE — 99291 CRITICAL CARE FIRST HOUR: CPT

## 2020-02-13 RX ORDER — ACETAMINOPHEN 500 MG
1000 TABLET ORAL ONCE
Refills: 0 | Status: COMPLETED | OUTPATIENT
Start: 2020-02-13 | End: 2020-02-13

## 2020-02-13 RX ADMIN — Medication 250 MILLIGRAM(S): at 01:48

## 2020-02-13 RX ADMIN — PANTOPRAZOLE SODIUM 40 MILLIGRAM(S): 20 TABLET, DELAYED RELEASE ORAL at 14:26

## 2020-02-13 RX ADMIN — ENOXAPARIN SODIUM 40 MILLIGRAM(S): 100 INJECTION SUBCUTANEOUS at 22:12

## 2020-02-13 RX ADMIN — NIMODIPINE 60 MILLIGRAM(S): 60 SOLUTION ORAL at 10:30

## 2020-02-13 RX ADMIN — NIMODIPINE 60 MILLIGRAM(S): 60 SOLUTION ORAL at 18:18

## 2020-02-13 RX ADMIN — CHLORHEXIDINE GLUCONATE 1 APPLICATION(S): 213 SOLUTION TOPICAL at 22:11

## 2020-02-13 RX ADMIN — Medication 250 MILLIGRAM(S): at 17:14

## 2020-02-13 RX ADMIN — NIMODIPINE 60 MILLIGRAM(S): 60 SOLUTION ORAL at 01:48

## 2020-02-13 RX ADMIN — Medication 250 MILLIGRAM(S): at 10:30

## 2020-02-13 RX ADMIN — Medication 650 MILLIGRAM(S): at 19:40

## 2020-02-13 RX ADMIN — Medication 1000 MILLIGRAM(S): at 12:00

## 2020-02-13 RX ADMIN — NIMODIPINE 60 MILLIGRAM(S): 60 SOLUTION ORAL at 14:26

## 2020-02-13 RX ADMIN — Medication 650 MILLIGRAM(S): at 05:00

## 2020-02-13 RX ADMIN — Medication 650 MILLIGRAM(S): at 19:10

## 2020-02-13 RX ADMIN — NIMODIPINE 60 MILLIGRAM(S): 60 SOLUTION ORAL at 22:12

## 2020-02-13 RX ADMIN — Medication 650 MILLIGRAM(S): at 05:30

## 2020-02-13 RX ADMIN — NIMODIPINE 60 MILLIGRAM(S): 60 SOLUTION ORAL at 05:01

## 2020-02-13 RX ADMIN — Medication 400 MILLIGRAM(S): at 11:30

## 2020-02-13 NOTE — PROGRESS NOTE ADULT - ASSESSMENT
The patient is a 55y Female complaining of abdominal pain. HH4 mF3 subarachnoid hemorrhage, post-bleed day 10  - EVD clamp trial, CTH in am, possible DC drain  - TCDs, euvolemia, nimodipine  - monitor Na for goal 135-145  - -220mmHg  - Mobilize  - Pain control  - Vanco for LUE cellulitis, started on 2/7-2/8 overnight, complete 5day course on 2/13

## 2020-02-13 NOTE — PROGRESS NOTE ADULT - SUBJECTIVE AND OBJECTIVE BOX
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 She arrived intubated and sedated. After coming to [facility] ED, she underwent repeat imaging and was taken to the OR emergently for craniectomy and clipping of right posterior communicating artery aneurysm.     ADMISSION SCORES:  GCS: 3T  Hunt-Gonzalez: 5  modified Couch: 3  ICH score: 2    HOSPITAL COURSE:  2/3/20- Clipping R PCOMM              EVD placed   2/5/20 - Extubated overnight  2/6/20- 500cc Bolus  2/7/20 - Following commands on all extremities  2/8/20- R  cm2 from 129  2/11: failed FEEST   2/11: overnight EVD stopped working, no change in neuro exam        ICU Vital Signs Last 24 Hrs  T(C): 37.2 (13 Feb 2020 06:00), Max: 39.1 (12 Feb 2020 22:00)  T(F): 99 (13 Feb 2020 06:00), Max: 102.4 (12 Feb 2020 22:00)  HR: 76 (13 Feb 2020 07:00) (76 - 122)  BP: 138/78 (13 Feb 2020 07:00) (107/82 - 184/139)  BP(mean): 93 (13 Feb 2020 07:00) (82 - 153)  ABP: --  ABP(mean): --  RR: 20 (13 Feb 2020 07:00) (17 - 28)  SpO2: 100% (13 Feb 2020 07:00) (98% - 100%)      02-12-20 @ 07:01  -  02-13-20 @ 07:00  --------------------------------------------------------  IN: 2560 mL / OUT: 1300 mL / NET: 1260 mL        acetaminophen    Suspension .. 650 milliGRAM(s) Oral every 6 hours PRN  albuterol/ipratropium for Nebulization. 3 milliLiter(s) Nebulizer every 6 hours PRN  artificial tears (preservative free) Ophthalmic Solution 1 Drop(s) Both EYES three times a day PRN  chlorhexidine 4% Liquid 1 Application(s) Topical <User Schedule>  enoxaparin Injectable 40 milliGRAM(s) SubCutaneous <User Schedule>  niMODipine Oral Solution 60 milliGRAM(s) Oral every 4 hours  ondansetron Injectable 4 milliGRAM(s) IV Push every 8 hours PRN  oxyCODONE    IR 5 milliGRAM(s) Oral every 4 hours PRN  oxyCODONE    IR 10 milliGRAM(s) Oral every 4 hours PRN  pantoprazole  Injectable 40 milliGRAM(s) IV Push daily  vancomycin  IVPB 1000 milliGRAM(s) IV Intermittent every 8 hours        02-12    137  |  101  |  20  ----------------------------<  128<H>  4.1   |  24  |  0.54    Ca    8.4      12 Feb 2020 21:15  Phos  3.0     02-12  Mg     2.3     02-12      EXAMINATION:  General:  Calm.   HEENT:  MMM. EO spontaneously.   Neuro: A&O x3. L facial. EOMI. Dysarthria - improved.   RUE  5/5  LUE drift; LUE 5/5 flexion, 4+/5 extension, 4+/5 HG  RLE  5/5  LLE - 4+/5 hip flexion, 4+/5 knee extension, 5/5 DF and PF  Cards:  RRR.   Respiratory: Clear  Abdomen:  soft, +BS  Extremities:  no edema. LUE - erythema, swelling. Cord palpated.  RUE - erythema - marked. 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. She arrived intubated and sedated. After coming to [facility] ED, she underwent repeat imaging and was taken to the OR emergently for craniectomy and clipping of right posterior communicating artery aneurysm.     ADMISSION SCORES:  GCS: 3T  Hunt-Gonzalez: 5  modified Couch: 3  ICH score: 2    HOSPITAL COURSE:  2/3/20- Clipping R PCOMM              EVD placed   2/5/20 - Extubated overnight  2/6/20- 500cc Bolus  2/7/20 - Following commands on all extremities  2/8/20- R  cm2 from 129  2/11: failed FEEST   2/11: overnight EVD stopped working, no change in neuro exam        ICU Vital Signs Last 24 Hrs  T(C): 37.2 (13 Feb 2020 06:00), Max: 39.1 (12 Feb 2020 22:00)  T(F): 99 (13 Feb 2020 06:00), Max: 102.4 (12 Feb 2020 22:00)  HR: 76 (13 Feb 2020 07:00) (76 - 122)  BP: 138/78 (13 Feb 2020 07:00) (107/82 - 184/139)  BP(mean): 93 (13 Feb 2020 07:00) (82 - 153)  ABP: --  ABP(mean): --  RR: 20 (13 Feb 2020 07:00) (17 - 28)  SpO2: 100% (13 Feb 2020 07:00) (98% - 100%)      02-12-20 @ 07:01  -  02-13-20 @ 07:00  --------------------------------------------------------  IN: 2560 mL / OUT: 1300 mL / NET: 1260 mL    IMAGING:    CT Scan 2/12:  Slight Increase of pericatheter hemorrhage measuring up to 2.0 x 1.3 cm in the region of the genu of the right internal capsule.    Slightly increased, but still small hemorrhage layering in the occipital horns.    New mild dilatation of the right temporal horn. No midline shift.    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      acetaminophen    Suspension .. 650 milliGRAM(s) Oral every 6 hours PRN  albuterol/ipratropium for Nebulization. 3 milliLiter(s) Nebulizer every 6 hours PRN  artificial tears (preservative free) Ophthalmic Solution 1 Drop(s) Both EYES three times a day PRN  chlorhexidine 4% Liquid 1 Application(s) Topical <User Schedule>  enoxaparin Injectable 40 milliGRAM(s) SubCutaneous <User Schedule>  niMODipine Oral Solution 60 milliGRAM(s) Oral every 4 hours  ondansetron Injectable 4 milliGRAM(s) IV Push every 8 hours PRN  oxyCODONE    IR 5 milliGRAM(s) Oral every 4 hours PRN  oxyCODONE    IR 10 milliGRAM(s) Oral every 4 hours PRN  pantoprazole  Injectable 40 milliGRAM(s) IV Push daily  vancomycin  IVPB 1000 milliGRAM(s) IV Intermittent every 8 hours        02-12    137  |  101  |  20  ----------------------------<  128<H>  4.1   |  24  |  0.54    Ca    8.4      12 Feb 2020 21:15  Phos  3.0     02-12  Mg     2.3     02-12      EXAMINATION:  General:  Calm.   HEENT:  MMM. EO spontaneously.   Neuro: A&O x3. L facial. EOMI. Dysarthria - improved.   RUE  5/5  LUE drift; LUE 5/5 flexion, 4+/5 extension, 4+/5 HG  RLE  5/5  LLE - 4+/5 hip flexion, 4+/5 knee extension, 5/5 DF and PF  Cards:  RRR.   Respiratory: Clear  Abdomen:  soft, +BS  Extremities:  no edema. LUE - erythema, swelling. Cord palpated.  RUE - erythema - marked. 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 She arrived intubated and sedated. 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     ADMISSION SCORES:  GCS: 3T  Hunt-Gonzalez: 5  modified Couch: 3  ICH score: 2    HOSPITAL COURSE:  2/3- Clipping R PCOMM              EVD placed   2/5- Extubated overnight  2/6- 500cc Bolus  2/7- Following commands on all extremities  2/8- R  cm2 from 129  2/11- failed FEEST   2/11- Superficial thrombophlebitis seen on doppler;   2/11- overnight EVD stopped working, no change in neuro exam    2/12- EVD clamped; no change in neuro exam  2/12- Febrile overnight (TMax 39.1); CXR ordered; Cultures Ordered      ICU Vital Signs Last 24 Hrs  T(C): 37.2 (13 Feb 2020 06:00), Max: 39.1 (12 Feb 2020 22:00)  T(F): 99 (13 Feb 2020 06:00), Max: 102.4 (12 Feb 2020 22:00)  HR: 76 (13 Feb 2020 07:00) (76 - 122)  BP: 138/78 (13 Feb 2020 07:00) (107/82 - 184/139)  BP(mean): 93 (13 Feb 2020 07:00) (82 - 153)  ABP: --  ABP(mean): --  RR: 20 (13 Feb 2020 07:00) (17 - 28)  SpO2: 100% (13 Feb 2020 07:00) (98% - 100%)      02-12-20 @ 07:01  -  02-13-20 @ 07:00  --------------------------------------------------------  IN: 2560 mL / OUT: 1300 mL / NET: 1260 mL      IMAGING:    CT Scan 2/12:     Slight Increase of pericatheter hemorrhage measuring up to 2.0 x 1.3 cm in the region of the genu of the right internal capsule.    Slightly increased, but still small hemorrhage layering in the occipital horns.    New mild dilatation of the right temporal horn. No midline shift.    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    Upper Extremity Doppler 2/11:     No evidence of deep venous thrombosis in either upper extremity.     Superficial thrombophlebitis affects the right basilic vein in the forearm   and the right cephalic vein in the forearm and upper arm.     Superficial thrombophlebitis affects the left cephalic vein in the upper arm.       MEDICATIONS   Acetominophen Suspension .. 650 milliGRAM(s) Oral every 6 hours PRN  albuterol/ipratropium for Nebulization. 3 milliLiter(s) Nebulizer every 6 hours PRN  artificial tears (preservative free) Ophthalmic Solution 1 Drop(s) Both EYES three times a day PRN  chlorhexidine 4% Liquid 1 Application(s) Topical <User Schedule>  enoxaparin Injectable 40 milliGRAM(s) SubCutaneous <User Schedule>  niMODipine Oral Solution 60 milliGRAM(s) Oral every 4 hours  ondansetron Injectable 4 milliGRAM(s) IV Push every 8 hours PRN  oxyCODONE    IR 5 milliGRAM(s) Oral every 4 hours PRN  oxyCODONE    IR 10 milliGRAM(s) Oral every 4 hours PRN  pantoprazole  Injectable 40 milliGRAM(s) IV Push daily  vancomycin  IVPB 1000 milliGRAM(s) IV Intermittent every 8 hours    LABS    02-12    137  |  101  |  20  ----------------------------<  128<H>  4.1   |  24  |  0.54    Ca    8.4      12 Feb 2020 21:15  Phos  3.0     02-12  Mg     2.3     02-12    Vancomycin Trough    2/10: <4.0 (L)  2/11: 5.1      EXAMINATION:  General:  Calm.   HEENT:  MMM. EO spontaneously.   Neuro: A&O x3. L facial. EOMI. Dysarthria - improved.   RUE  5/5  LUE drift; LUE 5/5 flexion, 4+/5 extension, 4+/5 HG  RLE  5/5  LLE - 4+/5 hip flexion, 4+/5 knee extension, 5/5 DF and PF  Cards:  RRR.   Respiratory: Clear  Abdomen:  soft, +BS  Extremities:  no edema. LUE - erythema, swelling. Cord palpated.  RUE - erythema - marked. 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 She arrived intubated and sedated. 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     ADMISSION SCORES:  GCS: 3T  Hunt-Gonzalez: 5  modified Couch: 3  ICH score: 2    HOSPITAL COURSE:  2/3- Clipping R PCOMM              EVD placed   2/5- Extubated overnight  2/6- 500cc Bolus  2/7- Following commands on all extremities  2/8- R  cm2 from 129  2/11- failed FEEST   2/11- Superficial thrombophlebitis seen on doppler;   2/11- overnight EVD stopped working, no change in neuro exam    2/12- EVD clamped; no change in neuro exam  2/12- Febrile overnight (TMax 39.1); CXR normal (pending official read); Cultures Ordered      ICU Vital Signs Last 24 Hrs  T(C): 37.2 (13 Feb 2020 06:00), Max: 39.1 (12 Feb 2020 22:00)  T(F): 99 (13 Feb 2020 06:00), Max: 102.4 (12 Feb 2020 22:00)  HR: 76 (13 Feb 2020 07:00) (76 - 122)  BP: 138/78 (13 Feb 2020 07:00) (107/82 - 184/139)  BP(mean): 93 (13 Feb 2020 07:00) (82 - 153)  ABP: --  ABP(mean): --  RR: 20 (13 Feb 2020 07:00) (17 - 28)  SpO2: 100% (13 Feb 2020 07:00) (98% - 100%)      02-12-20 @ 07:01  -  02-13-20 @ 07:00  --------------------------------------------------------  IN: 2560 mL / OUT: 1300 mL / NET: 1260 mL      IMAGING:    CT Scan 2/12:     Slight Increase of pericatheter hemorrhage measuring up to 2.0 x 1.3 cm in the region of the genu of the right internal capsule.    Slightly increased, but still small hemorrhage layering in the occipital horns.    New mild dilatation of the right temporal horn. No midline shift.    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    Upper Extremity Doppler 2/11:     No evidence of deep venous thrombosis in either upper extremity.     Superficial thrombophlebitis affects the right basilic vein in the forearm   and the right cephalic vein in the forearm and upper arm.     Superficial thrombophlebitis affects the left cephalic vein in the upper arm.       MEDICATIONS   Acetominophen Suspension .. 650 milliGRAM(s) Oral every 6 hours PRN  albuterol/ipratropium for Nebulization. 3 milliLiter(s) Nebulizer every 6 hours PRN  artificial tears (preservative free) Ophthalmic Solution 1 Drop(s) Both EYES three times a day PRN  chlorhexidine 4% Liquid 1 Application(s) Topical <User Schedule>  enoxaparin Injectable 40 milliGRAM(s) SubCutaneous <User Schedule>  niMODipine Oral Solution 60 milliGRAM(s) Oral every 4 hours  ondansetron Injectable 4 milliGRAM(s) IV Push every 8 hours PRN  oxyCODONE    IR 5 milliGRAM(s) Oral every 4 hours PRN  oxyCODONE    IR 10 milliGRAM(s) Oral every 4 hours PRN  pantoprazole  Injectable 40 milliGRAM(s) IV Push daily  vancomycin  IVPB 1000 milliGRAM(s) IV Intermittent every 8 hours    LABS    02-12    137  |  101  |  20  ----------------------------<  128<H>  4.1   |  24  |  0.54    Ca    8.4      12 Feb 2020 21:15  Phos  3.0     02-12  Mg     2.3     02-12    Vancomycin Trough    2/10: <4.0 (L)  2/11: 5.1      EXAMINATION:  General:  Calm.   HEENT:  MMM. EO spontaneously.   Neuro: A&O x3. L facial. EOMI. Dysarthria - improved.   RUE  5/5  LUE drift; LUE 5/5 flexion, 4+/5 extension, 4+/5 HG  RLE  5/5  LLE - 4+/5 hip flexion, 4+/5 knee extension, 5/5 DF and PF  Cards:  RRR.   Respiratory: Clear  Abdomen:  soft, +BS  Extremities:  no edema. LUE - erythema, swelling. Cord palpated.  RUE - erythema - marked. 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 She arrived intubated and sedated. After coming to [facility] ED, she underwent repeat imaging and was taken to the OR emergently for craniectomy and clipping of right posterior communicating artery aneurysm.     ADMISSION SCORES:  GCS: 3T  Hunt-Gonzalez: 5  modified Couch: 3  ICH score: 2    HOSPITAL COURSE:  2/3- Clipping R PCOMM              EVD placed   2/5- Extubated overnight  2/6- 500cc Bolus  2/7- Following commands on all extremities  2/8- R  cm2 from 129  2/11- failed FEEST   2/11- Superficial thrombophlebitis seen on doppler;   2/11- overnight EVD stopped working, no change in neuro exam    2/12- EVD clamped; no change in neuro exam  2/12- Febrile overnight (TMax 39.1); CXR shows clear lungs; Blood cultures pending      ICU Vital Signs Last 24 Hrs  T(C): 37.2 (13 Feb 2020 06:00), Max: 39.1 (12 Feb 2020 22:00)  T(F): 99 (13 Feb 2020 06:00), Max: 102.4 (12 Feb 2020 22:00)  HR: 76 (13 Feb 2020 07:00) (76 - 122)  BP: 138/78 (13 Feb 2020 07:00) (107/82 - 184/139)  BP(mean): 93 (13 Feb 2020 07:00) (82 - 153)  ABP: --  ABP(mean): --  RR: 20 (13 Feb 2020 07:00) (17 - 28)  SpO2: 100% (13 Feb 2020 07:00) (98% - 100%)      02-12-20 @ 07:01  -  02-13-20 @ 07:00  --------------------------------------------------------  IN: 2560 mL / OUT: 1300 mL / NET: 1260 mL      IMAGING:    X-Ray 2/12:    IMPRESSION:     Enteric tube with tip in the stomach. Right upper extremity PICC with tip in   the distal SVC.     Small rounded nodular opacity in the right lower lung, not seen on prior   radiographs, is likely external to patient. No pleural effusion or   pneumothorax.     Heart size is normal.     CT Scan 2/12:     Slight Increase of pericatheter hemorrhage measuring up to 2.0 x 1.3 cm in the region of the genu of the right internal capsule.    Slightly increased, but still small hemorrhage layering in the occipital horns.    New mild dilatation of the right temporal horn. No midline shift.    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    Upper Extremity Doppler 2/11:     No evidence of deep venous thrombosis in either upper extremity.     Superficial thrombophlebitis affects the right basilic vein in the forearm   and the right cephalic vein in the forearm and upper arm.     Superficial thrombophlebitis affects the left cephalic vein in the upper arm.       MEDICATIONS   Acetominophen Suspension .. 650 milliGRAM(s) Oral every 6 hours PRN  albuterol/ipratropium for Nebulization. 3 milliLiter(s) Nebulizer every 6 hours PRN  artificial tears (preservative free) Ophthalmic Solution 1 Drop(s) Both EYES three times a day PRN  chlorhexidine 4% Liquid 1 Application(s) Topical <User Schedule>  enoxaparin Injectable 40 milliGRAM(s) SubCutaneous <User Schedule>  niMODipine Oral Solution 60 milliGRAM(s) Oral every 4 hours  ondansetron Injectable 4 milliGRAM(s) IV Push every 8 hours PRN  oxyCODONE    IR 5 milliGRAM(s) Oral every 4 hours PRN  oxyCODONE    IR 10 milliGRAM(s) Oral every 4 hours PRN  pantoprazole  Injectable 40 milliGRAM(s) IV Push daily  vancomycin  IVPB 1000 milliGRAM(s) IV Intermittent every 8 hours    LABS    02-12    137  |  101  |  20  ----------------------------<  128<H>  4.1   |  24  |  0.54    Ca    8.4      12 Feb 2020 21:15  Phos  3.0     02-12  Mg     2.3     02-12    Vancomycin Trough    2/10: <4.0 (L)  2/11: 5.1 (L)  2/13: 11.4      EXAMINATION:  General:  Calm.   HEENT:  MMM. EO spontaneously.   Neuro: A&O x3. L facial droop. EOMI. Dysarthria - improving.   RUE  5/5  LUE drift; LUE 5/5 flexion, 4+/5 extension, 4+/5 HG with incremental improvement.  RLE  5/5  LLE - 4+/5 hip flexion, 4+/5 knee extension, 5/5 DF and PF  Cards:  Normal S1/S2 with rate and rhythm. No murmur.   Respiratory: Clear lung fields BL  Abdomen:  soft, +BS  Extremities:  no edema. LUE - erythema, swelling. Cord palpated.  RUE - erythema - marked. 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 She arrived intubated and sedated. After coming to [facility] ED, she underwent repeat imaging and was taken to the OR emergently for craniectomy and clipping of right posterior communicating artery aneurysm.     ADMISSION SCORES:  GCS: 3T  Hunt-Gonzalez: 5  modified Couch: 3  ICH score: 2    HOSPITAL COURSE:  2/3- Clipping R PCOMM              EVD placed   2/5- Extubated overnight  2/6- 500cc Bolus  2/7- Following commands on all extremities  2/8- R  cm2 from 129  2/11- failed FEEST   2/11- Superficial thrombophlebitis seen on doppler;   2/11- overnight EVD stopped working, no change in neuro exam    2/12- EVD clamped; no change in neuro exam  2/12- Febrile overnight (TMax 39.1); CXR shows clear lungs; Blood cultures pending  2/13- EVD clamped; stable neuro exam      ICU Vital Signs Last 24 Hrs  T(C): 37.2 (13 Feb 2020 06:00), Max: 39.1 (12 Feb 2020 22:00)  T(F): 99 (13 Feb 2020 06:00), Max: 102.4 (12 Feb 2020 22:00)  HR: 76 (13 Feb 2020 07:00) (76 - 122)  BP: 138/78 (13 Feb 2020 07:00) (107/82 - 184/139)  BP(mean): 93 (13 Feb 2020 07:00) (82 - 153)  ABP: --  ABP(mean): --  RR: 20 (13 Feb 2020 07:00) (17 - 28)  SpO2: 100% (13 Feb 2020 07:00) (98% - 100%)      02-12-20 @ 07:01  -  02-13-20 @ 07:00  --------------------------------------------------------  IN: 2560 mL / OUT: 1300 mL / NET: 1260 mL      IMAGING:    X-Ray 2/12:    IMPRESSION:     Enteric tube with tip in the stomach. Right upper extremity PICC with tip in   the distal SVC.     Small rounded nodular opacity in the right lower lung, not seen on prior   radiographs, is likely external to patient. No pleural effusion or   pneumothorax.     Heart size is normal.     CT Scan 2/12:     Slight Increase of pericatheter hemorrhage measuring up to 2.0 x 1.3 cm in the region of the genu of the right internal capsule.    Slightly increased, but still small hemorrhage layering in the occipital horns.    New mild dilatation of the right temporal horn. No midline shift.    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    Upper Extremity Doppler 2/11:     No evidence of deep venous thrombosis in either upper extremity.     Superficial thrombophlebitis affects the right basilic vein in the forearm   and the right cephalic vein in the forearm and upper arm.     Superficial thrombophlebitis affects the left cephalic vein in the upper arm.       MEDICATIONS   Acetominophen Suspension .. 650 milliGRAM(s) Oral every 6 hours PRN  albuterol/ipratropium for Nebulization. 3 milliLiter(s) Nebulizer every 6 hours PRN  artificial tears (preservative free) Ophthalmic Solution 1 Drop(s) Both EYES three times a day PRN  chlorhexidine 4% Liquid 1 Application(s) Topical <User Schedule>  enoxaparin Injectable 40 milliGRAM(s) SubCutaneous <User Schedule>  niMODipine Oral Solution 60 milliGRAM(s) Oral every 4 hours  ondansetron Injectable 4 milliGRAM(s) IV Push every 8 hours PRN  oxyCODONE    IR 5 milliGRAM(s) Oral every 4 hours PRN  oxyCODONE    IR 10 milliGRAM(s) Oral every 4 hours PRN  pantoprazole  Injectable 40 milliGRAM(s) IV Push daily  vancomycin  IVPB 1000 milliGRAM(s) IV Intermittent every 8 hours    LABS    02-12    137  |  101  |  20  ----------------------------<  128<H>  4.1   |  24  |  0.54    Ca    8.4      12 Feb 2020 21:15  Phos  3.0     02-12  Mg     2.3     02-12    Vancomycin Trough    2/10: <4.0 (L)  2/11: 5.1 (L)  2/13: 11.4      EXAMINATION:  General:  Calm.   HEENT:  MMM. EO spontaneously.   Neuro: A&O x3. L facial droop. EOMI. Dysarthria - improving.   RUE  5/5  LUE drift; LUE 5/5 flexion, 4+/5 extension, 4+/5 HG with incremental improvement.  RLE  5/5  LLE - 4+/5 hip flexion, 4+/5 knee extension, 4+/5 DF and PF  Cards:  Normal S1/S2 with rate and rhythm. No murmur.   Respiratory: Clear lung fields BL  Abdomen:  soft, +BS  Extremities:  no edema. LUE - erythema, swelling. Cord palpated.  RUE - erythema - marked.

## 2020-02-13 NOTE — PROGRESS NOTE ADULT - SUBJECTIVE AND OBJECTIVE BOX
no EVD output in over 24hrs    vitals/labs/meds reviewed  Awakens to voice, oriented fully, left facial, left side 4/5, right side full strength no EVD output in over 24hrs    vitals/labs/meds reviewed  Awakens to voice, oriented x3, but confused on further conversation, left facial, left side 4+/5, right side full strength

## 2020-02-13 NOTE — CHART NOTE - NSCHARTNOTEFT_GEN_A_CORE
Transcranial doppler exam #1 (2/4/2020) 1045am  mean velocity  cm/sec                              Left         Right  MIKHAIL                    57           74  MCA                   55           72  PCA                     23,24       x  VERT -Could not insonate, patient is intubated.  BA                                x  Technically difficult study.  Official report to follow.  S.Castro    Transcranial doppler exam #2 (2/6/2020) 11:15am  mean velocity  cm/sec                              Left         Right  MIKHAIL                   74            73  MCA                  78            124  PCA                    23,30        x  Official report to follow.  S.Castro    Transcranial doppler exam #3 (2/7/2020) 1045am  mean velocity  cm/sec                              Left         Right  MIKHAIL                    67           91  MCA                   79           125  PCA                      P2-34      x  Technically difficult study.  Official report to follow.  S.Castro    Transcranial doppler exam #4 (2/8/2020) 1015am  mean velocity  cm/sec                              Left         Right  MIKHAIL                    73           84  MCA                   68           115  PCA                     31,28        x  Official report to follow.  S.Castro    Transcranial doppler exam #5 (2/10/2020) 11am  mean velocity  cm/sec                              Left         Right  MIKHAIL                    68           93  MCA                   71           133  PCA                     29,34        x  Official report to follow.  S.Castro    Transcranial doppler exam #6 (2/11/2020) 11am  mean velocity  cm/sec                              Left         Right  MIKHAIL                    70            85  MCA                   70            95  PCA                     34,24        x  Official report to follow.  S.Castro    Transcranial doppler exam #7 (2/12/2020) 1430pm  mean velocity  cm/sec                              Left         Right  MIKHAIL                     61           75  MCA                   65             98  PCA                     P2-28        x  Official report to follow.  S.Castro    Transcranial doppler exam #8 (2/13/2020) 1145am  mean velocity  cm/sec                              Left         Right  MIKHAIL                    51             56  MCA                   57             95  PCA                     22,32        x  Official report to follow.  S.Castro

## 2020-02-13 NOTE — PROGRESS NOTE ADULT - ASSESSMENT
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)    NEURO:  q1h neuro checks  EVD stopped working last night , no change in Exam   Clamp EVD today   CT stereo today for cranioplasty planning   Stroke code measures: A1c, LDL- as above   Delayed Cerebral Ischemia: TCD, euvolemia, Nimodipine  Pain control w/ Tylenol prn, Oxy 5/10 prn  OOB with helmet/PT/OT    PULM:  Extubated 2/5  SpO2 > 92%  Incentive spirometry, if able    CARDS:  - 220 mHg  TTE- Nl LV function, no wall motion abnormalities    GASTRO:  TF  Speech/Swallow eval- failed FEEST 2/11   last BM - 2/9/20  ---> Stress ulcer prophylaxis:  Not indicated    RENAL:  Goal: Eunatremia  Hypernatremia - Free water     ENDO:  euglycemia    HEME:  monitor H/H  ---> DVT prophylaxis: SCDs, Lovenox 40 sc daily    ID:  MONICA septic  thrombophlebitis- on Vanco hx of allergy to PCN /Cephalosporin   -  Vanco 1000 mg q12h  Repeat cultures - 2/8 - neg     Code status:  Full code  Disposition:  ICU    Updated family at bedside.    This patient was at high risk of neurologic deterioration and/or death due to: re-hemorrhage, seizures, DCI.     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)    NEURO:  q1h neuro checks  EVD clamped 2/12   CT stereo today for cranioplasty planning   Stroke code measures: A1c, LDL- as above   Delayed Cerebral Ischemia: TCD, euvolemia, Nimodipine  Pain control w/ Tylenol prn, Oxy 5/10 prn  OOB with helmet/PT/OT    PULM:  Extubated 2/5  SpO2 > 92%  Incentive spirometry, if able    CARDS:  - 220 mHg  TTE- Nl LV function, no wall motion abnormalities    GASTRO:  TF  Speech/Swallow eval- failed FEEST 2/11   last BM - 2/9/20  ---> Stress ulcer prophylaxis:  Not indicated    RENAL:  Goal: Eunatremia  Hypernatremia - Free water     ENDO:  euglycemia    HEME:  monitor H/H  ---> DVT prophylaxis: SCDs, Lovenox 40 sc daily    ID:  BL thrombophlebitis in UEs on 2/11 doppler - on Vanco (hx of allergy to PCN /Cephalosporin).     Febrile (TMax 39.1) on 2/12 overnight.  - Cultures Drawn (2/12 - Pending)    Vanco 1000 mg q8h  Vanco Trough - 5.1 (L); reordered 2/13 - Pending     Code status:  Full code  Disposition:  ICU    Updated family at bedside.    This patient was at high risk of neurologic deterioration and/or death due to: re-hemorrhage, seizures, DCI.     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)    NEURO:  q1h neuro checks  EVD clamped 2/12   CT stereo today for cranioplasty planning   Stroke code measures: A1c, LDL- as above   Delayed Cerebral Ischemia: TCD, euvolemia, Nimodipine  Pain control w/ Tylenol prn, Oxy 5/10 prn  OOB with helmet/PT/OT    PULM:  Extubated 2/5  SpO2 > 92%  Incentive spirometry, if able    CARDS:  - 220 mHg  TTE- Nl LV function, no wall motion abnormalities    GASTRO:  TF  Speech/Swallow eval- failed FEEST 2/11   last BM - 2/9/20  ---> Stress ulcer prophylaxis:  Not indicated    RENAL:  Goal: Eunatremia  Hypernatremia - Free water     ENDO:  euglycemia    HEME:  monitor H/H; Draw CBC today   ---> DVT prophylaxis: SCDs, Lovenox 40 sc daily    ID:  BL thrombophlebitis in UEs on 2/11 doppler - on Vanco (hx of allergy to PCN /Cephalosporin).     Febrile (TMax 39.1) on 2/12 overnight.  - Cultures Drawn (2/12 - Pending)    Vanco 1000 mg q8h  Vanco Trough - 5.1 (L); reordered 2/13 - Pending     Code status:  Full code  Disposition:  ICU    Updated family at bedside.    This patient was at high risk of neurologic deterioration and/or death due to: re-hemorrhage, seizures, DCI.     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)    NEURO:  q1h neuro checks  EVD clamped 2/12, remove EVD within 48h if neuroexam is stable  CT stereo today for cranioplasty planning   Stroke code measures: A1c, LDL- as above   Delayed Cerebral Ischemia: TCD, euvolemia, Nimodipine  Pain control w/ Tylenol prn, Oxy 5/10 prn  OOB with helmet/PT/OT    PULM:  Extubated 2/5  SpO2 > 92%  Incentive spirometry, if able    CARDS:  - 220 mHg  TTE- Nl LV function, no wall motion abnormalities    GASTRO:  TF  Speech/Swallow eval- failed FEEST 2/11   last BM - 2/9/20  ---> Stress ulcer prophylaxis:  Not indicated    RENAL:  Goal: Eunatremia    ENDO:  euglycemia    HEME:  monitor H/H; Draw CBC today   ---> DVT prophylaxis: SCDs, Lovenox 40 sc daily    ID:  BL thrombophlebitis in UEs on 2/11 doppler - on Vanco (hx of allergy to PCN /Cephalosporin).     Febrile (TMax 39.1) on 2/12 overnight.  - Blood cultures drawn (2/12 - Pending)    Vanco 1000 mg q8h  Vanco Trough - 2/13 - 11.4 (theraputic)     Code status:  Full code  Disposition:  ICU    Updated family at bedside.    This patient was at high risk of neurologic deterioration and/or death due to: re-hemorrhage, seizures, DCI.     Time spent:  45 minutes

## 2020-02-13 NOTE — CHART NOTE - NSCHARTNOTEFT_GEN_A_CORE
Patient seen for adjustment of helmet due to edema reduction. Cool foam padding placed on crown forehead and occiput to cushion and reduce circumference.  Contact information given. To notify office with any other issues questions or concerns.  Evin KAMARA  [facility]  [phone]

## 2020-02-14 LAB
ANION GAP SERPL CALC-SCNC: 12 MMOL/L — SIGNIFICANT CHANGE UP (ref 5–17)
ANION GAP SERPL CALC-SCNC: 12 MMOL/L — SIGNIFICANT CHANGE UP (ref 5–17)
ANION GAP SERPL CALC-SCNC: 13 MMOL/L — SIGNIFICANT CHANGE UP (ref 5–17)
ANION GAP SERPL CALC-SCNC: 13 MMOL/L — SIGNIFICANT CHANGE UP (ref 5–17)
APPEARANCE UR: CLEAR — SIGNIFICANT CHANGE UP
BACTERIA # UR AUTO: ABNORMAL
BILIRUB UR-MCNC: NEGATIVE — SIGNIFICANT CHANGE UP
BUN SERPL-MCNC: 18 MG/DL — SIGNIFICANT CHANGE UP (ref 7–23)
BUN SERPL-MCNC: 19 MG/DL — SIGNIFICANT CHANGE UP (ref 7–23)
BUN SERPL-MCNC: 19 MG/DL — SIGNIFICANT CHANGE UP (ref 7–23)
BUN SERPL-MCNC: 20 MG/DL — SIGNIFICANT CHANGE UP (ref 7–23)
CALCIUM SERPL-MCNC: 8.4 MG/DL — SIGNIFICANT CHANGE UP (ref 8.4–10.5)
CALCIUM SERPL-MCNC: 8.5 MG/DL — SIGNIFICANT CHANGE UP (ref 8.4–10.5)
CALCIUM SERPL-MCNC: 8.8 MG/DL — SIGNIFICANT CHANGE UP (ref 8.4–10.5)
CALCIUM SERPL-MCNC: 8.9 MG/DL — SIGNIFICANT CHANGE UP (ref 8.4–10.5)
CHLORIDE SERPL-SCNC: 100 MMOL/L — SIGNIFICANT CHANGE UP (ref 96–108)
CHLORIDE SERPL-SCNC: 101 MMOL/L — SIGNIFICANT CHANGE UP (ref 96–108)
CHLORIDE SERPL-SCNC: 95 MMOL/L — LOW (ref 96–108)
CHLORIDE SERPL-SCNC: 98 MMOL/L — SIGNIFICANT CHANGE UP (ref 96–108)
CO2 SERPL-SCNC: 21 MMOL/L — LOW (ref 22–31)
CO2 SERPL-SCNC: 23 MMOL/L — SIGNIFICANT CHANGE UP (ref 22–31)
CO2 SERPL-SCNC: 23 MMOL/L — SIGNIFICANT CHANGE UP (ref 22–31)
CO2 SERPL-SCNC: 24 MMOL/L — SIGNIFICANT CHANGE UP (ref 22–31)
COLOR SPEC: SIGNIFICANT CHANGE UP
CREAT SERPL-MCNC: 0.43 MG/DL — LOW (ref 0.5–1.3)
CREAT SERPL-MCNC: 0.44 MG/DL — LOW (ref 0.5–1.3)
CREAT SERPL-MCNC: 0.46 MG/DL — LOW (ref 0.5–1.3)
CREAT SERPL-MCNC: 0.47 MG/DL — LOW (ref 0.5–1.3)
DIFF PNL FLD: NEGATIVE — SIGNIFICANT CHANGE UP
EPI CELLS # UR: 7 /HPF — HIGH
GLUCOSE SERPL-MCNC: 115 MG/DL — HIGH (ref 70–99)
GLUCOSE SERPL-MCNC: 115 MG/DL — HIGH (ref 70–99)
GLUCOSE SERPL-MCNC: 116 MG/DL — HIGH (ref 70–99)
GLUCOSE SERPL-MCNC: 125 MG/DL — HIGH (ref 70–99)
GLUCOSE UR QL: NEGATIVE — SIGNIFICANT CHANGE UP
HCT VFR BLD CALC: 34.9 % — SIGNIFICANT CHANGE UP (ref 34.5–45)
HGB BLD-MCNC: 11.2 G/DL — LOW (ref 11.5–15.5)
HYALINE CASTS # UR AUTO: 4 /LPF — SIGNIFICANT CHANGE UP (ref 0–7)
KETONES UR-MCNC: NEGATIVE — SIGNIFICANT CHANGE UP
LEUKOCYTE ESTERASE UR-ACNC: ABNORMAL
MAGNESIUM SERPL-MCNC: 2.1 MG/DL — SIGNIFICANT CHANGE UP (ref 1.6–2.6)
MAGNESIUM SERPL-MCNC: 2.1 MG/DL — SIGNIFICANT CHANGE UP (ref 1.6–2.6)
MAGNESIUM SERPL-MCNC: 2.2 MG/DL — SIGNIFICANT CHANGE UP (ref 1.6–2.6)
MAGNESIUM SERPL-MCNC: 2.4 MG/DL — SIGNIFICANT CHANGE UP (ref 1.6–2.6)
MCHC RBC-ENTMCNC: 28.7 PG — SIGNIFICANT CHANGE UP (ref 27–34)
MCHC RBC-ENTMCNC: 32.1 GM/DL — SIGNIFICANT CHANGE UP (ref 32–36)
MCV RBC AUTO: 89.5 FL — SIGNIFICANT CHANGE UP (ref 80–100)
NITRITE UR-MCNC: NEGATIVE — SIGNIFICANT CHANGE UP
NRBC # BLD: 0 /100 WBCS — SIGNIFICANT CHANGE UP (ref 0–0)
PH UR: 6.5 — SIGNIFICANT CHANGE UP (ref 5–8)
PHOSPHATE SERPL-MCNC: 3 MG/DL — SIGNIFICANT CHANGE UP (ref 2.5–4.5)
PHOSPHATE SERPL-MCNC: 3.1 MG/DL — SIGNIFICANT CHANGE UP (ref 2.5–4.5)
PHOSPHATE SERPL-MCNC: 3.1 MG/DL — SIGNIFICANT CHANGE UP (ref 2.5–4.5)
PHOSPHATE SERPL-MCNC: 3.3 MG/DL — SIGNIFICANT CHANGE UP (ref 2.5–4.5)
PLATELET # BLD AUTO: 355 K/UL — SIGNIFICANT CHANGE UP (ref 150–400)
POTASSIUM SERPL-MCNC: 3.7 MMOL/L — SIGNIFICANT CHANGE UP (ref 3.5–5.3)
POTASSIUM SERPL-MCNC: 3.9 MMOL/L — SIGNIFICANT CHANGE UP (ref 3.5–5.3)
POTASSIUM SERPL-MCNC: 3.9 MMOL/L — SIGNIFICANT CHANGE UP (ref 3.5–5.3)
POTASSIUM SERPL-MCNC: 4 MMOL/L — SIGNIFICANT CHANGE UP (ref 3.5–5.3)
POTASSIUM SERPL-SCNC: 3.7 MMOL/L — SIGNIFICANT CHANGE UP (ref 3.5–5.3)
POTASSIUM SERPL-SCNC: 3.9 MMOL/L — SIGNIFICANT CHANGE UP (ref 3.5–5.3)
POTASSIUM SERPL-SCNC: 3.9 MMOL/L — SIGNIFICANT CHANGE UP (ref 3.5–5.3)
POTASSIUM SERPL-SCNC: 4 MMOL/L — SIGNIFICANT CHANGE UP (ref 3.5–5.3)
PROT UR-MCNC: SIGNIFICANT CHANGE UP
RBC # BLD: 3.9 M/UL — SIGNIFICANT CHANGE UP (ref 3.8–5.2)
RBC # FLD: 11.8 % — SIGNIFICANT CHANGE UP (ref 10.3–14.5)
RBC CASTS # UR COMP ASSIST: 1 /HPF — SIGNIFICANT CHANGE UP (ref 0–4)
SODIUM SERPL-SCNC: 132 MMOL/L — LOW (ref 135–145)
SODIUM SERPL-SCNC: 133 MMOL/L — LOW (ref 135–145)
SODIUM SERPL-SCNC: 134 MMOL/L — LOW (ref 135–145)
SODIUM SERPL-SCNC: 136 MMOL/L — SIGNIFICANT CHANGE UP (ref 135–145)
SP GR SPEC: 1.02 — SIGNIFICANT CHANGE UP (ref 1.01–1.02)
UROBILINOGEN FLD QL: NEGATIVE — SIGNIFICANT CHANGE UP
WBC # BLD: 17.79 K/UL — HIGH (ref 3.8–10.5)
WBC # FLD AUTO: 17.79 K/UL — HIGH (ref 3.8–10.5)
WBC UR QL: 5 /HPF — SIGNIFICANT CHANGE UP (ref 0–5)

## 2020-02-14 PROCEDURE — 99291 CRITICAL CARE FIRST HOUR: CPT

## 2020-02-14 PROCEDURE — 70450 CT HEAD/BRAIN W/O DYE: CPT | Mod: 26

## 2020-02-14 PROCEDURE — 93888 INTRACRANIAL LIMITED STUDY: CPT | Mod: 26

## 2020-02-14 PROCEDURE — 99292 CRITICAL CARE ADDL 30 MIN: CPT

## 2020-02-14 RX ORDER — FLUDROCORTISONE ACETATE 0.1 MG/1
0.1 TABLET ORAL EVERY 12 HOURS
Refills: 0 | Status: COMPLETED | OUTPATIENT
Start: 2020-02-14 | End: 2020-02-17

## 2020-02-14 RX ORDER — SODIUM CHLORIDE 5 G/100ML
1000 INJECTION, SOLUTION INTRAVENOUS
Refills: 0 | Status: DISCONTINUED | OUTPATIENT
Start: 2020-02-14 | End: 2020-02-14

## 2020-02-14 RX ORDER — SODIUM CHLORIDE 5 G/100ML
1000 INJECTION, SOLUTION INTRAVENOUS
Refills: 0 | Status: DISCONTINUED | OUTPATIENT
Start: 2020-02-14 | End: 2020-02-15

## 2020-02-14 RX ADMIN — NIMODIPINE 60 MILLIGRAM(S): 60 SOLUTION ORAL at 02:10

## 2020-02-14 RX ADMIN — FLUDROCORTISONE ACETATE 0.1 MILLIGRAM(S): 0.1 TABLET ORAL at 21:13

## 2020-02-14 RX ADMIN — Medication 650 MILLIGRAM(S): at 17:30

## 2020-02-14 RX ADMIN — CHLORHEXIDINE GLUCONATE 1 APPLICATION(S): 213 SOLUTION TOPICAL at 21:13

## 2020-02-14 RX ADMIN — PANTOPRAZOLE SODIUM 40 MILLIGRAM(S): 20 TABLET, DELAYED RELEASE ORAL at 11:11

## 2020-02-14 RX ADMIN — Medication 650 MILLIGRAM(S): at 06:30

## 2020-02-14 RX ADMIN — Medication 650 MILLIGRAM(S): at 06:00

## 2020-02-14 RX ADMIN — NIMODIPINE 60 MILLIGRAM(S): 60 SOLUTION ORAL at 10:24

## 2020-02-14 RX ADMIN — NIMODIPINE 60 MILLIGRAM(S): 60 SOLUTION ORAL at 14:11

## 2020-02-14 RX ADMIN — SODIUM CHLORIDE 50 MILLILITER(S): 5 INJECTION, SOLUTION INTRAVENOUS at 06:39

## 2020-02-14 RX ADMIN — OXYCODONE HYDROCHLORIDE 5 MILLIGRAM(S): 5 TABLET ORAL at 11:30

## 2020-02-14 RX ADMIN — OXYCODONE HYDROCHLORIDE 5 MILLIGRAM(S): 5 TABLET ORAL at 01:15

## 2020-02-14 RX ADMIN — NIMODIPINE 60 MILLIGRAM(S): 60 SOLUTION ORAL at 21:13

## 2020-02-14 RX ADMIN — NIMODIPINE 60 MILLIGRAM(S): 60 SOLUTION ORAL at 18:24

## 2020-02-14 RX ADMIN — NIMODIPINE 60 MILLIGRAM(S): 60 SOLUTION ORAL at 06:18

## 2020-02-14 RX ADMIN — Medication 650 MILLIGRAM(S): at 17:00

## 2020-02-14 RX ADMIN — SODIUM CHLORIDE 50 MILLILITER(S): 5 INJECTION, SOLUTION INTRAVENOUS at 17:01

## 2020-02-14 RX ADMIN — OXYCODONE HYDROCHLORIDE 5 MILLIGRAM(S): 5 TABLET ORAL at 11:00

## 2020-02-14 RX ADMIN — OXYCODONE HYDROCHLORIDE 5 MILLIGRAM(S): 5 TABLET ORAL at 01:45

## 2020-02-14 NOTE — CHART NOTE - NSCHARTNOTEFT_GEN_A_CORE
Transcranial doppler exam #1 (2/4/2020) 1045am  mean velocity  cm/sec                              Left         Right  MIKHAIL                    57           74  MCA                   55           72  PCA                     23,24       x  VERT -Could not insonate, patient is intubated.  BA                                x  Technically difficult study.  Official report to follow.  S.Castro    Transcranial doppler exam #2 (2/6/2020) 11:15am  mean velocity  cm/sec                              Left         Right  MIKHAIL                   74            73  MCA                  78            124  PCA                    23,30        x  Official report to follow.  S.Castro    Transcranial doppler exam #3 (2/7/2020) 1045am  mean velocity  cm/sec                              Left         Right  MIKHAIL                    67           91  MCA                   79           125  PCA                      P2-34      x  Technically difficult study.  Official report to follow.  S.Castro    Transcranial doppler exam #4 (2/8/2020) 1015am  mean velocity  cm/sec                              Left         Right  MIKHAIL                    73           84  MCA                   68           115  PCA                     31,28        x  Official report to follow.  S.Castro    Transcranial doppler exam #5 (2/10/2020) 11am  mean velocity  cm/sec                              Left         Right  MIKHAIL                    68           93  MCA                   71           133  PCA                     29,34        x  Official report to follow.  S.Castro    Transcranial doppler exam #6 (2/11/2020) 11am  mean velocity  cm/sec                              Left         Right  MIKHAIL                    70            85  MCA                   70            95  PCA                     34,24        x  Official report to follow.  S.Castro    Transcranial doppler exam #7 (2/12/2020) 1430pm  mean velocity  cm/sec                              Left         Right  MIKHAIL                     61           75  MCA                   65             98  PCA                     P2-28        x  Official report to follow.  S.Castro    Transcranial doppler exam #8 (2/13/2020) 1145am  mean velocity  cm/sec                              Left         Right  MIKHAIL                    51             56  MCA                   57             95  PCA                     22,32        x  Official report to follow.  S.Castro    Transcranial doppler exam #9 (2/142020) 1030am  mean velocity  cm/sec                              Left         Right  MIKHAIL                    66           58  MCA                   76           97  PCA                     P2-25       x  Official report to follow.  S.Castro

## 2020-02-14 NOTE — PROGRESS NOTE ADULT - SUBJECTIVE AND OBJECTIVE BOX
no EVD output     vitals/labs/meds reviewed  Awakens to voice, oriented x3, but confused on further conversation, left facial, left side 4+/5, right side full strength

## 2020-02-14 NOTE — PROGRESS NOTE ADULT - ASSESSMENT
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)    NEURO:  q1h neuro checks  EVD clamped 2/12, remove EVD within 48h if neuroexam is stable  CT stereo today for cranioplasty planning   Stroke code measures: A1c, LDL- as above   Delayed Cerebral Ischemia: TCD, euvolemia, Nimodipine  Pain control w/ Tylenol prn, Oxy 5/10 prn  OOB with helmet/PT/OT    PULM:  Extubated 2/5  SpO2 > 92%  Incentive spirometry, if able    CARDS:  - 220 mHg  TTE- Nl LV function, no wall motion abnormalities    GASTRO:  TF  Speech/Swallow eval- failed FEEST 2/11   last BM - 2/9/20  ---> Stress ulcer prophylaxis:  Not indicated    RENAL:  Goal: Eunatremia; continue on 2% NaCl @ 50ml/hr    ENDO:  euglycemia    HEME:  monitor H/H; Draw CBC today   ---> DVT prophylaxis: SCDs, Lovenox 40 sc daily    ID:  Follow up blood cultures drawn on 2/12 (Negative at 24h); discontinued vancomycin  Positive urinalaysis - many bacteria, positive leuk esterase      Code status:  Full code  Disposition:  ICU    Updated family at bedside.    This patient was at high risk of neurologic deterioration and/or death due to: re-hemorrhage, seizures, DCI.     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)    NEURO:  q1h neuro checks  EVD clamped 2/12  slight increase in IPH pericatheter  CT head tomorrow   Delayed Cerebral Ischemia: TCD, euvolemia, Nimodipine  Pain control w/ Tylenol prn, Oxy 5/10 prn  OOB with helmet/PT/OT    PULM:  Extubated 2/5  SpO2 > 92%  Incentive spirometry, if able    CARDS:  - 220 mHg  TTE- Nl LV function, no wall motion abnormalities    GASTRO:  TF  Speech/Swallow eval- failed FEEST 2/11   last BM - 2/13/20  ---> Stress ulcer prophylaxis:  Not indicated    RENAL:  Goal: Eunatremia; Siadh STARTED  on 2% NaCl @ 50ml/hr  BMP Q 8 HR     ENDO:  euglycemia    HEME:  monitor H/H; Draw CBC today   ---> DVT prophylaxis: SCDs,HOLD  Lovenox 40 sc given IPH     ID:  Follow up blood cultures drawn on 2/12 (Negative at 24h); discontinued vancomycin (last dose was yesterday)   Positive urinalaysis - many bacteria, positive leuk esterase, will send urine culture       Code status:  Full code  Disposition:  ICU    Updated family at bedside.    This patient was at high risk of neurologic deterioration and/or death due to: re-hemorrhage, seizures, DCI.     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)    NEURO:  q1h neuro checks  non obstructive hydrocephalus EVD clamped 2/12  slight increase in IPH pericatheter  CT head tomorrow   Delayed Cerebral Ischemia: TCD, euvolemia, Nimodipine  Pain control w/ Tylenol prn, Oxy 5/10 prn  OOB with helmet/PT/OT    PULM:  Extubated 2/5  SpO2 > 92%  Incentive spirometry, if able    CARDS:  - 220 mHg  TTE- Nl LV function, no wall motion abnormalities    GASTRO:  TF  Speech/Swallow eval- failed FEEST 2/11   last BM - 2/13/20  ---> Stress ulcer prophylaxis:  Not indicated    RENAL:  Goal: Eunatremia; Siadh STARTED  on 2% NaCl @ 50ml/hr  BMP Q 8 HR     ENDO:  euglycemia    HEME:  monitor H/H; Draw CBC today   ---> DVT prophylaxis: SCDs,HOLD  Lovenox 40 sc given IPH     ID:  Follow up blood cultures drawn on 2/12 (Negative at 24h); discontinued vancomycin (last dose was yesterday)   Positive urinalaysis - many bacteria, positive leuk esterase, will send urine culture       Code status:  Full code  Disposition:  ICU    Updated family at bedside.    This patient was at high risk of neurologic deterioration and/or death due to: re-hemorrhage, seizures, DCI.     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)    NEURO:  q1h neuro checks  non obstructive hydrocephalus EVD clamped 2/12  slight increase in IPH pericatheter, EVD to remain (clamped) pending f/u [facility]  CT head tomorrow   Delayed Cerebral Ischemia: TCD, euvolemia, Nimodipine  Pain control w/ Tylenol prn, Oxy 5/10 prn  OOB with helmet/PT/OT    PULM:  Extubated 2/5  SpO2 > 92%  Incentive spirometry, if able    CARDS:  - 220 mHg  TTE- Nl LV function, no wall motion abnormalities    GASTRO:  TF  Speech/Swallow eval- failed FEEST 2/11   last BM - 2/13/20  ---> Stress ulcer prophylaxis:  Not indicated    RENAL:  Goal: Eunatremia; Siadh STARTED  on 2% NaCl @ 50ml/hr  BMP Q 8 HR     ENDO:  euglycemia    HEME:  monitor H/H; Draw CBC today   ---> DVT prophylaxis: SCDs,HOLD  Lovenox 40 sc given IPH     ID:  Follow up blood cultures drawn on 2/12 (Negative at 24h); discontinued vancomycin (last dose was yesterday)   Positive urinalaysis - many bacteria, positive leuk esterase, will send urine culture       Code status:  Full code  Disposition:  ICU    Updated family at bedside.    This patient was at high risk of neurologic deterioration and/or death due to: re-hemorrhage, seizures, DCI.     Time spent:  45 minutes

## 2020-02-14 NOTE — PROGRESS NOTE ADULT - SUBJECTIVE AND OBJECTIVE BOX
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 She arrived intubated and sedated. After coming to [facility] ED, she underwent repeat imaging and was taken to the OR emergently for craniectomy and clipping of right posterior communicating artery aneurysm.     ADMISSION SCORES:  GCS: 3T  Hunt-Gonzalez: 5  modified Couch: 3  ICH score: 2    HOSPITAL COURSE:  2/3- Clipping R PCOMM              EVD placed   2/5- Extubated overnight  2/6- 500cc Bolus  2/7- Following commands on all extremities  2/8- R  cm2 from 129  2/11- failed FEEST   2/11- Superficial thrombophlebitis seen on doppler;   2/11- overnight EVD stopped working, no change in neuro exam    2/12- EVD clamped; no change in neuro exam  2/12- Febrile overnight (TMax 39.1); CXR shows clear lungs; Blood cultures pending  2/13- EVD clamped; stable neuro exam  2/13- Positive U/A; Blood cultures negative at 24h  2/13- drop in sodium to 132; started on 2% NaCl 50ml/hr  2/14- EVD remains clamped; stable neuro exam      ICU Vital Signs Last 24 Hrs  T(C): 37.3 (14 Feb 2020 07:00), Max: 38.3 (13 Feb 2020 19:00)  T(F): 99.1 (14 Feb 2020 07:00), Max: 100.9 (13 Feb 2020 19:00)  HR: 87 (14 Feb 2020 07:00) (69 - 106)  BP: 147/87 (14 Feb 2020 07:00) (124/85 - 169/131)  BP(mean): 101 (14 Feb 2020 07:00) (92 - 145)  ABP: --  ABP(mean): --  RR: 21 (14 Feb 2020 07:00) (15 - 32)  SpO2: 100% (14 Feb 2020 07:00) (91% - 100%)    24h I/O's ICU  02-13-20 @ 07:01  -  02-14-20 @ 07:00  --------------------------------------------------------  IN: 2280 mL / OUT: 1600 mL / NET: 680 mL      MEDICATIONS  acetaminophen    Suspension .. 650 milliGRAM(s) Oral every 6 hours PRN  albuterol/ipratropium for Nebulization. 3 milliLiter(s) Nebulizer every 6 hours PRN  artificial tears (preservative free) Ophthalmic Solution 1 Drop(s) Both EYES three times a day PRN  chlorhexidine 4% Liquid 1 Application(s) Topical <User Schedule>  enoxaparin Injectable 40 milliGRAM(s) SubCutaneous <User Schedule>  niMODipine Oral Solution 60 milliGRAM(s) Oral every 4 hours  ondansetron Injectable 4 milliGRAM(s) IV Push every 8 hours PRN  oxyCODONE    IR 5 milliGRAM(s) Oral every 4 hours PRN  oxyCODONE    IR 10 milliGRAM(s) Oral every 4 hours PRN  pantoprazole  Injectable 40 milliGRAM(s) IV Push daily  sodium chloride 2% . 1000 milliLiter(s) (50 mL/Hr) IV Continuous <Continuous>      LAB VALUES                    11.2   17.79 )-----------( 355      ( 14 Feb 2020 02:31 )             34.9     02-14    132<L>  |  95<L>  |  20  ----------------------------<  115<H>  3.9   |  24  |  0.44<L>    Ca    8.9      14 Feb 2020 03:35  Phos  3.1     02-14  Mg     2.1     02-14    Vancomycin Trough  2/10: <4.0 (L)  2/11: 5.1 (L)  2/13: 11.4      URINE ANALYSIS:  Ketones - Negative  Protein - Trace  Leuk Esterase - moderate  WBC - 8  Bacteria - many  hyaline casts - 4    IMAGING:    X-Ray 2/12:    IMPRESSION:     Enteric tube with tip in the stomach. Right upper extremity PICC with tip in   the distal SVC.     Small rounded nodular opacity in the right lower lung, not seen on prior   radiographs, is likely external to patient. No pleural effusion or   pneumothorax.     Heart size is normal.     CT Scan 2/12:     Slight Increase of pericatheter hemorrhage measuring up to 2.0 x 1.3 cm in the region of the genu of the right internal capsule.    Slightly increased, but still small hemorrhage layering in the occipital horns.    New mild dilatation of the right temporal horn. No midline shift.    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    Upper Extremity Doppler 2/11:     No evidence of deep venous thrombosis in either upper extremity.     Superficial thrombophlebitis affects the right basilic vein in the forearm   and the right cephalic vein in the forearm and upper arm.     Superficial thrombophlebitis affects the left cephalic vein in the upper arm.       EXAMINATION:  General:  Calm.   HEENT:  MMM. EO spontaneously.   Neuro: A&O x3. L facial droop. EOMI. Dysarthria - improving.   RUE  5/5  LUE drift; LUE 5/5 flexion, 4+/5 extension, 4+/5 HG with incremental improvement.  RLE  5/5  LLE - 4+/5 hip flexion, 4+/5 knee extension, 4+/5 DF and PF  Cards:  Normal S1/S2 with rate and rhythm. No murmur.   Respiratory: Clear lung fields BL  Abdomen:  soft, +BS  Extremities:  no edema. LUE - erythema, swelling. Cord palpated.  RUE - erythema - marked. 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 She arrived intubated and sedated. 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     ADMISSION SCORES:  GCS: 3T  Hunt-Gonzalez: 5  modified Couch: 3  ICH score: 2    HOSPITAL COURSE:  2/3- Clipping R PCOMM              EVD placed   2/5- Extubated overnight  2/6- 500cc Bolus  2/7- Following commands on all extremities  2/8- R  cm2 from 129  2/11- failed FEEST   2/11- Superficial thrombophlebitis seen on doppler;   2/11- overnight EVD stopped working, no change in neuro exam    2/12- EVD clamped; no change in neuro exam  2/12- Febrile overnight (TMax 39.1); CXR shows clear lungs; Blood cultures pending  2/13- EVD clamped; stable neuro exam  2/13- Positive U/A; Blood cultures negative at 24h  2/13- drop in sodium to 132; started on 2% NaCl 50ml/hr  2/14- EVD remains clamped; stable neuro exam      ICU Vital Signs Last 24 Hrs  T(C): 37.3 (14 Feb 2020 07:00), Max: 38.3 (13 Feb 2020 19:00)  T(F): 99.1 (14 Feb 2020 07:00), Max: 100.9 (13 Feb 2020 19:00)  HR: 87 (14 Feb 2020 07:00) (69 - 106)  BP: 147/87 (14 Feb 2020 07:00) (124/85 - 169/131)  BP(mean): 101 (14 Feb 2020 07:00) (92 - 145)  ABP: --  ABP(mean): --  RR: 21 (14 Feb 2020 07:00) (15 - 32)  SpO2: 100% (14 Feb 2020 07:00) (91% - 100%)    24h I/O's ICU  02-13-20 @ 07:01  -  02-14-20 @ 07:00  --------------------------------------------------------  IN: 2280 mL / OUT: 1600 mL / NET: 680 mL      MEDICATIONS  acetaminophen    Suspension .. 650 milliGRAM(s) Oral every 6 hours PRN  albuterol/ipratropium for Nebulization. 3 milliLiter(s) Nebulizer every 6 hours PRN  artificial tears (preservative free) Ophthalmic Solution 1 Drop(s) Both EYES three times a day PRN  chlorhexidine 4% Liquid 1 Application(s) Topical <User Schedule>  enoxaparin Injectable 40 milliGRAM(s) SubCutaneous <User Schedule>  niMODipine Oral Solution 60 milliGRAM(s) Oral every 4 hours  ondansetron Injectable 4 milliGRAM(s) IV Push every 8 hours PRN  oxyCODONE    IR 5 milliGRAM(s) Oral every 4 hours PRN  oxyCODONE    IR 10 milliGRAM(s) Oral every 4 hours PRN  pantoprazole  Injectable 40 milliGRAM(s) IV Push daily  sodium chloride 2% . 1000 milliLiter(s) (50 mL/Hr) IV Continuous <Continuous>      URINE ANALYSIS:  Ketones - Negative  Protein - Trace  Leuk Esterase - moderate  WBC - 8  Bacteria - many  hyaline casts - 4    IMAGING:    X-Ray 2/12:    IMPRESSION:     Enteric tube with tip in the stomach. Right upper extremity PICC with tip in   the distal SVC.     Small rounded nodular opacity in the right lower lung, not seen on prior   radiographs, is likely external to patient. No pleural effusion or   pneumothorax.     Heart size is normal.     CT Scan 2/12:     Slight Increase of pericatheter hemorrhage measuring up to 2.0 x 1.3 cm in the region of the genu of the right internal capsule.    Slightly increased, but still small hemorrhage layering in the occipital horns.    New mild dilatation of the right temporal horn. No midline shift.    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    Upper Extremity Doppler 2/11:     No evidence of deep venous thrombosis in either upper extremity.     Superficial thrombophlebitis affects the right basilic vein in the forearm   and the right cephalic vein in the forearm and upper arm.     Superficial thrombophlebitis affects the left cephalic vein in the upper arm.       EXAMINATION:  General:  Calm.   HEENT:  MMM. EO spontaneously.   Neuro: A&O x3. L facial droop. EOMI. Dysarthria - improving.   RUE  5/5  LUE drift; LUE 5/5 flexion, 4+/5 extension, 4+/5 HG with incremental improvement.  RLE  5/5  LLE - 4+/5 hip flexion, 4+/5 knee extension, 4+/5 DF and PF  Cards:  Normal S1/S2 with rate and rhythm. No murmur.   Respiratory: Clear lung fields BL  Abdomen:  soft, +BS  Extremities:  no edema. LUE - erythema, swelling. Cord palpated.  RUE - erythema - marked.       LABS:  Na: 132 (02-14 @ 03:35), 133 (02-14 @ 02:31), 137 (02-12 @ 21:15), 138 (02-12 @ 04:04), 137 (02-11 @ 21:05)  K: 3.9 (02-14 @ 03:35), 3.7 (02-14 @ 02:31), 4.1 (02-12 @ 21:15), 4.2 (02-12 @ 04:04), 3.9 (02-11 @ 21:05)  Cl: 95 (02-14 @ 03:35), 98 (02-14 @ 02:31), 101 (02-12 @ 21:15), 100 (02-12 @ 04:04), 99 (02-11 @ 21:05)  CO2: 24 (02-14 @ 03:35), 23 (02-14 @ 02:31), 24 (02-12 @ 21:15), 25 (02-12 @ 04:04), 24 (02-11 @ 21:05)  BUN: 20 (02-14 @ 03:35), 19 (02-14 @ 02:31), 20 (02-12 @ 21:15), 23 (02-12 @ 04:04), 25 (02-11 @ 21:05)  Cr: 0.44 (02-14 @ 03:35), 0.43 (02-14 @ 02:31), 0.54 (02-12 @ 21:15), 0.58 (02-12 @ 04:04), 0.49 (02-11 @ 21:05)  Glu: 115(02-14 @ 03:35), 115(02-14 @ 02:31), 128(02-12 @ 21:15), 125(02-12 @ 04:04), 125(02-11 @ 21:05)    Hgb: 11.2 (02-14 @ 02:31)  Hct: 34.9 (02-14 @ 02:31)  WBC: 17.79 (02-14 @ 02:31)  Plt: 355 (02-14 @ 02:31)    INR:   PTT:       EXAM:  CT BRAIN                            PROCEDURE DATE:  02/14/2020            INTERPRETATION:  INDICATIONS:  SAH    TECHNIQUE:  Serial axial images were obtained from the skull base to the vertex without intravenous contrast.    COMPARISON EXAMINATION: 2/12/2020    FINDINGS:    VENTRICLES AND SULCI: Left frontal shunt catheter with its tip in the region of the right basal ganglia region with surrounding hemorrhage appreciated. There is significant motion on the examination which limits evaluation. More conspicuous hemorrhage layers in the right atria of the lateral ventricle. Ventricles appear slightly increased in size compared with the prior  INTRA-AXIAL: Evidence of right temporal edema suggested with cranial tissue projecting beyondthe margins of the cranium. Evidence of what appears to be an evolving right thalamocapsular region infarct EXTRA-AXIAL: Evidence of right P-comm aneurysm clipping..  VISUALIZED SINUSES: Right maxillary sinus polyp versus retention cyst  VISUALIZED MASTOIDS:  Clear.  CALVARIUM: Right frontal parietal craniectomy  MISCELLANEOUS:  Nasoenteric tube is appreciated    IMPRESSION:  This is a motion degraded study. Left frontal shunt catheter with its tip in the region of the right basal ganglia with surrounding hemorrhage appreciated which is slightly increased compared with the prior. Intraventricular hemorrhage layers in the right atria of the lateral ventricle again slightly increased compared with the prior. Ventricles are slightly increased insize compared with the prior. Suspicion of an evolving infarct in the right thalamic low capsular region as identified on the prior. 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 She arrived intubated and sedated. After coming to [facility] ED, she underwent repeat imaging and was taken to the OR emergently for craniectomy and clipping of right posterior communicating artery aneurysm.     ADMISSION SCORES:  GCS: 3T  Hunt-Gonzalez: 5  modified Couch: 3  ICH score: 2    HOSPITAL COURSE:  2/3- Clipping R PCOMM              EVD placed   2/5- Extubated overnight  2/6- 500cc Bolus  2/7- Following commands on all extremities  2/8- R  cm2 from 129  2/11- failed FEEST   2/11- Superficial thrombophlebitis seen on doppler;   2/11- overnight EVD stopped working, no change in neuro exam    2/12- EVD clamped; no change in neuro exam  2/12- Febrile overnight (TMax 39.1); CXR shows clear lungs; Blood cultures pending  2/13- EVD clamped; stable neuro exam  2/13- Positive U/A; Blood cultures negative at 24h  2/13- drop in sodium to 132; started on 2% NaCl 50ml/hr  2/14- EVD remains clamped; stable neuro exam      ICU Vital Signs Last 24 Hrs  T(C): 37.3 (14 Feb 2020 07:00), Max: 38.3 (13 Feb 2020 19:00)  T(F): 99.1 (14 Feb 2020 07:00), Max: 100.9 (13 Feb 2020 19:00)  HR: 87 (14 Feb 2020 07:00) (69 - 106)  BP: 147/87 (14 Feb 2020 07:00) (124/85 - 169/131)  BP(mean): 101 (14 Feb 2020 07:00) (92 - 145)  ABP: --  ABP(mean): --  RR: 21 (14 Feb 2020 07:00) (15 - 32)  SpO2: 100% (14 Feb 2020 07:00) (91% - 100%)    24h I/O's ICU  02-13-20 @ 07:01  -  02-14-20 @ 07:00  --------------------------------------------------------  IN: 2280 mL / OUT: 1600 mL / NET: 680 mL      MEDICATIONS  acetaminophen    Suspension .. 650 milliGRAM(s) Oral every 6 hours PRN  albuterol/ipratropium for Nebulization. 3 milliLiter(s) Nebulizer every 6 hours PRN  artificial tears (preservative free) Ophthalmic Solution 1 Drop(s) Both EYES three times a day PRN  chlorhexidine 4% Liquid 1 Application(s) Topical <User Schedule>  niMODipine Oral Solution 60 milliGRAM(s) Oral every 4 hours  ondansetron Injectable 4 milliGRAM(s) IV Push every 8 hours PRN  oxyCODONE    IR 5 milliGRAM(s) Oral every 4 hours PRN  oxyCODONE    IR 10 milliGRAM(s) Oral every 4 hours PRN  pantoprazole  Injectable 40 milliGRAM(s) IV Push daily  sodium chloride 2% . 1000 milliLiter(s) (50 mL/Hr) IV Continuous <Continuous>      URINE ANALYSIS:  Ketones - Negative  Protein - Trace  Leuk Esterase - moderate  WBC - 8  Bacteria - many  hyaline casts - 4    IMAGING:    X-Ray 2/12:    IMPRESSION:     Enteric tube with tip in the stomach. Right upper extremity PICC with tip in   the distal SVC.     Small rounded nodular opacity in the right lower lung, not seen on prior   radiographs, is likely external to patient. No pleural effusion or   pneumothorax.     Heart size is normal.     CT Scan 2/12:     Slight Increase of pericatheter hemorrhage measuring up to 2.0 x 1.3 cm in the region of the genu of the right internal capsule.    Slightly increased, but still small hemorrhage layering in the occipital horns.    New mild dilatation of the right temporal horn. No midline shift.    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    Upper Extremity Doppler 2/11:     No evidence of deep venous thrombosis in either upper extremity.     Superficial thrombophlebitis affects the right basilic vein in the forearm   and the right cephalic vein in the forearm and upper arm.     Superficial thrombophlebitis affects the left cephalic vein in the upper arm.       EXAMINATION:  General:  Calm.   HEENT:  MMM. EO spontaneously.   Neuro: A&O x3. L facial droop. EOMI. Dysarthria - improving.   RUE  5/5  LUE drift; LUE 5/5 flexion, 4+/5 extension, 4+/5 HG with incremental improvement.  RLE  5/5  LLE - 4+/5 hip flexion, 4+/5 knee extension, 4+/5 DF and PF  Cards:  Normal S1/S2 with rate and rhythm. No murmur.   Respiratory: Clear lung fields BL  Abdomen:  soft, +BS  Extremities:  no edema. LUE - erythema, swelling. Cord palpated.  RUE - erythema - marked.       LABS:  Na: 132 (02-14 @ 03:35), 133 (02-14 @ 02:31), 137 (02-12 @ 21:15), 138 (02-12 @ 04:04), 137 (02-11 @ 21:05)  K: 3.9 (02-14 @ 03:35), 3.7 (02-14 @ 02:31), 4.1 (02-12 @ 21:15), 4.2 (02-12 @ 04:04), 3.9 (02-11 @ 21:05)  Cl: 95 (02-14 @ 03:35), 98 (02-14 @ 02:31), 101 (02-12 @ 21:15), 100 (02-12 @ 04:04), 99 (02-11 @ 21:05)  CO2: 24 (02-14 @ 03:35), 23 (02-14 @ 02:31), 24 (02-12 @ 21:15), 25 (02-12 @ 04:04), 24 (02-11 @ 21:05)  BUN: 20 (02-14 @ 03:35), 19 (02-14 @ 02:31), 20 (02-12 @ 21:15), 23 (02-12 @ 04:04), 25 (02-11 @ 21:05)  Cr: 0.44 (02-14 @ 03:35), 0.43 (02-14 @ 02:31), 0.54 (02-12 @ 21:15), 0.58 (02-12 @ 04:04), 0.49 (02-11 @ 21:05)  Glu: 115(02-14 @ 03:35), 115(02-14 @ 02:31), 128(02-12 @ 21:15), 125(02-12 @ 04:04), 125(02-11 @ 21:05)    Hgb: 11.2 (02-14 @ 02:31)  Hct: 34.9 (02-14 @ 02:31)  WBC: 17.79 (02-14 @ 02:31)  Plt: 355 (02-14 @ 02:31)    INR:   PTT:       EXAM:  CT BRAIN                            PROCEDURE DATE:  02/14/2020            INTERPRETATION:  INDICATIONS:  SAH    TECHNIQUE:  Serial axial images were obtained from the skull base to the vertex without intravenous contrast.    COMPARISON EXAMINATION: 2/12/2020    FINDINGS:    VENTRICLES AND SULCI: Left frontal shunt catheter with its tip in the region of the right basal ganglia region with surrounding hemorrhage appreciated. There is significant motion on the examination which limits evaluation. More conspicuous hemorrhage layers in the right atria of the lateral ventricle. Ventricles appear slightly increased in size compared with the prior  INTRA-AXIAL: Evidence of right temporal edema suggested with cranial tissue projecting beyondthe margins of the cranium. Evidence of what appears to be an evolving right thalamocapsular region infarct EXTRA-AXIAL: Evidence of right P-comm aneurysm clipping..  VISUALIZED SINUSES: Right maxillary sinus polyp versus retention cyst  VISUALIZED MASTOIDS:  Clear.  CALVARIUM: Right frontal parietal craniectomy  MISCELLANEOUS:  Nasoenteric tube is appreciated    IMPRESSION:  This is a motion degraded study. Left frontal shunt catheter with its tip in the region of the right basal ganglia with surrounding hemorrhage appreciated which is slightly increased compared with the prior. Intraventricular hemorrhage layers in the right atria of the lateral ventricle again slightly increased compared with the prior. Ventricles are slightly increased insize compared with the prior. Suspicion of an evolving infarct in the right thalamic low capsular region as identified on the prior.

## 2020-02-14 NOTE — PROGRESS NOTE ADULT - ASSESSMENT
HH4 mF3 subarachnoid hemorrhage, post-bleed day 11  - EVD clamp trial, CTH in am, possible DC drain  - TCDs, euvolemia, nimodipine  - monitor Na for goal 135-145; hypertonics, florinef  - -220mmHg  - Mobilize  - Pain control  - monitor for fevers

## 2020-02-14 NOTE — PROGRESS NOTE ADULT - SUBJECTIVE AND OBJECTIVE BOX
responded to call to adjust cranial helmet by physical therapy  done by [facility] [phone]

## 2020-02-15 LAB
ANION GAP SERPL CALC-SCNC: 10 MMOL/L — SIGNIFICANT CHANGE UP (ref 5–17)
ANION GAP SERPL CALC-SCNC: 12 MMOL/L — SIGNIFICANT CHANGE UP (ref 5–17)
ANION GAP SERPL CALC-SCNC: 13 MMOL/L — SIGNIFICANT CHANGE UP (ref 5–17)
ANION GAP SERPL CALC-SCNC: 13 MMOL/L — SIGNIFICANT CHANGE UP (ref 5–17)
APTT BLD: 33.3 SEC — SIGNIFICANT CHANGE UP (ref 27.5–36.3)
BLD GP AB SCN SERPL QL: NEGATIVE — SIGNIFICANT CHANGE UP
BUN SERPL-MCNC: 16 MG/DL — SIGNIFICANT CHANGE UP (ref 7–23)
BUN SERPL-MCNC: 16 MG/DL — SIGNIFICANT CHANGE UP (ref 7–23)
BUN SERPL-MCNC: 18 MG/DL — SIGNIFICANT CHANGE UP (ref 7–23)
BUN SERPL-MCNC: 20 MG/DL — SIGNIFICANT CHANGE UP (ref 7–23)
CALCIUM SERPL-MCNC: 8.8 MG/DL — SIGNIFICANT CHANGE UP (ref 8.4–10.5)
CALCIUM SERPL-MCNC: 8.8 MG/DL — SIGNIFICANT CHANGE UP (ref 8.4–10.5)
CALCIUM SERPL-MCNC: 9 MG/DL — SIGNIFICANT CHANGE UP (ref 8.4–10.5)
CALCIUM SERPL-MCNC: 9.1 MG/DL — SIGNIFICANT CHANGE UP (ref 8.4–10.5)
CHLORIDE SERPL-SCNC: 104 MMOL/L — SIGNIFICANT CHANGE UP (ref 96–108)
CHLORIDE SERPL-SCNC: 104 MMOL/L — SIGNIFICANT CHANGE UP (ref 96–108)
CHLORIDE SERPL-SCNC: 105 MMOL/L — SIGNIFICANT CHANGE UP (ref 96–108)
CHLORIDE SERPL-SCNC: 105 MMOL/L — SIGNIFICANT CHANGE UP (ref 96–108)
CO2 SERPL-SCNC: 21 MMOL/L — LOW (ref 22–31)
CO2 SERPL-SCNC: 22 MMOL/L — SIGNIFICANT CHANGE UP (ref 22–31)
CO2 SERPL-SCNC: 23 MMOL/L — SIGNIFICANT CHANGE UP (ref 22–31)
CO2 SERPL-SCNC: 24 MMOL/L — SIGNIFICANT CHANGE UP (ref 22–31)
CREAT SERPL-MCNC: 0.41 MG/DL — LOW (ref 0.5–1.3)
CREAT SERPL-MCNC: 0.41 MG/DL — LOW (ref 0.5–1.3)
CREAT SERPL-MCNC: 0.44 MG/DL — LOW (ref 0.5–1.3)
CREAT SERPL-MCNC: 0.44 MG/DL — LOW (ref 0.5–1.3)
GLUCOSE SERPL-MCNC: 108 MG/DL — HIGH (ref 70–99)
GLUCOSE SERPL-MCNC: 111 MG/DL — HIGH (ref 70–99)
GLUCOSE SERPL-MCNC: 115 MG/DL — HIGH (ref 70–99)
GLUCOSE SERPL-MCNC: 117 MG/DL — HIGH (ref 70–99)
HCT VFR BLD CALC: 32.5 % — LOW (ref 34.5–45)
HCT VFR BLD CALC: 33.5 % — LOW (ref 34.5–45)
HGB BLD-MCNC: 10.6 G/DL — LOW (ref 11.5–15.5)
HGB BLD-MCNC: 10.9 G/DL — LOW (ref 11.5–15.5)
INR BLD: 1.14 RATIO — SIGNIFICANT CHANGE UP (ref 0.88–1.16)
MAGNESIUM SERPL-MCNC: 2.3 MG/DL — SIGNIFICANT CHANGE UP (ref 1.6–2.6)
MAGNESIUM SERPL-MCNC: 2.5 MG/DL — SIGNIFICANT CHANGE UP (ref 1.6–2.6)
MCHC RBC-ENTMCNC: 28.6 PG — SIGNIFICANT CHANGE UP (ref 27–34)
MCHC RBC-ENTMCNC: 30 PG — SIGNIFICANT CHANGE UP (ref 27–34)
MCHC RBC-ENTMCNC: 31.6 GM/DL — LOW (ref 32–36)
MCHC RBC-ENTMCNC: 33.5 GM/DL — SIGNIFICANT CHANGE UP (ref 32–36)
MCV RBC AUTO: 89.5 FL — SIGNIFICANT CHANGE UP (ref 80–100)
MCV RBC AUTO: 90.3 FL — SIGNIFICANT CHANGE UP (ref 80–100)
NRBC # BLD: 0 /100 WBCS — SIGNIFICANT CHANGE UP (ref 0–0)
NRBC # BLD: 0 /100 WBCS — SIGNIFICANT CHANGE UP (ref 0–0)
PHOSPHATE SERPL-MCNC: 3 MG/DL — SIGNIFICANT CHANGE UP (ref 2.5–4.5)
PHOSPHATE SERPL-MCNC: 3.8 MG/DL — SIGNIFICANT CHANGE UP (ref 2.5–4.5)
PLATELET # BLD AUTO: 383 K/UL — SIGNIFICANT CHANGE UP (ref 150–400)
PLATELET # BLD AUTO: 418 K/UL — HIGH (ref 150–400)
POTASSIUM SERPL-MCNC: 3.8 MMOL/L — SIGNIFICANT CHANGE UP (ref 3.5–5.3)
POTASSIUM SERPL-MCNC: 3.8 MMOL/L — SIGNIFICANT CHANGE UP (ref 3.5–5.3)
POTASSIUM SERPL-MCNC: 3.9 MMOL/L — SIGNIFICANT CHANGE UP (ref 3.5–5.3)
POTASSIUM SERPL-MCNC: 4 MMOL/L — SIGNIFICANT CHANGE UP (ref 3.5–5.3)
POTASSIUM SERPL-SCNC: 3.8 MMOL/L — SIGNIFICANT CHANGE UP (ref 3.5–5.3)
POTASSIUM SERPL-SCNC: 3.8 MMOL/L — SIGNIFICANT CHANGE UP (ref 3.5–5.3)
POTASSIUM SERPL-SCNC: 3.9 MMOL/L — SIGNIFICANT CHANGE UP (ref 3.5–5.3)
POTASSIUM SERPL-SCNC: 4 MMOL/L — SIGNIFICANT CHANGE UP (ref 3.5–5.3)
PROTHROM AB SERPL-ACNC: 13 SEC — HIGH (ref 10–12.9)
RBC # BLD: 3.63 M/UL — LOW (ref 3.8–5.2)
RBC # BLD: 3.71 M/UL — LOW (ref 3.8–5.2)
RBC # FLD: 11.9 % — SIGNIFICANT CHANGE UP (ref 10.3–14.5)
RBC # FLD: 12.1 % — SIGNIFICANT CHANGE UP (ref 10.3–14.5)
RH IG SCN BLD-IMP: POSITIVE — SIGNIFICANT CHANGE UP
SODIUM SERPL-SCNC: 138 MMOL/L — SIGNIFICANT CHANGE UP (ref 135–145)
SODIUM SERPL-SCNC: 139 MMOL/L — SIGNIFICANT CHANGE UP (ref 135–145)
SODIUM SERPL-SCNC: 139 MMOL/L — SIGNIFICANT CHANGE UP (ref 135–145)
SODIUM SERPL-SCNC: 140 MMOL/L — SIGNIFICANT CHANGE UP (ref 135–145)
WBC # BLD: 12.45 K/UL — HIGH (ref 3.8–10.5)
WBC # BLD: 14.86 K/UL — HIGH (ref 3.8–10.5)
WBC # FLD AUTO: 12.45 K/UL — HIGH (ref 3.8–10.5)
WBC # FLD AUTO: 14.86 K/UL — HIGH (ref 3.8–10.5)

## 2020-02-15 PROCEDURE — 99291 CRITICAL CARE FIRST HOUR: CPT

## 2020-02-15 PROCEDURE — 70450 CT HEAD/BRAIN W/O DYE: CPT | Mod: 26,77

## 2020-02-15 PROCEDURE — 70450 CT HEAD/BRAIN W/O DYE: CPT | Mod: 26

## 2020-02-15 PROCEDURE — 99292 CRITICAL CARE ADDL 30 MIN: CPT

## 2020-02-15 RX ORDER — SODIUM CHLORIDE 9 MG/ML
2 INJECTION INTRAMUSCULAR; INTRAVENOUS; SUBCUTANEOUS EVERY 6 HOURS
Refills: 0 | Status: DISCONTINUED | OUTPATIENT
Start: 2020-02-15 | End: 2020-02-19

## 2020-02-15 RX ORDER — POTASSIUM CHLORIDE 20 MEQ
20 PACKET (EA) ORAL ONCE
Refills: 0 | Status: COMPLETED | OUTPATIENT
Start: 2020-02-15 | End: 2020-02-15

## 2020-02-15 RX ADMIN — SODIUM CHLORIDE 2 GRAM(S): 9 INJECTION INTRAMUSCULAR; INTRAVENOUS; SUBCUTANEOUS at 17:05

## 2020-02-15 RX ADMIN — SODIUM CHLORIDE 50 MILLILITER(S): 5 INJECTION, SOLUTION INTRAVENOUS at 05:37

## 2020-02-15 RX ADMIN — NIMODIPINE 60 MILLIGRAM(S): 60 SOLUTION ORAL at 02:20

## 2020-02-15 RX ADMIN — NIMODIPINE 60 MILLIGRAM(S): 60 SOLUTION ORAL at 18:08

## 2020-02-15 RX ADMIN — FLUDROCORTISONE ACETATE 0.1 MILLIGRAM(S): 0.1 TABLET ORAL at 17:05

## 2020-02-15 RX ADMIN — PANTOPRAZOLE SODIUM 40 MILLIGRAM(S): 20 TABLET, DELAYED RELEASE ORAL at 11:42

## 2020-02-15 RX ADMIN — Medication 20 MILLIEQUIVALENT(S): at 05:30

## 2020-02-15 RX ADMIN — Medication 650 MILLIGRAM(S): at 20:44

## 2020-02-15 RX ADMIN — CHLORHEXIDINE GLUCONATE 1 APPLICATION(S): 213 SOLUTION TOPICAL at 22:09

## 2020-02-15 RX ADMIN — FLUDROCORTISONE ACETATE 0.1 MILLIGRAM(S): 0.1 TABLET ORAL at 05:30

## 2020-02-15 RX ADMIN — SODIUM CHLORIDE 2 GRAM(S): 9 INJECTION INTRAMUSCULAR; INTRAVENOUS; SUBCUTANEOUS at 11:42

## 2020-02-15 RX ADMIN — SODIUM CHLORIDE 50 MILLILITER(S): 5 INJECTION, SOLUTION INTRAVENOUS at 10:09

## 2020-02-15 RX ADMIN — NIMODIPINE 60 MILLIGRAM(S): 60 SOLUTION ORAL at 10:08

## 2020-02-15 RX ADMIN — NIMODIPINE 60 MILLIGRAM(S): 60 SOLUTION ORAL at 22:08

## 2020-02-15 RX ADMIN — Medication 650 MILLIGRAM(S): at 20:14

## 2020-02-15 RX ADMIN — NIMODIPINE 60 MILLIGRAM(S): 60 SOLUTION ORAL at 13:52

## 2020-02-15 RX ADMIN — NIMODIPINE 60 MILLIGRAM(S): 60 SOLUTION ORAL at 05:30

## 2020-02-15 NOTE — PROGRESS NOTE ADULT - SUBJECTIVE AND OBJECTIVE BOX
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 She arrived intubated and sedated. After coming to [facility] ED, she underwent repeat imaging and was taken to the OR emergently for craniectomy and clipping of right posterior communicating artery aneurysm.     ADMISSION SCORES:  GCS: 3T  Hunt-Gonzalez: 5  modified Couch: 3  ICH score: 2    HOSPITAL COURSE:  2/3- Clipping R PCOMM              EVD placed   2/5- Extubated overnight  2/6- 500cc Bolus  2/7- Following commands on all extremities  2/8- R  cm2 from 129  2/11- failed FEEST   2/11- Superficial thrombophlebitis seen on doppler;   2/11- overnight EVD stopped working, no change in neuro exam    2/12- EVD clamped; no change in neuro exam  2/12- Febrile overnight (TMax 39.1); CXR shows clear lungs; Blood cultures pending  2/13- EVD clamped; stable neuro exam  2/13- Positive U/A; Blood cultures negative at 24h  2/13- drop in sodium to 132; started on 2% NaCl 50ml/hr  2/14- EVD remains clamped; stable neuro exam            ICU Vital Signs Last 24 Hrs  T(C): 37.3 (15 Feb 2020 03:00), Max: 38.2 (14 Feb 2020 17:00)  T(F): 99.2 (15 Feb 2020 03:00), Max: 100.8 (14 Feb 2020 17:00)  HR: 92 (15 Feb 2020 06:00) (79 - 102)  BP: 138/87 (15 Feb 2020 06:00) (128/73 - 166/90)  BP(mean): 103 (15 Feb 2020 06:00) (85 - 115)  ABP: --  ABP(mean): --  RR: 21 (15 Feb 2020 06:00) (15 - 24)  SpO2: 100% (15 Feb 2020 06:00) (100% - 100%)      02-13-20 @ 07:01  -  02-14-20 @ 07:00  --------------------------------------------------------  IN: 2280 mL / OUT: 1600 mL / NET: 680 mL    02-14-20 @ 07:01  -  02-15-20 @ 06:57  --------------------------------------------------------  IN: 2775 mL / OUT: 1400 mL / NET: 1375 mL          acetaminophen    Suspension .. 650 milliGRAM(s) Oral every 6 hours PRN  albuterol/ipratropium for Nebulization. 3 milliLiter(s) Nebulizer every 6 hours PRN  artificial tears (preservative free) Ophthalmic Solution 1 Drop(s) Both EYES three times a day PRN  chlorhexidine 4% Liquid 1 Application(s) Topical <User Schedule>  fludroCORTISONE 0.1 milliGRAM(s) Oral every 12 hours  niMODipine Oral Solution 60 milliGRAM(s) Oral every 4 hours  ondansetron Injectable 4 milliGRAM(s) IV Push every 8 hours PRN  oxyCODONE    IR 5 milliGRAM(s) Oral every 4 hours PRN  oxyCODONE    IR 10 milliGRAM(s) Oral every 4 hours PRN  pantoprazole  Injectable 40 milliGRAM(s) IV Push daily  sodium chloride 2% . 1000 milliLiter(s) (50 mL/Hr) IV Continuous <Continuous>      LABS:  Na: 139 (02-15 @ 02:28), 134 (02-14 @ 18:46), 136 (02-14 @ 12:32), 132 (02-14 @ 03:35), 133 (02-14 @ 02:31), 137 (02-12 @ 21:15)  K: 3.8 (02-15 @ 02:28), 4.0 (02-14 @ 18:46), 3.9 (02-14 @ 12:32), 3.9 (02-14 @ 03:35), 3.7 (02-14 @ 02:31), 4.1 (02-12 @ 21:15)  Cl: 104 (02-15 @ 02:28), 101 (02-14 @ 18:46), 100 (02-14 @ 12:32), 95 (02-14 @ 03:35), 98 (02-14 @ 02:31), 101 (02-12 @ 21:15)  CO2: 22 (02-15 @ 02:28), 21 (02-14 @ 18:46), 23 (02-14 @ 12:32), 24 (02-14 @ 03:35), 23 (02-14 @ 02:31), 24 (02-12 @ 21:15)  BUN: 16 (02-15 @ 02:28), 18 (02-14 @ 18:46), 19 (02-14 @ 12:32), 20 (02-14 @ 03:35), 19 (02-14 @ 02:31), 20 (02-12 @ 21:15)  Cr: 0.44 (02-15 @ 02:28), 0.46 (02-14 @ 18:46), 0.47 (02-14 @ 12:32), 0.44 (02-14 @ 03:35), 0.43 (02-14 @ 02:31), 0.54 (02-12 @ 21:15)  Glu: 117(02-15 @ 02:28), 125(02-14 @ 18:46), 116(02-14 @ 12:32), 115(02-14 @ 03:35), 115(02-14 @ 02:31), 128(02-12 @ 21:15)    Hgb: 10.9 (02-15 @ 02:28), 11.2 (02-14 @ 02:31)  Hct: 32.5 (02-15 @ 02:28), 34.9 (02-14 @ 02:31)  WBC: 14.86 (02-15 @ 02:28), 17.79 (02-14 @ 02:31)  Plt: 383 (02-15 @ 02:28), 355 (02-14 @ 02:31)    INR:   PTT:           EXAMINATION:  General:  Calm.   HEENT:  MMM. EO spontaneously.   Neuro: A&O x3. L facial droop. EOMI. Dysarthria - improving.   RUE  5/5  LUE drift; LUE 5/5 flexion, 4+/5 extension, 4+/5 HG with incremental improvement.  RLE  5/5  LLE - 4+/5 hip flexion, 4+/5 knee extension, 4+/5 DF and PF  Cards:  Normal S1/S2 with rate and rhythm. No murmur.   Respiratory: Clear lung fields BL  Abdomen:  soft, +BS  Extremities:  no edema. LUE - erythema, swelling. Cord palpated.  RUE - erythema - marked. 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 She arrived intubated and sedated. After coming to [facility] ED, she underwent repeat imaging and was taken to the OR emergently for craniectomy and clipping of right posterior communicating artery aneurysm.     ADMISSION SCORES:  GCS: 3T  Hunt-Gonzalez: 5  modified Couch: 3  ICH score: 2    HOSPITAL COURSE:  2/3- Clipping R PCOMM              EVD placed   2/5- Extubated overnight  2/6- 500cc Bolus  2/7- Following commands on all extremities  2/8- R  cm2 from 129  2/11- failed FEEST   2/11- Superficial thrombophlebitis seen on doppler;   2/11- overnight EVD stopped working, no change in neuro exam    2/12- EVD clamped; no change in neuro exam  2/12- Febrile overnight (TMax 39.1); CXR shows clear lungs; Blood cultures pending  2/13- EVD clamped; stable neuro exam  2/13- Positive U/A; Blood cultures negative at 24h  2/13- drop in sodium to 132; started on 2% NaCl 50ml/hr  2/14- EVD remains clamped; stable neuro exam  2/15: EVD remains clamped, the IPD is stable             ICU Vital Signs Last 24 Hrs  T(C): 37.3 (15 Feb 2020 03:00), Max: 38.2 (14 Feb 2020 17:00)  T(F): 99.2 (15 Feb 2020 03:00), Max: 100.8 (14 Feb 2020 17:00)  HR: 92 (15 Feb 2020 06:00) (79 - 102)  BP: 138/87 (15 Feb 2020 06:00) (128/73 - 166/90)  BP(mean): 103 (15 Feb 2020 06:00) (85 - 115)  ABP: --  ABP(mean): --  RR: 21 (15 Feb 2020 06:00) (15 - 24)  SpO2: 100% (15 Feb 2020 06:00) (100% - 100%)      02-13-20 @ 07:01  -  02-14-20 @ 07:00  --------------------------------------------------------  IN: 2280 mL / OUT: 1600 mL / NET: 680 mL    02-14-20 @ 07:01  -  02-15-20 @ 06:57  --------------------------------------------------------  IN: 2775 mL / OUT: 1400 mL / NET: 1375 mL          acetaminophen    Suspension .. 650 milliGRAM(s) Oral every 6 hours PRN  albuterol/ipratropium for Nebulization. 3 milliLiter(s) Nebulizer every 6 hours PRN  artificial tears (preservative free) Ophthalmic Solution 1 Drop(s) Both EYES three times a day PRN  chlorhexidine 4% Liquid 1 Application(s) Topical <User Schedule>  fludroCORTISONE 0.1 milliGRAM(s) Oral every 12 hours  niMODipine Oral Solution 60 milliGRAM(s) Oral every 4 hours  ondansetron Injectable 4 milliGRAM(s) IV Push every 8 hours PRN  oxyCODONE    IR 5 milliGRAM(s) Oral every 4 hours PRN  oxyCODONE    IR 10 milliGRAM(s) Oral every 4 hours PRN  pantoprazole  Injectable 40 milliGRAM(s) IV Push daily  sodium chloride 2% . 1000 milliLiter(s) (50 mL/Hr) IV Continuous <Continuous>      LABS:  Na: 139 (02-15 @ 02:28), 134 (02-14 @ 18:46), 136 (02-14 @ 12:32), 132 (02-14 @ 03:35), 133 (02-14 @ 02:31), 137 (02-12 @ 21:15)  K: 3.8 (02-15 @ 02:28), 4.0 (02-14 @ 18:46), 3.9 (02-14 @ 12:32), 3.9 (02-14 @ 03:35), 3.7 (02-14 @ 02:31), 4.1 (02-12 @ 21:15)  Cl: 104 (02-15 @ 02:28), 101 (02-14 @ 18:46), 100 (02-14 @ 12:32), 95 (02-14 @ 03:35), 98 (02-14 @ 02:31), 101 (02-12 @ 21:15)  CO2: 22 (02-15 @ 02:28), 21 (02-14 @ 18:46), 23 (02-14 @ 12:32), 24 (02-14 @ 03:35), 23 (02-14 @ 02:31), 24 (02-12 @ 21:15)  BUN: 16 (02-15 @ 02:28), 18 (02-14 @ 18:46), 19 (02-14 @ 12:32), 20 (02-14 @ 03:35), 19 (02-14 @ 02:31), 20 (02-12 @ 21:15)  Cr: 0.44 (02-15 @ 02:28), 0.46 (02-14 @ 18:46), 0.47 (02-14 @ 12:32), 0.44 (02-14 @ 03:35), 0.43 (02-14 @ 02:31), 0.54 (02-12 @ 21:15)  Glu: 117(02-15 @ 02:28), 125(02-14 @ 18:46), 116(02-14 @ 12:32), 115(02-14 @ 03:35), 115(02-14 @ 02:31), 128(02-12 @ 21:15)    Hgb: 10.9 (02-15 @ 02:28), 11.2 (02-14 @ 02:31)  Hct: 32.5 (02-15 @ 02:28), 34.9 (02-14 @ 02:31)  WBC: 14.86 (02-15 @ 02:28), 17.79 (02-14 @ 02:31)  Plt: 383 (02-15 @ 02:28), 355 (02-14 @ 02:31)    INR:   PTT:           EXAMINATION:  General:  Calm.   HEENT:  MMM. EO spontaneously.   Neuro: A&O x3. L facial droop. EOMI. Dysarthria - improving.   RUE  5/5  LUE drift; LUE 5/5 flexion, 4+/5 extension, 4+/5 HG with incremental improvement.  RLE  5/5  LLE - 4+/5 hip flexion, 4+/5 knee extension, 4+/5 DF and PF  Cards:  Normal S1/S2 with rate and rhythm. No murmur.   Respiratory: Clear lung fields BL  Abdomen:  soft, +BS  Extremities:  no edema. LUE - erythema, swelling. Cord palpated.  RUE - erythema - marked.

## 2020-02-15 NOTE — PROGRESS NOTE ADULT - SUBJECTIVE AND OBJECTIVE BOX
CRISTAL d/c'ed    vitals/labs/meds reviewed  alert, oriented x3, left facial, left side 4+/5 DF/PF and throughout almost full strength, right side full strength

## 2020-02-15 NOTE — PROVIDER CONTACT NOTE (CHANGE IN STATUS NOTIFICATION) - ASSESSMENT
See flowsheet for full neuro exam. Patient a&ox4, drifts noted on LUE and LLE. Pupils throughout shift were 3mm round and brisk bilaterally, at 1530 post EVD removal, left pupil noted to be 4mm round and reactive.

## 2020-02-15 NOTE — PROGRESS NOTE ADULT - SUBJECTIVE AND OBJECTIVE BOX
Patient seen and examined at bedside.    --Anticoagulation--    T(C): 37.3 (02-15-20 @ 03:00), Max: 38.2 (02-14-20 @ 17:00)  HR: 97 (02-15-20 @ 03:00) (79 - 102)  BP: 141/92 (02-15-20 @ 03:00) (128/73 - 166/90)  RR: 19 (02-15-20 @ 03:00) (15 - 32)  SpO2: 100% (02-15-20 @ 03:00) (100% - 100%)  Wt(kg): --    Exam:

## 2020-02-15 NOTE — PROGRESS NOTE ADULT - ASSESSMENT
s/p Rt craniectomy, clipping of Pcomm aneurysm, evacuation of hematoma    - EVD with hemorrhage at tip.  Clamped.  CTH in AM. If stable dc  - TCDs, euvolemia, nimodipine  - Mobilize  - Pain control  - monitor for fevers

## 2020-02-15 NOTE — PROGRESS NOTE ADULT - ASSESSMENT
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)    NEURO:  q1h neuro checks  non obstructive hydrocephalus EVD clamped 2/12  slight increase in IPH pericatheter, EVD to remain (clamped) pending f/u CTH  CT head tomorrow   Delayed Cerebral Ischemia: TCD, euvolemia, Nimodipine  Pain control w/ Tylenol prn, Oxy 5/10 prn  OOB with helmet/PT/OT    PULM:  Extubated 2/5  SpO2 > 92%  Incentive spirometry, if able    CARDS:  - 220 mHg  TTE- Nl LV function, no wall motion abnormalities    GASTRO:  TF  Speech/Swallow eval- failed FEEST 2/11   last BM - 2/13/20  ---> Stress ulcer prophylaxis:  Not indicated    RENAL:  Goal: Eunatremia; Siadh STARTED  on 2% NaCl @ 50ml/hr  BMP Q 8 HR     ENDO:  euglycemia    HEME:  monitor H/H; Draw CBC today   ---> DVT prophylaxis: SCDs,HOLD  Lovenox 40 sc given IPH     ID:  Follow up blood cultures drawn on 2/12 (Negative at 24h); discontinued vancomycin (last dose was yesterday)   Positive urinalaysis - many bacteria, positive leuk esterase, will send urine culture       Code status:  Full code  Disposition:  ICU    Updated family at bedside.    This patient was at high risk of neurologic deterioration and/or death due to: re-hemorrhage, seizures, DCI.     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)    NEURO:  q1h neuro checks  non obstructive hydrocephalus EVD clamped 2/12  stable IPH pericatheter, d/c EVD   repeat head CT tomorrow   Delayed Cerebral Ischemia: TCD, euvolemia, Nimodipine  Pain control w/ Tylenol prn, Oxy 5/10 prn  OOB with helmet/PT/OT    PULM:  Extubated 2/5  SpO2 > 92%  Incentive spirometry, if able    CARDS:  - 160 mHg  TTE- Nl LV function, no wall motion abnormalities    GASTRO:  TF  Speech/Swallow eval- failed FEEST 2/11   last BM - 2/15/20  ---> Stress ulcer prophylaxis:  Not indicated    RENAL:  Goal: Eunatremia; Siadh  d/c 2 %, salt tablets   BMP Q 8 HR     ENDO:  euglycemia    HEME:  monitor H/H;  ---> DVT prophylaxis: SCDs,HOLD  Lovenox as EVD is being removed today  ID:  Follow up blood cultures drawn on 2/12 (Negative at 24h); discontinued vancomycin (last dose was yesterday)   Positive urinalaysis - many bacteria, positive leuk esterase,  urine culture pending      Code status:  Full code  Disposition:  ICU    Updated family at bedside.    This patient was at high risk of neurologic deterioration and/or death due to: re-hemorrhage, seizures, DCI.     Time spent:  45 minutes

## 2020-02-15 NOTE — PROGRESS NOTE ADULT - ASSESSMENT
HH4 mF3 subarachnoid hemorrhage, post-bleed day 12    - TCDs, euvolemia, nimodipine  - monitor Na for goal 135-145; hypertonics, florinef  - -220mmHg  - Mobilize  - Pain control  - monitor for fevers

## 2020-02-16 LAB
ANION GAP SERPL CALC-SCNC: 13 MMOL/L — SIGNIFICANT CHANGE UP (ref 5–17)
APPEARANCE UR: CLEAR — SIGNIFICANT CHANGE UP
BACTERIA # UR AUTO: ABNORMAL
BILIRUB UR-MCNC: NEGATIVE — SIGNIFICANT CHANGE UP
BUN SERPL-MCNC: 22 MG/DL — SIGNIFICANT CHANGE UP (ref 7–23)
CALCIUM SERPL-MCNC: 9.3 MG/DL — SIGNIFICANT CHANGE UP (ref 8.4–10.5)
CHLORIDE SERPL-SCNC: 106 MMOL/L — SIGNIFICANT CHANGE UP (ref 96–108)
CO2 SERPL-SCNC: 24 MMOL/L — SIGNIFICANT CHANGE UP (ref 22–31)
COLOR SPEC: SIGNIFICANT CHANGE UP
CREAT SERPL-MCNC: 0.5 MG/DL — SIGNIFICANT CHANGE UP (ref 0.5–1.3)
DIFF PNL FLD: NEGATIVE — SIGNIFICANT CHANGE UP
EPI CELLS # UR: 12 /HPF — HIGH
GLUCOSE SERPL-MCNC: 134 MG/DL — HIGH (ref 70–99)
GLUCOSE UR QL: NEGATIVE — SIGNIFICANT CHANGE UP
HYALINE CASTS # UR AUTO: 3 /LPF — HIGH (ref 0–2)
KETONES UR-MCNC: NEGATIVE — SIGNIFICANT CHANGE UP
LEUKOCYTE ESTERASE UR-ACNC: ABNORMAL
NITRITE UR-MCNC: POSITIVE
PH UR: 6 — SIGNIFICANT CHANGE UP (ref 5–8)
POTASSIUM SERPL-MCNC: 3.7 MMOL/L — SIGNIFICANT CHANGE UP (ref 3.5–5.3)
POTASSIUM SERPL-SCNC: 3.7 MMOL/L — SIGNIFICANT CHANGE UP (ref 3.5–5.3)
PROT UR-MCNC: ABNORMAL
RBC CASTS # UR COMP ASSIST: 2 /HPF — SIGNIFICANT CHANGE UP (ref 0–4)
SODIUM SERPL-SCNC: 143 MMOL/L — SIGNIFICANT CHANGE UP (ref 135–145)
SP GR SPEC: 1.03 — HIGH (ref 1.01–1.02)
UROBILINOGEN FLD QL: NEGATIVE — SIGNIFICANT CHANGE UP
WBC UR QL: 16 /HPF — HIGH (ref 0–5)

## 2020-02-16 PROCEDURE — 71045 X-RAY EXAM CHEST 1 VIEW: CPT | Mod: 26

## 2020-02-16 PROCEDURE — 70450 CT HEAD/BRAIN W/O DYE: CPT | Mod: 26

## 2020-02-16 PROCEDURE — 93888 INTRACRANIAL LIMITED STUDY: CPT | Mod: 26

## 2020-02-16 PROCEDURE — 99232 SBSQ HOSP IP/OBS MODERATE 35: CPT

## 2020-02-16 RX ORDER — SODIUM CHLORIDE 9 MG/ML
1000 INJECTION INTRAMUSCULAR; INTRAVENOUS; SUBCUTANEOUS ONCE
Refills: 0 | Status: COMPLETED | OUTPATIENT
Start: 2020-02-16 | End: 2020-02-16

## 2020-02-16 RX ORDER — SODIUM CHLORIDE 9 MG/ML
500 INJECTION INTRAMUSCULAR; INTRAVENOUS; SUBCUTANEOUS ONCE
Refills: 0 | Status: COMPLETED | OUTPATIENT
Start: 2020-02-16 | End: 2020-02-16

## 2020-02-16 RX ORDER — HEPARIN SODIUM 5000 [USP'U]/ML
5000 INJECTION INTRAVENOUS; SUBCUTANEOUS EVERY 12 HOURS
Refills: 0 | Status: DISCONTINUED | OUTPATIENT
Start: 2020-02-17 | End: 2020-02-28

## 2020-02-16 RX ADMIN — SODIUM CHLORIDE 1000 MILLILITER(S): 9 INJECTION INTRAMUSCULAR; INTRAVENOUS; SUBCUTANEOUS at 18:38

## 2020-02-16 RX ADMIN — Medication 650 MILLIGRAM(S): at 23:30

## 2020-02-16 RX ADMIN — NIMODIPINE 60 MILLIGRAM(S): 60 SOLUTION ORAL at 06:02

## 2020-02-16 RX ADMIN — NIMODIPINE 60 MILLIGRAM(S): 60 SOLUTION ORAL at 02:02

## 2020-02-16 RX ADMIN — SODIUM CHLORIDE 2 GRAM(S): 9 INJECTION INTRAMUSCULAR; INTRAVENOUS; SUBCUTANEOUS at 05:52

## 2020-02-16 RX ADMIN — Medication 650 MILLIGRAM(S): at 14:44

## 2020-02-16 RX ADMIN — Medication 650 MILLIGRAM(S): at 15:15

## 2020-02-16 RX ADMIN — SODIUM CHLORIDE 2 GRAM(S): 9 INJECTION INTRAMUSCULAR; INTRAVENOUS; SUBCUTANEOUS at 01:08

## 2020-02-16 RX ADMIN — SODIUM CHLORIDE 2 GRAM(S): 9 INJECTION INTRAMUSCULAR; INTRAVENOUS; SUBCUTANEOUS at 23:19

## 2020-02-16 RX ADMIN — NIMODIPINE 60 MILLIGRAM(S): 60 SOLUTION ORAL at 22:02

## 2020-02-16 RX ADMIN — NIMODIPINE 60 MILLIGRAM(S): 60 SOLUTION ORAL at 14:00

## 2020-02-16 RX ADMIN — OXYCODONE HYDROCHLORIDE 5 MILLIGRAM(S): 5 TABLET ORAL at 22:32

## 2020-02-16 RX ADMIN — NIMODIPINE 60 MILLIGRAM(S): 60 SOLUTION ORAL at 18:06

## 2020-02-16 RX ADMIN — SODIUM CHLORIDE 2 GRAM(S): 9 INJECTION INTRAMUSCULAR; INTRAVENOUS; SUBCUTANEOUS at 18:06

## 2020-02-16 RX ADMIN — NIMODIPINE 60 MILLIGRAM(S): 60 SOLUTION ORAL at 10:06

## 2020-02-16 RX ADMIN — OXYCODONE HYDROCHLORIDE 5 MILLIGRAM(S): 5 TABLET ORAL at 22:02

## 2020-02-16 RX ADMIN — SODIUM CHLORIDE 1000 MILLILITER(S): 9 INJECTION INTRAMUSCULAR; INTRAVENOUS; SUBCUTANEOUS at 14:45

## 2020-02-16 RX ADMIN — CHLORHEXIDINE GLUCONATE 1 APPLICATION(S): 213 SOLUTION TOPICAL at 22:02

## 2020-02-16 RX ADMIN — PANTOPRAZOLE SODIUM 40 MILLIGRAM(S): 20 TABLET, DELAYED RELEASE ORAL at 11:12

## 2020-02-16 RX ADMIN — SODIUM CHLORIDE 2 GRAM(S): 9 INJECTION INTRAMUSCULAR; INTRAVENOUS; SUBCUTANEOUS at 11:11

## 2020-02-16 RX ADMIN — FLUDROCORTISONE ACETATE 0.1 MILLIGRAM(S): 0.1 TABLET ORAL at 05:52

## 2020-02-16 RX ADMIN — Medication 650 MILLIGRAM(S): at 06:45

## 2020-02-16 RX ADMIN — Medication 650 MILLIGRAM(S): at 23:00

## 2020-02-16 RX ADMIN — SODIUM CHLORIDE 1000 MILLILITER(S): 9 INJECTION INTRAMUSCULAR; INTRAVENOUS; SUBCUTANEOUS at 22:45

## 2020-02-16 RX ADMIN — FLUDROCORTISONE ACETATE 0.1 MILLIGRAM(S): 0.1 TABLET ORAL at 18:06

## 2020-02-16 RX ADMIN — Medication 650 MILLIGRAM(S): at 07:15

## 2020-02-16 NOTE — CHART NOTE - NSCHARTNOTEFT_GEN_A_CORE
Transcranial doppler exam #1 (2/4/2020) 1045am  mean velocity  cm/sec                              Left         Right  MIKHAIL                    57           74  MCA                   55           72  PCA                     23,24       x  VERT -Could not insonate, patient is intubated.  BA                                x  Technically difficult study.  Official report to follow.  S.Castro    Transcranial doppler exam #2 (2/6/2020) 11:15am  mean velocity  cm/sec                              Left         Right  MIKHAIL                   74            73  MCA                  78            124  PCA                    23,30        x  Official report to follow.  S.Castro    Transcranial doppler exam #3 (2/7/2020) 1045am  mean velocity  cm/sec                              Left         Right  MIKHAIL                    67           91  MCA                   79           125  PCA                      P2-34      x  Technically difficult study.  Official report to follow.  S.Castro    Transcranial doppler exam #4 (2/8/2020) 1015am  mean velocity  cm/sec                              Left         Right  MIKHAIL                    73           84  MCA                   68           115  PCA                     31,28        x  Official report to follow.  S.Castro    Transcranial doppler exam #5 (2/10/2020) 11am  mean velocity  cm/sec                              Left         Right  MIKHAIL                    68           93  MCA                   71           133  PCA                     29,34        x  Official report to follow.  S.Castro    Transcranial doppler exam #6 (2/11/2020) 11am  mean velocity  cm/sec                              Left         Right  MIKHAIL                    70            85  MCA                   70            95  PCA                     34,24        x  Official report to follow.  S.Castro    Transcranial doppler exam #7 (2/12/2020) 1430pm  mean velocity  cm/sec                              Left         Right  MIKHAIL                     61           75  MCA                   65             98  PCA                     P2-28        x  Official report to follow.  S.Castro    Transcranial doppler exam #8 (2/13/2020) 1145am  mean velocity  cm/sec                              Left         Right  MIKHAIL                    51             56  MCA                   57             95  PCA                     22,32        x  Official report to follow.  S.Castro    Transcranial doppler exam #9 (2/142020) 1030am  mean velocity  cm/sec                              Left         Right  MIKHAIL                    66           58  MCA                   76           97  PCA                     P2-25       x  Official report to follow.  S.Castro    Transcranial doppler exam #10 (2/16/2020) 11:15am  mean velocity  cm/sec                              Left         Right  MIKHAIL                    60           63  MCA                   69           90  Official report to follow.  S.Castro

## 2020-02-16 NOTE — PROGRESS NOTE ADULT - ASSESSMENT
HH4 mF3 subarachnoid hemorrhage, post-bleed day 13    - TCDs, euvolemia, nimodipine  - monitor Na for goal 135-145; hypertonics, florinef  - -220mmHg  - Mobilize  - Pain control  - monitor for fevers

## 2020-02-16 NOTE — PROGRESS NOTE ADULT - SUBJECTIVE AND OBJECTIVE BOX
started on antibiotics for UTI    vitals/labs/meds reviewed  Awakens to voice, oriented x3, mildly confused, left facial, left side 4+/5, right side full strength started on antibiotics for UTI  CTH stable track heme    vitals/labs/meds reviewed  Awakens to voice, oriented x3, mildly confused, left facial, left side 4+/5, right side full strength

## 2020-02-17 LAB
ANION GAP SERPL CALC-SCNC: 10 MMOL/L — SIGNIFICANT CHANGE UP (ref 5–17)
ANION GAP SERPL CALC-SCNC: 12 MMOL/L — SIGNIFICANT CHANGE UP (ref 5–17)
BUN SERPL-MCNC: 14 MG/DL — SIGNIFICANT CHANGE UP (ref 7–23)
BUN SERPL-MCNC: 20 MG/DL — SIGNIFICANT CHANGE UP (ref 7–23)
CALCIUM SERPL-MCNC: 8.7 MG/DL — SIGNIFICANT CHANGE UP (ref 8.4–10.5)
CALCIUM SERPL-MCNC: 9.3 MG/DL — SIGNIFICANT CHANGE UP (ref 8.4–10.5)
CHLORIDE SERPL-SCNC: 105 MMOL/L — SIGNIFICANT CHANGE UP (ref 96–108)
CHLORIDE SERPL-SCNC: 107 MMOL/L — SIGNIFICANT CHANGE UP (ref 96–108)
CO2 SERPL-SCNC: 23 MMOL/L — SIGNIFICANT CHANGE UP (ref 22–31)
CO2 SERPL-SCNC: 24 MMOL/L — SIGNIFICANT CHANGE UP (ref 22–31)
CREAT SERPL-MCNC: 0.41 MG/DL — LOW (ref 0.5–1.3)
CREAT SERPL-MCNC: 0.41 MG/DL — LOW (ref 0.5–1.3)
GLUCOSE SERPL-MCNC: 124 MG/DL — HIGH (ref 70–99)
GLUCOSE SERPL-MCNC: 149 MG/DL — HIGH (ref 70–99)
HCT VFR BLD CALC: 35.4 % — SIGNIFICANT CHANGE UP (ref 34.5–45)
HGB BLD-MCNC: 11.2 G/DL — LOW (ref 11.5–15.5)
MAGNESIUM SERPL-MCNC: 2.2 MG/DL — SIGNIFICANT CHANGE UP (ref 1.6–2.6)
MAGNESIUM SERPL-MCNC: 2.3 MG/DL — SIGNIFICANT CHANGE UP (ref 1.6–2.6)
MCHC RBC-ENTMCNC: 29 PG — SIGNIFICANT CHANGE UP (ref 27–34)
MCHC RBC-ENTMCNC: 31.6 GM/DL — LOW (ref 32–36)
MCV RBC AUTO: 91.7 FL — SIGNIFICANT CHANGE UP (ref 80–100)
NRBC # BLD: 0 /100 WBCS — SIGNIFICANT CHANGE UP (ref 0–0)
PHOSPHATE SERPL-MCNC: 3.3 MG/DL — SIGNIFICANT CHANGE UP (ref 2.5–4.5)
PHOSPHATE SERPL-MCNC: 3.9 MG/DL — SIGNIFICANT CHANGE UP (ref 2.5–4.5)
PLATELET # BLD AUTO: 463 K/UL — HIGH (ref 150–400)
POTASSIUM SERPL-MCNC: 3.4 MMOL/L — LOW (ref 3.5–5.3)
POTASSIUM SERPL-MCNC: 3.7 MMOL/L — SIGNIFICANT CHANGE UP (ref 3.5–5.3)
POTASSIUM SERPL-SCNC: 3.4 MMOL/L — LOW (ref 3.5–5.3)
POTASSIUM SERPL-SCNC: 3.7 MMOL/L — SIGNIFICANT CHANGE UP (ref 3.5–5.3)
RBC # BLD: 3.86 M/UL — SIGNIFICANT CHANGE UP (ref 3.8–5.2)
RBC # FLD: 12.5 % — SIGNIFICANT CHANGE UP (ref 10.3–14.5)
SODIUM SERPL-SCNC: 140 MMOL/L — SIGNIFICANT CHANGE UP (ref 135–145)
SODIUM SERPL-SCNC: 141 MMOL/L — SIGNIFICANT CHANGE UP (ref 135–145)
WBC # BLD: 12.56 K/UL — HIGH (ref 3.8–10.5)
WBC # FLD AUTO: 12.56 K/UL — HIGH (ref 3.8–10.5)

## 2020-02-17 PROCEDURE — 99233 SBSQ HOSP IP/OBS HIGH 50: CPT

## 2020-02-17 RX ORDER — POTASSIUM CHLORIDE 20 MEQ
40 PACKET (EA) ORAL ONCE
Refills: 0 | Status: COMPLETED | OUTPATIENT
Start: 2020-02-17 | End: 2020-02-17

## 2020-02-17 RX ORDER — POTASSIUM CHLORIDE 20 MEQ
40 PACKET (EA) ORAL EVERY 4 HOURS
Refills: 0 | Status: COMPLETED | OUTPATIENT
Start: 2020-02-17 | End: 2020-02-17

## 2020-02-17 RX ORDER — ALTEPLASE 100 MG
2 KIT INTRAVENOUS ONCE
Refills: 0 | Status: COMPLETED | OUTPATIENT
Start: 2020-02-17 | End: 2020-02-17

## 2020-02-17 RX ADMIN — HEPARIN SODIUM 5000 UNIT(S): 5000 INJECTION INTRAVENOUS; SUBCUTANEOUS at 09:08

## 2020-02-17 RX ADMIN — NIMODIPINE 60 MILLIGRAM(S): 60 SOLUTION ORAL at 13:49

## 2020-02-17 RX ADMIN — NIMODIPINE 60 MILLIGRAM(S): 60 SOLUTION ORAL at 09:09

## 2020-02-17 RX ADMIN — Medication 40 MILLIEQUIVALENT(S): at 09:08

## 2020-02-17 RX ADMIN — Medication 40 MILLIEQUIVALENT(S): at 06:22

## 2020-02-17 RX ADMIN — NIMODIPINE 60 MILLIGRAM(S): 60 SOLUTION ORAL at 06:22

## 2020-02-17 RX ADMIN — CHLORHEXIDINE GLUCONATE 1 APPLICATION(S): 213 SOLUTION TOPICAL at 21:29

## 2020-02-17 RX ADMIN — FLUDROCORTISONE ACETATE 0.1 MILLIGRAM(S): 0.1 TABLET ORAL at 17:37

## 2020-02-17 RX ADMIN — SODIUM CHLORIDE 2 GRAM(S): 9 INJECTION INTRAMUSCULAR; INTRAVENOUS; SUBCUTANEOUS at 17:41

## 2020-02-17 RX ADMIN — NIMODIPINE 60 MILLIGRAM(S): 60 SOLUTION ORAL at 21:29

## 2020-02-17 RX ADMIN — SODIUM CHLORIDE 2 GRAM(S): 9 INJECTION INTRAMUSCULAR; INTRAVENOUS; SUBCUTANEOUS at 23:50

## 2020-02-17 RX ADMIN — Medication 40 MILLIEQUIVALENT(S): at 23:50

## 2020-02-17 RX ADMIN — HEPARIN SODIUM 5000 UNIT(S): 5000 INJECTION INTRAVENOUS; SUBCUTANEOUS at 20:32

## 2020-02-17 RX ADMIN — NIMODIPINE 60 MILLIGRAM(S): 60 SOLUTION ORAL at 02:16

## 2020-02-17 RX ADMIN — ALTEPLASE 2 MILLIGRAM(S): KIT at 01:00

## 2020-02-17 RX ADMIN — SODIUM CHLORIDE 2 GRAM(S): 9 INJECTION INTRAMUSCULAR; INTRAVENOUS; SUBCUTANEOUS at 06:22

## 2020-02-17 RX ADMIN — Medication 650 MILLIGRAM(S): at 16:45

## 2020-02-17 RX ADMIN — FLUDROCORTISONE ACETATE 0.1 MILLIGRAM(S): 0.1 TABLET ORAL at 06:45

## 2020-02-17 RX ADMIN — NIMODIPINE 60 MILLIGRAM(S): 60 SOLUTION ORAL at 17:37

## 2020-02-17 RX ADMIN — SODIUM CHLORIDE 2 GRAM(S): 9 INJECTION INTRAMUSCULAR; INTRAVENOUS; SUBCUTANEOUS at 11:45

## 2020-02-17 NOTE — PROGRESS NOTE ADULT - ASSESSMENT
HH4 mF3 subarachnoid hemorrhage, post-bleed day 14    - TCDs, euvolemia, nimodipine  - monitor Na for goal 135-145; hypertonics, florinef  - -220mmHg  - Mobilize  - Pain control  - UTI- levofloxacin  - EVD d/c'd HH4 mF3 subarachnoid hemorrhage, post-bleed day 14    - TCDs, euvolemia, nimodipine  - q4hr neurochecks  - monitor Na for goal 135-145; salt tabs  - -220 mmHg  - Mobilize  - Pain control  - UTI- levofloxacin  - urinary retention- straight cath prn  - EVD d/c'd

## 2020-02-17 NOTE — PROGRESS NOTE ADULT - SUBJECTIVE AND OBJECTIVE BOX
Chief complaint:   Patient is a 39y old  Female who presents with a chief complaint of SAH, IP (17 Feb 2020 06:51)    HPI:  39F unknown medical Hx, found unresponsive on airplane, transferred to [place], subsequently transferred to [facility].    Arrive intubated and sedated, no family or medical Hx available. (03 Feb 2020 14:10)      Stay Summary:      OVERNIGHT EVENTS:      ROS: [ ]  Unable to assess due to mental status   All other systems negative    -----------------------------------------------------------------------------------------------------------------------------------------------------------------------------------  ICU Vital Signs Last 24 Hrs  T(C): 36.7 (17 Feb 2020 17:00), Max: 38.8 (17 Feb 2020 16:00)  T(F): 98 (17 Feb 2020 17:00), Max: 101.8 (17 Feb 2020 16:00)  HR: 115 (17 Feb 2020 17:00) (86 - 121)  BP: 152/90 (17 Feb 2020 17:00) (136/91 - 161/92)  BP(mean): 107 (17 Feb 2020 17:00) (102 - 113)  ABP: --  ABP(mean): --  RR: 24 (17 Feb 2020 17:00) (19 - 26)  SpO2: 100% (17 Feb 2020 17:00) (94% - 100%)      I&O's Summary    16 Feb 2020 07:01  -  17 Feb 2020 07:00  --------------------------------------------------------  IN: 3540 mL / OUT: 2000 mL / NET: 1540 mL    17 Feb 2020 07:01  -  17 Feb 2020 19:40  --------------------------------------------------------  IN: 770 mL / OUT: 1400 mL / NET: -630 mL        MEDICATIONS  (STANDING):  chlorhexidine 4% Liquid 1 Application(s) Topical <User Schedule>  heparin  Injectable 5000 Unit(s) SubCutaneous every 12 hours  levoFLOXacin  Tablet 500 milliGRAM(s) Oral every 24 hours  niMODipine Oral Solution 60 milliGRAM(s) Oral every 4 hours  sodium chloride 2 Gram(s) Oral every 6 hours    NEUROIMAGING:   Recent imaging studies were reviewed.    LAB RESULTS:                          10.6   12.45 )-----------( 418      ( 15 Feb 2020 22:27 )             33.5           02-17    140  |  107  |  14  ----------------------------<  124<H>  3.4<L>   |  23  |  0.41<L>    Ca    8.7      17 Feb 2020 03:13  Phos  3.3     02-17  Mg     2.2     02-17      Culture - Urine (collected 02-16-20 @ 21:49)  Source: .Urine Clean Catch (Midstream)  Preliminary Report (02-17-20 @ 17:22):    >100,000 CFU/ml Gram Negative Rods      -----------------------------------------------------------------------------------------------------------------------------------------------------------------------------------    PHYSICAL EXAM:  General: Calm, intubated  HEENT: MMM  Neuro:  -Mental status- No acute distress  -CN- PERRL 3mm, EOMI, tongue midline, face symmetric  + corneals/cough/gag  -Motor-  -Sensation-   -Coordination- unable to assess    CV: RRR  Pulm: Clear to auscultation  Abd: Soft, nontender, nondistended  Ext: No edema  Skin: warm, dry Chief complaint:   Patient is a 39y old  Female who presents with a chief complaint of SAH, IPH (17 Feb 2020 06:51)    HPI:  39F unknown medical Hx, found unresponsive on airplane, transferred to [place], subsequently transferred to [facility].    Arrive intubated and sedated, no family or medical Hx available. (03 Feb 2020 14:10)    DAY EVENTS: stable exam    ROS: No complaints  All other systems negative    -----------------------------------------------------------------------------------------------------------------------------------------------------------------------------------  ICU Vital Signs Last 24 Hrs  T(C): 36.7 (17 Feb 2020 17:00), Max: 38.8 (17 Feb 2020 16:00)  T(F): 98 (17 Feb 2020 17:00), Max: 101.8 (17 Feb 2020 16:00)  HR: 115 (17 Feb 2020 17:00) (86 - 121)  BP: 152/90 (17 Feb 2020 17:00) (136/91 - 161/92)  BP(mean): 107 (17 Feb 2020 17:00) (102 - 113)  ABP: --  ABP(mean): --  RR: 24 (17 Feb 2020 17:00) (19 - 26)  SpO2: 100% (17 Feb 2020 17:00) (94% - 100%)      I&O's Summary    16 Feb 2020 07:01  -  17 Feb 2020 07:00  --------------------------------------------------------  IN: 3540 mL / OUT: 2000 mL / NET: 1540 mL    17 Feb 2020 07:01  -  17 Feb 2020 19:40  --------------------------------------------------------  IN: 770 mL / OUT: 1400 mL / NET: -630 mL        MEDICATIONS  (STANDING):  chlorhexidine 4% Liquid 1 Application(s) Topical <User Schedule>  heparin  Injectable 5000 Unit(s) SubCutaneous every 12 hours  levoFLOXacin  Tablet 500 milliGRAM(s) Oral every 24 hours  niMODipine Oral Solution 60 milliGRAM(s) Oral every 4 hours  sodium chloride 2 Gram(s) Oral every 6 hours    NEUROIMAGING:   Recent imaging studies were reviewed.    LAB RESULTS:                          10.6   12.45 )-----------( 418      ( 15 Feb 2020 22:27 )             33.5     02-17    140  |  107  |  14  ----------------------------<  124<H>  3.4<L>   |  23  |  0.41<L>    Ca    8.7      17 Feb 2020 03:13  Phos  3.3     02-17  Mg     2.2     02-17      Culture - Urine (collected 02-16-20 @ 21:49)  Source: .Urine Clean Catch (Midstream)  Preliminary Report (02-17-20 @ 17:22):    >100,000 CFU/ml Gram Negative Rods      -----------------------------------------------------------------------------------------------------------------------------------------------------------------------------------    PHYSICAL EXAM:  General: Calm, laying in bed  HEENT: MMM  Neuro:  -Mental status- A&O x3. L facial droop. EOMI. Dysarthria - improving.   RUE  5/5  LUE drift; LUE 5/5 flexion, 4+/5 extension, 4+/5 handgrip  RLE  5/5  LLE - 4+/5 hip flexion, 4+/5 knee extension, 4+/5     CV: RRR  Pulm: Clear to auscultation  Abd: Soft, nontender, nondistended  Ext: No edema  Skin: warm, dry

## 2020-02-17 NOTE — PROGRESS NOTE ADULT - SUBJECTIVE AND OBJECTIVE BOX
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 She arrived intubated and sedated. 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     ADMISSION SCORES:  GCS: 3T  Hunt-Gonzalez: 5  modified Couch: 3  ICH score: 2      HOSPITAL COURSE:  2/3- Clipping R PCOMM              EVD placed   2/5- Extubated overnight  2/6- 500cc Bolus  2/7- Following commands on all extremities  2/8- R  cm2 from 129  2/11- failed FEEST   2/11- Superficial thrombophlebitis seen on doppler;   2/11- overnight EVD stopped working, no change in neuro exam    2/12- EVD clamped; no change in neuro exam  2/12- Febrile overnight (TMax 39.1); CXR shows clear lungs; Blood cultures pending  2/13- EVD clamped; stable neuro exam  2/13- Positive U/A; Blood cultures negative at 24h  2/13- drop in sodium to 132; started on 2% NaCl 50ml/hr  2/14- EVD remains clamped; stable neuro exam  2/15: EVD remains clamped, the IPD is stable   2/16: EVD Dced     No acute overnight events       EXAMINATION:  General:  Calm.   HEENT:  MMM. EO spontaneously.   Neuro: A&O x3. L facial droop. EOMI. Dysarthria - improving.   RUE  5/5  LUE drift; LUE 5/5 flexion, 4+/5 extension, 4+/5 HG with incremental improvement.  RLE  5/5  LLE - 4+/5 hip flexion, 4+/5 knee extension, 4+/5 DF and PF  Cards:  Normal S1/S2 with rate and rhythm. No murmur.   Respiratory: Clear lung fields BL  Abdomen:  soft, +BS  Extremities:  no edema. LUE - erythema, swelling. Cord palpated.  RUE - erythema - marked.             ICU Vital Signs Last 24 Hrs  T(C): 36.9 (17 Feb 2020 03:00), Max: 38.6 (16 Feb 2020 15:00)  T(F): 98.5 (17 Feb 2020 03:00), Max: 101.5 (16 Feb 2020 15:00)  HR: 90 (17 Feb 2020 03:00) (86 - 127)  BP: 145/95 (17 Feb 2020 03:00) (134/80 - 155/95)  BP(mean): 110 (17 Feb 2020 03:00) (96 - 116)  RR: 21 (17 Feb 2020 03:00) (18 - 25)  SpO2: 100% (17 Feb 2020 03:00) (99% - 100%)      02-15-20 @ 07:01  -  02-16-20 @ 07:00  --------------------------------------------------------  IN: 2500 mL / OUT: 1850 mL / NET: 650 mL    02-16-20 @ 07:01  -  02-17-20 @ 06:54  --------------------------------------------------------  IN: 3000 mL / OUT: 800 mL / NET: 2200 mL            acetaminophen    Suspension .. 650 milliGRAM(s) Oral every 6 hours PRN  albuterol/ipratropium for Nebulization. 3 milliLiter(s) Nebulizer every 6 hours PRN  artificial tears (preservative free) Ophthalmic Solution 1 Drop(s) Both EYES three times a day PRN  chlorhexidine 4% Liquid 1 Application(s) Topical <User Schedule>  fludroCORTISONE 0.1 milliGRAM(s) Oral every 12 hours  heparin  Injectable 5000 Unit(s) SubCutaneous every 12 hours  levoFLOXacin  Tablet 500 milliGRAM(s) Oral every 24 hours  niMODipine Oral Solution 60 milliGRAM(s) Oral every 4 hours  ondansetron Injectable 4 milliGRAM(s) IV Push every 8 hours PRN  oxyCODONE    IR 5 milliGRAM(s) Oral every 4 hours PRN  potassium chloride   Solution 40 milliEquivalent(s) Oral every 4 hours  sodium chloride 2 Gram(s) Oral every 6 hours      LABS:  Na: 140 (02-17 @ 03:13), 143 (02-16 @ 10:05), 140 (02-15 @ 22:27), 138 (02-15 @ 16:55), 139 (02-15 @ 09:01), 139 (02-15 @ 02:28), 134 (02-14 @ 18:46), 136 (02-14 @ 12:32)  K: 3.4 (02-17 @ 03:13), 3.7 (02-16 @ 10:05), 4.0 (02-15 @ 22:27), 3.9 (02-15 @ 16:55), 3.8 (02-15 @ 09:01), 3.8 (02-15 @ 02:28), 4.0 (02-14 @ 18:46), 3.9 (02-14 @ 12:32)  Cl: 107 (02-17 @ 03:13), 106 (02-16 @ 10:05), 105 (02-15 @ 22:27), 104 (02-15 @ 16:55), 105 (02-15 @ 09:01), 104 (02-15 @ 02:28), 101 (02-14 @ 18:46), 100 (02-14 @ 12:32)  CO2: 23 (02-17 @ 03:13), 24 (02-16 @ 10:05), 23 (02-15 @ 22:27), 21 (02-15 @ 16:55), 24 (02-15 @ 09:01), 22 (02-15 @ 02:28), 21 (02-14 @ 18:46), 23 (02-14 @ 12:32)  BUN: 14 (02-17 @ 03:13), 22 (02-16 @ 10:05), 20 (02-15 @ 22:27), 18 (02-15 @ 16:55), 16 (02-15 @ 09:01), 16 (02-15 @ 02:28), 18 (02-14 @ 18:46), 19 (02-14 @ 12:32)  Cr: 0.41 (02-17 @ 03:13), 0.50 (02-16 @ 10:05), 0.44 (02-15 @ 22:27), 0.41 (02-15 @ 16:55), 0.41 (02-15 @ 09:01), 0.44 (02-15 @ 02:28), 0.46 (02-14 @ 18:46), 0.47 (02-14 @ 12:32)  Glu: 124(02-17 @ 03:13), 134(02-16 @ 10:05), 108(02-15 @ 22:27), 111(02-15 @ 16:55), 115(02-15 @ 09:01), 117(02-15 @ 02:28), 125(02-14 @ 18:46), 116(02-14 @ 12:32)    Hgb: 10.6 (02-15 @ 22:27), 10.9 (02-15 @ 02:28)  Hct: 33.5 (02-15 @ 22:27), 32.5 (02-15 @ 02:28)  WBC: 12.45 (02-15 @ 22:27), 14.86 (02-15 @ 02:28)  Plt: 418 (02-15 @ 22:27), 383 (02-15 @ 02:28)    INR: 1.14 02-15-20 @ 16:55  PTT: 33.3 02-15-20 @ 16:55 SUMMARY:   39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 She arrived intubated and sedated. 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     ADMISSION SCORES:  GCS: 3T  Hunt-Gonzalez: 5  modified Couch: 3  ICH score: 2      HOSPITAL COURSE:  2/3- Clipping R PCOMM              EVD placed   2/5- Extubated overnight  2/6- 500cc Bolus  2/7- Following commands on all extremities  2/8- R  cm2 from 129  2/11- failed FEEST   2/11- Superficial thrombophlebitis seen on doppler;   2/11- overnight EVD stopped working, no change in neuro exam    2/12- EVD clamped; no change in neuro exam  2/12- Febrile overnight (TMax 39.1); CXR shows clear lungs; Blood cultures pending  2/13- EVD clamped; stable neuro exam  2/13- Positive U/A; Blood cultures negative at 24h  2/13- drop in sodium to 132; started on 2% NaCl 50ml/hr  2/14- EVD remains clamped; stable neuro exam  2/15: EVD remains clamped, the IPD is stable   2/16: EVD Dced     No acute overnight events       EXAMINATION:  General:  Calm.   HEENT:  MMM. EO spontaneously.   Neuro: A&O x3. L facial droop. EOMI. Dysarthria - improving.   RUE  5/5  LUE drift; LUE 5/5 flexion, 4+/5 extension, 4+/5 HG with incremental improvement.  RLE  5/5  LLE - 4+/5 hip flexion, 4+/5 knee extension, 4+/5 DF and PF  Cards:  Normal S1/S2 with rate and rhythm. No murmur.   Respiratory: Clear lung fields BL  Abdomen:  soft, +BS  Extremities:  no edema            ICU Vital Signs Last 24 Hrs  T(C): 36.9 (17 Feb 2020 03:00), Max: 38.6 (16 Feb 2020 15:00)  T(F): 98.5 (17 Feb 2020 03:00), Max: 101.5 (16 Feb 2020 15:00)  HR: 90 (17 Feb 2020 03:00) (86 - 127)  BP: 145/95 (17 Feb 2020 03:00) (134/80 - 155/95)  BP(mean): 110 (17 Feb 2020 03:00) (96 - 116)  RR: 21 (17 Feb 2020 03:00) (18 - 25)  SpO2: 100% (17 Feb 2020 03:00) (99% - 100%)      02-15-20 @ 07:01  -  02-16-20 @ 07:00  --------------------------------------------------------  IN: 2500 mL / OUT: 1850 mL / NET: 650 mL    02-16-20 @ 07:01  -  02-17-20 @ 06:54  --------------------------------------------------------  IN: 3000 mL / OUT: 800 mL / NET: 2200 mL            acetaminophen    Suspension .. 650 milliGRAM(s) Oral every 6 hours PRN  albuterol/ipratropium for Nebulization. 3 milliLiter(s) Nebulizer every 6 hours PRN  artificial tears (preservative free) Ophthalmic Solution 1 Drop(s) Both EYES three times a day PRN  chlorhexidine 4% Liquid 1 Application(s) Topical <User Schedule>  fludroCORTISONE 0.1 milliGRAM(s) Oral every 12 hours  heparin  Injectable 5000 Unit(s) SubCutaneous every 12 hours  levoFLOXacin  Tablet 500 milliGRAM(s) Oral every 24 hours  niMODipine Oral Solution 60 milliGRAM(s) Oral every 4 hours  ondansetron Injectable 4 milliGRAM(s) IV Push every 8 hours PRN  oxyCODONE    IR 5 milliGRAM(s) Oral every 4 hours PRN  potassium chloride   Solution 40 milliEquivalent(s) Oral every 4 hours  sodium chloride 2 Gram(s) Oral every 6 hours      LABS:  Na: 140 (02-17 @ 03:13), 143 (02-16 @ 10:05), 140 (02-15 @ 22:27), 138 (02-15 @ 16:55), 139 (02-15 @ 09:01), 139 (02-15 @ 02:28), 134 (02-14 @ 18:46), 136 (02-14 @ 12:32)  K: 3.4 (02-17 @ 03:13), 3.7 (02-16 @ 10:05), 4.0 (02-15 @ 22:27), 3.9 (02-15 @ 16:55), 3.8 (02-15 @ 09:01), 3.8 (02-15 @ 02:28), 4.0 (02-14 @ 18:46), 3.9 (02-14 @ 12:32)  Cl: 107 (02-17 @ 03:13), 106 (02-16 @ 10:05), 105 (02-15 @ 22:27), 104 (02-15 @ 16:55), 105 (02-15 @ 09:01), 104 (02-15 @ 02:28), 101 (02-14 @ 18:46), 100 (02-14 @ 12:32)  CO2: 23 (02-17 @ 03:13), 24 (02-16 @ 10:05), 23 (02-15 @ 22:27), 21 (02-15 @ 16:55), 24 (02-15 @ 09:01), 22 (02-15 @ 02:28), 21 (02-14 @ 18:46), 23 (02-14 @ 12:32)  BUN: 14 (02-17 @ 03:13), 22 (02-16 @ 10:05), 20 (02-15 @ 22:27), 18 (02-15 @ 16:55), 16 (02-15 @ 09:01), 16 (02-15 @ 02:28), 18 (02-14 @ 18:46), 19 (02-14 @ 12:32)  Cr: 0.41 (02-17 @ 03:13), 0.50 (02-16 @ 10:05), 0.44 (02-15 @ 22:27), 0.41 (02-15 @ 16:55), 0.41 (02-15 @ 09:01), 0.44 (02-15 @ 02:28), 0.46 (02-14 @ 18:46), 0.47 (02-14 @ 12:32)  Glu: 124(02-17 @ 03:13), 134(02-16 @ 10:05), 108(02-15 @ 22:27), 111(02-15 @ 16:55), 115(02-15 @ 09:01), 117(02-15 @ 02:28), 125(02-14 @ 18:46), 116(02-14 @ 12:32)    Hgb: 10.6 (02-15 @ 22:27), 10.9 (02-15 @ 02:28)  Hct: 33.5 (02-15 @ 22:27), 32.5 (02-15 @ 02:28)  WBC: 12.45 (02-15 @ 22:27), 14.86 (02-15 @ 02:28)  Plt: 418 (02-15 @ 22:27), 383 (02-15 @ 02:28)    INR: 1.14 02-15-20 @ 16:55  PTT: 33.3 02-15-20 @ 16:55

## 2020-02-17 NOTE — SWALLOW BEDSIDE ASSESSMENT ADULT - SWALLOW EVAL: DIAGNOSIS
Pt s/p PCOMM aneurysm-> SAH and ICH. Order for swallow re-eval received and appreciated. Chart reviewed and case d/w NSCU team. Pt with known hx of nasim-pharyngeal dysphagia with silent laryngeal penetration/aspiration. Had FEES exam last week that also showed laryngeal edema/erythema/granulation tissue. This affected laryngeal closure and may have contributed to reduced laryngeal sensation. Pt with evidence of neuro-muscular deficits-> dysphagia. Team now requesting swallow re-eval to aid in decision making about method of provision of nutrition as d/c planning is underway.

## 2020-02-17 NOTE — PROGRESS NOTE ADULT - ASSESSMENT
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)    NEURO:  q4h neuro checks  EVD removed 2/15  stable IPH pericatheter  Delayed Cerebral Ischemia: TCD, euvolemia, Nimodipine  Pain control w/ Tylenol prn, Oxy 5/10 prn  OOB with helmet/PT/OT    PULM:  Extubated 2/5  SpO2 > 92%  Incentive spirometry, if able    CARDS:  - 160 mHg  TTE- Nl LV function, no wall motion abnormalities    GASTRO:  TF  Speech/Swallow eval- failed FEEST 2/11   last BM - 2/15/20  ---> Stress ulcer prophylaxis:  Not indicated    RENAL:  Goal: Eunatremia; Siadh  salt tablets   BMP Q 12 HR     ENDO:  euglycemia    HEME:  monitor H/H;  ---> DVT prophylaxis: SCDs, heparin 5000 units q 12hr start tomorrow ( 24 hrs after stability CT head )   ID:  Febrile yesterday   UA +ve started on Levodloxacin   f/u cultures     Code status:  Full code  Disposition:  floor   moderate complex medical decisions 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)    NEURO:  q4h neuro checks  EVD removed 2/15  stable IPH pericatheter  Delayed Cerebral Ischemia: TCD, euvolemia, Nimodipine  Pain control w/ Tylenol prn, Oxy 5/10 prn  OOB with helmet/PT/OT    PULM:  Extubated 2/5  SpO2 > 92%  Incentive spirometry, if able    CARDS:  - 160 mHg  TTE- Nl LV function, no wall motion abnormalities    GASTRO:  TF  Speech/Swallow eval- failed FEEST 2/11   last BM - 2/15/20  ---> Stress ulcer prophylaxis:  Not indicated    RENAL:  Goal: Eunatremia; Siadh  salt tablets   BMP Q 12 HR     ENDO:  euglycemia    HEME:  monitor H/H;  ---> DVT prophylaxis: SCDs, heparin 5000 units q 12hr start tomorrow ( 24 hrs after stability CT head )   ID:  Febrile yesterday   UA +ve started on Levofloxacin ( Allergy to other Abx)   f/u cultures     Code status:  Full code  Disposition:  floor   moderate complex medical decisions

## 2020-02-17 NOTE — PROGRESS NOTE ADULT - SUBJECTIVE AND OBJECTIVE BOX
Patient seen and examined at bedside.    --Anticoagulation--  alteplase for catheter clearance 2 milliGRAM(s) Catheter once  heparin  Injectable 5000 Unit(s) SubCutaneous every 12 hours    T(C): 38.1 (02-16-20 @ 23:30), Max: 38.6 (02-16-20 @ 15:00)  HR: 95 (02-16-20 @ 23:30) (74 - 127)  BP: 155/95 (02-16-20 @ 23:00) (134/80 - 161/88)  RR: 19 (02-16-20 @ 23:00) (18 - 22)  SpO2: 100% (02-16-20 @ 23:00) (99% - 100%)  Wt(kg): --    Exam:  EO spont, AOx2-3, intermittently confused. R side 4+/5, L side 5/5

## 2020-02-17 NOTE — SWALLOW BEDSIDE ASSESSMENT ADULT - ASR SWALLOW RECOMMEND DIAG
Given hx of SILENT laryngeal penetration/aspiration, pt will require a repeat objective swallow study to determine if there has been an improvement in a/w protection. Will schedule an exam for this week.
Please write order for FEES/FEES
eventual objective swallow study, but too premature to consider

## 2020-02-17 NOTE — PROGRESS NOTE ADULT - ASSESSMENT
39F s/p HH4 mF 3 SAH  - cont TCDs  - monitor for salt wasting  - febrile overnight  - continue supportive care / SAH core measures

## 2020-02-18 DIAGNOSIS — Z29.9 ENCOUNTER FOR PROPHYLACTIC MEASURES, UNSPECIFIED: ICD-10-CM

## 2020-02-18 DIAGNOSIS — R50.9 FEVER, UNSPECIFIED: ICD-10-CM

## 2020-02-18 DIAGNOSIS — R07.9 CHEST PAIN, UNSPECIFIED: ICD-10-CM

## 2020-02-18 DIAGNOSIS — I67.848 OTHER CEREBROVASCULAR VASOSPASM AND VASOCONSTRICTION: ICD-10-CM

## 2020-02-18 DIAGNOSIS — R13.10 DYSPHAGIA, UNSPECIFIED: ICD-10-CM

## 2020-02-18 DIAGNOSIS — I60.9 NONTRAUMATIC SUBARACHNOID HEMORRHAGE, UNSPECIFIED: ICD-10-CM

## 2020-02-18 DIAGNOSIS — Z02.9 ENCOUNTER FOR ADMINISTRATIVE EXAMINATIONS, UNSPECIFIED: ICD-10-CM

## 2020-02-18 LAB
-  AMIKACIN: SIGNIFICANT CHANGE UP
-  AMPICILLIN/SULBACTAM: SIGNIFICANT CHANGE UP
-  AMPICILLIN: SIGNIFICANT CHANGE UP
-  AZTREONAM: SIGNIFICANT CHANGE UP
-  CEFAZOLIN: SIGNIFICANT CHANGE UP
-  CEFEPIME: SIGNIFICANT CHANGE UP
-  CEFOXITIN: SIGNIFICANT CHANGE UP
-  CEFTRIAXONE: SIGNIFICANT CHANGE UP
-  CIPROFLOXACIN: SIGNIFICANT CHANGE UP
-  GENTAMICIN: SIGNIFICANT CHANGE UP
-  IMIPENEM: SIGNIFICANT CHANGE UP
-  LEVOFLOXACIN: SIGNIFICANT CHANGE UP
-  MEROPENEM: SIGNIFICANT CHANGE UP
-  NITROFURANTOIN: SIGNIFICANT CHANGE UP
-  PIPERACILLIN/TAZOBACTAM: SIGNIFICANT CHANGE UP
-  TIGECYCLINE: SIGNIFICANT CHANGE UP
-  TOBRAMYCIN: SIGNIFICANT CHANGE UP
-  TRIMETHOPRIM/SULFAMETHOXAZOLE: SIGNIFICANT CHANGE UP
BASOPHILS # BLD AUTO: 0.03 K/UL — SIGNIFICANT CHANGE UP (ref 0–0.2)
BASOPHILS NFR BLD AUTO: 0.3 % — SIGNIFICANT CHANGE UP (ref 0–2)
CK MB CFR SERPL CALC: 1.1 NG/ML — SIGNIFICANT CHANGE UP (ref 0–3.8)
CK SERPL-CCNC: 45 U/L — SIGNIFICANT CHANGE UP (ref 25–170)
CULTURE RESULTS: SIGNIFICANT CHANGE UP
EOSINOPHIL # BLD AUTO: 0.05 K/UL — SIGNIFICANT CHANGE UP (ref 0–0.5)
EOSINOPHIL NFR BLD AUTO: 0.5 % — SIGNIFICANT CHANGE UP (ref 0–6)
HCT VFR BLD CALC: 30.8 % — LOW (ref 34.5–45)
HGB BLD-MCNC: 9.9 G/DL — LOW (ref 11.5–15.5)
IMM GRANULOCYTES NFR BLD AUTO: 0.5 % — SIGNIFICANT CHANGE UP (ref 0–1.5)
LYMPHOCYTES # BLD AUTO: 1.42 K/UL — SIGNIFICANT CHANGE UP (ref 1–3.3)
LYMPHOCYTES # BLD AUTO: 15.3 % — SIGNIFICANT CHANGE UP (ref 13–44)
MCHC RBC-ENTMCNC: 29.8 PG — SIGNIFICANT CHANGE UP (ref 27–34)
MCHC RBC-ENTMCNC: 32.1 GM/DL — SIGNIFICANT CHANGE UP (ref 32–36)
MCV RBC AUTO: 92.8 FL — SIGNIFICANT CHANGE UP (ref 80–100)
METHOD TYPE: SIGNIFICANT CHANGE UP
MONOCYTES # BLD AUTO: 0.66 K/UL — SIGNIFICANT CHANGE UP (ref 0–0.9)
MONOCYTES NFR BLD AUTO: 7.1 % — SIGNIFICANT CHANGE UP (ref 2–14)
NEUTROPHILS # BLD AUTO: 7.08 K/UL — SIGNIFICANT CHANGE UP (ref 1.8–7.4)
NEUTROPHILS NFR BLD AUTO: 76.3 % — SIGNIFICANT CHANGE UP (ref 43–77)
NRBC # BLD: 0 /100 WBCS — SIGNIFICANT CHANGE UP (ref 0–0)
ORGANISM # SPEC MICROSCOPIC CNT: SIGNIFICANT CHANGE UP
ORGANISM # SPEC MICROSCOPIC CNT: SIGNIFICANT CHANGE UP
PLATELET # BLD AUTO: 423 K/UL — HIGH (ref 150–400)
RBC # BLD: 3.32 M/UL — LOW (ref 3.8–5.2)
RBC # FLD: 12.7 % — SIGNIFICANT CHANGE UP (ref 10.3–14.5)
SPECIMEN SOURCE: SIGNIFICANT CHANGE UP
TROPONIN T, HIGH SENSITIVITY RESULT: <6 NG/L — SIGNIFICANT CHANGE UP (ref 0–51)
WBC # BLD: 9.29 K/UL — SIGNIFICANT CHANGE UP (ref 3.8–10.5)
WBC # FLD AUTO: 9.29 K/UL — SIGNIFICANT CHANGE UP (ref 3.8–10.5)

## 2020-02-18 PROCEDURE — 99223 1ST HOSP IP/OBS HIGH 75: CPT

## 2020-02-18 PROCEDURE — 93010 ELECTROCARDIOGRAM REPORT: CPT

## 2020-02-18 PROCEDURE — 71045 X-RAY EXAM CHEST 1 VIEW: CPT | Mod: 26

## 2020-02-18 PROCEDURE — 99233 SBSQ HOSP IP/OBS HIGH 50: CPT

## 2020-02-18 RX ORDER — ACETAMINOPHEN 500 MG
1000 TABLET ORAL ONCE
Refills: 0 | Status: COMPLETED | OUTPATIENT
Start: 2020-02-18 | End: 2020-02-18

## 2020-02-18 RX ORDER — ACETAMINOPHEN 500 MG
1000 TABLET ORAL EVERY 6 HOURS
Refills: 0 | Status: DISCONTINUED | OUTPATIENT
Start: 2020-02-18 | End: 2020-02-19

## 2020-02-18 RX ORDER — SODIUM CHLORIDE 9 MG/ML
1000 INJECTION INTRAMUSCULAR; INTRAVENOUS; SUBCUTANEOUS ONCE
Refills: 0 | Status: DISCONTINUED | OUTPATIENT
Start: 2020-02-18 | End: 2020-02-18

## 2020-02-18 RX ORDER — SODIUM CHLORIDE 9 MG/ML
1000 INJECTION INTRAMUSCULAR; INTRAVENOUS; SUBCUTANEOUS ONCE
Refills: 0 | Status: COMPLETED | OUTPATIENT
Start: 2020-02-18 | End: 2020-02-18

## 2020-02-18 RX ADMIN — SODIUM CHLORIDE 2 GRAM(S): 9 INJECTION INTRAMUSCULAR; INTRAVENOUS; SUBCUTANEOUS at 11:47

## 2020-02-18 RX ADMIN — SODIUM CHLORIDE 2 GRAM(S): 9 INJECTION INTRAMUSCULAR; INTRAVENOUS; SUBCUTANEOUS at 23:51

## 2020-02-18 RX ADMIN — Medication 400 MILLIGRAM(S): at 10:00

## 2020-02-18 RX ADMIN — Medication 650 MILLIGRAM(S): at 03:30

## 2020-02-18 RX ADMIN — Medication 1000 MILLIGRAM(S): at 18:21

## 2020-02-18 RX ADMIN — NIMODIPINE 60 MILLIGRAM(S): 60 SOLUTION ORAL at 13:54

## 2020-02-18 RX ADMIN — HEPARIN SODIUM 5000 UNIT(S): 5000 INJECTION INTRAVENOUS; SUBCUTANEOUS at 08:50

## 2020-02-18 RX ADMIN — NIMODIPINE 60 MILLIGRAM(S): 60 SOLUTION ORAL at 05:12

## 2020-02-18 RX ADMIN — SODIUM CHLORIDE 2 GRAM(S): 9 INJECTION INTRAMUSCULAR; INTRAVENOUS; SUBCUTANEOUS at 05:12

## 2020-02-18 RX ADMIN — SODIUM CHLORIDE 2 GRAM(S): 9 INJECTION INTRAMUSCULAR; INTRAVENOUS; SUBCUTANEOUS at 18:21

## 2020-02-18 RX ADMIN — HEPARIN SODIUM 5000 UNIT(S): 5000 INJECTION INTRAVENOUS; SUBCUTANEOUS at 21:26

## 2020-02-18 RX ADMIN — Medication 650 MILLIGRAM(S): at 02:58

## 2020-02-18 RX ADMIN — NIMODIPINE 60 MILLIGRAM(S): 60 SOLUTION ORAL at 02:45

## 2020-02-18 RX ADMIN — SODIUM CHLORIDE 1000 MILLILITER(S): 9 INJECTION INTRAMUSCULAR; INTRAVENOUS; SUBCUTANEOUS at 11:10

## 2020-02-18 RX ADMIN — NIMODIPINE 60 MILLIGRAM(S): 60 SOLUTION ORAL at 10:06

## 2020-02-18 RX ADMIN — NIMODIPINE 60 MILLIGRAM(S): 60 SOLUTION ORAL at 21:26

## 2020-02-18 RX ADMIN — Medication 1000 MILLIGRAM(S): at 10:15

## 2020-02-18 RX ADMIN — NIMODIPINE 60 MILLIGRAM(S): 60 SOLUTION ORAL at 18:21

## 2020-02-18 NOTE — PROGRESS NOTE ADULT - SUBJECTIVE AND OBJECTIVE BOX
Patient seen and examined at bedside.    --Anticoagulation--  heparin  Injectable 5000 Unit(s) SubCutaneous every 12 hours    T(C): 36.8 (02-18-20 @ 05:00), Max: 38.8 (02-17-20 @ 16:00)  HR: 92 (02-18-20 @ 05:00) (92 - 121)  BP: 126/80 (02-18-20 @ 05:00) (126/80 - 159/99)  RR: 18 (02-18-20 @ 05:00) (17 - 26)  SpO2: 98% (02-18-20 @ 05:00) (94% - 100%)  Wt(kg): --    Exam:  AOX3, pupils 4 mm R b/l, left facial, FC, Rt side 5/5, LUE 5/5, HG 4+/5, LLE 4+/5, Lt UE and LE drift

## 2020-02-18 NOTE — CONSULT NOTE ADULT - PROBLEM SELECTOR RECOMMENDATION 9
fevers improved briefly on vancomycin but recurred prior to stopping vancomycin with UA not overtly c/w infection (no UCx sent at that time). recurrent fevers possible due to brewing UTI.   could this be central fevers?  will continue to monitor   repeat bcx 2/16 NTD, cxr without consolidation. fevers improved briefly on vancomycin but recurred prior to stopping vancomycin with UA not overtly c/w infection (no UCx sent at that time). recurrent fevers possible due to brewing UTI.   c/w levaquin (day 3)  could this be central fevers?  will continue to monitor   repeat bcx 2/16 NTD, cxr without consolidation.

## 2020-02-18 NOTE — CONSULT NOTE ADULT - PROBLEM SELECTOR RECOMMENDATION 3
s/p craniectomy s/p craniectomy with evacuation of ICH and clipping of PComm aneuryms on 2/3  management per neurosurgery

## 2020-02-18 NOTE — CONSULT NOTE ADULT - PROBLEM SELECTOR RECOMMENDATION 7
Transitions of Care Status:  1.  Name of PCP: none  2.  PCP Contacted on Admission: [ ] Y    [ ] N    3.  PCP contacted at Discharge: [ ] Y    [ ] N    [ ] N/A  4.  Post-Discharge Appointment Date and Location:  5.  Summary of Handoff given to PCP:

## 2020-02-18 NOTE — CONSULT NOTE ADULT - ASSESSMENT
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  Fever may be central as she is non toxic, has a normal wbc, and denies any dysuria.  She could have intermittent fever secondary to retained ICH.  She reports hives to [facility], will try to avoid beta lactams.  Suggest:  1.levaquin for 5 days-day 3  2.Monitor for retention  3.Follow fever curve, central fever is always a diagnosis of exclusion 34 yo female with no significant PMH s/p emergency craniectomy and clipping of Rt PCOM aneurysm 2/3 after acute rupture.  Fever may be central as she is non toxic, has a normal wbc, and denies any dysuria.  She could have intermittent fever secondary to retained ICH.  She reports hives to [facility], will try to avoid beta lactams.  EVD removed 2/16, fever earlier in hospital course with negative blood cultures  Suggest:  1.levaquin for 5 days-day 3  2.Monitor for retention, consider LE dopplers  3.Follow fever curve, central fever is always a diagnosis of exclusion

## 2020-02-18 NOTE — PROGRESS NOTE ADULT - ASSESSMENT
HH4 mF3 subarachnoid hemorrhage, post-bleed day 14    - TCDs, euvolemia, nimodipine  - q4hr neurochecks  - monitor Na for goal 135-145; salt tabs  - -220 mmHg  - Mobilize  - Pain control  - UTI- levofloxacin  - urinary retention- straight cath prn  - EVD d/c'd

## 2020-02-18 NOTE — PROGRESS NOTE ADULT - ASSESSMENT
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)    NEURO:  q4h neuro checks  EVD removed 2/15  stable IPH pericatheter  Delayed Cerebral Ischemia: TCD, euvolemia, Nimodipine  Pain control w/ Tylenol prn, Oxy 5/10 prn  OOB with helmet/PT/OT  PULM:  Extubated 2/5  SpO2 > 92%  Incentive spirometry, if able    CARDS:  - 160 mHg  TTE- Nl LV function, no wall motion abnormalities    GASTRO:  TF  Speech/Swallow eval- failed FEEST 2/11   last BM - 2/15/20  ---> Stress ulcer prophylaxis:  Not indicated    RENAL:  Goal: Eunatremia; Siadh  salt tablets   BMP daily     ENDO:  euglycemia    HEME:  monitor H/H;  ---> DVT prophylaxis: SCDs, heparin 5000 units q 12hr     ID:  Febrile yesterday   UA +ve started on Levofloxacin ( Allergy to other Abx)   f/u cultures     Code status:  Full code  Disposition:  floor   moderate complex medical decisions 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)    NEURO:  q4h neuro checks  EVD removed 2/15; stable IPH pericatheter  Delayed Cerebral Ischemia: TCD, euvolemia, Nimodipine  Pain control w/ Tylenol prn, Oxy 5/10 prn  OOB with helmet/PT/OT    PULM:  Extubated 2/5  SpO2 > 92%  Incentive spirometry, if able    CARDS:  - 160 mHg  TTE- Nl LV function, no wall motion abnormalities    GASTRO:  TF  Speech/Swallow eval- failed FEEST 2/11   last BM - 2/15/20  ---> Stress ulcer prophylaxis:  Not indicated    RENAL:  Goal: Eunatremia; Siadh  salt tablets   BMP daily     ENDO:  euglycemia    HEME:  monitor H/H;  ---> DVT prophylaxis: SCDs, heparin 5000 units q 12hr     ID:  Febrile today and yesterday  UA +ve started on Levofloxacin (Allergy to other Abx)   Urine Culture (+) - >100,000CFU E. Coli    Code status:  Full code  Disposition:  floor   moderate complex medical decisions

## 2020-02-18 NOTE — PROGRESS NOTE ADULT - SUBJECTIVE AND OBJECTIVE BOX
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 She arrived intubated and sedated. After coming to [facility] ED, she underwent repeat imaging and was taken to the OR emergently for craniectomy and clipping of right posterior communicating artery aneurysm.     ADMISSION SCORES:  GCS: 3T  Hunt-Gonzalez: 5  modified Couch: 3  ICH score: 2      HOSPITAL COURSE:  2/3- Clipping R PCOMM              EVD placed   2/5- Extubated overnight  2/6- 500cc Bolus  2/7- Following commands on all extremities  2/8- R  cm2 from 129  2/11- failed FEEST   2/11- Superficial thrombophlebitis seen on doppler;   2/11- overnight EVD stopped working, no change in neuro exam    2/12- EVD clamped; no change in neuro exam  2/12- Febrile overnight (TMax 39.1); CXR shows clear lungs; Blood cultures pending  2/13- EVD clamped; stable neuro exam  2/13- Positive U/A; Blood cultures negative at 24h  2/13- drop in sodium to 132; started on 2% NaCl 50ml/hr  2/14- EVD remains clamped; stable neuro exam  2/15: EVD remains clamped, the IPD is stable   2/16: EVD Dced     No acute overnight events       ICU Vital Signs Last 24 Hrs  T(C): 37.2 (18 Feb 2020 06:00), Max: 38.8 (17 Feb 2020 16:00)  T(F): 99 (18 Feb 2020 06:00), Max: 101.8 (17 Feb 2020 16:00)  HR: 92 (18 Feb 2020 05:00) (92 - 121)  BP: 126/80 (18 Feb 2020 05:00) (126/80 - 159/99)  BP(mean): 90 (18 Feb 2020 05:00) (90 - 115)  RR: 18 (18 Feb 2020 05:00) (17 - 26)  SpO2: 98% (18 Feb 2020 05:00) (94% - 100%)      02-17-20 @ 07:01  -  02-18-20 @ 07:00  --------------------------------------------------------  IN: 2040 mL / OUT: 1875 mL / NET: 165 mL      acetaminophen    Suspension .. 650 milliGRAM(s) Oral every 6 hours PRN  albuterol/ipratropium for Nebulization. 3 milliLiter(s) Nebulizer every 6 hours PRN  artificial tears (preservative free) Ophthalmic Solution 1 Drop(s) Both EYES three times a day PRN  chlorhexidine 4% Liquid 1 Application(s) Topical <User Schedule>  heparin  Injectable 5000 Unit(s) SubCutaneous every 12 hours  levoFLOXacin  Tablet 500 milliGRAM(s) Oral every 24 hours  niMODipine Oral Solution 60 milliGRAM(s) Oral every 4 hours  ondansetron Injectable 4 milliGRAM(s) IV Push every 8 hours PRN  oxyCODONE    IR 5 milliGRAM(s) Oral every 4 hours PRN  sodium chloride 2 Gram(s) Oral every 6 hours      LABS:  Na: 141 (02-17 @ 21:52), 140 (02-17 @ 03:13), 143 (02-16 @ 10:05), 140 (02-15 @ 22:27), 138 (02-15 @ 16:55), 139 (02-15 @ 09:01)  K: 3.7 (02-17 @ 21:52), 3.4 (02-17 @ 03:13), 3.7 (02-16 @ 10:05), 4.0 (02-15 @ 22:27), 3.9 (02-15 @ 16:55), 3.8 (02-15 @ 09:01)  Cl: 105 (02-17 @ 21:52), 107 (02-17 @ 03:13), 106 (02-16 @ 10:05), 105 (02-15 @ 22:27), 104 (02-15 @ 16:55), 105 (02-15 @ 09:01)  CO2: 24 (02-17 @ 21:52), 23 (02-17 @ 03:13), 24 (02-16 @ 10:05), 23 (02-15 @ 22:27), 21 (02-15 @ 16:55), 24 (02-15 @ 09:01)  BUN: 20 (02-17 @ 21:52), 14 (02-17 @ 03:13), 22 (02-16 @ 10:05), 20 (02-15 @ 22:27), 18 (02-15 @ 16:55), 16 (02-15 @ 09:01)  Cr: 0.41 (02-17 @ 21:52), 0.41 (02-17 @ 03:13), 0.50 (02-16 @ 10:05), 0.44 (02-15 @ 22:27), 0.41 (02-15 @ 16:55), 0.41 (02-15 @ 09:01)  Glu: 149(02-17 @ 21:52), 124(02-17 @ 03:13), 134(02-16 @ 10:05), 108(02-15 @ 22:27), 111(02-15 @ 16:55), 115(02-15 @ 09:01)    Hgb: 11.2 (02-17 @ 21:52), 10.6 (02-15 @ 22:27)  Hct: 35.4 (02-17 @ 21:52), 33.5 (02-15 @ 22:27)  WBC: 12.56 (02-17 @ 21:52), 12.45 (02-15 @ 22:27)  Plt: 463 (02-17 @ 21:52), 418 (02-15 @ 22:27)    INR: 1.14 02-15-20 @ 16:55  PTT: 33.3 02-15-20 @ 16:55          EXAMINATION:  General:  Calm.   HEENT:  MMM. EO spontaneously.   Neuro: A&O x3. L facial droop. EOMI. Dysarthria - improving.   RUE  5/5  LUE drift; LUE 5/5 flexion, 4+/5 extension, 4+/5 HG with incremental improvement.  RLE  5/5  LLE - 4+/5 hip flexion, 4+/5 knee extension, 4+/5 DF and PF  Cards:  Normal S1/S2 with rate and rhythm. No murmur.   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 She arrived intubated and sedated. After coming to [facility] ED, she underwent repeat imaging and was taken to the OR emergently for craniectomy and clipping of right posterior communicating artery aneurysm.     ADMISSION SCORES:  GCS: 3T  Hunt-Gonzalez: 5  modified Couch: 3  ICH score: 2      HOSPITAL COURSE:  2/3- Clipping R PCOMM              EVD placed   2/5- Extubated overnight  2/6- 500cc Bolus  2/7- Following commands on all extremities  2/8- R  cm2 from 129  2/11- failed FEEST   2/11- Superficial thrombophlebitis seen on doppler;   2/11- overnight EVD stopped working, no change in neuro exam    2/12- EVD clamped; no change in neuro exam  2/12- Febrile overnight (TMax 39.1); CXR shows clear lungs; Blood cultures pending  2/13- EVD clamped; stable neuro exam  2/13- Positive U/A; Blood cultures negative at 24h  2/13- drop in sodium to 132; started on 2% NaCl 50ml/hr  2/14- EVD remains clamped; stable neuro exam  2/15: EVD remains clamped, the IPD is stable   2/16: EVD Dced   2/16: Patient febrile with positive U/A; started on Levofloxacin  2/17: Patient febrile; urine culture positive for E. Coli   2/18: Patient cleared for discharge from ICU to floor    No acute overnight events       ICU Vital Signs Last 24 Hrs  T(C): 37.2 (18 Feb 2020 06:00), Max: 38.8 (17 Feb 2020 16:00)  T(F): 99 (18 Feb 2020 06:00), Max: 101.8 (17 Feb 2020 16:00)  HR: 92 (18 Feb 2020 05:00) (92 - 121)  BP: 126/80 (18 Feb 2020 05:00) (126/80 - 159/99)  BP(mean): 90 (18 Feb 2020 05:00) (90 - 115)  RR: 18 (18 Feb 2020 05:00) (17 - 26)  SpO2: 98% (18 Feb 2020 05:00) (94% - 100%)      02-17-20 @ 07:01  -  02-18-20 @ 07:00  --------------------------------------------------------  IN: 2040 mL / OUT: 1875 mL / NET: 165 mL      acetaminophen    Suspension .. 650 milliGRAM(s) Oral every 6 hours PRN  albuterol/ipratropium for Nebulization. 3 milliLiter(s) Nebulizer every 6 hours PRN  artificial tears (preservative free) Ophthalmic Solution 1 Drop(s) Both EYES three times a day PRN  chlorhexidine 4% Liquid 1 Application(s) Topical <User Schedule>  heparin  Injectable 5000 Unit(s) SubCutaneous every 12 hours  levoFLOXacin  Tablet 500 milliGRAM(s) Oral every 24 hours  niMODipine Oral Solution 60 milliGRAM(s) Oral every 4 hours  ondansetron Injectable 4 milliGRAM(s) IV Push every 8 hours PRN  oxyCODONE    IR 5 milliGRAM(s) Oral every 4 hours PRN  sodium chloride 2 Gram(s) Oral every 6 hours      LABS:  Na: 141 (02-17 @ 21:52), 140 (02-17 @ 03:13), 143 (02-16 @ 10:05), 140 (02-15 @ 22:27), 138 (02-15 @ 16:55), 139 (02-15 @ 09:01)  K: 3.7 (02-17 @ 21:52), 3.4 (02-17 @ 03:13), 3.7 (02-16 @ 10:05), 4.0 (02-15 @ 22:27), 3.9 (02-15 @ 16:55), 3.8 (02-15 @ 09:01)  Cl: 105 (02-17 @ 21:52), 107 (02-17 @ 03:13), 106 (02-16 @ 10:05), 105 (02-15 @ 22:27), 104 (02-15 @ 16:55), 105 (02-15 @ 09:01)  CO2: 24 (02-17 @ 21:52), 23 (02-17 @ 03:13), 24 (02-16 @ 10:05), 23 (02-15 @ 22:27), 21 (02-15 @ 16:55), 24 (02-15 @ 09:01)  BUN: 20 (02-17 @ 21:52), 14 (02-17 @ 03:13), 22 (02-16 @ 10:05), 20 (02-15 @ 22:27), 18 (02-15 @ 16:55), 16 (02-15 @ 09:01)  Cr: 0.41 (02-17 @ 21:52), 0.41 (02-17 @ 03:13), 0.50 (02-16 @ 10:05), 0.44 (02-15 @ 22:27), 0.41 (02-15 @ 16:55), 0.41 (02-15 @ 09:01)  Glu: 149(02-17 @ 21:52), 124(02-17 @ 03:13), 134(02-16 @ 10:05), 108(02-15 @ 22:27), 111(02-15 @ 16:55), 115(02-15 @ 09:01)    Hgb: 11.2 (02-17 @ 21:52), 10.6 (02-15 @ 22:27)  Hct: 35.4 (02-17 @ 21:52), 33.5 (02-15 @ 22:27)  WBC: 12.56 (02-17 @ 21:52), 12.45 (02-15 @ 22:27)  Plt: 463 (02-17 @ 21:52), 418 (02-15 @ 22:27)    INR: 1.14 02-15-20 @ 16:55  PTT: 33.3 02-15-20 @ 16:55          EXAMINATION:  General:  Calm.   HEENT:  MMM. EO spontaneously.   Neuro: A&O x3. L facial droop. EOMI. Dysarthria - improving.   RUE  5/5  LUE drift; LUE 5/5 flexion, 4+/5 extension, 4+/5 HG with incremental improvement.  RLE  5/5  LLE - 4+/5 hip flexion, 4+/5 knee extension, 4+/5 DF and PF  Cards:  Normal S1/S2 with rate and rhythm. No murmur.   Respiratory: Clear lung fields BL  Abdomen:  soft, +BS  Extremities:  no edema

## 2020-02-18 NOTE — CONSULT NOTE ADULT - SUBJECTIVE AND OBJECTIVE BOX
HPI:   Patient is a 39y female with no significant PMH who was brought to hospital after she collapsed on airplane.She was found to have a SAH/IPH and was sent from [facility] to [facility] on 2/3 and underwent emergency craniectomy with removal of clot, and clipping of RT PCOM aneurysm.She was extubeated in early post op setting and received a limited course of vanco for rt arm phlebitis.She has EVD pulled 2/16 and has had intermittent low grade fever.Levaquin added 2/16 for isolation of E Coli in the urine.She is receiving NG feeds. She has mild headaches.No cough, GI or  complaints.    REVIEW OF SYSTEMS:  All other review of systems negative (Comprehensive ROS)    PAST MEDICAL & SURGICAL HISTORY:  No pertinent past medical history  No significant past surgical history      Allergies    penicillins (Anaphylaxis; Hives)  shellfish (Anaphylaxis)  sulfa drugs (Unknown)    Intolerances    Gluten (Stomach Upset)      Antimicrobials Day #  :day 3  levoFLOXacin  Tablet 500 milliGRAM(s) Oral every 24 hours    Other Medications:  acetaminophen   Tablet .. 1000 milliGRAM(s) Oral every 6 hours PRN  albuterol/ipratropium for Nebulization. 3 milliLiter(s) Nebulizer every 6 hours PRN  artificial tears (preservative free) Ophthalmic Solution 1 Drop(s) Both EYES three times a day PRN  chlorhexidine 4% Liquid 1 Application(s) Topical <User Schedule>  heparin  Injectable 5000 Unit(s) SubCutaneous every 12 hours  niMODipine Oral Solution 60 milliGRAM(s) Oral every 4 hours  ondansetron Injectable 4 milliGRAM(s) IV Push every 8 hours PRN  oxyCODONE    IR 5 milliGRAM(s) Oral every 4 hours PRN  sodium chloride 2 Gram(s) Oral every 6 hours      FAMILY HISTORY:NC      SOCIAL HISTORY:  Smoking:x     ETOH: x    Drug Use: x      T(F): 98.1 (02-18-20 @ 15:44), Max: 101.1 (02-18-20 @ 03:00)  HR: 79 (02-18-20 @ 15:44)  BP: 134/87 (02-18-20 @ 15:44)  RR: 18 (02-18-20 @ 15:44)  SpO2: 100% (02-18-20 @ 15:44)  Wt(kg): --    PHYSICAL EXAM:  General: alert, no acute distress, Rt craniotomy with depressed skull and swelling, incision is C/D/I  Eyes:  anicteric, no conjunctival injection, no discharge  Oropharynx: no lesions or injection 	  Neck: supple, without adenopathy  Lungs: clear to auscultation  Heart: regular rate and rhythm; no murmur, rubs or gallops  Abdomen: soft, nondistended, nontender, without mass or organomegaly  Skin: no lesions  Extremities: no clubbing, cyanosis, or edema  Neurologic: alert, oriented, moves all extremities    LAB RESULTS:                        9.9    9.29  )-----------( 423      ( 18 Feb 2020 13:25 )             30.8     02-17    141  |  105  |  20  ----------------------------<  149<H>  3.7   |  24  |  0.41<L>    Ca    9.3      17 Feb 2020 21:52  Phos  3.9     02-17  Mg     2.3     02-17            MICROBIOLOGY:  RECENT CULTURES:  02-16 @ 22:27 .Blood Blood     No growth to date.      02-16 @ 21:49 .Urine Clean Catch (Midstream) Escherichia coli    >100,000 CFU/ml Escherichia coli            RADIOLOGY REVIEWED:  < from: Xray Chest 1 View- PORTABLE-Urgent (02.18.20 @ 05:04) >  INTERPRETATION:  CLINICAL INFORMATION: Enteric tube placement.    TECHNIQUE: Portable AP radiograph of the chest.    COMPARISON: Portable AP chest radiograph from  2/16/2020.    FINDINGS/  IMPRESSION:    Enteric tube tip is in the stomach.    Right upper extremity PICC with tip in the SVC.    The lungs are clear. No pleural effusion or pneumothorax.    Heart size is normal.    < end of copied text >  < from: CT Head No Cont (02.16.20 @ 07:51) >    IMPRESSION: Hemorrhage within the right basal ganglia and along the tract of the recently removed left frontal  shunt catheter is grossly unchanged. Adjacent vasogenic edema within the left frontal lobe adjacent to the tract of hemorrhage also appears similar. No midline shift or evidence of herniation.    Redemonstration of right hemicraniectomy with extension of brain parenchyma through the craniectomy site.    Redemonstration of right-sided P-comm aneurysm clipping.

## 2020-02-18 NOTE — CONSULT NOTE ADULT - PROBLEM SELECTOR RECOMMENDATION 2
resolved   EKG with twi v1-v3 - likely lead placement - HsT<6  tachycardia - heart rate better controlled when afebrile   no hypoxia

## 2020-02-18 NOTE — CONSULT NOTE ADULT - SUBJECTIVE AND OBJECTIVE BOX
cc: found unresponsive    HPI:  39F no PMHx found unresponsive on airplane coming back from [place] found to have right temporal parenchymal hemorrhage with diffuse subarachnoid hemorrhage slightly greater on the right with slight prominence of the left temporal horn. 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 patient had craniectomy with evacuation of ICH and clipping of Pcomm aneurysm on 2/3. patient had EVD (removed 2/15) and FELICIA drain (d/dory ). TCD's monitored in NSCU with elevated RMCA 120s. patient was having fevers up to 102 found to have superficial thrombophlebitis s/p treatment with vancomycin. Though fevers subsided briefly, patient developed recurrent fevers prior to completing vancomycin. blood cultures remained negative. However, patient UA positive  started on levaquin with UCx resulting ecoli sensitive to levaquin now Day 3. patient c/o headache 10 relieved with tylenol. denies cough, sore throat, sob, abdominal pain, dysuria. does have loose stools 2x/day (not watery). patient c/o chest pain earlier today that has since resolved.       PAST MEDICAL & SURGICAL HISTORY:  No pertinent past medical history  No significant past surgical history      Review of Systems:   CONSTITUTIONAL: No fever  EYES: No eye pain, visual disturbances  ENMT:  No difficulty hearing,   NECK: No pain or stiffness  RESPIRATORY: No cough, No shortness of breath  CARDIOVASCULAR: No chest pain, palpitations,  GASTROINTESTINAL: No abdominal or epigastric pain. No nausea, vomiting,  GENITOURINARY: No dysuria,   NEUROLOGICAL: No headaches,   SKIN: No rashes  ENDOCRINE: No heat or cold intolerance  MUSCULOSKELETAL: No joint pain or swelling;   PSYCHIATRIC: No depression,   ALLERY AND IMMUNOLOGIC: No hives or eczema    Allergies    penicillins (Anaphylaxis; Hives)  shellfish (Anaphylaxis)  sulfa drugs (Unknown)    Intolerances    Gluten (Stomach Upset)      Social History: denies smoking or etoh abuse    FAMILY HISTORY:  denies fhx of aneurysm      MEDICATIONS  (STANDING):  chlorhexidine 4% Liquid 1 Application(s) Topical <User Schedule>  heparin  Injectable 5000 Unit(s) SubCutaneous every 12 hours  levoFLOXacin  Tablet 500 milliGRAM(s) Oral every 24 hours  niMODipine Oral Solution 60 milliGRAM(s) Oral every 4 hours  sodium chloride 2 Gram(s) Oral every 6 hours    MEDICATIONS  (PRN):  acetaminophen   Tablet .. 1000 milliGRAM(s) Oral every 6 hours PRN Temp greater or equal to 38.5C (101.3F), Mild Pain (1 - 3)  albuterol/ipratropium for Nebulization. 3 milliLiter(s) Nebulizer every 6 hours PRN Shortness of Breath and/or Wheezing  artificial tears (preservative free) Ophthalmic Solution 1 Drop(s) Both EYES three times a day PRN Dry Eyes  ondansetron Injectable 4 milliGRAM(s) IV Push every 8 hours PRN Nausea and/or Vomiting  oxyCODONE    IR 5 milliGRAM(s) Oral every 4 hours PRN Moderate Pain (4 - 6)      Vital Signs Last 24 Hrs  T(C): 36.6 (2020 12:27), Max: 38.8 (2020 16:00)  T(F): 97.8 (2020 12:), Max: 101.8 (2020 16:00)  HR: 104 (2020 12:) (92 - 131)  BP: 130/89 (2020 12:27) (126/80 - 159/99)  BP(mean): 105 (2020 11:49) (90 - 116)  RR: 21 (2020 12:27) (17 - 25)  SpO2: 100% (2020 12:27) (98% - 100%)  CAPILLARY BLOOD GLUCOSE        I&O's Summary    2020 07:  -  2020 07:00  --------------------------------------------------------  IN: 2040 mL / OUT: 1875 mL / NET: 165 mL    2020 07:  -  2020 15:38  --------------------------------------------------------  IN: 1280 mL / OUT: 400 mL / NET: 880 mL        PHYSICAL EXAM:  GENERAL: NAD, breathing normal  HEAD:  Right craniectomy staples c/d/i without erythema  EYES: conjunctiva and sclera clear  NECK: supple, No JVD  CHEST/LUNG: CTA b/l  HEART: S1 S2 tachycardic - not muffled, no murmur appreciated  ABDOMEN: +BS Soft, NT/ND  EXTREMITIES:  2+ DP Pulses, No c/c. no LE edema  NEUROLOGY: AAOx3, 5/5 MS RUE and RLE, 4+/5 LLE, 4/5 LUE  SKIN: slight left forearm palpable cord, no erythema or tenderness. R PICC line site without erythema or tenderness      LABS:                        9.9    9.29  )-----------( 423      ( 2020 13:25 )             30.8     02-17    141  |  105  |  20  ----------------------------<  149<H>  3.7   |  24  |  0.41<L>    Ca    9.3      2020 21:52  Phos  3.9       Mg     2.3     -17        CARDIAC MARKERS ( 2020 13:25 )  x     / x     / 45 U/L / x     / 1.1 ng/mL      Urinalysis Basic - ( 2020 16:37 )    Color: Light Yellow / Appearance: Clear / S.029 / pH: x  Gluc: x / Ketone: Negative  / Bili: Negative / Urobili: Negative   Blood: x / Protein: Trace / Nitrite: Positive   Leuk Esterase: Large / RBC: 2 /hpf / WBC 16 /HPF   Sq Epi: x / Non Sq Epi: 12 /hpf / Bacteria: Many    HsT <6    RADIOLOGY & ADDITIONAL TESTS:    Imaging Personally Reviewed: EKG tracing reviewed - TWI V1-V3 - ?due to lead placement, no signs of low voltage    CTH  reviewed -  Hemorrhage within the right basal ganglia and along the tract of the recently removed left frontal  shunt catheter is grossly unchanged. Adjacent vasogenic edema within the left frontal lobe adjacent to the tract of hemorrhage also appears similar. No midline shift or evidence of herniation.  Redemonstration of right hemicraniectomy with extension of brain parenchyma through the craniectomy site.  Redemonstration of right-sided P-comm aneurysm clipping.    CXR film reviewed - no consolidation         Consultant(s) Notes Reviewed:      Care Discussed with Consultants/Other Providers: cc: found unresponsive    HPI:  39F no PMHx found unresponsive on airplane coming back from [place] found to have right temporal parenchymal hemorrhage with diffuse subarachnoid hemorrhage slightly greater on the right with slight prominence of the left temporal horn. 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 patient had craniectomy with evacuation of ICH and clipping of Pcomm aneurysm on 2/3. patient had EVD (removed 2/15) and FELICIA drain (d/dory ). TCD's monitored in NSCU with elevated RMCA 120s. patient was having fevers up to 102 found to have superficial thrombophlebitis s/p treatment with vancomycin. Though fevers subsided briefly, patient developed recurrent fevers prior to completing vancomycin. blood cultures remained negative. However, patient UA positive  started on levaquin with UCx resulting ecoli sensitive to levaquin now Day 3. patient c/o headache 10 relieved with tylenol. denies cough, sore throat, sob, abdominal pain, dysuria. does have loose stools 2x/day (not watery). patient c/o chest pain earlier today that has since resolved.       PAST MEDICAL & SURGICAL HISTORY:  No pertinent past medical history  No significant past surgical history      Review of Systems:   CONSTITUTIONAL: No fever  EYES: No eye pain, visual disturbances  ENMT:  No difficulty hearing,   NECK: No pain or stiffness  RESPIRATORY: No cough, No shortness of breath  CARDIOVASCULAR: No chest pain, palpitations,  GASTROINTESTINAL: No abdominal or epigastric pain. No nausea, vomiting,  GENITOURINARY: No dysuria,   NEUROLOGICAL: No headaches,   SKIN: No rashes  ENDOCRINE: No heat or cold intolerance  MUSCULOSKELETAL: No joint pain or swelling;   PSYCHIATRIC: No depression,   ALLERY AND IMMUNOLOGIC: No hives or eczema    Allergies    penicillins (Anaphylaxis; Hives)  shellfish (Anaphylaxis)  sulfa drugs (Unknown)    Intolerances    Gluten (Stomach Upset)      Social History: denies smoking or etoh abuse    FAMILY HISTORY:  denies fhx of aneurysm      MEDICATIONS  (STANDING):  chlorhexidine 4% Liquid 1 Application(s) Topical <User Schedule>  heparin  Injectable 5000 Unit(s) SubCutaneous every 12 hours  levoFLOXacin  Tablet 500 milliGRAM(s) Oral every 24 hours  niMODipine Oral Solution 60 milliGRAM(s) Oral every 4 hours  sodium chloride 2 Gram(s) Oral every 6 hours    MEDICATIONS  (PRN):  acetaminophen   Tablet .. 1000 milliGRAM(s) Oral every 6 hours PRN Temp greater or equal to 38.5C (101.3F), Mild Pain (1 - 3)  albuterol/ipratropium for Nebulization. 3 milliLiter(s) Nebulizer every 6 hours PRN Shortness of Breath and/or Wheezing  artificial tears (preservative free) Ophthalmic Solution 1 Drop(s) Both EYES three times a day PRN Dry Eyes  ondansetron Injectable 4 milliGRAM(s) IV Push every 8 hours PRN Nausea and/or Vomiting  oxyCODONE    IR 5 milliGRAM(s) Oral every 4 hours PRN Moderate Pain (4 - 6)      Vital Signs Last 24 Hrs  T(C): 36.6 (2020 12:27), Max: 38.8 (2020 16:00)  T(F): 97.8 (2020 12:), Max: 101.8 (2020 16:00)  HR: 104 (2020 12:) (92 - 131)  BP: 130/89 (2020 12:27) (126/80 - 159/99)  BP(mean): 105 (2020 11:49) (90 - 116)  RR: 21 (2020 12:27) (17 - 25)  SpO2: 100% (2020 12:27) (98% - 100%)  CAPILLARY BLOOD GLUCOSE        I&O's Summary    2020 07:  -  2020 07:00  --------------------------------------------------------  IN: 2040 mL / OUT: 1875 mL / NET: 165 mL    2020 07:  -  2020 15:38  --------------------------------------------------------  IN: 1280 mL / OUT: 400 mL / NET: 880 mL        PHYSICAL EXAM:  GENERAL: NAD, breathing normal  HEAD:  Right craniectomy staples c/d/i without erythema  EYES: conjunctiva and sclera clear  NECK: supple, No JVD  CHEST/LUNG: CTA b/l  HEART: S1 S2 tachycardic - not muffled, no murmur appreciated  ABDOMEN: +BS Soft, NT/ND  EXTREMITIES:  2+ DP Pulses, No c/c. no LE edema  NEUROLOGY: AAOx3, 5/5 MS RUE and RLE, 4+/5 LLE, 4/5 LUE  SKIN: slight left forearm palpable cord, no erythema or tenderness. R PICC line site without erythema or tenderness      LABS:                        9.9    9.29  )-----------( 423      ( 2020 13:25 )             30.8     02-17    141  |  105  |  20  ----------------------------<  149<H>  3.7   |  24  |  0.41<L>    Ca    9.3      2020 21:52  Phos  3.9     -  Mg     2.3     -17        CARDIAC MARKERS ( 2020 13:25 )  x     / x     / 45 U/L / x     / 1.1 ng/mL      Urinalysis Basic - ( 2020 16:37 )    Color: Light Yellow / Appearance: Clear / S.029 / pH: x  Gluc: x / Ketone: Negative  / Bili: Negative / Urobili: Negative   Blood: x / Protein: Trace / Nitrite: Positive   Leuk Esterase: Large / RBC: 2 /hpf / WBC 16 /HPF   Sq Epi: x / Non Sq Epi: 12 /hpf / Bacteria: Many    HsT <6    Utox negative    RADIOLOGY & ADDITIONAL TESTS:    Imaging Personally Reviewed: EKG tracing reviewed - TWI V1-V3 - ?due to lead placement, no signs of low voltage    CTH  reviewed -  Hemorrhage within the right basal ganglia and along the tract of the recently removed left frontal  shunt catheter is grossly unchanged. Adjacent vasogenic edema within the left frontal lobe adjacent to the tract of hemorrhage also appears similar. No midline shift or evidence of herniation.  Redemonstration of right hemicraniectomy with extension of brain parenchyma through the craniectomy site.  Redemonstration of right-sided P-comm aneurysm clipping.    CXR film reviewed - no consolidation     UE doppler  reviewed - No evidence of deep venous thrombosis in either upper extremity.  Superficial thrombophlebitis affects the right basilic vein in the forearm and the right cephalic vein in the forearm and upper arm.  Superficial thrombophlebitis affects the left cephalic vein in the upper arm.    LE doppler  reviewed- no DVT      Consultant(s) Notes Reviewed:      Care Discussed with Consultants/Other Providers: d/w neurosurgery NP - Kathy regarding fevers. cc: found unresponsive    HPI:  39F no PMHx found unresponsive on airplane coming back from [place] found to have right temporal parenchymal hemorrhage with diffuse subarachnoid hemorrhage slightly greater on the right with slight prominence of the left temporal horn. 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 patient had craniectomy with evacuation of ICH and clipping of Pcomm aneurysm on 2/3. patient had EVD (removed 2/15) and FELICIA drain (d/dory ). TCD's monitored in NSCU with elevated RMCA 120s on nimodipine. patient was having fevers up to 102 found to have superficial thrombophlebitis s/p treatment with vancomycin. Though fevers subsided briefly, patient developed recurrent fevers prior to completing vancomycin. blood cultures remained negative. However, patient UA positive  started on levaquin with UCx resulting ecoli sensitive to levaquin now Day 3. patient c/o headache 8/10 relieved with tylenol. denies cough, sore throat, sob, abdominal pain, dysuria. does have loose stools 2x/day (not watery). patient c/o chest pain earlier today that has since resolved.       PAST MEDICAL & SURGICAL HISTORY:  No pertinent past medical history  No significant past surgical history      Review of Systems:   CONSTITUTIONAL: No fever  EYES: No eye pain, visual disturbances  ENMT:  No difficulty hearing,   NECK: No pain or stiffness  RESPIRATORY: No cough, No shortness of breath  CARDIOVASCULAR: No chest pain, palpitations,  GASTROINTESTINAL: No abdominal or epigastric pain. No nausea, vomiting,  GENITOURINARY: No dysuria,   NEUROLOGICAL: No headaches,   SKIN: No rashes  ENDOCRINE: No heat or cold intolerance  MUSCULOSKELETAL: No joint pain or swelling;   PSYCHIATRIC: No depression,   ALLERY AND IMMUNOLOGIC: No hives or eczema    Allergies    penicillins (Anaphylaxis; Hives)  shellfish (Anaphylaxis)  sulfa drugs (Unknown)    Intolerances    Gluten (Stomach Upset)      Social History: denies smoking or etoh abuse    FAMILY HISTORY:  denies fhx of aneurysm      MEDICATIONS  (STANDING):  chlorhexidine 4% Liquid 1 Application(s) Topical <User Schedule>  heparin  Injectable 5000 Unit(s) SubCutaneous every 12 hours  levoFLOXacin  Tablet 500 milliGRAM(s) Oral every 24 hours  niMODipine Oral Solution 60 milliGRAM(s) Oral every 4 hours  sodium chloride 2 Gram(s) Oral every 6 hours    MEDICATIONS  (PRN):  acetaminophen   Tablet .. 1000 milliGRAM(s) Oral every 6 hours PRN Temp greater or equal to 38.5C (101.3F), Mild Pain (1 - 3)  albuterol/ipratropium for Nebulization. 3 milliLiter(s) Nebulizer every 6 hours PRN Shortness of Breath and/or Wheezing  artificial tears (preservative free) Ophthalmic Solution 1 Drop(s) Both EYES three times a day PRN Dry Eyes  ondansetron Injectable 4 milliGRAM(s) IV Push every 8 hours PRN Nausea and/or Vomiting  oxyCODONE    IR 5 milliGRAM(s) Oral every 4 hours PRN Moderate Pain (4 - 6)      Vital Signs Last 24 Hrs  T(C): 36.6 (2020 12:27), Max: 38.8 (2020 16:00)  T(F): 97.8 (2020 12:27), Max: 101.8 (2020 16:00)  HR: 104 (2020 12:27) (92 - 131)  BP: 130/89 (2020 12:27) (126/80 - 159/99)  BP(mean): 105 (2020 11:49) (90 - 116)  RR: 21 (2020 12:27) (17 - 25)  SpO2: 100% (2020 12:27) (98% - 100%)  CAPILLARY BLOOD GLUCOSE        I&O's Summary    2020 07:  -  2020 07:00  --------------------------------------------------------  IN: 2040 mL / OUT: 1875 mL / NET: 165 mL    2020 07:  -  2020 15:38  --------------------------------------------------------  IN: 1280 mL / OUT: 400 mL / NET: 880 mL        PHYSICAL EXAM:  GENERAL: NAD, breathing normal  HEAD:  Right craniectomy staples c/d/i without erythema  EYES: conjunctiva and sclera clear  NECK: supple, No JVD  CHEST/LUNG: CTA b/l  HEART: S1 S2 tachycardic - not muffled, no murmur appreciated  ABDOMEN: +BS Soft, NT/ND  EXTREMITIES:  2+ DP Pulses, No c/c. no LE edema  NEUROLOGY: AAOx3, 5/5 MS RUE and RLE, 4+/5 LLE, 4/5 LUE  SKIN: slight left forearm palpable cord, no erythema or tenderness. R PICC line site without erythema or tenderness      LABS:                        9.9    9.29  )-----------( 423      ( 2020 13:25 )             30.8     02-17    141  |  105  |  20  ----------------------------<  149<H>  3.7   |  24  |  0.41<L>    Ca    9.3      2020 21:52  Phos  3.9     02-  Mg     2.3     02-17        CARDIAC MARKERS ( 2020 13:25 )  x     / x     / 45 U/L / x     / 1.1 ng/mL      Urinalysis Basic - ( 2020 16:37 )    Color: Light Yellow / Appearance: Clear / S.029 / pH: x  Gluc: x / Ketone: Negative  / Bili: Negative / Urobili: Negative   Blood: x / Protein: Trace / Nitrite: Positive   Leuk Esterase: Large / RBC: 2 /hpf / WBC 16 /HPF   Sq Epi: x / Non Sq Epi: 12 /hpf / Bacteria: Many    HsT <6    Utox negative    RADIOLOGY & ADDITIONAL TESTS:    Imaging Personally Reviewed: EKG tracing reviewed - TWI V1-V3 - ?due to lead placement, no signs of low voltage    CTH  reviewed -  Hemorrhage within the right basal ganglia and along the tract of the recently removed left frontal  shunt catheter is grossly unchanged. Adjacent vasogenic edema within the left frontal lobe adjacent to the tract of hemorrhage also appears similar. No midline shift or evidence of herniation.  Redemonstration of right hemicraniectomy with extension of brain parenchyma through the craniectomy site.  Redemonstration of right-sided P-comm aneurysm clipping.    CXR film reviewed - no consolidation     UE doppler  reviewed - No evidence of deep venous thrombosis in either upper extremity.  Superficial thrombophlebitis affects the right basilic vein in the forearm and the right cephalic vein in the forearm and upper arm.  Superficial thrombophlebitis affects the left cephalic vein in the upper arm.    LE doppler  reviewed- no DVT      Consultant(s) Notes Reviewed:      Care Discussed with Consultants/Other Providers: d/w neurosurgery NP - Kathy regarding fevers.

## 2020-02-18 NOTE — CONSULT NOTE ADULT - ASSESSMENT
39F no PMHx found unresponsive on airplane coming back from [place] found to have R temporal parenchymal hemorrhage with diffuse SAH on CTH. Hospital course c/b fevers with superficial thrombophlebitis treated with IV vancomycin and now UTI on levaquin. 39F no PMHx found unresponsive on airplane coming back from [place] found to have R temporal parenchymal hemorrhage with diffuse SAH on CTH s/p craniectomy with evacuation of ICH and clipping of Pcomm aneurysm on 2/3. Hospital course c/b fevers with superficial thrombophlebitis treated with IV vancomycin and now UTI on levaquin.

## 2020-02-18 NOTE — CONSULT NOTE ADULT - PROBLEM SELECTOR RECOMMENDATION 5
Transitions of Care Status:  1.  Name of PCP: none  2.  PCP Contacted on Admission: [ ] Y    [ ] N    3.  PCP contacted at Discharge: [ ] Y    [ ] N    [ ] N/A  4.  Post-Discharge Appointment Date and Location:  5.  Summary of Handoff given to PCP: failed FEEST  s/p NGT  awaiting repeat swallow eval

## 2020-02-18 NOTE — CONSULT NOTE ADULT - ATTENDING COMMENTS
Seen and examined with resident. Agree with note.   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   Patient will need acute rehabilitation when stable.
Dr. ETHEL MckeonSycamore Medical Centerist  [phone]

## 2020-02-19 LAB
ANION GAP SERPL CALC-SCNC: 12 MMOL/L — SIGNIFICANT CHANGE UP (ref 5–17)
BUN SERPL-MCNC: 24 MG/DL — HIGH (ref 7–23)
CALCIUM SERPL-MCNC: 9.3 MG/DL — SIGNIFICANT CHANGE UP (ref 8.4–10.5)
CHLORIDE SERPL-SCNC: 106 MMOL/L — SIGNIFICANT CHANGE UP (ref 96–108)
CO2 SERPL-SCNC: 26 MMOL/L — SIGNIFICANT CHANGE UP (ref 22–31)
CREAT SERPL-MCNC: 0.49 MG/DL — LOW (ref 0.5–1.3)
GLUCOSE SERPL-MCNC: 138 MG/DL — HIGH (ref 70–99)
HCT VFR BLD CALC: 35.3 % — SIGNIFICANT CHANGE UP (ref 34.5–45)
HGB BLD-MCNC: 11 G/DL — LOW (ref 11.5–15.5)
MCHC RBC-ENTMCNC: 29.1 PG — SIGNIFICANT CHANGE UP (ref 27–34)
MCHC RBC-ENTMCNC: 31.2 GM/DL — LOW (ref 32–36)
MCV RBC AUTO: 93.4 FL — SIGNIFICANT CHANGE UP (ref 80–100)
NRBC # BLD: 0 /100 WBCS — SIGNIFICANT CHANGE UP (ref 0–0)
PLATELET # BLD AUTO: 487 K/UL — HIGH (ref 150–400)
POTASSIUM SERPL-MCNC: 3.5 MMOL/L — SIGNIFICANT CHANGE UP (ref 3.5–5.3)
POTASSIUM SERPL-SCNC: 3.5 MMOL/L — SIGNIFICANT CHANGE UP (ref 3.5–5.3)
RBC # BLD: 3.78 M/UL — LOW (ref 3.8–5.2)
RBC # FLD: 12.5 % — SIGNIFICANT CHANGE UP (ref 10.3–14.5)
SODIUM SERPL-SCNC: 144 MMOL/L — SIGNIFICANT CHANGE UP (ref 135–145)
WBC # BLD: 10.63 K/UL — HIGH (ref 3.8–10.5)
WBC # FLD AUTO: 10.63 K/UL — HIGH (ref 3.8–10.5)

## 2020-02-19 PROCEDURE — 99232 SBSQ HOSP IP/OBS MODERATE 35: CPT

## 2020-02-19 PROCEDURE — 70450 CT HEAD/BRAIN W/O DYE: CPT | Mod: 26

## 2020-02-19 PROCEDURE — 74230 X-RAY XM SWLNG FUNCJ C+: CPT | Mod: 26

## 2020-02-19 PROCEDURE — 72125 CT NECK SPINE W/O DYE: CPT | Mod: 26

## 2020-02-19 RX ORDER — NIMODIPINE 60 MG/10ML
60 SOLUTION ORAL EVERY 4 HOURS
Refills: 0 | Status: DISCONTINUED | OUTPATIENT
Start: 2020-02-19 | End: 2020-02-24

## 2020-02-19 RX ORDER — ACETAMINOPHEN 500 MG
650 TABLET ORAL EVERY 6 HOURS
Refills: 0 | Status: DISCONTINUED | OUTPATIENT
Start: 2020-02-19 | End: 2020-02-28

## 2020-02-19 RX ORDER — OXYCODONE HYDROCHLORIDE 5 MG/1
2.5 TABLET ORAL EVERY 4 HOURS
Refills: 0 | Status: DISCONTINUED | OUTPATIENT
Start: 2020-02-19 | End: 2020-02-25

## 2020-02-19 RX ADMIN — Medication 650 MILLIGRAM(S): at 12:44

## 2020-02-19 RX ADMIN — OXYCODONE HYDROCHLORIDE 2.5 MILLIGRAM(S): 5 TABLET ORAL at 23:40

## 2020-02-19 RX ADMIN — NIMODIPINE 60 MILLIGRAM(S): 60 SOLUTION ORAL at 09:05

## 2020-02-19 RX ADMIN — Medication 650 MILLIGRAM(S): at 02:55

## 2020-02-19 RX ADMIN — Medication 650 MILLIGRAM(S): at 19:09

## 2020-02-19 RX ADMIN — CHLORHEXIDINE GLUCONATE 1 APPLICATION(S): 213 SOLUTION TOPICAL at 22:15

## 2020-02-19 RX ADMIN — NIMODIPINE 60 MILLIGRAM(S): 60 SOLUTION ORAL at 23:00

## 2020-02-19 RX ADMIN — SODIUM CHLORIDE 2 GRAM(S): 9 INJECTION INTRAMUSCULAR; INTRAVENOUS; SUBCUTANEOUS at 06:30

## 2020-02-19 RX ADMIN — HEPARIN SODIUM 5000 UNIT(S): 5000 INJECTION INTRAVENOUS; SUBCUTANEOUS at 22:15

## 2020-02-19 RX ADMIN — NIMODIPINE 60 MILLIGRAM(S): 60 SOLUTION ORAL at 17:47

## 2020-02-19 RX ADMIN — Medication 650 MILLIGRAM(S): at 03:25

## 2020-02-19 RX ADMIN — Medication 1000 MILLIGRAM(S): at 20:10

## 2020-02-19 RX ADMIN — SODIUM CHLORIDE 2 GRAM(S): 9 INJECTION INTRAMUSCULAR; INTRAVENOUS; SUBCUTANEOUS at 12:35

## 2020-02-19 RX ADMIN — Medication 650 MILLIGRAM(S): at 11:01

## 2020-02-19 RX ADMIN — Medication 650 MILLIGRAM(S): at 20:19

## 2020-02-19 RX ADMIN — NIMODIPINE 60 MILLIGRAM(S): 60 SOLUTION ORAL at 14:22

## 2020-02-19 RX ADMIN — HEPARIN SODIUM 5000 UNIT(S): 5000 INJECTION INTRAVENOUS; SUBCUTANEOUS at 09:03

## 2020-02-19 RX ADMIN — OXYCODONE HYDROCHLORIDE 2.5 MILLIGRAM(S): 5 TABLET ORAL at 22:30

## 2020-02-19 RX ADMIN — NIMODIPINE 60 MILLIGRAM(S): 60 SOLUTION ORAL at 02:57

## 2020-02-19 RX ADMIN — NIMODIPINE 60 MILLIGRAM(S): 60 SOLUTION ORAL at 06:30

## 2020-02-19 NOTE — SWALLOW VFSS/MBS ASSESSMENT ADULT - ORAL PREPARATORY PHASE
Poor bolus formation reduced lingual control/Poor bolus formation Poor bolus formation/reduced lingual control prolonged mastication/Poor bolus formation reduced lingual control

## 2020-02-19 NOTE — PROGRESS NOTE ADULT - ASSESSMENT
39F no PMHx found unresponsive on airplane coming back from [place] found to have R temporal parenchymal hemorrhage with diffuse SAH on CTH s/p craniectomy with evacuation of ICH and clipping of Pcomm aneurysm on 2/3. Hospital course c/b fevers with superficial thrombophlebitis treated with IV vancomycin and now UTI on levaquin.

## 2020-02-19 NOTE — PROGRESS NOTE ADULT - ASSESSMENT
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 She arrived intubated and sedated. After coming to [facility] ED, she was  taken to the OR emergently for R temporal craniectomy/ evacuation clot  and clipping of right posterior communicating artery aneurysm/ EVD placement 2/3/20 . Post op CT/ CTA with shunt tract heme. CTA with no aneurysm . s/p Cerebral angio   ADMISSION SCORES:  GCS: 3T  Hunt-Gonzalez: 5  modified Couch: 3  ICH score: 2      HOSPITAL COURSE:  2/3- Clipping R PCOMM              EVD placed   2/5- Extubated overnight  2/6- 500cc Bolus  2/7- Following commands on all extremities  2/8- R  cm2 from 129  2/11- failed FEEST   2/11- Superficial thrombophlebitis seen on doppler;   2/11- overnight EVD stopped working, no change in neuro exam    2/12- EVD clamped; no change in neuro exam  2/12- Febrile overnight (TMax 39.1); CXR shows clear lungs; Blood cultures pending  2/13- EVD clamped; stable neuro exam  2/13- Positive U/A; Blood cultures negative at 24h  2/13- drop in sodium to 132; started on 2% NaCl 50ml/hr  2/14- EVD remains clamped; stable neuro exam  2/15: EVD remains clamped, the IPD is stable   2/16: EVD Dced   2/16: Patient febrile with positive U/A; started on Levofloxacin  2/17: Patient febrile; urine culture positive for E. 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 She arrived intubated and sedated. After coming to [facility] ED, she was  taken to the OR emergently for R temporal craniectomy/ evacuation clot  and clipping of right posterior communicating artery aneurysm/ EVD placement 2/3/20 . Post op CT/ CTA with shunt tract heme. CTA with no aneurysm . s/p Cerebral angio 2/5. Good clipping No vasospasm EVD clamped 2/12, Fevers 2/12 Fever work up negative , EVD d/c 2/16. Persistent fevers Work up w E coli urine. Started on Levaquin.  2/18 BP soft w tacycardia . 1 Liter fluid bolus Tx to floor  ID consulted .     Plan    Neuro stable . Helmet when OOB. On Nimodipine   Vitals stable   ID- Low grade fevers persists. If spikes reculture. Levaquin total 5 days. ID consult appreciated   Dysphagia- MBS today   DVT ppx  PT/OT- a/c TBI rehab

## 2020-02-19 NOTE — PROGRESS NOTE ADULT - PROBLEM SELECTOR PLAN 1
fever spike improved - will continue to monitor   suspect UTI  c/w levaquin (day 4 of 5)  could this be central fevers?  will continue to monitor   repeat bcx 2/16 NTD, cxr without consolidation.

## 2020-02-19 NOTE — SWALLOW VFSS/MBS ASSESSMENT ADULT - ORAL PHASE
3.5 second oral transit time/Delayed oral transit time/Incomplete tongue to palate contact/Residue in oral cavity Delayed oral transit time/Residue in oral cavity/1.5 seconds/Incomplete tongue to palate contact Delayed oral transit time/Uncontrolled bolus / spillover in nasim-pharynx/moderate amount of premature spillage to the level of the valleculae Uncontrolled bolus / spillover in nasim-pharynx/Delayed oral transit time 1.9 second oral transit time/Delayed oral transit time 9 seconds/Delayed oral transit time/Residue in oral cavity moderate amount of premature spillage to the level of the valleculae, over the aryepiglottic folds, mild amount of spillage to the pyriform sinuses, likely liquid within fruit cocktail/Delayed oral transit time/Uncontrolled bolus / spillover in nasim-pharynx mild amount of premature spillage to the level of the valleculae, over the aryepiglottic folds to the level of the pyriform sinuses/Uncontrolled bolus / spillover in nasim-pharynx

## 2020-02-19 NOTE — SWALLOW VFSS/MBS ASSESSMENT ADULT - DIAGNOSTIC IMPRESSIONS
Pt. presents with oropharyngeal dysphagia marked by reduced bolus formation, delayed oral transport, delayed initiation of swallow trigger, laryngeal penetration noted with uncontrolled amounts of nectar thick liquids and thin liquids. It should be noted that pt. was highly impulsive throughout the exam, therefore unable to utilize or carryover safe swallowing strategies. Pt. will require 100% assistance with meals and any oral intake.

## 2020-02-19 NOTE — PROGRESS NOTE ADULT - SUBJECTIVE AND OBJECTIVE BOX
SUBJECTIVE:   Patient seen and examined. Some headaches   OVERNIGHT EVENTS: none    Vital Signs Last 24 Hrs  T(C): 36.6 (19 Feb 2020 12:45), Max: 38.2 (19 Feb 2020 02:41)  T(F): 97.8 (19 Feb 2020 12:45), Max: 100.8 (19 Feb 2020 02:41)  HR: 69 (19 Feb 2020 11:16) (69 - 118)  BP: 144/87 (19 Feb 2020 11:16) (90/56 - 148/85)  RR: 18 (19 Feb 2020 11:16) (18 - 19)  SpO2: 96% (19 Feb 2020 11:16) (96% - 100%)    PHYSICAL EXAM:    Constitutional: No Acute Distress     Neurological: Awake alert Ox3, Speech clear Following Commands, Moving all Extremities LUE 4+/5 LLE 4+/5 RUE/ RLE 5/5 R cranial defect full/ soft     Pulmonary: Clear to Auscultation,     Cardiovascular: S1, S2, Regular rate and rhythm     Gastrointestinal: Soft, Non-tender, Non-distended     Extremities: No calf tenderness       LABS:                        11.0   10.63 )-----------( 487      ( 19 Feb 2020 07:22 )             35.3    02-19    144  |  106  |  24<H>  ----------------------------<  138<H>  3.5   |  26  |  0.49<L>    Ca    9.3      19 Feb 2020 07:22  Phos  3.9     02-17  Mg     2.3     02-17      IMAGING:         MEDICATIONS:  Antibiotics:  levoFLOXacin  Tablet 500 milliGRAM(s) Oral every 24 hours  acetaminophen   Tablet .. 650 milliGRAM(s) Oral every 6 hours PRN Temp greater or equal to 38C (100.4F), Mild Pain (1 - 3)  ondansetron Injectable 4 milliGRAM(s) IV Push every 8 hours PRN Nausea and/or Vomiting  oxyCODONE    IR 5 milliGRAM(s) Oral every 4 hours PRN Moderate Pain (4 - 6)  niMODipine Oral Solution 60 milliGRAM(s) Oral every 4 hours  albuterol/ipratropium for Nebulization. 3 milliLiter(s) Nebulizer every 6 hours PRN Shortness of Breath and/or Wheezing  artificial tears (preservative free) Ophthalmic Solution 1 Drop(s) Both EYES three times a day PRN Dry Eyes  chlorhexidine 4% Liquid 1 Application(s) Topical <User Schedule>  heparin  Injectable 5000 Unit(s) SubCutaneous every 12 hours  sodium chloride 2 Gram(s) Oral every 6 hours      DIET:     Assessment:  Please Check When Present   []  GCS  E   V  M     Heart Failure: []Acute, [] acute on chronic , []chronic  Heart Failure:  [] Diastolic (HFpEF), [] Systolic (HFrEF), []Combined (HFpEF and HFrEF), [] RHF, [] Pulm HTN, [] Other    [] GAURAV, [] ATN, [] AIN, [] other  [] CKD1, [] CKD2, [] CKD 3, [] CKD 4, [] CKD 5, []ESRD    Encephalopathy: [] Metabolic, [] Hepatic, [] toxic, [] Neurological, [] Other    Abnormal Nurtitional Status: [] malnurtition (see nutrition note), [ ]underweight: BMI < 19, [] morbid obesity: BMI >40, [] Cachexia    [] Sepsis  [] hypovolemic shock,[] cardiogenic shock, [] hemorrhagic shock, [] neuogenic shock  [] Acute Respiratory Failure  []Cerebral edema, [] Brain compression/ herniation,   [] Functional quadriplegia  [] Acute blood loss anemia

## 2020-02-19 NOTE — SWALLOW VFSS/MBS ASSESSMENT ADULT - SLP GENERAL OBSERVATIONS
Pt arrived to radiology in ROBERT chair awake and alert. Pt required consistent redirection throughout exam 2/2 impulsivity, continuous movement in chair, and reduced attention.

## 2020-02-19 NOTE — SWALLOW VFSS/MBS ASSESSMENT ADULT - LARYNGEAL PENETRATION DURING THE SWALLOW - SILENT
penetration during the swallow over laryngeal surface of the epiglottis, spontaneous clearance noted Trace/trace laryngeal penetration noted over the laryngeal surface of the epiglottis, spontaneously cleared. Likely thin liquid within fruit cocktail penetration noted over the laryngeal surface of the epiglottis with spontaneous retrieval/Mild

## 2020-02-19 NOTE — PROGRESS NOTE ADULT - PROBLEM SELECTOR PLAN 3
s/p craniectomy with evacuation of ICH and clipping of PComm aneuryms on 2/3  management per neurosurgery.

## 2020-02-19 NOTE — PROGRESS NOTE ADULT - SUBJECTIVE AND OBJECTIVE BOX
Patient seen and examined at bedside.    --Anticoagulation--  heparin  Injectable 5000 Unit(s) SubCutaneous every 12 hours    T(C): 38.1 (02-19-20 @ 19:14), Max: 38.2 (02-19-20 @ 02:41)  HR: 108 (02-19-20 @ 16:37) (69 - 118)  BP: 149/97 (02-19-20 @ 16:37) (90/56 - 149/97)  RR: 19 (02-19-20 @ 16:37) (18 - 19)  SpO2: 100% (02-19-20 @ 16:37) (96% - 100%)  Wt(kg): --    Exam:  AOx3, briskly FC, appropriate  LUE 4 to 4+/5, otherwise strong throughout  No pain to extremities on palpation  Comfortable on room air

## 2020-02-19 NOTE — PROGRESS NOTE ADULT - SUBJECTIVE AND OBJECTIVE BOX
CC: f/u for fever    Patient reports: mild headaches, no respiratory, GI or  symptoms.    REVIEW OF SYSTEMS:  All other review of systems negative (Comprehensive ROS)    Antimicrobials Day #  :day 4/5  levoFLOXacin  Tablet 500 milliGRAM(s) Oral every 24 hours    Other Medications Reviewed    T(F): 97.8 (02-19-20 @ 07:10), Max: 100.8 (02-19-20 @ 02:41)  HR: 79 (02-19-20 @ 07:10)  BP: 90/56 (02-19-20 @ 07:10)  RR: 18 (02-19-20 @ 07:10)  SpO2: 100% (02-19-20 @ 07:10)  Wt(kg): --    PHYSICAL EXAM:  General: alert, no acute distress, craniotomy incision intact,no drainage but swelling appreciated  Eyes:  anicteric, no conjunctival injection, no discharge  Oropharynx: no lesions or injection . NG tube in place	  Neck: supple, without adenopathy  Lungs: clear to auscultation  Heart: regular rate and rhythm; no murmur, rubs or gallops  Abdomen: soft, nondistended, nontender, without mass or organomegaly  Skin: no lesions  Extremities: no clubbing, cyanosis, or edema  Neurologic: alert, oriented, moves all extremities    LAB RESULTS:                        11.0   10.63 )-----------( 487      ( 19 Feb 2020 07:22 )             35.3     02-19    144  |  106  |  24<H>  ----------------------------<  138<H>  3.5   |  26  |  0.49<L>    Ca    9.3      19 Feb 2020 07:22  Phos  3.9     02-17  Mg     2.3     02-17          MICROBIOLOGY:  RECENT CULTURES:  02-16 @ 22:27 .Blood Blood     No growth to date.      02-16 @ 21:49 .Urine Clean Catch (Midstream) Escherichia coli    >100,000 CFU/ml Escherichia coli          RADIOLOGY REVIEWED:    < from: Xray Chest 1 View- PORTABLE-Urgent (02.18.20 @ 05:04) >  INTERPRETATION:  CLINICAL INFORMATION: Enteric tube placement.    TECHNIQUE: Portable AP radiograph of the chest.    COMPARISON: Portable AP chest radiograph from  2/16/2020.    FINDINGS/  IMPRESSION:    Enteric tube tip is in the stomach.    Right upper extremity PICC with tip in the SVC.    The lungs are clear. No pleural effusion or pneumothorax.    Heart size is normal.    < end of copied text >

## 2020-02-19 NOTE — SWALLOW VFSS/MBS ASSESSMENT ADULT - ADDITIONAL INFORMATION
Disorders: reduced bolus formation, reduced lingual strength/ROM/rate of motion, reduced BOT retraction to posterior pharyngeal wall with a trace column of contrast between structures, delayed initiation of swallow trigger with low trigger points, incomplete laryngeal vestibule closure, reduced supraglottic sensation, swallow discoordination.

## 2020-02-19 NOTE — SWALLOW VFSS/MBS ASSESSMENT ADULT - RECOMMENDED CONSISTENCY
Dysphagia 1 with nectar thick liquids via teaspoon only    MD/team Please enter the following as provider to RN orders : 1) Full assist with meals, 2) Crush meds or provide via alternate source, 3) ALL p.o. via teaspoon. NO CUP. NO STRAW.  4) Provide small single bites and sips at slow rate 5) Encourage successive swallows for oral clearance 6) Check oral cavity to ensure clearance prior to next intake 7) Aspiration precautions. Monitor for s/s aspiration/laryngeal penetration. If noted:  D/C p.o. intake, provide non-oral nutrition/hydration/meds

## 2020-02-19 NOTE — SWALLOW VFSS/MBS ASSESSMENT ADULT - MODE OF PRESENTATION
spoon/fed by clinician fed by clinician/spoon cup guided feeding by SLP/straw/cup/self fed fed by clinician straw/guided by SLP/self fed

## 2020-02-19 NOTE — PROGRESS NOTE ADULT - SUBJECTIVE AND OBJECTIVE BOX
Patient is a 39y old  Female who presents with a chief complaint of SAH, IPH (19 Feb 2020 08:54)        SUBJECTIVE / OVERNIGHT EVENTS: no acute complaints       MEDICATIONS  (STANDING):  chlorhexidine 4% Liquid 1 Application(s) Topical <User Schedule>  heparin  Injectable 5000 Unit(s) SubCutaneous every 12 hours  levoFLOXacin  Tablet 500 milliGRAM(s) Oral every 24 hours  niMODipine Oral Solution 60 milliGRAM(s) Oral every 4 hours  sodium chloride 2 Gram(s) Oral every 6 hours    MEDICATIONS  (PRN):  acetaminophen   Tablet .. 650 milliGRAM(s) Oral every 6 hours PRN Temp greater or equal to 38C (100.4F), Mild Pain (1 - 3)  albuterol/ipratropium for Nebulization. 3 milliLiter(s) Nebulizer every 6 hours PRN Shortness of Breath and/or Wheezing  artificial tears (preservative free) Ophthalmic Solution 1 Drop(s) Both EYES three times a day PRN Dry Eyes  ondansetron Injectable 4 milliGRAM(s) IV Push every 8 hours PRN Nausea and/or Vomiting  oxyCODONE    IR 5 milliGRAM(s) Oral every 4 hours PRN Moderate Pain (4 - 6)      Vital Signs Last 24 Hrs  T(C): 36.6 (19 Feb 2020 07:10), Max: 38.2 (19 Feb 2020 02:41)  T(F): 97.8 (19 Feb 2020 07:10), Max: 100.8 (19 Feb 2020 02:41)  HR: 79 (19 Feb 2020 07:10) (79 - 131)  BP: 90/56 (19 Feb 2020 07:10) (90/56 - 148/85)  BP(mean): 105 (18 Feb 2020 11:49) (92 - 105)  RR: 18 (19 Feb 2020 07:10) (18 - 25)  SpO2: 100% (19 Feb 2020 07:10) (97% - 100%)  CAPILLARY BLOOD GLUCOSE        I&O's Summary    18 Feb 2020 07:01  -  19 Feb 2020 07:00  --------------------------------------------------------  IN: 3100 mL / OUT: 1300 mL / NET: 1800 mL        PHYSICAL EXAM:  GENERAL: NAD, breathing normal  HEAD:  Right craniectomy staples c/d/i without erythema, +NGT  EYES: conjunctiva and sclera clear  NECK: supple, No JVD  CHEST/LUNG: CTA b/l  HEART: S1 S2 tachycardic - not muffled, no murmur appreciated  ABDOMEN: +BS Soft, NT/ND  EXTREMITIES:  2+ DP Pulses, No c/c. no LE edema  NEUROLOGY: AAOx3, 5/5 MS RUE and RLE, 4+/5 LLE, 4/5 LUE  SKIN: slight left forearm palpable cord, no erythema or tenderness. R PICC line site without erythema or tenderness    LABS:                        11.0   10.63 )-----------( 487      ( 19 Feb 2020 07:22 )             35.3     02-19    144  |  106  |  24<H>  ----------------------------<  138<H>  3.5   |  26  |  0.49<L>    Ca    9.3      19 Feb 2020 07:22  Phos  3.9     02-17  Mg     2.3     02-17        CARDIAC MARKERS ( 18 Feb 2020 13:25 )  x     / x     / 45 U/L / x     / 1.1 ng/mL          RADIOLOGY & ADDITIONAL TESTS:    Imaging Personally Reviewed:  Consultant(s) Notes Reviewed:    Care Discussed with Consultants/Other Providers:

## 2020-02-19 NOTE — SWALLOW VFSS/MBS ASSESSMENT ADULT - NS SWALLOW VFSS REC ASPIR MON
Monitor for s/s aspiration/laryngeal penetration. If noted:  D/C p.o. intake, provide non-oral nutrition/hydration/meds, and contact this service @ [phone]/fever/cough/change of breathing pattern/gurgly voice/pneumonia/throat clearing/upper respiratory infection

## 2020-02-19 NOTE — SWALLOW VFSS/MBS ASSESSMENT ADULT - RECOMMENDED FEEDING/EATING TECHNIQUES
allow for swallow between intakes/maintain upright posture during/after eating for 30 mins/turn head left/no straws/oral hygiene/small sips/bites/check mouth frequently for oral residue/pocketing/crush medication (when feasible)/position upright (90 degrees)

## 2020-02-19 NOTE — PROGRESS NOTE ADULT - ASSESSMENT
39F w/ ruptured aneurysm s/p craniectomy and clipping s/p mild fall while bathing, neurologically stable, complaining of headache / neck pain  - CTH / C spine  - Conservative management  - Continue fall precautions  - Further workup as needed

## 2020-02-20 LAB
ANION GAP SERPL CALC-SCNC: 14 MMOL/L — SIGNIFICANT CHANGE UP (ref 5–17)
BUN SERPL-MCNC: 27 MG/DL — HIGH (ref 7–23)
CALCIUM SERPL-MCNC: 9.6 MG/DL — SIGNIFICANT CHANGE UP (ref 8.4–10.5)
CHLORIDE SERPL-SCNC: 104 MMOL/L — SIGNIFICANT CHANGE UP (ref 96–108)
CO2 SERPL-SCNC: 25 MMOL/L — SIGNIFICANT CHANGE UP (ref 22–31)
CREAT SERPL-MCNC: 0.51 MG/DL — SIGNIFICANT CHANGE UP (ref 0.5–1.3)
GLUCOSE SERPL-MCNC: 102 MG/DL — HIGH (ref 70–99)
HCT VFR BLD CALC: 36.8 % — SIGNIFICANT CHANGE UP (ref 34.5–45)
HGB BLD-MCNC: 11.4 G/DL — LOW (ref 11.5–15.5)
MCHC RBC-ENTMCNC: 28.8 PG — SIGNIFICANT CHANGE UP (ref 27–34)
MCHC RBC-ENTMCNC: 31 GM/DL — LOW (ref 32–36)
MCV RBC AUTO: 92.9 FL — SIGNIFICANT CHANGE UP (ref 80–100)
NRBC # BLD: 0 /100 WBCS — SIGNIFICANT CHANGE UP (ref 0–0)
PLATELET # BLD AUTO: 494 K/UL — HIGH (ref 150–400)
POTASSIUM SERPL-MCNC: 3.9 MMOL/L — SIGNIFICANT CHANGE UP (ref 3.5–5.3)
POTASSIUM SERPL-SCNC: 3.9 MMOL/L — SIGNIFICANT CHANGE UP (ref 3.5–5.3)
RBC # BLD: 3.96 M/UL — SIGNIFICANT CHANGE UP (ref 3.8–5.2)
RBC # FLD: 12.7 % — SIGNIFICANT CHANGE UP (ref 10.3–14.5)
SODIUM SERPL-SCNC: 143 MMOL/L — SIGNIFICANT CHANGE UP (ref 135–145)
WBC # BLD: 10.17 K/UL — SIGNIFICANT CHANGE UP (ref 3.8–10.5)
WBC # FLD AUTO: 10.17 K/UL — SIGNIFICANT CHANGE UP (ref 3.8–10.5)

## 2020-02-20 PROCEDURE — 99232 SBSQ HOSP IP/OBS MODERATE 35: CPT

## 2020-02-20 RX ORDER — SODIUM CHLORIDE 9 MG/ML
500 INJECTION INTRAMUSCULAR; INTRAVENOUS; SUBCUTANEOUS ONCE
Refills: 0 | Status: COMPLETED | OUTPATIENT
Start: 2020-02-20 | End: 2020-02-20

## 2020-02-20 RX ADMIN — NIMODIPINE 60 MILLIGRAM(S): 60 SOLUTION ORAL at 02:34

## 2020-02-20 RX ADMIN — Medication 650 MILLIGRAM(S): at 20:40

## 2020-02-20 RX ADMIN — NIMODIPINE 60 MILLIGRAM(S): 60 SOLUTION ORAL at 22:59

## 2020-02-20 RX ADMIN — NIMODIPINE 60 MILLIGRAM(S): 60 SOLUTION ORAL at 17:57

## 2020-02-20 RX ADMIN — Medication 650 MILLIGRAM(S): at 11:00

## 2020-02-20 RX ADMIN — CHLORHEXIDINE GLUCONATE 1 APPLICATION(S): 213 SOLUTION TOPICAL at 23:00

## 2020-02-20 RX ADMIN — NIMODIPINE 60 MILLIGRAM(S): 60 SOLUTION ORAL at 14:32

## 2020-02-20 RX ADMIN — Medication 650 MILLIGRAM(S): at 02:34

## 2020-02-20 RX ADMIN — NIMODIPINE 60 MILLIGRAM(S): 60 SOLUTION ORAL at 10:05

## 2020-02-20 RX ADMIN — HEPARIN SODIUM 5000 UNIT(S): 5000 INJECTION INTRAVENOUS; SUBCUTANEOUS at 23:00

## 2020-02-20 RX ADMIN — HEPARIN SODIUM 5000 UNIT(S): 5000 INJECTION INTRAVENOUS; SUBCUTANEOUS at 10:06

## 2020-02-20 RX ADMIN — NIMODIPINE 60 MILLIGRAM(S): 60 SOLUTION ORAL at 06:12

## 2020-02-20 RX ADMIN — Medication 650 MILLIGRAM(S): at 03:10

## 2020-02-20 RX ADMIN — SODIUM CHLORIDE 1000 MILLILITER(S): 9 INJECTION INTRAMUSCULAR; INTRAVENOUS; SUBCUTANEOUS at 18:05

## 2020-02-20 RX ADMIN — Medication 650 MILLIGRAM(S): at 22:00

## 2020-02-20 RX ADMIN — Medication 650 MILLIGRAM(S): at 10:06

## 2020-02-20 NOTE — PROGRESS NOTE ADULT - SUBJECTIVE AND OBJECTIVE BOX
Patient is a 39y old  Female who presents with a chief complaint of SAH, IPH (19 Feb 2020 14:00)        SUBJECTIVE / OVERNIGHT EVENTS: no acute complaints       MEDICATIONS  (STANDING):  chlorhexidine 4% Liquid 1 Application(s) Topical <User Schedule>  heparin  Injectable 5000 Unit(s) SubCutaneous every 12 hours  levoFLOXacin  Tablet 500 milliGRAM(s) Oral every 24 hours  niMODipine Oral Solution 60 milliGRAM(s) Oral every 4 hours    MEDICATIONS  (PRN):  acetaminophen   Tablet .. 650 milliGRAM(s) Oral every 6 hours PRN Temp greater or equal to 38C (100.4F), Mild Pain (1 - 3)  albuterol/ipratropium for Nebulization. 3 milliLiter(s) Nebulizer every 6 hours PRN Shortness of Breath and/or Wheezing  artificial tears (preservative free) Ophthalmic Solution 1 Drop(s) Both EYES three times a day PRN Dry Eyes  ondansetron Injectable 4 milliGRAM(s) IV Push every 8 hours PRN Nausea and/or Vomiting  oxyCODONE    IR 2.5 milliGRAM(s) Oral every 4 hours PRN Moderate Pain (4 - 6)      Vital Signs Last 24 Hrs  T(C): 36.9 (20 Feb 2020 07:11), Max: 38.1 (19 Feb 2020 11:01)  T(F): 98.5 (20 Feb 2020 07:11), Max: 100.5 (19 Feb 2020 11:01)  HR: 109 (20 Feb 2020 07:11) (69 - 124)  BP: 124/75 (20 Feb 2020 07:11) (124/75 - 149/97)  BP(mean): --  RR: 18 (20 Feb 2020 07:11) (18 - 20)  SpO2: 98% (20 Feb 2020 07:11) (96% - 100%)  CAPILLARY BLOOD GLUCOSE        I&O's Summary    19 Feb 2020 07:01  -  20 Feb 2020 07:00  --------------------------------------------------------  IN: 0 mL / OUT: 2 mL / NET: -2 mL        PHYSICAL EXAM:  GENERAL: NAD, breathing normal  HEAD:  Right craniectomy staples c/d/i without erythema, +NGT  EYES: conjunctiva and sclera clear  NECK: supple, No JVD  CHEST/LUNG: CTA b/l  HEART: S1 S2 tachycardic - not muffled, no murmur appreciated  ABDOMEN: +BS Soft, NT/ND  EXTREMITIES:  2+ DP Pulses, No c/c. no LE edema  NEUROLOGY: AAOx3, 5/5 MS RUE and RLE, 4+/5 LLE, 4/5 LUE  SKIN: slight left forearm palpable cord, no erythema or tenderness. R PICC line site without erythema or tenderness    LABS:                        11.4   10.17 )-----------( 494      ( 20 Feb 2020 06:59 )             36.8     02-20    143  |  104  |  27<H>  ----------------------------<  102<H>  3.9   |  25  |  0.51    Ca    9.6      20 Feb 2020 07:01        CARDIAC MARKERS ( 18 Feb 2020 13:25 )  x     / x     / 45 U/L / x     / 1.1 ng/mL          RADIOLOGY & ADDITIONAL TESTS:    Imaging Personally Reviewed:  Consultant(s) Notes Reviewed:    Care Discussed with Consultants/Other Providers:

## 2020-02-20 NOTE — PROGRESS NOTE ADULT - PROBLEM SELECTOR PLAN 1
fever spike improved - will continue to monitor   suspect UTI  c/w levaquin (day 5 of 5) - overall fever spike improved but still having low grade fevers - ?benefit from extension to 7 days - ID f/u  could this be central fevers?  will continue to monitor   repeat bcx 2/16 NTD, cxr without consolidation.

## 2020-02-20 NOTE — PROGRESS NOTE ADULT - ASSESSMENT
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  Fever may be central as she is non toxic, has a normal wbc, and denies any dysuria.  She could have intermittent fever secondary to retained ICH.  She reports hives to N, will try to avoid beta lactams.  EVD removed 2/16, fever earlier in hospital course with negative blood cultures.  No evidence that temps are responding to levaquin.I have asked RN to check a bladder scan to make sure she is not in retention.  Suggest:  1.levaquin for 5 days-day 5, can stop after todays dose   2.Monitor for retention, consider LE dopplers to exclude occult DVT, especially with tachycardia  3.Follow fever curve, central fever is always a diagnosis of exclusion but still may be the most likely explanation here.  4.she can be monitored off antibiotics, expect slow moderation in fever  5 tachycardia of concern, etiology not clear

## 2020-02-20 NOTE — PROGRESS NOTE ADULT - ASSESSMENT
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. She arrived intubated and sedated. After coming to 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 Post op CT/ CTA with shunt tract heme. CTA with no aneurysm. s/p Cerebral angio 2/5. Good clipping No vasospasm EVD clamped 2/12, Fevers 2/12 Fever work up negative, EVD d/c 2/16. Persistent fevers Work up w E coli urine. Started on Levaquin and will complete 5 days today per ID.   2/18 BP soft w tacycardia. 1 Liter fluid bolus Tx to floor.     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     Plan:    Neuro stable. Helmet when OOB. On Nimodipine.   Vitals stable   ID- Low grade fevers persists. If spikes reculture. Levaquin total 5 days to complete today. ID consult appreciated. Lower extremity duplex to R/O DVTs given intermittent persistent fevers and tachycardia.  Dysphagia- passed MBS yesterday, tolerating dysphagia 1 diet well  DVT ppx  PT/OT/ PMR- a/c TBI rehab   parents updated on all above  f/u am labs  cranioplasty at later date    will discuss with Dr Ordonez  #[phone]    Assessment:  Please Check When Present   []  GCS  E   V  M     Heart Failure: []Acute, [] acute on chronic , []chronic  Heart Failure:  [] Diastolic (HFpEF), [] Systolic (HFrEF), []Combined (HFpEF and HFrEF), [] RHF, [] Pulm HTN, [] Other    [] GAURAV, [] ATN, [] AIN, [] other  [] CKD1, [] CKD2, [] CKD 3, [] CKD 4, [] CKD 5, []ESRD    Encephalopathy: [] Metabolic, [] Hepatic, [] toxic, [] Neurological, [] Other    Abnormal Nurtitional Status: [] malnurtition (see nutrition note), [ ]underweight: BMI < 19, [] morbid obesity: BMI >40, [] Cachexia    [] Sepsis  [] hypovolemic shock,[] cardiogenic shock, [] hemorrhagic shock, [] neurogenic shock  [] Acute Respiratory Failure  []Cerebral edema, [] Brain compression/ herniation,   [] Functional quadriplegia  [] Acute blood loss anemia

## 2020-02-20 NOTE — PROGRESS NOTE ADULT - SUBJECTIVE AND OBJECTIVE BOX
SUBJECTIVE:   Patient seen and examined. Some headaches sitting in chair with helmet in place.    OVERNIGHT EVENTS: none    Vital Signs Last 24 Hrs  T(C): 37.8 (20 Feb 2020 16:14), Max: 38.1 (19 Feb 2020 19:14)  T(F): 100 (20 Feb 2020 16:14), Max: 100.5 (19 Feb 2020 19:14)  HR: 112 (20 Feb 2020 16:14) (86 - 124)  BP: 147/90 (20 Feb 2020 16:14) (119/73 - 149/83)  BP(mean): --  RR: 19 (20 Feb 2020 16:14) (18 - 20)  SpO2: 100% (20 Feb 2020 16:14) (97% - 100%)      PHYSICAL EXAM:    Constitutional: No Acute Distress     Neurological: Awake alert Ox3, Speech clear Following Commands, Moving all Extremities LUE 4+/5 LLE 4+/5 RUE/ RLE 5/5 R cranial defect full/ soft     Pulmonary: Clear to Auscultation,     Cardiovascular: S1, S2, Regular rate and rhythm     Gastrointestinal: Soft, Non-tender, Non-distended     Extremities: No calf tenderness     Incision: + staples/sutures c/d/i    LABS:                                     11.4   10.17 )-----------( 494      ( 20 Feb 2020 06:59 )             36.8   02-20    143  |  104  |  27<H>  ----------------------------<  102<H>  3.9   |  25  |  0.51    Ca    9.6      20 Feb 2020 07:01    IMAGING:   < from: CT Cervical Spine No Cont (02.19.20 @ 23:26) >  INTERPRETATION:  INDICATIONS: Patient with craniectomy status post fall    CT brain:  TECHNIQUE:  Serial axial images were obtained from the skull base to the vertex without intravenous contrast.    COMPARISON EXAMINATION: 2/16/2020    FINDINGS:    Ventricles and sulci:  Evidence of intraventricular hemorrhage slightly less conspicuous compared with the prior 2/16/2020. Ventricles appear relatively stable in size compared with 2/16/2020.  Intra-axial:  Parenchymal hemorrhage again appreciated along the shunt tract in the left frontal region status post removal. Hemorrhage at the level of the right basal gangliawith intraventricular extension . Right temporal encephalomalacia. Aneurysm clip in the right parasellar region  Extra-axial:  Aneurysm clip in the right parasellar region..  Visualized sinuses:  Clear.  Visualized mastoids:  Clear.  Calvarium:  Leftfrontal messi hole. Right frontoparietal craniectomy.  Miscellaneous:  None.    IMPRESSION:  No change compared with 2/16/2020. No new hemorrhage. Evidence of hemorrhage in the right basal ganglia region with intraventricular extension as seen previously. Right frontal parietal craniectomy. Status post shunt removal with hemorrhage along the shunt tract.    CT cervical spine:  TECHNIQUE:  Axial images were obtained using multislice helical technique.  Reformatted coronal and sagittal images were performed.      COMPARISON EXAMINATION:  None.    FINDINGS:    Vertebral bodies:  Normal.  Alignment:  Reversal of the normal cervical lordosis..  Intervertebral disc spaces: Normal.  Miscellaneous:  None.    IMPRESSION: No acute fracture or traumatic subluxation    < end of copied text >    MEDICATIONS:  Antibiotics:  levoFLOXacin  Tablet 500 milliGRAM(s) Oral every 24 hours  acetaminophen   Tablet .. 650 milliGRAM(s) Oral every 6 hours PRN Temp greater or equal to 38C (100.4F), Mild Pain (1 - 3)  ondansetron Injectable 4 milliGRAM(s) IV Push every 8 hours PRN Nausea and/or Vomiting  oxyCODONE    IR 5 milliGRAM(s) Oral every 4 hours PRN Moderate Pain (4 - 6)  niMODipine Oral Solution 60 milliGRAM(s) Oral every 4 hours  albuterol/ipratropium for Nebulization. 3 milliLiter(s) Nebulizer every 6 hours PRN Shortness of Breath and/or Wheezing  artificial tears (preservative free) Ophthalmic Solution 1 Drop(s) Both EYES three times a day PRN Dry Eyes  chlorhexidine 4% Liquid 1 Application(s) Topical <User Schedule>  heparin  Injectable 5000 Unit(s) SubCutaneous every 12 hours  sodium chloride 2 Gram(s) Oral every 6 hours      DIET: dysphagia 1, nectar thick fluids

## 2020-02-20 NOTE — PROGRESS NOTE ADULT - SUBJECTIVE AND OBJECTIVE BOX
CC: f/u for low grade fever    Patient reports: no specific complaints, no headache, no respiratory, GI or  complaints    REVIEW OF SYSTEMS:  All other review of systems negative (Comprehensive ROS)    Antimicrobials Day #  :day 5/5  levoFLOXacin  Tablet 500 milliGRAM(s) Oral every 24 hours    Other Medications Reviewed    T(F): 98.5 (02-20-20 @ 07:11), Max: 100.5 (02-19-20 @ 19:14)  HR: 118 (02-20-20 @ 10:00)  BP: 149/83 (02-20-20 @ 10:00)  RR: 18 (02-20-20 @ 07:11)  SpO2: 98% (02-20-20 @ 07:11)  Wt(kg): --    PHYSICAL EXAM:  General: alert, no acute distress, wearing chelo  Eyes:  anicteric, no conjunctival injection, no discharge  Oropharynx: no lesions or injection 	  Neck: supple, without adenopathy  Lungs: clear to auscultation  Heart: regular rate and rhythm; no murmur, rubs or gallops  Abdomen: soft, nondistended, nontender, without mass or organomegaly  Skin: no lesions  Extremities: no clubbing, cyanosis, or edema  Neurologic: alert, oriented, moves all extremities    LAB RESULTS:                        11.4   10.17 )-----------( 494      ( 20 Feb 2020 06:59 )             36.8     02-20    143  |  104  |  27<H>  ----------------------------<  102<H>  3.9   |  25  |  0.51    Ca    9.6      20 Feb 2020 07:01          MICROBIOLOGY:  RECENT CULTURES:  02-16 @ 22:27 .Blood Blood     No growth to date.      02-16 @ 21:49 .Urine Clean Catch (Midstream) Escherichia coli    >100,000 CFU/ml Escherichia coli          RADIOLOGY REVIEWED:    < from: CT Head No Cont (02.19.20 @ 23:26) >  INTERPRETATION:  INDICATIONS: Patient with craniectomy status post fall    CT brain:  TECHNIQUE:  Serial axial images were obtained from the skull base to the vertex without intravenous contrast.    COMPARISON EXAMINATION: 2/16/2020    FINDINGS:    Ventricles and sulci:  Evidence of intraventricular hemorrhage slightly less conspicuous compared with the prior 2/16/2020. Ventricles appear relatively stable in size compared with 2/16/2020.  Intra-axial:  Parenchymal hemorrhage again appreciated along the shunt tract in the left frontal region status post removal. Hemorrhage at the level of the right basal gangliawith intraventricular extension . Right temporal encephalomalacia. Aneurysm clip in the right parasellar region  Extra-axial:  Aneurysm clip in the right parasellar region..  Visualized sinuses:  Clear.  Visualized mastoids:  Clear.  Calvarium:  Leftfrontal messi hole. Right frontoparietal craniectomy.  Miscellaneous:  None.    IMPRESSION:  No change compared with 2/16/2020. No new hemorrhage. Evidence of hemorrhage in the right basal ganglia region with intraventricular extension as seen previously. Right frontal parietal craniectomy. Status post shunt removal with hemorrhage along the shunt tract.    CT cervical spine:  TECHNIQUE:  Axial images were obtained using multislice helical technique.  Reformatted coronal and sagittal images were performed.      COMPARISON EXAMINATION:  None.    FINDINGS:    Vertebral bodies:  Normal.  Alignment:  Reversal of the normal cervical lordosis..  Intervertebral disc spaces: Normal.  Miscellaneous:  None.    IMPRESSION: No acute fracture or traumatic subluxation    < end of copied text >

## 2020-02-21 DIAGNOSIS — R00.0 TACHYCARDIA, UNSPECIFIED: ICD-10-CM

## 2020-02-21 LAB
ANION GAP SERPL CALC-SCNC: 12 MMOL/L — SIGNIFICANT CHANGE UP (ref 5–17)
BASOPHILS # BLD AUTO: 0.05 K/UL — SIGNIFICANT CHANGE UP (ref 0–0.2)
BASOPHILS NFR BLD AUTO: 0.5 % — SIGNIFICANT CHANGE UP (ref 0–2)
BUN SERPL-MCNC: 26 MG/DL — HIGH (ref 7–23)
CALCIUM SERPL-MCNC: 9.6 MG/DL — SIGNIFICANT CHANGE UP (ref 8.4–10.5)
CHLORIDE SERPL-SCNC: 103 MMOL/L — SIGNIFICANT CHANGE UP (ref 96–108)
CO2 SERPL-SCNC: 25 MMOL/L — SIGNIFICANT CHANGE UP (ref 22–31)
CREAT SERPL-MCNC: 0.63 MG/DL — SIGNIFICANT CHANGE UP (ref 0.5–1.3)
CULTURE RESULTS: SIGNIFICANT CHANGE UP
EOSINOPHIL # BLD AUTO: 0.08 K/UL — SIGNIFICANT CHANGE UP (ref 0–0.5)
EOSINOPHIL NFR BLD AUTO: 0.8 % — SIGNIFICANT CHANGE UP (ref 0–6)
GLUCOSE SERPL-MCNC: 106 MG/DL — HIGH (ref 70–99)
HCT VFR BLD CALC: 34.5 % — SIGNIFICANT CHANGE UP (ref 34.5–45)
HGB BLD-MCNC: 11.1 G/DL — LOW (ref 11.5–15.5)
IMM GRANULOCYTES NFR BLD AUTO: 0.4 % — SIGNIFICANT CHANGE UP (ref 0–1.5)
LYMPHOCYTES # BLD AUTO: 2.47 K/UL — SIGNIFICANT CHANGE UP (ref 1–3.3)
LYMPHOCYTES # BLD AUTO: 25.9 % — SIGNIFICANT CHANGE UP (ref 13–44)
MCHC RBC-ENTMCNC: 29.3 PG — SIGNIFICANT CHANGE UP (ref 27–34)
MCHC RBC-ENTMCNC: 32.2 GM/DL — SIGNIFICANT CHANGE UP (ref 32–36)
MCV RBC AUTO: 91 FL — SIGNIFICANT CHANGE UP (ref 80–100)
MONOCYTES # BLD AUTO: 0.79 K/UL — SIGNIFICANT CHANGE UP (ref 0–0.9)
MONOCYTES NFR BLD AUTO: 8.3 % — SIGNIFICANT CHANGE UP (ref 2–14)
NEUTROPHILS # BLD AUTO: 6.1 K/UL — SIGNIFICANT CHANGE UP (ref 1.8–7.4)
NEUTROPHILS NFR BLD AUTO: 64.1 % — SIGNIFICANT CHANGE UP (ref 43–77)
NRBC # BLD: 0 /100 WBCS — SIGNIFICANT CHANGE UP (ref 0–0)
PLATELET # BLD AUTO: 494 K/UL — HIGH (ref 150–400)
POTASSIUM SERPL-MCNC: 3.6 MMOL/L — SIGNIFICANT CHANGE UP (ref 3.5–5.3)
POTASSIUM SERPL-SCNC: 3.6 MMOL/L — SIGNIFICANT CHANGE UP (ref 3.5–5.3)
RBC # BLD: 3.79 M/UL — LOW (ref 3.8–5.2)
RBC # FLD: 12.7 % — SIGNIFICANT CHANGE UP (ref 10.3–14.5)
SODIUM SERPL-SCNC: 140 MMOL/L — SIGNIFICANT CHANGE UP (ref 135–145)
SPECIMEN SOURCE: SIGNIFICANT CHANGE UP
WBC # BLD: 9.53 K/UL — SIGNIFICANT CHANGE UP (ref 3.8–10.5)
WBC # FLD AUTO: 9.53 K/UL — SIGNIFICANT CHANGE UP (ref 3.8–10.5)

## 2020-02-21 PROCEDURE — 93971 EXTREMITY STUDY: CPT | Mod: 26,59,LT

## 2020-02-21 PROCEDURE — 99233 SBSQ HOSP IP/OBS HIGH 50: CPT

## 2020-02-21 PROCEDURE — 93970 EXTREMITY STUDY: CPT | Mod: 26

## 2020-02-21 RX ORDER — SODIUM CHLORIDE 9 MG/ML
10 INJECTION INTRAMUSCULAR; INTRAVENOUS; SUBCUTANEOUS
Refills: 0 | Status: DISCONTINUED | OUTPATIENT
Start: 2020-02-21 | End: 2020-02-28

## 2020-02-21 RX ORDER — ACETAMINOPHEN 500 MG
1000 TABLET ORAL ONCE
Refills: 0 | Status: COMPLETED | OUTPATIENT
Start: 2020-02-21 | End: 2020-02-21

## 2020-02-21 RX ORDER — CHLORHEXIDINE GLUCONATE 213 G/1000ML
1 SOLUTION TOPICAL DAILY
Refills: 0 | Status: DISCONTINUED | OUTPATIENT
Start: 2020-02-21 | End: 2020-02-28

## 2020-02-21 RX ORDER — CALCIUM CARBONATE 500(1250)
1 TABLET ORAL EVERY 4 HOURS
Refills: 0 | Status: DISCONTINUED | OUTPATIENT
Start: 2020-02-21 | End: 2020-02-28

## 2020-02-21 RX ADMIN — NIMODIPINE 60 MILLIGRAM(S): 60 SOLUTION ORAL at 06:50

## 2020-02-21 RX ADMIN — NIMODIPINE 60 MILLIGRAM(S): 60 SOLUTION ORAL at 11:22

## 2020-02-21 RX ADMIN — Medication 650 MILLIGRAM(S): at 02:40

## 2020-02-21 RX ADMIN — Medication 400 MILLIGRAM(S): at 14:13

## 2020-02-21 RX ADMIN — NIMODIPINE 60 MILLIGRAM(S): 60 SOLUTION ORAL at 17:43

## 2020-02-21 RX ADMIN — CHLORHEXIDINE GLUCONATE 1 APPLICATION(S): 213 SOLUTION TOPICAL at 12:53

## 2020-02-21 RX ADMIN — NIMODIPINE 60 MILLIGRAM(S): 60 SOLUTION ORAL at 14:39

## 2020-02-21 RX ADMIN — HEPARIN SODIUM 5000 UNIT(S): 5000 INJECTION INTRAVENOUS; SUBCUTANEOUS at 23:08

## 2020-02-21 RX ADMIN — NIMODIPINE 60 MILLIGRAM(S): 60 SOLUTION ORAL at 02:12

## 2020-02-21 RX ADMIN — ONDANSETRON 4 MILLIGRAM(S): 8 TABLET, FILM COATED ORAL at 14:13

## 2020-02-21 RX ADMIN — Medication 1 TABLET(S): at 19:08

## 2020-02-21 RX ADMIN — Medication 650 MILLIGRAM(S): at 04:00

## 2020-02-21 RX ADMIN — Medication 400 MILLIGRAM(S): at 23:42

## 2020-02-21 RX ADMIN — Medication 1000 MILLIGRAM(S): at 14:43

## 2020-02-21 RX ADMIN — HEPARIN SODIUM 5000 UNIT(S): 5000 INJECTION INTRAVENOUS; SUBCUTANEOUS at 08:14

## 2020-02-21 RX ADMIN — NIMODIPINE 60 MILLIGRAM(S): 60 SOLUTION ORAL at 23:08

## 2020-02-21 NOTE — PROGRESS NOTE ADULT - PROBLEM SELECTOR PLAN 8
ECG done in 2/18 with sinus tachycardia   Unsure of etiology of tachycardia ( pt is tachycardic even when she is not febrile )   NO Hypoxia , no clear source of infection , no current pain   ? Anxious,   Neg lower ext doppler   Will recommend to get TSH

## 2020-02-21 NOTE — PROGRESS NOTE ADULT - SUBJECTIVE AND OBJECTIVE BOX
SUBJECTIVE:   Patient seen and examined. Some headaches sitting in chair with helmet in place.    Vital Signs Last 24 Hrs  T(C): 36.8 (21 Feb 2020 15:52), Max: 38.1 (20 Feb 2020 21:02)  T(F): 98.2 (21 Feb 2020 15:52), Max: 100.6 (20 Feb 2020 21:02)  HR: 116 (21 Feb 2020 15:52) (61 - 128)  BP: 117/74 (21 Feb 2020 15:52) (117/74 - 149/100)  BP(mean): --  RR: 18 (21 Feb 2020 15:52) (18 - 18)  SpO2: 97% (21 Feb 2020 15:52) (97% - 100%)    PHYSICAL EXAM:    Constitutional: No Acute Distress     Neurological: Awake alert Ox3, Speech clear Following Commands, Moving all Extremities LUE 4+/5 LLE 4+/5 RUE/ RLE 5/5 R cranial defect full/ soft     Pulmonary: Clear to Auscultation,     Cardiovascular: S1, S2, Regular rate and rhythm     Gastrointestinal: Soft, Non-tender, Non-distended     Extremities: No calf tenderness     Incision: + staples/sutures c/d/i    LABS:                                           11.1   9.53  )-----------( 494      ( 21 Feb 2020 06:47 )             34.5   02-21    140  |  103  |  26<H>  ----------------------------<  106<H>  3.6   |  25  |  0.63    Ca    9.6      21 Feb 2020 06:47    IMAGING:   < from: [facility] Duplex Upper Ext Vein Scan, Bilat (02.21.20 @ 10:53) >  IMPRESSION:     Persistent superficial thrombophlebitis affects the right basilic and the right and left cephalic veins.  No evidence of deep venous thrombosis in either upper extremity.    < end of copied text >    < from: [facility] Duplex Lower Ext Vein Scan, Bilat (02.21.20 @ 09:19) >  IMPRESSION:     No evidence of deep venous thrombosis in either lower extremity.      < end of copied text >      < from: CT Cervical Spine No Cont (02.19.20 @ 23:26) >  INTERPRETATION:  INDICATIONS: Patient with craniectomy status post fall    CT brain:  TECHNIQUE:  Serial axial images were obtained from the skull base to the vertex without intravenous contrast.    COMPARISON EXAMINATION: 2/16/2020    FINDINGS:    Ventricles and sulci:  Evidence of intraventricular hemorrhage slightly less conspicuous compared with the prior 2/16/2020. Ventricles appear relatively stable in size compared with 2/16/2020.  Intra-axial:  Parenchymal hemorrhage again appreciated along the shunt tract in the left frontal region status post removal. Hemorrhage at the level of the right basal gangliawith intraventricular extension . Right temporal encephalomalacia. Aneurysm clip in the right parasellar region  Extra-axial:  Aneurysm clip in the right parasellar region..  Visualized sinuses:  Clear.  Visualized mastoids:  Clear.  Calvarium:  Leftfrontal messi hole. Right frontoparietal craniectomy.  Miscellaneous:  None.    IMPRESSION:  No change compared with 2/16/2020. No new hemorrhage. Evidence of hemorrhage in the right basal ganglia region with intraventricular extension as seen previously. Right frontal parietal craniectomy. Status post shunt removal with hemorrhage along the shunt tract.    CT cervical spine:  TECHNIQUE:  Axial images were obtained using multislice helical technique.  Reformatted coronal and sagittal images were performed.      COMPARISON EXAMINATION:  None.    FINDINGS:    Vertebral bodies:  Normal.  Alignment:  Reversal of the normal cervical lordosis..  Intervertebral disc spaces: Normal.  Miscellaneous:  None.    IMPRESSION: No acute fracture or traumatic subluxation    < end of copied text >    MEDICATIONS  (STANDING):  chlorhexidine 2% Cloths 1 Application(s) Topical daily  heparin  Injectable 5000 Unit(s) SubCutaneous every 12 hours  niMODipine Oral Solution 60 milliGRAM(s) Oral every 4 hours    MEDICATIONS  (PRN):  acetaminophen   Tablet .. 650 milliGRAM(s) Oral every 6 hours PRN Temp greater or equal to 38C (100.4F), Mild Pain (1 - 3)  albuterol/ipratropium for Nebulization. 3 milliLiter(s) Nebulizer every 6 hours PRN Shortness of Breath and/or Wheezing  artificial tears (preservative free) Ophthalmic Solution 1 Drop(s) Both EYES three times a day PRN Dry Eyes  ondansetron Injectable 4 milliGRAM(s) IV Push every 8 hours PRN Nausea and/or Vomiting  oxyCODONE    IR 2.5 milliGRAM(s) Oral every 4 hours PRN Moderate Pain (4 - 6)  sodium chloride 0.9% lock flush 10 milliLiter(s) IV Push every 1 hour PRN Pre/post blood products, medications, blood draw, and to maintain line patency    DIET: dysphagia 1, nectar thick fluids

## 2020-02-21 NOTE — PROGRESS NOTE ADULT - ASSESSMENT
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 She arrived intubated and sedated. 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 Post op CT/ CTA with shunt tract heme. CTA with no aneurysm. s/p Cerebral angio 2/5. Good clipping No vasospasm EVD clamped 2/12, Fevers 2/12 Fever work up negative, EVD d/c 2/16. Persistent fevers Work up w E coli urine. Completed 5 days of Levaquin 2/20 per ID. 2/18 BP soft w tacycardia. 1 Liter fluid bolus Tx to floor.     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     Plan:    Neuro stable. Helmet when OOB. On Nimodipine.   Vitals stable   ID- Low grade fevers persists. If spikes reculture. Completed 5 days of levaquin on 2/20 for +UTI per ID consult. Fevers may likely be central in origin, will observe off antibiotics per ID.   Dysphagia- passed MBS, tolerating dysphagia 1 diet well  DVT ppx  PT/OT/ PMR- a/c TBI rehab upon d/c  parents updated on all above  f/u am labs  cranioplasty at later date    will discuss with Dr Ordonez  #[phone]    Assessment:  Please Check When Present   []  GCS  E   V  M     Heart Failure: []Acute, [] acute on chronic , []chronic  Heart Failure:  [] Diastolic (HFpEF), [] Systolic (HFrEF), []Combined (HFpEF and HFrEF), [] RHF, [] Pulm HTN, [] Other    [] GAURAV, [] ATN, [] AIN, [] other  [] CKD1, [] CKD2, [] CKD 3, [] CKD 4, [] CKD 5, []ESRD    Encephalopathy: [] Metabolic, [] Hepatic, [] toxic, [] Neurological, [] Other    Abnormal Nurtitional Status: [] malnurtition (see nutrition note), [ ]underweight: BMI < 19, [] morbid obesity: BMI >40, [] Cachexia    [] Sepsis  [] hypovolemic shock,[] cardiogenic shock, [] hemorrhagic shock, [] neurogenic shock  [] Acute Respiratory Failure  []Cerebral edema, [] Brain compression/ herniation,   [] Functional quadriplegia  [] Acute blood loss anemia

## 2020-02-21 NOTE — CHART NOTE - NSCHARTNOTEFT_GEN_A_CORE
called by speech pathologist about chest pains.  Patient evaluated and sitting in chair, sister in law at bedside and patient no longer complaining of chest pain  she stated, "its acid reflux and is gone" denies chest pain or SOB, patient requesting tums.  states her headache greatly improved.

## 2020-02-21 NOTE — PROGRESS NOTE ADULT - SUBJECTIVE AND OBJECTIVE BOX
CC: f/u for fever    Patient reports; no complaints, she is tolerating a diet    REVIEW OF SYSTEMS:  All other review of systems negative (Comprehensive ROS)    Antimicrobials Day #  :s/p 5 days of levaquin    Other Medications Reviewed    T(F): 99.1 (02-21-20 @ 11:20), Max: 100.6 (02-20-20 @ 21:02)  HR: 106 (02-21-20 @ 11:20)  BP: 142/71 (02-21-20 @ 11:20)  RR: 18 (02-21-20 @ 11:20)  SpO2: 98% (02-21-20 @ 11:20)  Wt(kg): --    PHYSICAL EXAM:  General: alert, no acute distress, craniotomy incision intact  Eyes:  anicteric, no conjunctival injection, no discharge  Oropharynx: no lesions or injection 	  Neck: supple, without adenopathy  Lungs: clear to auscultation  Heart: regular rate and rhythm; no murmur, rubs or gallops  Abdomen: soft, nondistended, nontender, without mass or organomegaly  Skin: no lesions  Extremities: no clubbing, cyanosis, or edema  Neurologic: alert, oriented, moves all extremities    LAB RESULTS:                        11.1   9.53  )-----------( 494      ( 21 Feb 2020 06:47 )             34.5     02-21    140  |  103  |  26<H>  ----------------------------<  106<H>  3.6   |  25  |  0.63    Ca    9.6      21 Feb 2020 06:47          MICROBIOLOGY:  RECENT CULTURES:  02-16 @ 22:27 .Blood Blood     No growth to date.      02-16 @ 21:49 .Urine Clean Catch (Midstream) Escherichia coli    >100,000 CFU/ml Escherichia coli          RADIOLOGY REVIEWED:    < from: [facility] Duplex Upper Ext Vein Scan, Bilat (02.21.20 @ 10:53) >  IMPRESSION:     Persistent superficial thrombophlebitis affects the right basilic and the right and left cephalic veins.  No evidence of deep venous thrombosis in either upper extremity.    < end of copied text >  < from: [facility] Duplex Lower Ext Vein Scan, Bilat (02.21.20 @ 09:19) >  IMPRESSION:     No evidence of deep venous thrombosis in either lower extremity.    < end of copied text >

## 2020-02-21 NOTE — PROGRESS NOTE ADULT - ASSESSMENT
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  Fever may be central as she is non toxic, has a normal wbc, and denies any dysuria.  She could have intermittent fever secondary to retained ICH.  She reports hives to N, will try to avoid beta lactams.  EVD removed 2/16, fever earlier in hospital course with negative blood cultures.  No evidence that temps are responding to levaquin.I have asked RN to check a bladder scan to make sure she is not in retention.  Levaquin completed 2/20  LE dopplers are negative  UE dopplers with superficial phlebitis  Suggest:  1.observe off antibiotics   3.Follow fever curve, central fever is always a diagnosis of exclusion but still may be the most likely explanation here.  4.she can be monitored off antibiotics, expect slow moderation in fever  5 tachycardia of concern, etiology not clear

## 2020-02-21 NOTE — PROGRESS NOTE ADULT - SUBJECTIVE AND OBJECTIVE BOX
Patient is a 39y old  Female who presents with a chief complaint of SAH, IPH (21 Feb 2020 12:32)      SUBJECTIVE / OVERNIGHT EVENTS:    39F no PMHx found unresponsive on airplane coming back from [place] found to have right temporal parenchymal hemorrhage with diffuse subarachnoid hemorrhage slightly greater on the right with slight prominence of the left temporal horn. 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 patient had craniectomy with evacuation of ICH and clipping of Pcomm aneurysm on 2/3. Patient had EVD (removed 2/15) and FELICIA drain (d/dory 2/7).   Patient was having fevers up to 102 found to have superficial thrombophlebitis s/p treatment with vancomycin. Though fevers subsided briefly, patient developed recurrent fevers prior to completing vancomycin. Blood cultures remained negative. However, patient UA positive 2/16 started on levaquin with UCx resulting ecoli sensitive to levaquin.        ADDITIONAL REVIEW OF SYSTEMS: Negative except for above    MEDICATIONS  (STANDING):  chlorhexidine 2% Cloths 1 Application(s) Topical daily  heparin  Injectable 5000 Unit(s) SubCutaneous every 12 hours  niMODipine Oral Solution 60 milliGRAM(s) Oral every 4 hours    MEDICATIONS  (PRN):  acetaminophen   Tablet .. 650 milliGRAM(s) Oral every 6 hours PRN Temp greater or equal to 38C (100.4F), Mild Pain (1 - 3)  albuterol/ipratropium for Nebulization. 3 milliLiter(s) Nebulizer every 6 hours PRN Shortness of Breath and/or Wheezing  artificial tears (preservative free) Ophthalmic Solution 1 Drop(s) Both EYES three times a day PRN Dry Eyes  ondansetron Injectable 4 milliGRAM(s) IV Push every 8 hours PRN Nausea and/or Vomiting  oxyCODONE    IR 2.5 milliGRAM(s) Oral every 4 hours PRN Moderate Pain (4 - 6)      CAPILLARY BLOOD GLUCOSE        I&O's Summary    21 Feb 2020 07:01  -  21 Feb 2020 15:34  --------------------------------------------------------  IN: 240 mL / OUT: 0 mL / NET: 240 mL        PHYSICAL EXAM:  Vital Signs Last 24 Hrs  T(C): 36.8 (21 Feb 2020 13:45), Max: 38.1 (20 Feb 2020 21:02)  T(F): 98.2 (21 Feb 2020 13:45), Max: 100.6 (20 Feb 2020 21:02)  HR: 111 (21 Feb 2020 13:45) (61 - 128)  BP: 134/92 (21 Feb 2020 13:45) (129/73 - 149/100)  BP(mean): --  RR: 18 (21 Feb 2020 13:45) (18 - 19)  SpO2: 97% (21 Feb 2020 13:45) (97% - 100%)    PHYSICAL EXAM:  GENERAL: NAD, well-developed  HEAD:  Atraumatic, Normocephalic  EYES:  conjunctiva and sclera clear  NECK: Supple, No JVD  CHEST/LUNG: Clear to auscultation bilaterally; No wheeze  HEART: Regular rate and rhythm; No murmurs, rubs, or gallops  ABDOMEN: Soft, Nontender, Nondistended; Bowel sounds present  EXTREMITIES:  2+ Peripheral Pulses, No clubbing, cyanosis, or edema  PSYCH: AAOx3  NEUROLOGY: non-focal  SKIN: No rashes or lesions      LABS:                        11.1   9.53  )-----------( 494      ( 21 Feb 2020 06:47 )             34.5     02-21    140  |  103  |  26<H>  ----------------------------<  106<H>  3.6   |  25  |  0.63    Ca    9.6      21 Feb 2020 06:47                  RADIOLOGY & ADDITIONAL TESTS:    Imaging Personally Reviewed:    Electrocardiogram Personally Reviewed:    COORDINATION OF CARE:  Care Discussed with Consultants/Other Providers [Y/N]:  Prior or Outpatient Records Reviewed [Y/N]: Patient is a 39y old  Female who presents with a chief complaint of SAH, IPH (21 Feb 2020 12:32)      SUBJECTIVE / OVERNIGHT EVENTS:    39F no PMHx found unresponsive on airplane coming back from [place] found to have right temporal parenchymal hemorrhage with diffuse subarachnoid hemorrhage slightly greater on the right with slight prominence of the left temporal horn. 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 patient had craniectomy with evacuation of ICH and clipping of Pcomm aneurysm on 2/3. Patient had EVD (removed 2/15) and FELICIA drain (d/dory 2/7).   Patient was having fevers up to 102 found to have superficial thrombophlebitis s/p treatment with vancomycin. Though fevers subsided briefly, patient developed recurrent fevers prior to completing vancomycin. Blood cultures remained negative. However, patient UA positive 2/16 started on levaquin with UCx resulting ecoli sensitive to levaquin.        ADDITIONAL REVIEW OF SYSTEMS:  Unable to obtain as pt was very gittery during the examination    MEDICATIONS  (STANDING):  chlorhexidine 2% Cloths 1 Application(s) Topical daily  heparin  Injectable 5000 Unit(s) SubCutaneous every 12 hours  niMODipine Oral Solution 60 milliGRAM(s) Oral every 4 hours    MEDICATIONS  (PRN):  acetaminophen   Tablet .. 650 milliGRAM(s) Oral every 6 hours PRN Temp greater or equal to 38C (100.4F), Mild Pain (1 - 3)  albuterol/ipratropium for Nebulization. 3 milliLiter(s) Nebulizer every 6 hours PRN Shortness of Breath and/or Wheezing  artificial tears (preservative free) Ophthalmic Solution 1 Drop(s) Both EYES three times a day PRN Dry Eyes  ondansetron Injectable 4 milliGRAM(s) IV Push every 8 hours PRN Nausea and/or Vomiting  oxyCODONE    IR 2.5 milliGRAM(s) Oral every 4 hours PRN Moderate Pain (4 - 6)      CAPILLARY BLOOD GLUCOSE        I&O's Summary    21 Feb 2020 07:01  -  21 Feb 2020 15:34  --------------------------------------------------------  IN: 240 mL / OUT: 0 mL / NET: 240 mL        PHYSICAL EXAM:  Vital Signs Last 24 Hrs  T(C): 36.8 (21 Feb 2020 13:45), Max: 38.1 (20 Feb 2020 21:02)  T(F): 98.2 (21 Feb 2020 13:45), Max: 100.6 (20 Feb 2020 21:02)  HR: 111 (21 Feb 2020 13:45) (61 - 128)  BP: 134/92 (21 Feb 2020 13:45) (129/73 - 149/100)  BP(mean): --  RR: 18 (21 Feb 2020 13:45) (18 - 19)  SpO2: 97% (21 Feb 2020 13:45) (97% - 100%)    PHYSICAL EXAM:  GENERAL: NAD, well-developed  HEAD:  Atraumatic, Normocephalic  EYES:  conjunctiva and sclera clear  NECK: Supple, No JVD  CHEST/LUNG: Clear to auscultation bilaterally; No wheeze  HEART: Regular rate and rhythm; No murmurs, rubs, or gallops  ABDOMEN: Soft, Nontender, Nondistended; Bowel sounds present  EXTREMITIES:  2+ Peripheral Pulses, No clubbing, cyanosis, or edema  NEUROLOGY:  AAOx3, sleepy most of the time and jittery       LABS:                        11.1   9.53  )-----------( 494      ( 21 Feb 2020 06:47 )             34.5     02-21    140  |  103  |  26<H>  ----------------------------<  106<H>  3.6   |  25  |  0.63    Ca    9.6      21 Feb 2020 06:47                  RADIOLOGY & ADDITIONAL TESTS:    Imaging Personally Reviewed:    Electrocardiogram Personally Reviewed:    COORDINATION OF CARE:  Care Discussed with Consultants/Other Providers [Y/N]:  Prior or Outpatient Records Reviewed [Y/N]:

## 2020-02-21 NOTE — PROGRESS NOTE ADULT - ASSESSMENT
39F no PMHx found unresponsive on airplane coming back from [place] found to have R temporal parenchymal hemorrhage with diffuse SAH on CTH s/p craniectomy with evacuation of ICH and clipping of Pcomm aneurysm on 2/3. Hospital course c/b fevers with superficial thrombophlebitis treated with IV vancomycin and was treated with  Levaquin for E coli UTI.

## 2020-02-21 NOTE — PROGRESS NOTE ADULT - PROBLEM SELECTOR PLAN 1
Last temp of 100.6 was on 2/20/20   S/P 5 days of Levaquin for E coli UTI low grade fevers   CT of head with no new hemorrhage or collection  will continue to monitor   repeat bcx 2/16 NTD, cxr without consolidation.  UE doppler with SUperficial htormbophlebitis

## 2020-02-21 NOTE — PROGRESS NOTE ADULT - PROBLEM SELECTOR PLAN 2
resolved   EKG with twi v1-v3 - likely lead placement - HsT<6  tachycardia improved   no hypoxia. resolved   EKG with twi v1-v3 - likely lead placement - HsT<6  no hypoxia.

## 2020-02-22 LAB
ALBUMIN SERPL ELPH-MCNC: 4.1 G/DL — SIGNIFICANT CHANGE UP (ref 3.3–5)
ALP SERPL-CCNC: 97 U/L — SIGNIFICANT CHANGE UP (ref 40–120)
ALT FLD-CCNC: 42 U/L — SIGNIFICANT CHANGE UP (ref 10–45)
ANION GAP SERPL CALC-SCNC: 13 MMOL/L — SIGNIFICANT CHANGE UP (ref 5–17)
AST SERPL-CCNC: 27 U/L — SIGNIFICANT CHANGE UP (ref 10–40)
BILIRUB SERPL-MCNC: 0.4 MG/DL — SIGNIFICANT CHANGE UP (ref 0.2–1.2)
BUN SERPL-MCNC: 25 MG/DL — HIGH (ref 7–23)
CALCIUM SERPL-MCNC: 9.3 MG/DL — SIGNIFICANT CHANGE UP (ref 8.4–10.5)
CHLORIDE SERPL-SCNC: 101 MMOL/L — SIGNIFICANT CHANGE UP (ref 96–108)
CO2 SERPL-SCNC: 24 MMOL/L — SIGNIFICANT CHANGE UP (ref 22–31)
CREAT SERPL-MCNC: 0.56 MG/DL — SIGNIFICANT CHANGE UP (ref 0.5–1.3)
GLUCOSE SERPL-MCNC: 115 MG/DL — HIGH (ref 70–99)
LIDOCAIN IGE QN: 125 U/L — HIGH (ref 7–60)
POTASSIUM SERPL-MCNC: 3.7 MMOL/L — SIGNIFICANT CHANGE UP (ref 3.5–5.3)
POTASSIUM SERPL-SCNC: 3.7 MMOL/L — SIGNIFICANT CHANGE UP (ref 3.5–5.3)
PROT SERPL-MCNC: 7.9 G/DL — SIGNIFICANT CHANGE UP (ref 6–8.3)
SODIUM SERPL-SCNC: 138 MMOL/L — SIGNIFICANT CHANGE UP (ref 135–145)

## 2020-02-22 PROCEDURE — 99233 SBSQ HOSP IP/OBS HIGH 50: CPT

## 2020-02-22 RX ORDER — SODIUM CHLORIDE 9 MG/ML
500 INJECTION INTRAMUSCULAR; INTRAVENOUS; SUBCUTANEOUS ONCE
Refills: 0 | Status: COMPLETED | OUTPATIENT
Start: 2020-02-22 | End: 2020-02-22

## 2020-02-22 RX ADMIN — SODIUM CHLORIDE 1000 MILLILITER(S): 9 INJECTION INTRAMUSCULAR; INTRAVENOUS; SUBCUTANEOUS at 16:08

## 2020-02-22 RX ADMIN — Medication 650 MILLIGRAM(S): at 22:15

## 2020-02-22 RX ADMIN — NIMODIPINE 60 MILLIGRAM(S): 60 SOLUTION ORAL at 07:12

## 2020-02-22 RX ADMIN — Medication 650 MILLIGRAM(S): at 21:45

## 2020-02-22 RX ADMIN — NIMODIPINE 60 MILLIGRAM(S): 60 SOLUTION ORAL at 15:49

## 2020-02-22 RX ADMIN — NIMODIPINE 60 MILLIGRAM(S): 60 SOLUTION ORAL at 21:45

## 2020-02-22 RX ADMIN — HEPARIN SODIUM 5000 UNIT(S): 5000 INJECTION INTRAVENOUS; SUBCUTANEOUS at 21:46

## 2020-02-22 RX ADMIN — NIMODIPINE 60 MILLIGRAM(S): 60 SOLUTION ORAL at 18:46

## 2020-02-22 RX ADMIN — HEPARIN SODIUM 5000 UNIT(S): 5000 INJECTION INTRAVENOUS; SUBCUTANEOUS at 08:14

## 2020-02-22 RX ADMIN — NIMODIPINE 60 MILLIGRAM(S): 60 SOLUTION ORAL at 10:47

## 2020-02-22 RX ADMIN — CHLORHEXIDINE GLUCONATE 1 APPLICATION(S): 213 SOLUTION TOPICAL at 15:49

## 2020-02-22 RX ADMIN — Medication 1000 MILLIGRAM(S): at 00:00

## 2020-02-22 RX ADMIN — NIMODIPINE 60 MILLIGRAM(S): 60 SOLUTION ORAL at 03:40

## 2020-02-22 NOTE — PROGRESS NOTE ADULT - SUBJECTIVE AND OBJECTIVE BOX
Patient is a 39y old  Female who presents with a chief complaint of SAH, IPH (22 Feb 2020 09:07)      SUBJECTIVE / OVERNIGHT EVENTS:    Patient is still having tachycardia and has fever with a Tm of 101.6F.        ADDITIONAL REVIEW OF SYSTEMS: Negative except for above    MEDICATIONS  (STANDING):  chlorhexidine 2% Cloths 1 Application(s) Topical daily  heparin  Injectable 5000 Unit(s) SubCutaneous every 12 hours  niMODipine Oral Solution 60 milliGRAM(s) Oral every 4 hours    MEDICATIONS  (PRN):  acetaminophen   Tablet .. 650 milliGRAM(s) Oral every 6 hours PRN Temp greater or equal to 38C (100.4F), Mild Pain (1 - 3)  albuterol/ipratropium for Nebulization. 3 milliLiter(s) Nebulizer every 6 hours PRN Shortness of Breath and/or Wheezing  artificial tears (preservative free) Ophthalmic Solution 1 Drop(s) Both EYES three times a day PRN Dry Eyes  calcium carbonate    500 mG (Tums) Chewable 1 Tablet(s) Chew every 4 hours PRN Dyspepsia  ondansetron Injectable 4 milliGRAM(s) IV Push every 8 hours PRN Nausea and/or Vomiting  oxyCODONE    IR 2.5 milliGRAM(s) Oral every 4 hours PRN Moderate Pain (4 - 6)  sodium chloride 0.9% lock flush 10 milliLiter(s) IV Push every 1 hour PRN Pre/post blood products, medications, blood draw, and to maintain line patency      CAPILLARY BLOOD GLUCOSE        I&O's Summary    21 Feb 2020 07:01  -  22 Feb 2020 07:00  --------------------------------------------------------  IN: 240 mL / OUT: 0 mL / NET: 240 mL        PHYSICAL EXAM:  Vital Signs Last 24 Hrs  T(C): 37.1 (22 Feb 2020 10:42), Max: 38.7 (22 Feb 2020 00:00)  T(F): 98.8 (22 Feb 2020 10:42), Max: 101.6 (22 Feb 2020 00:00)  HR: 116 (22 Feb 2020 10:42) (96 - 128)  BP: 132/83 (22 Feb 2020 10:42) (117/74 - 143/87)  BP(mean): --  RR: 19 (22 Feb 2020 10:42) (18 - 19)  SpO2: 97% (22 Feb 2020 10:42) (94% - 100%)    PHYSICAL EXAM:  GENERAL: NAD, well-developed  HEAD:  Atraumatic, Normocephalic  EYES:  conjunctiva and sclera clear  NECK: Supple, No JVD  CHEST/LUNG: Clear to auscultation bilaterally; No wheeze  HEART: Regular rate and rhythm; No murmurs, rubs, or gallops  ABDOMEN: Soft, Nontender, Nondistended; Bowel sounds present  EXTREMITIES:  2+ Peripheral Pulses, No clubbing, cyanosis, or edema  NEUROLOGY: AAOx3  SKIN: No rashes or lesions      LABS:                        11.1   9.53  )-----------( 494      ( 21 Feb 2020 06:47 )             34.5     02-22    138  |  101  |  25<H>  ----------------------------<  115<H>  3.7   |  24  |  0.56    Ca    9.3      22 Feb 2020 10:36    TPro  7.9  /  Alb  4.1  /  TBili  0.4  /  DBili  x   /  AST  27  /  ALT  42  /  AlkPhos  97  02-22                RADIOLOGY & ADDITIONAL TESTS:    Imaging Personally Reviewed:    Electrocardiogram Personally Reviewed:    COORDINATION OF CARE:  Care Discussed with Consultants/Other Providers [Y/N]:  Prior or Outpatient Records Reviewed [Y/N]: Patient is a 39y old  Female who presents with a chief complaint of SAH, IPH (22 Feb 2020 09:07)      SUBJECTIVE / OVERNIGHT EVENTS:    Patient is still having tachycardia and has fever with a Tm of 101.6F.        ADDITIONAL REVIEW OF SYSTEMS: Negative except for above    MEDICATIONS  (STANDING):  chlorhexidine 2% Cloths 1 Application(s) Topical daily  heparin  Injectable 5000 Unit(s) SubCutaneous every 12 hours  niMODipine Oral Solution 60 milliGRAM(s) Oral every 4 hours    MEDICATIONS  (PRN):  acetaminophen   Tablet .. 650 milliGRAM(s) Oral every 6 hours PRN Temp greater or equal to 38C (100.4F), Mild Pain (1 - 3)  albuterol/ipratropium for Nebulization. 3 milliLiter(s) Nebulizer every 6 hours PRN Shortness of Breath and/or Wheezing  artificial tears (preservative free) Ophthalmic Solution 1 Drop(s) Both EYES three times a day PRN Dry Eyes  calcium carbonate    500 mG (Tums) Chewable 1 Tablet(s) Chew every 4 hours PRN Dyspepsia  ondansetron Injectable 4 milliGRAM(s) IV Push every 8 hours PRN Nausea and/or Vomiting  oxyCODONE    IR 2.5 milliGRAM(s) Oral every 4 hours PRN Moderate Pain (4 - 6)  sodium chloride 0.9% lock flush 10 milliLiter(s) IV Push every 1 hour PRN Pre/post blood products, medications, blood draw, and to maintain line patency      CAPILLARY BLOOD GLUCOSE        I&O's Summary    21 Feb 2020 07:01  -  22 Feb 2020 07:00  --------------------------------------------------------  IN: 240 mL / OUT: 0 mL / NET: 240 mL        PHYSICAL EXAM:  Vital Signs Last 24 Hrs  T(C): 37.1 (22 Feb 2020 10:42), Max: 38.7 (22 Feb 2020 00:00)  T(F): 98.8 (22 Feb 2020 10:42), Max: 101.6 (22 Feb 2020 00:00)  HR: 116 (22 Feb 2020 10:42) (96 - 128)  BP: 132/83 (22 Feb 2020 10:42) (117/74 - 143/87)  BP(mean): --  RR: 19 (22 Feb 2020 10:42) (18 - 19)  SpO2: 97% (22 Feb 2020 10:42) (94% - 100%)    PHYSICAL EXAM:  GENERAL: NAD, well-developed  HEAD:  Atraumatic, Normocephalic  EYES:  conjunctiva and sclera clear  NECK: Supple, No JVD  CHEST/LUNG: Clear to auscultation bilaterally; No wheeze  HEART: Regular rate and rhythm; No murmurs, rubs, or gallops  ABDOMEN: Soft, Nontender, Nondistended; Bowel sounds present  EXTREMITIES:  2+ Peripheral Pulses, No clubbing, cyanosis, or edema  NEUROLOGY: AAOx3        LABS:                        11.1   9.53  )-----------( 494      ( 21 Feb 2020 06:47 )             34.5     02-22    138  |  101  |  25<H>  ----------------------------<  115<H>  3.7   |  24  |  0.56    Ca    9.3      22 Feb 2020 10:36    TPro  7.9  /  Alb  4.1  /  TBili  0.4  /  DBili  x   /  AST  27  /  ALT  42  /  AlkPhos  97  02-22                RADIOLOGY & ADDITIONAL TESTS:    Imaging Personally Reviewed:    Electrocardiogram Personally Reviewed:    COORDINATION OF CARE:  Care Discussed with Consultants/Other Providers [Y/N]:  Prior or Outpatient Records Reviewed [Y/N]:

## 2020-02-22 NOTE — PROGRESS NOTE ADULT - PROBLEM SELECTOR PLAN 4
on nimodipine ?lower  recent TCD wnl  continue to monitor - management per neursourgery. on nimodipine ?lower ( mgt as per Neurosurgery)   recent TCD wnl  continue to monitor - management per neursourgery.

## 2020-02-22 NOTE — PROGRESS NOTE ADULT - PROBLEM SELECTOR PLAN 8
ECG done in 2/18 with sinus tachycardia   Unsure of etiology of tachycardia ( pt is tachycardic even when she is not febrile )   NO Hypoxia , no clear source of infection , no current pain   ? Anxious,   Neg lower ext doppler   Will recommend to get TSH ECG done in 2/18 with sinus tachycardia   Unsure of etiology of tachycardia ( pt is tachycardic even when she is not febrile )   NO Hypoxia , no clear source of infection , no current pain   ? Anxious,   Neg lower ext doppler   Will recommend to get TSH and repeat ECG when she is tachycardia to ensure similar rhythm

## 2020-02-22 NOTE — PROGRESS NOTE ADULT - SUBJECTIVE AND OBJECTIVE BOX
SUBJECTIVE: HPI:  39F unknown medical Hx, found unresponsive on airplane, transferred to [place], subsequently transferred to [facility].    Arrive intubated and sedated, no family or medical Hx available. (03 Feb 2020 14:10)      OVERNIGHT EVENTS: Patient was febrile at midnight Tmax 38.7 no cultures were sent and per RN patient was given IV Tylenol overnight. This morning, patient seen and evaluated at bedside - afebrile, well appearing denies HA, N/V, CP. Pain well controlled with medications.     Vital Signs Last 24 Hrs  T(C): 37.7 (22 Feb 2020 08:14), Max: 38.7 (22 Feb 2020 00:00)  T(F): 99.8 (22 Feb 2020 08:14), Max: 101.6 (22 Feb 2020 00:00)  HR: 117 (22 Feb 2020 08:14) (96 - 128)  BP: 130/80 (22 Feb 2020 08:14) (117/74 - 143/87)  BP(mean): --  RR: 18 (22 Feb 2020 08:14) (18 - 19)  SpO2: 100% (22 Feb 2020 08:14) (94% - 100%)    PHYSICAL EXAM:    General: No Acute Distress     Neurological: Awake, OX3, EOMI, PERRL, FC, RT side 5/5, LUE drift -LUE biceps/triceps 5/5 with HG 4+/5, LLE 4+/5    Pulmonary: Clear to Auscultation, No Rales, No Rhonchi, No Wheezes     Cardiovascular: S1, S2, Regular Rate and Rhythm     Gastrointestinal: Soft, Nontender, Nondistended     Incision: Crani staples C/D/I, cranial defect full soft    LABS:                        11.1   9.53  )-----------( 494      ( 21 Feb 2020 06:47 )             34.5    02-21    140  |  103  |  26<H>  ----------------------------<  106<H>  3.6   |  25  |  0.63    Ca    9.6      21 Feb 2020 06:47      Hemoglobin A1C, Whole Blood: 5.3 % (02-04 @ 01:05)      02-21 @ 07:01 - 02-22 @ 07:00  --------------------------------------------------------  IN: 240 mL / OUT: 0 mL / NET: 240 mL      DRAINS: None    MEDICATIONS:  Antibiotics:    Neuro:  acetaminophen   Tablet .. 650 milliGRAM(s) Oral every 6 hours PRN Temp greater or equal to 38C (100.4F), Mild Pain (1 - 3)  ondansetron Injectable 4 milliGRAM(s) IV Push every 8 hours PRN Nausea and/or Vomiting  oxyCODONE    IR 2.5 milliGRAM(s) Oral every 4 hours PRN Moderate Pain (4 - 6)    Cardiac:  niMODipine Oral Solution 60 milliGRAM(s) Oral every 4 hours    Pulm:  albuterol/ipratropium for Nebulization. 3 milliLiter(s) Nebulizer every 6 hours PRN Shortness of Breath and/or Wheezing    GI/:  calcium carbonate    500 mG (Tums) Chewable 1 Tablet(s) Chew every 4 hours PRN Dyspepsia    Other:   artificial tears (preservative free) Ophthalmic Solution 1 Drop(s) Both EYES three times a day PRN Dry Eyes  chlorhexidine 2% Cloths 1 Application(s) Topical daily  heparin  Injectable 5000 Unit(s) SubCutaneous every 12 hours  sodium chloride 0.9% lock flush 10 milliLiter(s) IV Push every 1 hour PRN Pre/post blood products, medications, blood draw, and to maintain line patency    DIET: [] Regular [] CCD [] Renal [] Puree [x] Dysphagia [] Tube Feeds:     IMAGING:   < from: [facility] Duplex Upper Ext Vein Scan, Bilat (02.21.20 @ 10:53) >  IMPRESSION:     Persistent superficial thrombophlebitis affects the right basilic and the right and left cephalic veins.  No evidence of deep venous thrombosis in either upper extremity.    JONNY GONZALES M.D., ATTENDING RADIOLOGIST  This document has been electronically signed. Feb 21 2020 11:44AM        < end of copied text >    < from: [facility] Duplex Lower Ext Vein Scan, Bilat (02.21.20 @ 09:19) >  IMPRESSION:     No evidence of deep venous thrombosis in either lower extremity.      JONNY GONZALES M.D., ATTENDING RADIOLOGIST  This document has been electronically signed. Feb 21 2020 10:46AM    < end of copied text >

## 2020-02-22 NOTE — PROGRESS NOTE ADULT - SUBJECTIVE AND OBJECTIVE BOX
CC: f/u for fever after SAH    Patient reports: no complaints past 12 hours    REVIEW OF SYSTEMS:  All other review of systems negative (Comprehensive ROS)    Antimicrobials Day #  :off    Other Medications Reviewed  MEDICATIONS  (STANDING):  chlorhexidine 2% Cloths 1 Application(s) Topical daily  heparin  Injectable 5000 Unit(s) SubCutaneous every 12 hours  niMODipine Oral Solution 60 milliGRAM(s) Oral every 4 hours    T(F): 98.9 (02-22-20 @ 06:09), Max: 101.6 (02-22-20 @ 00:00)  HR: 111 (02-22-20 @ 06:09)  BP: 122/82 (02-22-20 @ 06:09)  RR: 18 (02-22-20 @ 06:09)  SpO2: 100% (02-22-20 @ 06:09)  Wt(kg): --    PHYSICAL EXAM:  General: alert, no acute distress, craniotomy incision intact  Eyes:  anicteric, no conjunctival injection, no discharge  Oropharynx: no lesions or injection 	  Neck: supple, without adenopathy  Lungs: clear to auscultation  Heart: regular rate and rhythm; no murmur, rubs or gallops  Abdomen: soft, nondistended, nontender, without mass or organomegaly  Skin: no lesions  Extremities: no clubbing, cyanosis, or edema  Neurologic: alert, oriented, moves all extremities  No active phlebitis in either arm  LAB RESULTS:                        11.1   9.53  )-----------( 494      ( 21 Feb 2020 06:47 )             34.5     02-21    140  |  103  |  26<H>  ----------------------------<  106<H>  3.6   |  25  |  0.63    Ca    9.6      21 Feb 2020 06:47          MICROBIOLOGY:  RECENT CULTURES:    2/16 blood cultures negative  RADIOLOGY REVIEWED:    < from: CT Head No Cont (02.19.20 @ 23:26) >  CT brain:  TECHNIQUE:  Serial axial images were obtained from the skull base to the vertex without intravenous contrast.    COMPARISON EXAMINATION: 2/16/2020    FINDINGS:    Ventricles and sulci:  Evidence of intraventricular hemorrhage slightly less conspicuous compared with the prior 2/16/2020. Ventricles appear relatively stable in size compared with 2/16/2020.  Intra-axial:  Parenchymal hemorrhage again appreciated along the shunt tract in the left frontal region status post removal. Hemorrhage at the level of the right basal gangliawith intraventricular extension . Right temporal encephalomalacia. Aneurysm clip in the right parasellar region  Extra-axial:  Aneurysm clip in the right parasellar region..  Visualized sinuses:  Clear.  Visualized mastoids:  Clear.  Calvarium:  Leftfrontal messi hole. Right frontoparietal craniectomy.  Miscellaneous:  None.    IMPRESSION:  No change compared with 2/16/2020. No new hemorrhage. Evidence of hemorrhage in the right basal ganglia region with intraventricular extension as seen previously. Right frontal parietal craniectomy. Status post shunt removal with hemorrhage along the shunt tract.    < end of copied text >

## 2020-02-22 NOTE — PROGRESS NOTE ADULT - PROBLEM SELECTOR PLAN 1
Last temp of 100.6 was on 2/20/20   S/P 5 days of Levaquin for E coli UTI low grade fevers   CT of head with no new hemorrhage or collection  will continue to monitor   repeat bcx 2/16 NTD, cxr without consolidation.  UE doppler with SUperficial htormbophlebitis Tm was 101.6F   S/P 5 days of Levaquin for E coli UTI low grade fevers   CT of head with no new hemorrhage or collection  will continue to monitor   repeat bcx 2/16 NTD, cxr without consolidation.  UE doppler with Superficial thrombophlebitis  No Eosinophilia or rash / No skin desquamation   F/UP CMP/LFT

## 2020-02-22 NOTE — PROGRESS NOTE ADULT - ASSESSMENT
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  Fever may be central as she is non toxic, has a normal wbc, and denies any dysuria.  She could have intermittent fever secondary to retained ICH.  She reports hives to N, will try to avoid beta lactams.  EVD removed 2/16, fever earlier in hospital course with negative blood cultures.(Last blood cultures sent 2/16)  No evidence that temps are responding to levaquin.I have asked RN to check a bladder scan to make sure she is not in retention.  Levaquin completed 2/20  LE dopplers are negative  UE dopplers with superficial phlebitis although exam not very impressive  Normal wbc speaks against infectious fever  Suggest:  1.observe off antibiotics   3.Follow fever curve, central fever is always a diagnosis of exclusion but still may be the most likely explanation here.  4.she can be monitored off antibiotics, expect slow moderation in fever  5 consider repeat blood cultures if fever continues

## 2020-02-22 NOTE — PROGRESS NOTE ADULT - ASSESSMENT
ASSESSMENT AND PLAN: 39F, SAH HH4, MF3 w/ Rt temporal ICH, s/p craniectomy, evacuation of ICH and clipping of PCOMM aneurys (no bone flap) POD19, S/P angio: clip check in good position, no vasospasm PAD 17    NEURO:   - Continue neuro checks q 4  - Continue Nimodipine for vasospasm ppx - PBD 19  - Continue pain control with tylenol prn and oxy prn  - Continue to monitor crani defect  - Helmet at bedside, to wear when OOB at all times    PULM:   - Encourage incentive spirometry     CV:    ENDO:     HEME/ONC:                      DVT ppx:     RENAL:     ID:     GI:      DISCHARGE PLANNING: ASSESSMENT AND PLAN: 39F, SAH HH4, MF3 w/ Rt temporal ICH, s/p craniectomy, evacuation of ICH and clipping of PCOMM aneurys (no bone flap) POD19, S/P angio: clip check in good position, no vasospasm PAD 17    NEURO:   - Continue neuro checks q 4  - Continue Nimodipine for vasospasm ppx - PBD 19  - Continue pain control with tylenol prn and oxy prn  - Continue to monitor crani defect  - Helmet at bedside, to wear when OOB at all times    PULM:   - Encourage incentive spirometry   - Duonebs as needed    CV:  - Mildly tachycardic, no desaturation on room air, will continue to monitor  - -160, no issues    ENDO:   - Euglycemia    HEME/ONC:         CBC reviewed yesterday 2/21 WBC stable, differential also stable             DVT ppx: Continue SQH, patient had dopplers uppers and lowers and were negative for DVT    RENAL:   - IVL  - Appreciate Hospitalist following, rec to send CMP w/ lipase. Ordered, to follow results.     ID:   - Tmax overnight 38.7 - no cultures sent, tx w/ Tylenol, patient has since been afebrile then. If patient has another fever will culture then  - Dopplers show b/l thrombophlebitis  - Appreciate ID following, will continue to observe abx (finished Levaquin for UTI on 2/20)    GI:  - Continue dysphagia diet  - Last BM per RN 2/21      DISCHARGE PLANNING:   PT/OT/PMR - Acute TBI Rehab    Assessment:  Please Check When Present   []  GCS  E   V  M     Heart Failure: []Acute, [] acute on chronic , []chronic  Heart Failure:  [] Diastolic (HFpEF), [] Systolic (HFrEF), []Combined (HFpEF and HFrEF), [] RHF, [] Pulm HTN, [] Other    [] GAURAV, [] ATN, [] AIN, [] other  [] CKD1, [] CKD2, [] CKD 3, [] CKD 4, [] CKD 5, []ESRD    Encephalopathy: [] Metabolic, [] Hepatic, [] toxic, [] Neurological, [] Other    Abnormal Nurtitional Status: [] malnutrition (see nutrition note), [ ]underweight: BMI < 19, [] morbid obesity: BMI >40, [] Cachexia    [] Sepsis  [] hypovolemic shock,[] cardiogenic shock, [] hemorrhagic shock, [] neuogenic shock  [] Acute Respiratory Failure  []Cerebral edema, [] Brain compression/ herniation,   [] Functional quadriplegia  [] Acute blood loss anemia    To be discussed w/ Dr. Ordonez  [id number]

## 2020-02-23 LAB
ALBUMIN SERPL ELPH-MCNC: 3.9 G/DL — SIGNIFICANT CHANGE UP (ref 3.3–5)
ALP SERPL-CCNC: 96 U/L — SIGNIFICANT CHANGE UP (ref 40–120)
ALT FLD-CCNC: 49 U/L — HIGH (ref 10–45)
ANION GAP SERPL CALC-SCNC: 13 MMOL/L — SIGNIFICANT CHANGE UP (ref 5–17)
APPEARANCE UR: CLEAR — SIGNIFICANT CHANGE UP
AST SERPL-CCNC: 34 U/L — SIGNIFICANT CHANGE UP (ref 10–40)
BACTERIA # UR AUTO: NEGATIVE — SIGNIFICANT CHANGE UP
BASOPHILS # BLD AUTO: 0.03 K/UL — SIGNIFICANT CHANGE UP (ref 0–0.2)
BASOPHILS NFR BLD AUTO: 0.4 % — SIGNIFICANT CHANGE UP (ref 0–2)
BILIRUB SERPL-MCNC: 0.4 MG/DL — SIGNIFICANT CHANGE UP (ref 0.2–1.2)
BILIRUB UR-MCNC: NEGATIVE — SIGNIFICANT CHANGE UP
BUN SERPL-MCNC: 20 MG/DL — SIGNIFICANT CHANGE UP (ref 7–23)
CALCIUM SERPL-MCNC: 9.2 MG/DL — SIGNIFICANT CHANGE UP (ref 8.4–10.5)
CHLORIDE SERPL-SCNC: 97 MMOL/L — SIGNIFICANT CHANGE UP (ref 96–108)
CO2 SERPL-SCNC: 24 MMOL/L — SIGNIFICANT CHANGE UP (ref 22–31)
COLOR SPEC: SIGNIFICANT CHANGE UP
CREAT SERPL-MCNC: 0.54 MG/DL — SIGNIFICANT CHANGE UP (ref 0.5–1.3)
DIFF PNL FLD: NEGATIVE — SIGNIFICANT CHANGE UP
EOSINOPHIL # BLD AUTO: 0.05 K/UL — SIGNIFICANT CHANGE UP (ref 0–0.5)
EOSINOPHIL NFR BLD AUTO: 0.6 % — SIGNIFICANT CHANGE UP (ref 0–6)
EPI CELLS # UR: 2 /HPF — SIGNIFICANT CHANGE UP
GLUCOSE SERPL-MCNC: 105 MG/DL — HIGH (ref 70–99)
GLUCOSE UR QL: NEGATIVE — SIGNIFICANT CHANGE UP
HCT VFR BLD CALC: 34.7 % — SIGNIFICANT CHANGE UP (ref 34.5–45)
HGB BLD-MCNC: 11.2 G/DL — LOW (ref 11.5–15.5)
HYALINE CASTS # UR AUTO: 0 /LPF — SIGNIFICANT CHANGE UP (ref 0–2)
IMM GRANULOCYTES NFR BLD AUTO: 0.5 % — SIGNIFICANT CHANGE UP (ref 0–1.5)
KETONES UR-MCNC: NEGATIVE — SIGNIFICANT CHANGE UP
LEUKOCYTE ESTERASE UR-ACNC: NEGATIVE — SIGNIFICANT CHANGE UP
LYMPHOCYTES # BLD AUTO: 1.76 K/UL — SIGNIFICANT CHANGE UP (ref 1–3.3)
LYMPHOCYTES # BLD AUTO: 22.2 % — SIGNIFICANT CHANGE UP (ref 13–44)
MCHC RBC-ENTMCNC: 29.2 PG — SIGNIFICANT CHANGE UP (ref 27–34)
MCHC RBC-ENTMCNC: 32.3 GM/DL — SIGNIFICANT CHANGE UP (ref 32–36)
MCV RBC AUTO: 90.6 FL — SIGNIFICANT CHANGE UP (ref 80–100)
MONOCYTES # BLD AUTO: 0.65 K/UL — SIGNIFICANT CHANGE UP (ref 0–0.9)
MONOCYTES NFR BLD AUTO: 8.2 % — SIGNIFICANT CHANGE UP (ref 2–14)
NEUTROPHILS # BLD AUTO: 5.39 K/UL — SIGNIFICANT CHANGE UP (ref 1.8–7.4)
NEUTROPHILS NFR BLD AUTO: 68.1 % — SIGNIFICANT CHANGE UP (ref 43–77)
NITRITE UR-MCNC: NEGATIVE — SIGNIFICANT CHANGE UP
NRBC # BLD: 0 /100 WBCS — SIGNIFICANT CHANGE UP (ref 0–0)
PH UR: 7 — SIGNIFICANT CHANGE UP (ref 5–8)
PLATELET # BLD AUTO: 448 K/UL — HIGH (ref 150–400)
POTASSIUM SERPL-MCNC: 3.5 MMOL/L — SIGNIFICANT CHANGE UP (ref 3.5–5.3)
POTASSIUM SERPL-SCNC: 3.5 MMOL/L — SIGNIFICANT CHANGE UP (ref 3.5–5.3)
PROT SERPL-MCNC: 7.5 G/DL — SIGNIFICANT CHANGE UP (ref 6–8.3)
PROT UR-MCNC: ABNORMAL
RBC # BLD: 3.83 M/UL — SIGNIFICANT CHANGE UP (ref 3.8–5.2)
RBC # FLD: 12.8 % — SIGNIFICANT CHANGE UP (ref 10.3–14.5)
RBC CASTS # UR COMP ASSIST: 1 /HPF — SIGNIFICANT CHANGE UP (ref 0–4)
SODIUM SERPL-SCNC: 134 MMOL/L — LOW (ref 135–145)
SP GR SPEC: >1.05 (ref 1.01–1.02)
UROBILINOGEN FLD QL: NEGATIVE — SIGNIFICANT CHANGE UP
WBC # BLD: 7.92 K/UL — SIGNIFICANT CHANGE UP (ref 3.8–10.5)
WBC # FLD AUTO: 7.92 K/UL — SIGNIFICANT CHANGE UP (ref 3.8–10.5)
WBC UR QL: 1 /HPF — SIGNIFICANT CHANGE UP (ref 0–5)

## 2020-02-23 PROCEDURE — 71045 X-RAY EXAM CHEST 1 VIEW: CPT | Mod: 26

## 2020-02-23 PROCEDURE — 71260 CT THORAX DX C+: CPT | Mod: 26

## 2020-02-23 PROCEDURE — 99233 SBSQ HOSP IP/OBS HIGH 50: CPT

## 2020-02-23 PROCEDURE — 74177 CT ABD & PELVIS W/CONTRAST: CPT | Mod: 26

## 2020-02-23 PROCEDURE — 70450 CT HEAD/BRAIN W/O DYE: CPT | Mod: 26

## 2020-02-23 RX ORDER — ACETAMINOPHEN 500 MG
1000 TABLET ORAL ONCE
Refills: 0 | Status: COMPLETED | OUTPATIENT
Start: 2020-02-23 | End: 2020-02-23

## 2020-02-23 RX ORDER — OXYCODONE HYDROCHLORIDE 5 MG/1
5 TABLET ORAL EVERY 8 HOURS
Refills: 0 | Status: DISCONTINUED | OUTPATIENT
Start: 2020-02-23 | End: 2020-02-25

## 2020-02-23 RX ORDER — SODIUM CHLORIDE 9 MG/ML
1000 INJECTION INTRAMUSCULAR; INTRAVENOUS; SUBCUTANEOUS ONCE
Refills: 0 | Status: COMPLETED | OUTPATIENT
Start: 2020-02-23 | End: 2020-02-23

## 2020-02-23 RX ORDER — ACETAMINOPHEN 500 MG
975 TABLET ORAL ONCE
Refills: 0 | Status: COMPLETED | OUTPATIENT
Start: 2020-02-23 | End: 2020-02-24

## 2020-02-23 RX ADMIN — SODIUM CHLORIDE 333.33 MILLILITER(S): 9 INJECTION INTRAMUSCULAR; INTRAVENOUS; SUBCUTANEOUS at 13:51

## 2020-02-23 RX ADMIN — CHLORHEXIDINE GLUCONATE 1 APPLICATION(S): 213 SOLUTION TOPICAL at 13:50

## 2020-02-23 RX ADMIN — OXYCODONE HYDROCHLORIDE 2.5 MILLIGRAM(S): 5 TABLET ORAL at 17:00

## 2020-02-23 RX ADMIN — Medication 400 MILLIGRAM(S): at 14:43

## 2020-02-23 RX ADMIN — Medication 650 MILLIGRAM(S): at 09:57

## 2020-02-23 RX ADMIN — NIMODIPINE 60 MILLIGRAM(S): 60 SOLUTION ORAL at 06:22

## 2020-02-23 RX ADMIN — OXYCODONE HYDROCHLORIDE 2.5 MILLIGRAM(S): 5 TABLET ORAL at 15:16

## 2020-02-23 RX ADMIN — NIMODIPINE 60 MILLIGRAM(S): 60 SOLUTION ORAL at 17:54

## 2020-02-23 RX ADMIN — NIMODIPINE 60 MILLIGRAM(S): 60 SOLUTION ORAL at 22:25

## 2020-02-23 RX ADMIN — NIMODIPINE 60 MILLIGRAM(S): 60 SOLUTION ORAL at 09:57

## 2020-02-23 RX ADMIN — HEPARIN SODIUM 5000 UNIT(S): 5000 INJECTION INTRAVENOUS; SUBCUTANEOUS at 22:12

## 2020-02-23 RX ADMIN — Medication 1000 MILLIGRAM(S): at 15:14

## 2020-02-23 RX ADMIN — Medication 650 MILLIGRAM(S): at 11:15

## 2020-02-23 RX ADMIN — HEPARIN SODIUM 5000 UNIT(S): 5000 INJECTION INTRAVENOUS; SUBCUTANEOUS at 09:37

## 2020-02-23 RX ADMIN — NIMODIPINE 60 MILLIGRAM(S): 60 SOLUTION ORAL at 13:50

## 2020-02-23 NOTE — PROGRESS NOTE ADULT - SUBJECTIVE AND OBJECTIVE BOX
SUBJECTIVE: HPI:  39F unknown medical Hx, found unresponsive on airplane, transferred to [place], subsequently transferred to [facility].  Arrived intubated and sedated, no family or medical Hx available. (03 Feb 2020 14:10)    OVERNIGHT EVENTS: Patient was febrile overnight Tmax 38.9; resent blood cultures, CXR, UA ordered this am    CC: patient resting in bed and denies CP/SOB/HA/N/V, family at bedside    Vital Signs Last 24 Hrs  T(C): 37.7 (23 Feb 2020 09:15), Max: 38.9 (22 Feb 2020 22:00)  T(F): 99.9 (23 Feb 2020 09:15), Max: 102 (22 Feb 2020 22:00)  HR: 99 (23 Feb 2020 09:15) (99 - 107)  BP: 134/90 (23 Feb 2020 09:15) (105/70 - 144/90)  BP(mean): --  RR: 18 (23 Feb 2020 09:15) (18 - 18)  SpO2: 99% (23 Feb 2020 09:15) (97% - 100%)    PHYSICAL EXAM:    General: No Acute Distress     Neurological: Awake, OX3, EOMI, PERRL, FC, RT side 5/5, LUE drift -LUE biceps/triceps 5/5 with HG 4+/5, LLE 4+/5    Pulmonary: Clear to Auscultation, No Rales, No Rhonchi, No Wheezes     Cardiovascular: S1, S2, Regular Rate and Rhythm     Gastrointestinal: Soft, Nontender, Nondistended     Incision: Crani staples removed without difficulty;  C/D/I, cranial defect full soft    LABS:                                      11.2   7.92  )-----------( 448      ( 23 Feb 2020 08:41 )             34.7   02-23    134<L>  |  97  |  20  ----------------------------<  105<H>  3.5   |  24  |  0.54    Ca    9.2      23 Feb 2020 08:41    TPro  7.5  /  Alb  3.9  /  TBili  0.4  /  DBili  x   /  AST  34  /  ALT  49<H>  /  AlkPhos  96  02-23    Hemoglobin A1C, Whole Blood: 5.3 % (02-04 @ 01:05)    MEDICATIONS  (STANDING):  chlorhexidine 2% Cloths 1 Application(s) Topical daily  heparin  Injectable 5000 Unit(s) SubCutaneous every 12 hours  niMODipine Oral Solution 60 milliGRAM(s) Oral every 4 hours    MEDICATIONS  (PRN):  acetaminophen   Tablet .. 650 milliGRAM(s) Oral every 6 hours PRN Temp greater or equal to 38C (100.4F), Mild Pain (1 - 3)  albuterol/ipratropium for Nebulization. 3 milliLiter(s) Nebulizer every 6 hours PRN Shortness of Breath and/or Wheezing  artificial tears (preservative free) Ophthalmic Solution 1 Drop(s) Both EYES three times a day PRN Dry Eyes  calcium carbonate    500 mG (Tums) Chewable 1 Tablet(s) Chew every 4 hours PRN Dyspepsia  ondansetron Injectable 4 milliGRAM(s) IV Push every 8 hours PRN Nausea and/or Vomiting  oxyCODONE    IR 2.5 milliGRAM(s) Oral every 4 hours PRN Moderate Pain (4 - 6)  sodium chloride 0.9% lock flush 10 milliLiter(s) IV Push every 1 hour PRN Pre/post blood products, medications, blood draw, and to maintain line patency    DIET: [] Regular [] CCD [] Renal [] Puree [x] Dysphagia [] Tube Feeds:     IMAGING:   < from: [facility] Duplex Upper Ext Vein Scan, Bilat (02.21.20 @ 10:53) >  IMPRESSION:     Persistent superficial thrombophlebitis affects the right basilic and the right and left cephalic veins.  No evidence of deep venous thrombosis in either upper extremity.    JONNY GONZALES M.D., ATTENDING RADIOLOGIST  This document has been electronically signed. Feb 21 2020 11:44AM        < end of copied text >    < from: [facility] Duplex Lower Ext Vein Scan, Bilat (02.21.20 @ 09:19) >  IMPRESSION:     No evidence of deep venous thrombosis in either lower extremity.      JONNY GONZALES M.D., ATTENDING RADIOLOGIST  This document has been electronically signed. Feb 21 2020 10:46AM    < end of copied text >

## 2020-02-23 NOTE — PROGRESS NOTE ADULT - SUBJECTIVE AND OBJECTIVE BOX
Patient is a 39y old  Female who presents with a chief complaint of SAH, IPH (23 Feb 2020 07:42)      SUBJECTIVE / OVERNIGHT EVENTS:     Pt is still spiking high temp with a Tm of 102F       ADDITIONAL REVIEW OF SYSTEMS: Negative except for above    MEDICATIONS  (STANDING):  chlorhexidine 2% Cloths 1 Application(s) Topical daily  heparin  Injectable 5000 Unit(s) SubCutaneous every 12 hours  niMODipine Oral Solution 60 milliGRAM(s) Oral every 4 hours    MEDICATIONS  (PRN):  acetaminophen   Tablet .. 650 milliGRAM(s) Oral every 6 hours PRN Temp greater or equal to 38C (100.4F), Mild Pain (1 - 3)  albuterol/ipratropium for Nebulization. 3 milliLiter(s) Nebulizer every 6 hours PRN Shortness of Breath and/or Wheezing  artificial tears (preservative free) Ophthalmic Solution 1 Drop(s) Both EYES three times a day PRN Dry Eyes  calcium carbonate    500 mG (Tums) Chewable 1 Tablet(s) Chew every 4 hours PRN Dyspepsia  ondansetron Injectable 4 milliGRAM(s) IV Push every 8 hours PRN Nausea and/or Vomiting  oxyCODONE    IR 2.5 milliGRAM(s) Oral every 4 hours PRN Moderate Pain (4 - 6)  sodium chloride 0.9% lock flush 10 milliLiter(s) IV Push every 1 hour PRN Pre/post blood products, medications, blood draw, and to maintain line patency      CAPILLARY BLOOD GLUCOSE        I&O's Summary    22 Feb 2020 07:01  -  23 Feb 2020 07:00  --------------------------------------------------------  IN: 1120 mL / OUT: 0 mL / NET: 1120 mL        PHYSICAL EXAM:  Vital Signs Last 24 Hrs  T(C): 37.7 (23 Feb 2020 09:15), Max: 38.9 (22 Feb 2020 22:00)  T(F): 99.9 (23 Feb 2020 09:15), Max: 102 (22 Feb 2020 22:00)  HR: 99 (23 Feb 2020 09:15) (99 - 116)  BP: 134/90 (23 Feb 2020 09:15) (105/70 - 144/90)  BP(mean): --  RR: 18 (23 Feb 2020 09:15) (18 - 19)  SpO2: 99% (23 Feb 2020 09:15) (97% - 100%)    PHYSICAL EXAM:  GENERAL: NAD, well-developed  HEAD:  Atraumatic, Normocephalic  EYES:  conjunctiva and sclera clear  NECK: Supple, No JVD  CHEST/LUNG: Clear to auscultation bilaterally; No wheeze  HEART: Regular rate and rhythm; No murmurs, rubs, or gallops  ABDOMEN: Soft, Nontender, Nondistended; Bowel sounds present  EXTREMITIES:  2+ Peripheral Pulses, No clubbing, cyanosis, or edema  NEUROLOGY:  AAOx3        LABS:                        11.2   7.92  )-----------( 448      ( 23 Feb 2020 08:41 )             34.7     02-23    134<L>  |  97  |  20  ----------------------------<  105<H>  3.5   |  24  |  0.54    Ca    9.2      23 Feb 2020 08:41    TPro  7.5  /  Alb  3.9  /  TBili  0.4  /  DBili  x   /  AST  34  /  ALT  49<H>  /  AlkPhos  96  02-23                RADIOLOGY & ADDITIONAL TESTS:    Imaging Personally Reviewed:    Electrocardiogram Personally Reviewed:    COORDINATION OF CARE:  Care Discussed with Consultants/Other Providers [Y/N]:  Prior or Outpatient Records Reviewed [Y/N]: Patient is a 39y old  Female who presents with a chief complaint of SAH, IPH (23 Feb 2020 07:42)      SUBJECTIVE / OVERNIGHT EVENTS:     Pt is still spiking high temp with a Tm of 102F.  Patient was seen with many friends and family at the bedside.  She is awake , but she falls asleep during the conversation but is easily arousable.        ADDITIONAL REVIEW OF SYSTEMS: Unable to obtain    MEDICATIONS  (STANDING):  chlorhexidine 2% Cloths 1 Application(s) Topical daily  heparin  Injectable 5000 Unit(s) SubCutaneous every 12 hours  niMODipine Oral Solution 60 milliGRAM(s) Oral every 4 hours    MEDICATIONS  (PRN):  acetaminophen   Tablet .. 650 milliGRAM(s) Oral every 6 hours PRN Temp greater or equal to 38C (100.4F), Mild Pain (1 - 3)  albuterol/ipratropium for Nebulization. 3 milliLiter(s) Nebulizer every 6 hours PRN Shortness of Breath and/or Wheezing  artificial tears (preservative free) Ophthalmic Solution 1 Drop(s) Both EYES three times a day PRN Dry Eyes  calcium carbonate    500 mG (Tums) Chewable 1 Tablet(s) Chew every 4 hours PRN Dyspepsia  ondansetron Injectable 4 milliGRAM(s) IV Push every 8 hours PRN Nausea and/or Vomiting  oxyCODONE    IR 2.5 milliGRAM(s) Oral every 4 hours PRN Moderate Pain (4 - 6)  sodium chloride 0.9% lock flush 10 milliLiter(s) IV Push every 1 hour PRN Pre/post blood products, medications, blood draw, and to maintain line patency      CAPILLARY BLOOD GLUCOSE        I&O's Summary    22 Feb 2020 07:01  -  23 Feb 2020 07:00  --------------------------------------------------------  IN: 1120 mL / OUT: 0 mL / NET: 1120 mL        PHYSICAL EXAM:  Vital Signs Last 24 Hrs  T(C): 37.7 (23 Feb 2020 09:15), Max: 38.9 (22 Feb 2020 22:00)  T(F): 99.9 (23 Feb 2020 09:15), Max: 102 (22 Feb 2020 22:00)  HR: 99 (23 Feb 2020 09:15) (99 - 116)  BP: 134/90 (23 Feb 2020 09:15) (105/70 - 144/90)  BP(mean): --  RR: 18 (23 Feb 2020 09:15) (18 - 19)  SpO2: 99% (23 Feb 2020 09:15) (97% - 100%)    PHYSICAL EXAM:  GENERAL: NAD, well-developed  HEAD:  Atraumatic, Normocephalic  EYES:  conjunctiva and sclera clear  NECK: Supple, No JVD  CHEST/LUNG: Clear to auscultation bilaterally; No wheeze  HEART: Regular rate and rhythm; No murmurs, rubs, or gallops  ABDOMEN: Soft, Nontender, Nondistended; Bowel sounds present  EXTREMITIES:  2+ Peripheral Pulses, No clubbing, cyanosis, or edema  NEUROLOGY:  AAOx3, but falls asleep during conversation and is easily arousable   Skin             : RT PICC site is clean with no tenderness and scalp surgical site is clean with no erythema         LABS:                        11.2   7.92  )-----------( 448      ( 23 Feb 2020 08:41 )             34.7     02-23    134<L>  |  97  |  20  ----------------------------<  105<H>  3.5   |  24  |  0.54    Ca    9.2      23 Feb 2020 08:41    TPro  7.5  /  Alb  3.9  /  TBili  0.4  /  DBili  x   /  AST  34  /  ALT  49<H>  /  AlkPhos  96  02-23                RADIOLOGY & ADDITIONAL TESTS:    Imaging Personally Reviewed:    Electrocardiogram Personally Reviewed:    COORDINATION OF CARE:  Care Discussed with Consultants/Other Providers [Y/N]:  Prior or Outpatient Records Reviewed [Y/N]:

## 2020-02-23 NOTE — PROGRESS NOTE ADULT - PROBLEM SELECTOR PLAN 1
Tm was 102F   Pt has been spiking temp for >1 week ( since 2/12) and was on ABX with no resolution.  Blood CX has been negative  CBC with no leucocytosis or bandemia or neutrophilia or eosinophilia   NO clear source is identified   I have reviewed her medications and did not identified any medication that could cause a drug fever .    S/P 5 days of Levaquin for E coli UTI low grade fevers   CT of head with no new hemorrhage or collection done on 2/19  repeat bcx 2/16 NTD, cxr without consolidation.  UE doppler with Superficial thrombophlebitis  Mild increase in Lipase   For completion of her fever work up since she is still spiking , I would recommend checking RVP( although it is a long duration for a viral syndrome), After discussion with the ID physician , I would recommend a CT of chest and Abdomen to r/o any source .  ?  maybe a repeat CT of head to r/o any seroma formation   Please Hydrate patient while she is spiking fever to cover insensible loss.

## 2020-02-23 NOTE — PROGRESS NOTE ADULT - ASSESSMENT
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     NEURO:   - Continue neuro checks q 4  - Continue Nimodipine for vasospasm ppx - PBD 20  - Continue pain control with tylenol prn and oxy prn  - Continue to monitor crani defect; cranioplasty scheduled for 3/19/20 (will confirm date with Dr Ordonez office)  - Helmet at bedside, to wear when OOB at all times; monitor incision- staples removed today    PULM:   - Encourage incentive spirometry   - Duonebs as needed    CV:  - Mildly tachycardic, no desaturation on room air, will continue to monitor  - -160, no issues    ENDO:   - Euglycemia    HEME/ONC: no leukocytosis noted     DVT ppx: Continue SQH, patient had dopplers uppers and lowers and were negative for DVT    RENAL:   - IVL  - Appreciate Hospitalist following, unclear significance of elevated lipase f/u ABd/Pelvic CT    ID:   - Tmax overnight 102- repeat blood cultures, UA, CXR ordered, will check CT scan chest to R/O PNA; and CT abd/pelvis with and without contrast to R/O occult abscess or other fever source as discussed with hospitalist and ID.  - Dopplers show stable bilateral thrombophlebitis  - Appreciate ID following, will continue to observe off abx (finished Levaquin for UTI on 2/20)     GI:  - Continue dysphagia diet  - Last BM per RN 2/21      DISCHARGE PLANNING:   PT/OT/PMR - Acute TBI Rehab upon d/c    family updated at bedside.    will discuss w/ Dr. Ordonez  [phone]      Assessment:  Please Check When Present   []  GCS  E   V  M     Heart Failure: []Acute, [] acute on chronic , []chronic  Heart Failure:  [] Diastolic (HFpEF), [] Systolic (HFrEF), []Combined (HFpEF and HFrEF), [] RHF, [] Pulm HTN, [] Other    [] GAURAV, [] ATN, [] AIN, [] other  [] CKD1, [] CKD2, [] CKD 3, [] CKD 4, [] CKD 5, []ESRD    Encephalopathy: [] Metabolic, [] Hepatic, [] toxic, [] Neurological, [] Other    Abnormal Nurtitional Status: [] malnutrition (see nutrition note), [ ]underweight: BMI < 19, [] morbid obesity: BMI >40, [] Cachexia    [] Sepsis  [] hypovolemic shock,[] cardiogenic shock, [] hemorrhagic shock, [] neurogenic shock  [] Acute Respiratory Failure  []Cerebral edema, [] Brain compression/ herniation,   [] Functional quadriplegia  [] Acute blood loss anemia

## 2020-02-23 NOTE — PROGRESS NOTE ADULT - PROBLEM SELECTOR PLAN 4
On nimodipine ( mgt as per Neurosurgery)   recent TCD wnl  continue to monitor - management per neurosurgery.

## 2020-02-23 NOTE — PROGRESS NOTE ADULT - ASSESSMENT
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  Fever may be central as she is non toxic, has a normal wbc, and denies any dysuria.  She could have intermittent fever secondary to retained ICH.  She reports hives to N, will try to avoid beta lactams.  EVD removed 2/16, fever earlier in hospital course with negative blood cultures.(Last blood cultures sent 2/16)  No evidence that temps are responding to levaquin.I have asked RN to check a bladder scan to make sure she is not in retention.  Levaquin completed 2/20  LE dopplers are negative  UE dopplers with superficial phlebitis although exam not very impressive  Normal wbc speaks against infectious fever.  Head CT 2/20 without change, she is on very few meds, no evidence of drug fever  Suggest:  1.observe off antibiotics   3.Follow fever curve, central fever is always a diagnosis of exclusion but still may be the most likely explanation here.  4.she can be monitored off antibiotics, expect slow moderation in fever  5 Advise  repeat blood cultures today

## 2020-02-23 NOTE — PROGRESS NOTE ADULT - PROBLEM SELECTOR PLAN 8
ECG done in 2/18 with sinus tachycardia   Unsure of etiology of tachycardia ( pt is tachycardic even when she is not febrile )   NO Hypoxia , no clear source of infection , no current pain   ? Anxious,   Neg lower ext doppler   Will recommend to get TSH and repeat ECG when she is tachycardia to ensure similar rhythm

## 2020-02-23 NOTE — PROGRESS NOTE ADULT - ASSESSMENT
39F no PMHx found unresponsive on airplane coming back from [place] found to have R temporal parenchymal hemorrhage with diffuse SAH on CTH s/p craniectomy with evacuation of ICH and clipping of Pcomm aneurysm on 2/3. Hospital course c/b fevers with superficial thrombophlebitis treated with IV vancomycin and was treated with  Levaquin for E coli UTI. 39F no PMHx found unresponsive on airplane coming back from [place] found to have R temporal parenchymal hemorrhage with diffuse SAH on CTH s/p craniectomy with evacuation of ICH and clipping of Pcomm aneurysm on 2/3. Hospital course c/b fevers with superficial thrombophlebitis treated with IV vancomycin and was treated with  Levaquin for E coli UTI.  Patient remains febrile with no clear source of infection.

## 2020-02-23 NOTE — PROGRESS NOTE ADULT - ATTENDING COMMENTS
[facility]  [phone]
[facility]  [phone]
For completion of her fever work up since she is still spiking , I would recommend checking RVP( although it is a long duration for a viral syndrome), After discussion with the ID physician , I would recommend a CT of chest and Abdomen to r/o any source .  ?  maybe a repeat CT of head to r/o any seroma formation   Please Hydrate patient while she is spiking fever to cover for  insensible loss.      Valentine Adler   Hospitalist   [phone]
Valentine Adler   Hospitalist   [phone]
Valentine Adler   Hospitalist   [phone]

## 2020-02-23 NOTE — PROGRESS NOTE ADULT - SUBJECTIVE AND OBJECTIVE BOX
CC: f/u for fever    Patient reports: no specific complaints, intermittent fever,voiding okay, no headaches    REVIEW OF SYSTEMS:  All other review of systems negative (Comprehensive ROS)    Antimicrobials Day #  :    Other Medications Reviewed  MEDICATIONS  (STANDING):  chlorhexidine 2% Cloths 1 Application(s) Topical daily  heparin  Injectable 5000 Unit(s) SubCutaneous every 12 hours  niMODipine Oral Solution 60 milliGRAM(s) Oral every 4 hours    T(F): 98.5 (02-23-20 @ 04:40), Max: 102 (02-22-20 @ 22:00)  HR: 101 (02-23-20 @ 06:21)  BP: 131/81 (02-23-20 @ 06:21)  RR: 18 (02-23-20 @ 04:40)  SpO2: 99% (02-23-20 @ 04:40)  Wt(kg): --    PHYSICAL EXAM:  General: alert, no acute distress, craniotomy incision C.D.I  Eyes:  anicteric, no conjunctival injection, no discharge  Oropharynx: no lesions or injection 	  Neck: supple, without adenopathy  Lungs: clear to auscultation  Heart: regular rate and rhythm; no murmur, rubs or gallops  Abdomen: soft, nondistended, nontender, without mass or organomegaly  Skin: no lesions  Extremities: no clubbing, cyanosis, or edema  Neurologic: alert, oriented, moves all extremities    LAB RESULTS:    02-22    138  |  101  |  25<H>  ----------------------------<  115<H>  3.7   |  24  |  0.56    Ca    9.3      22 Feb 2020 10:36    TPro  7.9  /  Alb  4.1  /  TBili  0.4  /  DBili  x   /  AST  27  /  ALT  42  /  AlkPhos  97  02-22    LIVER FUNCTIONS - ( 22 Feb 2020 10:36 )  Alb: 4.1 g/dL / Pro: 7.9 g/dL / ALK PHOS: 97 U/L / ALT: 42 U/L / AST: 27 U/L / GGT: x             MICROBIOLOGY:  RECENT CULTURES:      RADIOLOGY REVIEWED:    < from: CT Head No Cont (02.19.20 @ 23:26) >    IMPRESSION:  No change compared with 2/16/2020. No new hemorrhage. Evidence of hemorrhage in the right basal ganglia region with intraventricular extension as seen previously. Right frontal parietal craniectomy. Status post shunt removal with hemorrhage along the shunt tract.    < end of copied text >

## 2020-02-24 LAB
ALBUMIN SERPL ELPH-MCNC: 3.7 G/DL — SIGNIFICANT CHANGE UP (ref 3.3–5)
ALP SERPL-CCNC: 86 U/L — SIGNIFICANT CHANGE UP (ref 40–120)
ALT FLD-CCNC: 47 U/L — HIGH (ref 10–45)
ANION GAP SERPL CALC-SCNC: 13 MMOL/L — SIGNIFICANT CHANGE UP (ref 5–17)
AST SERPL-CCNC: 29 U/L — SIGNIFICANT CHANGE UP (ref 10–40)
BASOPHILS # BLD AUTO: 0.02 K/UL — SIGNIFICANT CHANGE UP (ref 0–0.2)
BASOPHILS NFR BLD AUTO: 0.2 % — SIGNIFICANT CHANGE UP (ref 0–2)
BILIRUB SERPL-MCNC: 0.3 MG/DL — SIGNIFICANT CHANGE UP (ref 0.2–1.2)
BUN SERPL-MCNC: 15 MG/DL — SIGNIFICANT CHANGE UP (ref 7–23)
CALCIUM SERPL-MCNC: 9.1 MG/DL — SIGNIFICANT CHANGE UP (ref 8.4–10.5)
CHLORIDE SERPL-SCNC: 98 MMOL/L — SIGNIFICANT CHANGE UP (ref 96–108)
CO2 SERPL-SCNC: 24 MMOL/L — SIGNIFICANT CHANGE UP (ref 22–31)
CREAT SERPL-MCNC: 0.54 MG/DL — SIGNIFICANT CHANGE UP (ref 0.5–1.3)
EOSINOPHIL # BLD AUTO: 0.09 K/UL — SIGNIFICANT CHANGE UP (ref 0–0.5)
EOSINOPHIL NFR BLD AUTO: 1.1 % — SIGNIFICANT CHANGE UP (ref 0–6)
GLUCOSE SERPL-MCNC: 90 MG/DL — SIGNIFICANT CHANGE UP (ref 70–99)
HCT VFR BLD CALC: 32.3 % — LOW (ref 34.5–45)
HGB BLD-MCNC: 10.6 G/DL — LOW (ref 11.5–15.5)
IMM GRANULOCYTES NFR BLD AUTO: 0.2 % — SIGNIFICANT CHANGE UP (ref 0–1.5)
LIDOCAIN IGE QN: 100 U/L — HIGH (ref 7–60)
LYMPHOCYTES # BLD AUTO: 1.95 K/UL — SIGNIFICANT CHANGE UP (ref 1–3.3)
LYMPHOCYTES # BLD AUTO: 23.4 % — SIGNIFICANT CHANGE UP (ref 13–44)
MCHC RBC-ENTMCNC: 29.9 PG — SIGNIFICANT CHANGE UP (ref 27–34)
MCHC RBC-ENTMCNC: 32.8 GM/DL — SIGNIFICANT CHANGE UP (ref 32–36)
MCV RBC AUTO: 91 FL — SIGNIFICANT CHANGE UP (ref 80–100)
MONOCYTES # BLD AUTO: 0.61 K/UL — SIGNIFICANT CHANGE UP (ref 0–0.9)
MONOCYTES NFR BLD AUTO: 7.3 % — SIGNIFICANT CHANGE UP (ref 2–14)
NEUTROPHILS # BLD AUTO: 5.63 K/UL — SIGNIFICANT CHANGE UP (ref 1.8–7.4)
NEUTROPHILS NFR BLD AUTO: 67.8 % — SIGNIFICANT CHANGE UP (ref 43–77)
NRBC # BLD: 0 /100 WBCS — SIGNIFICANT CHANGE UP (ref 0–0)
PLATELET # BLD AUTO: 433 K/UL — HIGH (ref 150–400)
POTASSIUM SERPL-MCNC: 3.4 MMOL/L — LOW (ref 3.5–5.3)
POTASSIUM SERPL-SCNC: 3.4 MMOL/L — LOW (ref 3.5–5.3)
PROT SERPL-MCNC: 6.9 G/DL — SIGNIFICANT CHANGE UP (ref 6–8.3)
RBC # BLD: 3.55 M/UL — LOW (ref 3.8–5.2)
RBC # FLD: 13 % — SIGNIFICANT CHANGE UP (ref 10.3–14.5)
SODIUM SERPL-SCNC: 135 MMOL/L — SIGNIFICANT CHANGE UP (ref 135–145)
WBC # BLD: 8.32 K/UL — SIGNIFICANT CHANGE UP (ref 3.8–10.5)
WBC # FLD AUTO: 8.32 K/UL — SIGNIFICANT CHANGE UP (ref 3.8–10.5)

## 2020-02-24 PROCEDURE — 99232 SBSQ HOSP IP/OBS MODERATE 35: CPT

## 2020-02-24 RX ORDER — POTASSIUM CHLORIDE 20 MEQ
20 PACKET (EA) ORAL ONCE
Refills: 0 | Status: COMPLETED | OUTPATIENT
Start: 2020-02-24 | End: 2020-02-24

## 2020-02-24 RX ADMIN — HEPARIN SODIUM 5000 UNIT(S): 5000 INJECTION INTRAVENOUS; SUBCUTANEOUS at 22:05

## 2020-02-24 RX ADMIN — NIMODIPINE 60 MILLIGRAM(S): 60 SOLUTION ORAL at 04:37

## 2020-02-24 RX ADMIN — CHLORHEXIDINE GLUCONATE 1 APPLICATION(S): 213 SOLUTION TOPICAL at 11:02

## 2020-02-24 RX ADMIN — ONDANSETRON 4 MILLIGRAM(S): 8 TABLET, FILM COATED ORAL at 12:26

## 2020-02-24 RX ADMIN — HEPARIN SODIUM 5000 UNIT(S): 5000 INJECTION INTRAVENOUS; SUBCUTANEOUS at 10:49

## 2020-02-24 RX ADMIN — Medication 1 TABLET(S): at 13:49

## 2020-02-24 RX ADMIN — Medication 1 TABLET(S): at 06:34

## 2020-02-24 RX ADMIN — SODIUM CHLORIDE 10 MILLILITER(S): 9 INJECTION INTRAMUSCULAR; INTRAVENOUS; SUBCUTANEOUS at 08:00

## 2020-02-24 RX ADMIN — Medication 975 MILLIGRAM(S): at 03:02

## 2020-02-24 RX ADMIN — Medication 20 MILLIEQUIVALENT(S): at 10:49

## 2020-02-24 RX ADMIN — Medication 975 MILLIGRAM(S): at 03:32

## 2020-02-24 NOTE — PROGRESS NOTE ADULT - PROBLEM SELECTOR PLAN 8
ECG done in 2/18 with sinus tachycardia   Neg lower ext doppler   Will recommend to get TSH and repeat ECG when she is tachycardia to ensure similar rhythm  -improving

## 2020-02-24 NOTE — PROGRESS NOTE ADULT - SUBJECTIVE AND OBJECTIVE BOX
[facility] Division of Hospital Medicine  John Cervantes MD  Pager (M-F, 8A-5P): [phone] / [phone]  Other Times:  [phone]    Patient is a 39y old  Female who presents with a chief complaint of SAH, IPH (24 Feb 2020 11:33)    SUBJECTIVE / OVERNIGHT EVENTS: no new complaints. minimal pain no n/v afebrile since Sat night. family at bedside  ADDITIONAL REVIEW OF SYSTEMS:    MEDICATIONS  (STANDING):  chlorhexidine 2% Cloths 1 Application(s) Topical daily  heparin  Injectable 5000 Unit(s) SubCutaneous every 12 hours    MEDICATIONS  (PRN):  acetaminophen   Tablet .. 650 milliGRAM(s) Oral every 6 hours PRN Temp greater or equal to 38C (100.4F), Mild Pain (1 - 3)  albuterol/ipratropium for Nebulization. 3 milliLiter(s) Nebulizer every 6 hours PRN Shortness of Breath and/or Wheezing  artificial tears (preservative free) Ophthalmic Solution 1 Drop(s) Both EYES three times a day PRN Dry Eyes  calcium carbonate    500 mG (Tums) Chewable 1 Tablet(s) Chew every 4 hours PRN Dyspepsia  ondansetron Injectable 4 milliGRAM(s) IV Push every 8 hours PRN Nausea and/or Vomiting  oxyCODONE    IR 2.5 milliGRAM(s) Oral every 4 hours PRN Moderate Pain (4 - 6)  oxyCODONE    IR 5 milliGRAM(s) Oral every 8 hours PRN breakthrough  sodium chloride 0.9% lock flush 10 milliLiter(s) IV Push every 1 hour PRN Pre/post blood products, medications, blood draw, and to maintain line patency      CAPILLARY BLOOD GLUCOSE        I&O's Summary    23 Feb 2020 07:01  -  24 Feb 2020 07:00  --------------------------------------------------------  IN: 886 mL / OUT: 0 mL / NET: 886 mL    24 Feb 2020 07:01  -  24 Feb 2020 11:56  --------------------------------------------------------  IN: 60 mL / OUT: 0 mL / NET: 60 mL        PHYSICAL EXAM:  Vital Signs Last 24 Hrs  T(C): 36.8 (24 Feb 2020 08:00), Max: 37.1 (23 Feb 2020 21:12)  T(F): 98.2 (24 Feb 2020 08:00), Max: 98.7 (23 Feb 2020 21:12)  HR: 82 (24 Feb 2020 08:00) (77 - 99)  BP: 133/89 (24 Feb 2020 08:00) (112/79 - 133/89)  BP(mean): --  RR: 18 (24 Feb 2020 08:00) (18 - 18)  SpO2: 97% (24 Feb 2020 08:00) (97% - 100%)  GENERAL: NAD, well-developed  HEAD:  Atraumatic, Normocephalic  EYES:  conjunctiva and sclera clear  NECK: Supple, No JVD  CHEST/LUNG: Clear to auscultation bilaterally; No wheeze  HEART: Regular rate and rhythm; No murmurs, rubs, or gallops  ABDOMEN: Soft, Nontender, Nondistended; Bowel sounds present  EXTREMITIES:  2+ Peripheral Pulses, No clubbing, cyanosis, or edema  NEUROLOGY:  AAOx3 no lethargy   SKIN: RT PICC site is clean with no tenderness and scalp surgical site is clean with no erythema     LABS:                        10.6   8.32  )-----------( 433      ( 24 Feb 2020 07:16 )             32.3     02-24    135  |  98  |  15  ----------------------------<  90  3.4<L>   |  24  |  0.54    Ca    9.1      24 Feb 2020 07:08    TPro  6.9  /  Alb  3.7  /  TBili  0.3  /  DBili  x   /  AST  29  /  ALT  47<H>  /  AlkPhos  86  02-24      Urinalysis Basic - ( 23 Feb 2020 17:04 )    Color: Light Yellow / Appearance: Clear / SG: >1.050 / pH: x  Gluc: x / Ketone: Negative  / Bili: Negative / Urobili: Negative   Blood: x / Protein: Trace / Nitrite: Negative   Leuk Esterase: Negative / RBC: 1 /hpf / WBC 1 /HPF   Sq Epi: x / Non Sq Epi: 2 /hpf / Bacteria: Negative          RADIOLOGY & ADDITIONAL TESTS:  Results Reviewed:   Imaging Personally Reviewed:  Electrocardiogram Personally Reviewed:    COORDINATION OF CARE:  Care Discussed with Consultants/Other Providers [Y/N]: PAUL barth fever curve  Prior or Outpatient Records Reviewed [Y/N]:

## 2020-02-24 NOTE — PROGRESS NOTE ADULT - SUBJECTIVE AND OBJECTIVE BOX
SUBJECTIVE:   No events overnight. Afebrile  24 hrs   OVERNIGHT EVENTS: none    Vital Signs Last 24 Hrs  T(C): 36.8 (2020 08:00), Max: 37.1 (2020 21:12)  T(F): 98.2 (2020 08:00), Max: 98.7 (2020 21:12)  HR: 82 (2020 08:00) (77 - 99)  BP: 133/89 (2020 08:00) (112/79 - 133/89)  RR: 18 (2020 08:00) (18 - 18)  SpO2: 97% (2020 08:00) (97% - 100%)    PHYSICAL EXAM:      Constitutional: No Acute Distress     Neurological: Awake alert Ox3, Speech clear Following Commands, Moving all Extremities LUE 4+/5 HG 4/5  LLE 4+/5 RUE/ RLE 5/5 R cranial defect full/ soft     Pulmonary: Clear to Auscultation,     Cardiovascular: S1, S2, Regular rate and rhythm     Gastrointestinal: Soft, Non-tender, Non-distended     Extremities: No calf tenderness       LABS:                        10.6   8.32  )-----------( 433      ( 2020 07:16 )             32.3        135  |  98  |  15  ----------------------------<  90  3.4<L>   |  24  |  0.54    Ca    9.1      2020 07:08    TPro  6.9  /  Alb  3.7  /  TBili  0.3  /  DBili  x   /  AST  29  /  ALT  47<H>  /  AlkPhos  86          IMAGIN/23- Ct C/A/P No acute pathology or evidence of an infectious source in the chest, abdomen or pelvis.   CT head- Decreased size and density of right basal ganglia and left shunt tract parenchymal hemorrhages. Edema surrounding the hemorrhages appear similar.  Leftward bowing of the septum pellucidum is similar. Similar mild asymmetric dilatation of the right lateral ventricle    MEDICATIONS:    acetaminophen   Tablet .. 650 milliGRAM(s) Oral every 6 hours PRN Temp greater or equal to 38C (100.4F), Mild Pain (1 - 3)  ondansetron Injectable 4 milliGRAM(s) IV Push every 8 hours PRN Nausea and/or Vomiting  oxyCODONE    IR 2.5 milliGRAM(s) Oral every 4 hours PRN Moderate Pain (4 - 6)  oxyCODONE    IR 5 milliGRAM(s) Oral every 8 hours PRN breakthrough  albuterol/ipratropium for Nebulization. 3 milliLiter(s) Nebulizer every 6 hours PRN Shortness of Breath and/or Wheezing  calcium carbonate    500 mG (Tums) Chewable 1 Tablet(s) Chew every 4 hours PRN Dyspepsia  artificial tears (preservative free) Ophthalmic Solution 1 Drop(s) Both EYES three times a day PRN Dry Eyes  chlorhexidine 2% Cloths 1 Application(s) Topical daily  heparin  Injectable 5000 Unit(s) SubCutaneous every 12 hours  sodium chloride 0.9% lock flush 10 milliLiter(s) IV Push every 1 hour PRN Pre/post blood products, medications, blood draw, and to maintain line patency      DIET:     Assessment:  Please Check When Present   []  GCS  E   V  M     Heart Failure: []Acute, [] acute on chronic , []chronic  Heart Failure:  [] Diastolic (HFpEF), [] Systolic (HFrEF), []Combined (HFpEF and HFrEF), [] RHF, [] Pulm HTN, [] Other    [] GAURAV, [] ATN, [] AIN, [] other  [] CKD1, [] CKD2, [] CKD 3, [] CKD 4, [] CKD 5, []ESRD    Encephalopathy: [] Metabolic, [] Hepatic, [] toxic, [] Neurological, [] Other    Abnormal Nurtitional Status: [] malnurtition (see nutrition note), [ ]underweight: BMI < 19, [] morbid obesity: BMI >40, [] Cachexia    [] Sepsis  [] hypovolemic shock,[] cardiogenic shock, [] hemorrhagic shock, [] neuogenic shock  [] Acute Respiratory Failure  []Cerebral edema, [] Brain compression/ herniation,   [] Functional quadriplegia  [] Acute blood loss anemia

## 2020-02-24 NOTE — PROGRESS NOTE ADULT - PROBLEM SELECTOR PLAN 3
s/p craniectomy with evacuation of ICH and clipping of PComm aneurysms on 2/3  management per neurosurgery.

## 2020-02-24 NOTE — PROGRESS NOTE ADULT - PROBLEM SELECTOR PLAN 1
Tm was 102F on Sat  Blood CX has been negative, CBC no leucocytosis or bands or neutrophilia or eos  No clear source is identified - central fever is suspected but a dx of exclusion  unlikely drug adverse effect  S/P 5 days of Levaquin for E coli UTI low grade fevers   CT of head with no new hemorrhage or collection done on 2/19  BCx ngtd  UE doppler with Superficial thrombophlebitis  CT AP/chest unremarkable  Mild increase in Lipase   CTH neg  Please Hydrate patient while she is spiking fever to cover insensible loss.  monitoring for fever per ID and then d/c to rehab

## 2020-02-24 NOTE — PROGRESS NOTE ADULT - ASSESSMENT
39F no PMHx a/w R temporal parenchymal hemorrhage with diffuse SAH on CTH s/p craniectomy with evacuation of ICH and clipping of Pcomm aneurysm on 2/3. Hospital course c/b fevers with superficial thrombophlebitis treated with IV vancomycin and was treated with  Levaquin for E coli UTI.  Patient remains febrile with no clear source of infection - now resolving

## 2020-02-24 NOTE — PROGRESS NOTE ADULT - ASSESSMENT
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 She arrived intubated and sedated. 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 Post op CT/ CTA with shunt tract heme. CTA with no aneurysm . s/p Cerebral angio 2/5. Good clipping No vasospasm EVD clamped 2/12, Fevers 2/12 Fever work up negative , EVD d/c 2/16. Persistent fevers Work up w E coli urine. Completed  Levaquin.      Plan    Neuro stable . Helmet when OOB. d/c Nimodipine   Vitals stable   ID- Fever work up negative Completed  Levaquin total 5 days E coli UTI. No leucocytosis   Tolerating Dysphagia diet   DVT ppx  PT/OT- a/c TBI rehab . Awaiting placement

## 2020-02-24 NOTE — PROGRESS NOTE ADULT - SUBJECTIVE AND OBJECTIVE BOX
CC: f/u for fever    Patient reports she feels nauseated    REVIEW OF SYSTEMS:  All other review of systems negative (Comprehensive ROS)    Antimicrobials Day #  :    Other Medications Reviewed    T(F): 98.9 (02-24-20 @ 13:00), Max: 98.9 (02-24-20 @ 13:00)  HR: 96 (02-24-20 @ 13:00)  BP: 131/85 (02-24-20 @ 13:00)  RR: 18 (02-24-20 @ 13:00)  SpO2: 100% (02-24-20 @ 13:00)  Wt(kg): --    PHYSICAL EXAM:  General: alert, no acute distress  Eyes:  anicteric, no conjunctival injection, no discharge  Oropharynx: no lesions or injection 	  Neck: supple, without adenopathy  Lungs: clear to auscultation  Heart: regular rate and rhythm; no murmur, rubs or gallops  Abdomen: soft, nondistended, nontender, without mass or organomegaly  Skin: no lesions  Extremities: no clubbing, cyanosis, or edema  Neurologic: alert, oriented, moves all extremities  head wound intact, some bulging   LAB RESULTS:                        10.6   8.32  )-----------( 433      ( 24 Feb 2020 07:16 )             32.3     02-24    135  |  98  |  15  ----------------------------<  90  3.4<L>   |  24  |  0.54    Ca    9.1      24 Feb 2020 07:08    TPro  6.9  /  Alb  3.7  /  TBili  0.3  /  DBili  x   /  AST  29  /  ALT  47<H>  /  AlkPhos  86  02-24    LIVER FUNCTIONS - ( 24 Feb 2020 07:08 )  Alb: 3.7 g/dL / Pro: 6.9 g/dL / ALK PHOS: 86 U/L / ALT: 47 U/L / AST: 29 U/L / GGT: x           Urinalysis Basic - ( 23 Feb 2020 17:04 )    Color: Light Yellow / Appearance: Clear / SG: >1.050 / pH: x  Gluc: x / Ketone: Negative  / Bili: Negative / Urobili: Negative   Blood: x / Protein: Trace / Nitrite: Negative   Leuk Esterase: Negative / RBC: 1 /hpf / WBC 1 /HPF   Sq Epi: x / Non Sq Epi: 2 /hpf / Bacteria: Negative      MICROBIOLOGY:  RECENT CULTURES:  02-23 @ 11:21 .Blood Blood     No growth to date.          RADIOLOGY REVIEWED:  < from: CT Chest w/ IV Cont (02.23.20 @ 16:02) >  EXAM:  CT ABDOMEN AND PELVIS IC                          EXAM:  CT CHEST IC                            PROCEDURE DATE:  02/23/2020            INTERPRETATION:  CLINICAL INFORMATION: Pneumonia, fever, evaluate for abscess.    COMPARISON: None.    PROCEDURE:   CT of the Chest, Abdomen and Pelvis was performed with intravenous contrast.   Intravenous contrast: 90 ml Omnipaque 350. 10 ml discarded.  Oral contrast: None.  Sagittal and coronal reformats were performed.    FINDINGS:    CHEST:     LUNGSAND LARGE AIRWAYS: Patent central airways. No pulmonary nodules.  PLEURA: No pleural effusion.  VESSELS: Right-sided central line with tip at the cavoatrial junction.  HEART: Heart size is normal. No pericardial effusion.  MEDIASTINUM AND STEVIE: No lymphadenopathy.  CHEST WALL AND LOWER NECK: Within normal limits.    ABDOMEN AND PELVIS:    LIVER: Numerous hepatic cysts measuring up to 1.3 cm as well as hypodense foci too small to characterize  BILE DUCTS: Normal caliber.  GALLBLADDER: Within normal limits.  SPLEEN: Within normal limits.  PANCREAS: Within normal limits.  ADRENALS: Within normal limits.  KIDNEYS/URETERS: Within normal limits.    BLADDER: Within normal limits.  REPRODUCTIVE ORGANS: Uterus and adnexa within normal limits.    BOWEL: No bowel obstruction. Appendix is normal.  PERITONEUM: No ascites.  VESSELS: Within normal limits.  RETROPERITONEUM/LYMPH NODES: No lymphadenopathy.    ABDOMINAL WALL: Within normal limits.  BONES: Within normal limits.    IMPRESSION:     No acute pathology or evidence of an infectious source in the chest, abdomen or pelvis.      < from: CT Head No Cont (02.23.20 @ 15:50) >  EXAM:  CT BRAIN                            PROCEDURE DATE:  02/23/2020            INTERPRETATION:  Noncontrast CT of the brain.    CLINICAL INDICATION:  increased HA, s/p crani ICH evac/aneurysm clip    TECHNIQUE : Axial CT scanning of the brain was obtained from the skull base to the vertex without the administration of intravenous contrast.      COMPARISON: CT brain 2/19/2020    FINDINGS:      Redemonstration of right hemicraniectomy. Brain parenchyma still protrudes through the craniectomy defect. Septal malacia and gliosis involving the right anterior temporal lobe is redemonstrated.    Decreased size and density of right basal ganglia and left shunt tract parenchymal hemorrhages. Edema surrounding the hemorrhages appear similar.    Leftward bowing of the septum pellucidum is similar. Similar mild asymmetric dilatation of the right lateral ventricle.    IMPRESSION:    Decreased size and density of right basal ganglia and left shunt tract parenchymal hemorrhages. Edema surrounding the hemorrhages appear similar.    Leftward bowing of the septum pellucidum is similar. Similar mild asymmetric dilatation of the right lateral ventricle.      < end of copied text >    < end of copied text >              Assessment:  4 yo female with no significant PMH s/p emergency craniectomy and clipping of Rt PCOM aneurysm 2/3 after acute rupture.  Fever may have  been central as she is non toxic, has a normal wbc and benign exam, she c/o some mild dysuria and nausea today but has been afebrile since 2/22 and ua has no pyuria.   She could have intermittent fever secondary to retained ICH.  She reports hives to N, will try to avoid beta lactams.  EVD removed 2/16, fever earlier in hospital course with negative blood cultures.(Last blood cultures sent 2/16)  No evidence that temps are responding to levaquin.I have asked RN to check a bladder scan to make sure she is not in retention.  Levaquin completed 2/20  LE dopplers are negative  UE dopplers with superficial phlebitis although exam not very impressive  Normal wbc speaks against infectious fever.  Head CT 2/20 without change, she is on very few meds, no evidence of drug fever  ct chest abd and pelvis unrevealing for infection.  Plan:  monitor off antibiotics  monitor liver enzymes

## 2020-02-25 PROCEDURE — 99232 SBSQ HOSP IP/OBS MODERATE 35: CPT

## 2020-02-25 RX ORDER — NYSTATIN CREAM 100000 [USP'U]/G
1 CREAM TOPICAL THREE TIMES A DAY
Refills: 0 | Status: DISCONTINUED | OUTPATIENT
Start: 2020-02-25 | End: 2020-02-28

## 2020-02-25 RX ADMIN — Medication 650 MILLIGRAM(S): at 17:39

## 2020-02-25 RX ADMIN — HEPARIN SODIUM 5000 UNIT(S): 5000 INJECTION INTRAVENOUS; SUBCUTANEOUS at 06:54

## 2020-02-25 RX ADMIN — Medication 650 MILLIGRAM(S): at 06:53

## 2020-02-25 RX ADMIN — Medication 650 MILLIGRAM(S): at 07:38

## 2020-02-25 RX ADMIN — HEPARIN SODIUM 5000 UNIT(S): 5000 INJECTION INTRAVENOUS; SUBCUTANEOUS at 20:54

## 2020-02-25 NOTE — CHART NOTE - NSCHARTNOTEFT_GEN_A_CORE
Nutrition Follow Up Note  Patient seen for: follow up assessment    Source: comprehensive chart review, patient and father    Diet: dysphagia 1 diet with nectar thick liquids via teaspoon only. +ensure pudding x2    Pt is a 38 yo F who was found unresponsive on an airplane; taken to OSH where she was found to have a subarachnoid hemorrhage and subsequently transferred to [facility]. Arrived intubated and sedated. 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 Extubated 2/5. Failed swallow evaluation 2/10 and started on enteral feeds of Jevity 1.2 @70mL/hr x24hr on 2/11.    Interim events: EVD discontinued 2/16. Pt continues with intermittent fevers, ID following. Transferred to the floor 2/19. S/p MBS 2/20 recommending dysphagia 1 diet with nectar thick liquids via teaspoon only- full assist with meals. Ensure pudding ordered twice daily. +BM 2/24. Cranioplasty tentatively scheduled for 3/19/20.     Patient reports: Feeling ok today. Reports good appetite and intake, happy to be eating food again. Father at bedside confirms good PO intake, pt consistently eating 100% of meals in addition to supplements. Encouraged continued good PO intake. Pt and family made aware RD remains available as needed.     PO intake: good, 100%     Source for PO intake: patient and father at bedside     Enteral /Parenteral Nutrition: N/A      Daily   % Weight Change- no new weight since 2/12, stable.    Pertinent Medications: MEDICATIONS  (STANDING):  chlorhexidine 2% Cloths 1 Application(s) Topical daily  heparin  Injectable 5000 Unit(s) SubCutaneous every 12 hours    MEDICATIONS  (PRN):  acetaminophen   Tablet .. 650 milliGRAM(s) Oral every 6 hours PRN Temp greater or equal to 38C (100.4F), Mild Pain (1 - 3)  albuterol/ipratropium for Nebulization. 3 milliLiter(s) Nebulizer every 6 hours PRN Shortness of Breath and/or Wheezing  artificial tears (preservative free) Ophthalmic Solution 1 Drop(s) Both EYES three times a day PRN Dry Eyes  calcium carbonate    500 mG (Tums) Chewable 1 Tablet(s) Chew every 4 hours PRN Dyspepsia  nystatin Powder 1 Application(s) Topical three times a day PRN incontinence care  ondansetron Injectable 4 milliGRAM(s) IV Push every 8 hours PRN Nausea and/or Vomiting  oxyCODONE    IR 2.5 milliGRAM(s) Oral every 4 hours PRN Moderate Pain (4 - 6)  oxyCODONE    IR 5 milliGRAM(s) Oral every 8 hours PRN breakthrough  sodium chloride 0.9% lock flush 10 milliLiter(s) IV Push every 1 hour PRN Pre/post blood products, medications, blood draw, and to maintain line patency        Skin per nursing documentation: no pressure injuries  Edema: none    Estimated Needs:   [x] no change since previous assessment    Previous Nutrition Diagnosis: increased nutrient needs  Nutrition Diagnosis is: care plan in progress, pt with good oral intake of meals and supplements    New Nutrition Diagnosis: none    Recommend  1) Continue diet as ordered  2) Ensure pudding twice daily  3) Advance diet as feasible per SLP recommendations  4) Reweigh pt when able to assess changes throughout admission      Continue to monitor Nutritional intake, Tolerance to diet prescription, weights, labs, skin integrity    RD remains available upon request and will follow up per protocol    Vanesa Boone MS, RD, CDN  Pager [phone]

## 2020-02-25 NOTE — PROGRESS NOTE ADULT - PROBLEM SELECTOR PLAN 1
Tm was 102F on Sat  Blood CX has been negative, CBC no leucocytosis or bands or neutrophilia or eos  No clear source is identified - central fever is suspected but a dx of exclusion  unlikely drug adverse effect  S/P 5 days of Levaquin for E coli UTI low grade fevers   CT of head with no new hemorrhage or collection done on 2/19  BCx ngtd  UE doppler with Superficial thrombophlebitis  CT AP/chest unremarkable  Mild increase in Lipase   CTH neg  monitoring for fever per ID and then d/c to rehab likely tomorrow if no fever  can PICC be removed?

## 2020-02-25 NOTE — PROGRESS NOTE ADULT - SUBJECTIVE AND OBJECTIVE BOX
[facility] Division of Hospital Medicine  John Cervantes MD  Pager (M-F, 8A-5P): [phone]  Other Times:  [phone]    Patient is a 39y old  Female who presents with a chief complaint of SAH, IPH (25 Feb 2020 14:18)    SUBJECTIVE / OVERNIGHT EVENTS: no complaints, using bathroom, eating. family at bedside.   ADDITIONAL REVIEW OF SYSTEMS:    MEDICATIONS  (STANDING):  chlorhexidine 2% Cloths 1 Application(s) Topical daily  heparin  Injectable 5000 Unit(s) SubCutaneous every 12 hours    MEDICATIONS  (PRN):  acetaminophen   Tablet .. 650 milliGRAM(s) Oral every 6 hours PRN Temp greater or equal to 38C (100.4F), Mild Pain (1 - 3)  albuterol/ipratropium for Nebulization. 3 milliLiter(s) Nebulizer every 6 hours PRN Shortness of Breath and/or Wheezing  artificial tears (preservative free) Ophthalmic Solution 1 Drop(s) Both EYES three times a day PRN Dry Eyes  calcium carbonate    500 mG (Tums) Chewable 1 Tablet(s) Chew every 4 hours PRN Dyspepsia  nystatin Powder 1 Application(s) Topical three times a day PRN incontinence care  sodium chloride 0.9% lock flush 10 milliLiter(s) IV Push every 1 hour PRN Pre/post blood products, medications, blood draw, and to maintain line patency      CAPILLARY BLOOD GLUCOSE        I&O's Summary    24 Feb 2020 07:01  -  25 Feb 2020 07:00  --------------------------------------------------------  IN: 580 mL / OUT: 0 mL / NET: 580 mL        PHYSICAL EXAM:  Vital Signs Last 24 Hrs  T(C): 37.4 (25 Feb 2020 16:00), Max: 37.4 (25 Feb 2020 16:00)  T(F): 99.3 (25 Feb 2020 16:00), Max: 99.3 (25 Feb 2020 16:00)  HR: 98 (25 Feb 2020 16:00) (76 - 98)  BP: 134/88 (25 Feb 2020 16:00) (127/73 - 136/87)  BP(mean): --  RR: 18 (25 Feb 2020 16:00) (18 - 19)  SpO2: 100% (25 Feb 2020 16:00) (98% - 100%)  GENERAL: NAD, well-developed  HEAD:  Atraumatic, Normocephalic  EYES:  conjunctiva and sclera clear  NECK: Supple, No JVD  CHEST/LUNG: Clear to auscultation bilaterally; No wheeze  HEART: Regular rate and rhythm; No murmurs, rubs, or gallops  ABDOMEN: Soft, Nontender, Nondistended; Bowel sounds present  EXTREMITIES:  2+ Peripheral Pulses, No clubbing, cyanosis, or edema  NEUROLOGY:  AAOx3 no lethargy   SKIN: RT PICC site is clean with no tenderness and scalp surgical site is clean with no erythema     LABS:                        10.6   8.32  )-----------( 433      ( 24 Feb 2020 07:16 )             32.3     02-24    135  |  98  |  15  ----------------------------<  90  3.4<L>   |  24  |  0.54    Ca    9.1      24 Feb 2020 07:08    TPro  6.9  /  Alb  3.7  /  TBili  0.3  /  DBili  x   /  AST  29  /  ALT  47<H>  /  AlkPhos  86  02-24    Culture - Blood (collected 23 Feb 2020 11:21)  Source: .Blood Blood  Preliminary Report (24 Feb 2020 12:01):    No growth to date.    Culture - Blood (collected 23 Feb 2020 11:21)  Source: .Blood Blood  Preliminary Report (24 Feb 2020 12:01):    No growth to date.        RADIOLOGY & ADDITIONAL TESTS:  Results Reviewed:   Imaging Personally Reviewed:  Electrocardiogram Personally Reviewed:    COORDINATION OF CARE:  Care Discussed with Consultants/Other Providers [Y/N]:  Prior or Outpatient Records Reviewed [Y/N]: [facility] Division of Hospital Medicine  John Cervantes MD  Pager (M-F, 8A-5P): [phone]  Other Times:  [phone]    Patient is a 39y old  Female who presents with a chief complaint of SAH, IPH (25 Feb 2020 14:18)    SUBJECTIVE / OVERNIGHT EVENTS: no complaints, using bathroom, eating. family at bedside.   ADDITIONAL REVIEW OF SYSTEMS:    MEDICATIONS  (STANDING):  chlorhexidine 2% Cloths 1 Application(s) Topical daily  heparin  Injectable 5000 Unit(s) SubCutaneous every 12 hours    MEDICATIONS  (PRN):  acetaminophen   Tablet .. 650 milliGRAM(s) Oral every 6 hours PRN Temp greater or equal to 38C (100.4F), Mild Pain (1 - 3)  albuterol/ipratropium for Nebulization. 3 milliLiter(s) Nebulizer every 6 hours PRN Shortness of Breath and/or Wheezing  artificial tears (preservative free) Ophthalmic Solution 1 Drop(s) Both EYES three times a day PRN Dry Eyes  calcium carbonate    500 mG (Tums) Chewable 1 Tablet(s) Chew every 4 hours PRN Dyspepsia  nystatin Powder 1 Application(s) Topical three times a day PRN incontinence care  sodium chloride 0.9% lock flush 10 milliLiter(s) IV Push every 1 hour PRN Pre/post blood products, medications, blood draw, and to maintain line patency      CAPILLARY BLOOD GLUCOSE        I&O's Summary    24 Feb 2020 07:01  -  25 Feb 2020 07:00  --------------------------------------------------------  IN: 580 mL / OUT: 0 mL / NET: 580 mL        PHYSICAL EXAM:  Vital Signs Last 24 Hrs  T(C): 37.4 (25 Feb 2020 16:00), Max: 37.4 (25 Feb 2020 16:00)  T(F): 99.3 (25 Feb 2020 16:00), Max: 99.3 (25 Feb 2020 16:00)  HR: 98 (25 Feb 2020 16:00) (76 - 98)  BP: 134/88 (25 Feb 2020 16:00) (127/73 - 136/87)  BP(mean): --  RR: 18 (25 Feb 2020 16:00) (18 - 19)  SpO2: 100% (25 Feb 2020 16:00) (98% - 100%)  GENERAL: NAD, well-developed  HEAD: craniectomy defect  EYES:  conjunctiva and sclera clear  NECK: Supple, No JVD  CHEST/LUNG: Clear to auscultation bilaterally; No wheeze  HEART: Regular rate and rhythm; No murmurs, rubs, or gallops  ABDOMEN: Soft, Nontender, Nondistended; Bowel sounds present  EXTREMITIES:  2+ Peripheral Pulses, No clubbing, cyanosis, or edema  NEUROLOGY:  AAOx3 no lethargy   SKIN: RT PICC site is clean with no tenderness and scalp surgical site is clean with no erythema     LABS:                        10.6   8.32  )-----------( 433      ( 24 Feb 2020 07:16 )             32.3     02-24    135  |  98  |  15  ----------------------------<  90  3.4<L>   |  24  |  0.54    Ca    9.1      24 Feb 2020 07:08    TPro  6.9  /  Alb  3.7  /  TBili  0.3  /  DBili  x   /  AST  29  /  ALT  47<H>  /  AlkPhos  86  02-24    Culture - Blood (collected 23 Feb 2020 11:21)  Source: .Blood Blood  Preliminary Report (24 Feb 2020 12:01):    No growth to date.    Culture - Blood (collected 23 Feb 2020 11:21)  Source: .Blood Blood  Preliminary Report (24 Feb 2020 12:01):    No growth to date.        RADIOLOGY & ADDITIONAL TESTS:  Results Reviewed:   Imaging Personally Reviewed:  Electrocardiogram Personally Reviewed:    COORDINATION OF CARE:  Care Discussed with Consultants/Other Providers [Y/N]:  Prior or Outpatient Records Reviewed [Y/N]:

## 2020-02-25 NOTE — PROGRESS NOTE ADULT - ASSESSMENT
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 She arrived intubated and sedated. 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 Post op CT/ CTA with shunt tract heme. CTA with no aneurysm . s/p Cerebral angio 2/5. Good clipping No vasospasm EVD clamped 2/12, Fevers 2/12 Fever work up negative , EVD d/c 2/16. Persistent fevers Work up w E coli urine. Completed  Levaquin.      Plan    Neuro stable . Helmet when OOB.   Vitals stable   ID- Fever work up negative Completed  Levaquin total 5 days E coli UTI. No leucocytosis   Tolerating Dysphagia diet   DVT ppx  PT/OT- a/c TBI rehab . Awaiting bed at [place] rehab

## 2020-02-25 NOTE — PROGRESS NOTE ADULT - PROBLEM SELECTOR PLAN 7
Transitions of Care Status:  1.  Name of PCP: Dr Walker [id number]  2.  PCP Contacted on Admission: [ ] Y    [x ] N    3.  PCP contacted at Discharge: [ ] Y    [ ] N    [ ] N/A  4.  Post-Discharge Appointment Date and Location:  5.  Summary of Handoff given to PCP:
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
## 2020-02-26 DIAGNOSIS — E87.1 HYPO-OSMOLALITY AND HYPONATREMIA: ICD-10-CM

## 2020-02-26 LAB
ANION GAP SERPL CALC-SCNC: 12 MMOL/L — SIGNIFICANT CHANGE UP (ref 5–17)
BUN SERPL-MCNC: 14 MG/DL — SIGNIFICANT CHANGE UP (ref 7–23)
CALCIUM SERPL-MCNC: 9.1 MG/DL — SIGNIFICANT CHANGE UP (ref 8.4–10.5)
CHLORIDE SERPL-SCNC: 94 MMOL/L — LOW (ref 96–108)
CO2 SERPL-SCNC: 25 MMOL/L — SIGNIFICANT CHANGE UP (ref 22–31)
CREAT SERPL-MCNC: 0.49 MG/DL — LOW (ref 0.5–1.3)
GLUCOSE SERPL-MCNC: 106 MG/DL — HIGH (ref 70–99)
HCT VFR BLD CALC: 33.3 % — LOW (ref 34.5–45)
HGB BLD-MCNC: 10.9 G/DL — LOW (ref 11.5–15.5)
MCHC RBC-ENTMCNC: 29.5 PG — SIGNIFICANT CHANGE UP (ref 27–34)
MCHC RBC-ENTMCNC: 32.7 GM/DL — SIGNIFICANT CHANGE UP (ref 32–36)
MCV RBC AUTO: 90 FL — SIGNIFICANT CHANGE UP (ref 80–100)
NRBC # BLD: 0 /100 WBCS — SIGNIFICANT CHANGE UP (ref 0–0)
PLATELET # BLD AUTO: 359 K/UL — SIGNIFICANT CHANGE UP (ref 150–400)
POTASSIUM SERPL-MCNC: 3.9 MMOL/L — SIGNIFICANT CHANGE UP (ref 3.5–5.3)
POTASSIUM SERPL-SCNC: 3.9 MMOL/L — SIGNIFICANT CHANGE UP (ref 3.5–5.3)
RBC # BLD: 3.7 M/UL — LOW (ref 3.8–5.2)
RBC # FLD: 13.2 % — SIGNIFICANT CHANGE UP (ref 10.3–14.5)
SODIUM SERPL-SCNC: 131 MMOL/L — LOW (ref 135–145)
SODIUM UR-SCNC: <35 MMOL/L — SIGNIFICANT CHANGE UP
WBC # BLD: 6.56 K/UL — SIGNIFICANT CHANGE UP (ref 3.8–10.5)
WBC # FLD AUTO: 6.56 K/UL — SIGNIFICANT CHANGE UP (ref 3.8–10.5)

## 2020-02-26 PROCEDURE — 70450 CT HEAD/BRAIN W/O DYE: CPT | Mod: 26

## 2020-02-26 PROCEDURE — 99233 SBSQ HOSP IP/OBS HIGH 50: CPT

## 2020-02-26 RX ORDER — ONDANSETRON 8 MG/1
4 TABLET, FILM COATED ORAL ONCE
Refills: 0 | Status: DISCONTINUED | OUTPATIENT
Start: 2020-02-26 | End: 2020-02-28

## 2020-02-26 RX ADMIN — Medication 650 MILLIGRAM(S): at 20:32

## 2020-02-26 RX ADMIN — Medication 1 TABLET(S): at 10:21

## 2020-02-26 RX ADMIN — HEPARIN SODIUM 5000 UNIT(S): 5000 INJECTION INTRAVENOUS; SUBCUTANEOUS at 20:38

## 2020-02-26 RX ADMIN — HEPARIN SODIUM 5000 UNIT(S): 5000 INJECTION INTRAVENOUS; SUBCUTANEOUS at 10:21

## 2020-02-26 RX ADMIN — CHLORHEXIDINE GLUCONATE 1 APPLICATION(S): 213 SOLUTION TOPICAL at 12:18

## 2020-02-26 NOTE — PROVIDER CONTACT NOTE (OTHER) - ASSESSMENT
pt with c/o sudden onset of nausea . Pt neurologically stable unchanged pt denies HA  denies vomiting .  Vital signs stabke posted on flowsheet family at bedside .

## 2020-02-26 NOTE — PROGRESS NOTE ADULT - PROBLEM SELECTOR PLAN 9
Transitions of Care Status:  1.  Name of PCP: Dr Walker [id number]  2.  PCP Contacted on Admission: [ ] Y    [x ] N    3.  PCP contacted at Discharge: [ ] Y    [ ] N    [ ] N/A  4.  Post-Discharge Appointment Date and Location:  5.  Summary of Handoff given to PCP:

## 2020-02-26 NOTE — PROGRESS NOTE ADULT - ASSESSMENT
34 yo female with no significant PMH   S/p emergency craniectomy and clipping of Rt PCOM aneurysm 2/3 after acute rupture.  Post op fevers were likely central since she remained non toxic, with a normal wbc and benign exam  She could have intermittent fever secondary to retained ICH.  EVD removed 2/16  Levaquin completed 2/20  LE dopplers are negative  UE dopplers with superficial phlebitis although exam not very impressive  Head CT 2/20 without change  Fevers appear to have resolved now - last fever was on 2/22/20  UA negative  CT chest abd and pelvis unrevealing for infection.  Blood cx from 2/23 without growth to date    Plan:  See no indication for additional antibiotics  Awaiting repeat head CT today  Should remove PICC at the time of discharge

## 2020-02-26 NOTE — PROGRESS NOTE ADULT - PROBLEM SELECTOR PLAN 1
no apparent injury - mechanical fall mechanism described  given absence of helmet - reasonable to repeat CTH as per nsgy  advised to wear helmet at all times when OOB

## 2020-02-26 NOTE — PROGRESS NOTE ADULT - SUBJECTIVE AND OBJECTIVE BOX
CC: f/u for fever    Patient reports feeling good except that everyone is bothering her      REVIEW OF SYSTEMS:  All other review of systems negative (Comprehensive ROS)    Antimicrobials Day #  :    Other Medications Reviewed    T(F): 98.6 (02-26-20 @ 08:00), Max: 99.3 (02-25-20 @ 16:00)  HR: 95 (02-26-20 @ 08:00)  BP: 141/81 (02-26-20 @ 08:00)  RR: 18 (02-26-20 @ 08:00)  SpO2: 100% (02-26-20 @ 08:00)  Wt(kg): --    PHYSICAL EXAM:  General: alert, no acute distress  Eyes:  anicteric, no conjunctival injection, no discharge  Oropharynx: no lesions or injection 	  Neck: supple, without adenopathy  Lungs: clear to auscultation  Heart: regular rate and rhythm; no murmurs  Abdomen: soft, nondistended, nontender,   Extremities: no clubbing, cyanosis, or edema  Neurologic: alert, oriented, moves all extremities  head wound intact      LAB RESULTS:                        10.9   6.56  )-----------( 359      ( 26 Feb 2020 07:03 )             33.3     02-26    131<L>  |  94<L>  |  14  ----------------------------<  106<H>  3.9   |  25  |  0.49<L>    Ca    9.1      26 Feb 2020 06:58        MICROBIOLOGY:  RECENT CULTURES:  02-23 @ 11:21 .Blood Blood     No growth to date.        RADIOLOGY REVIEWED:

## 2020-02-26 NOTE — CHART NOTE - NSCHARTNOTEFT_GEN_A_CORE
Notified Notified that patient fell while attempting to go the bathroom on her own without her helmet on. Patient seen and evaluated at bedside - OX3, PERRL, EOMI, FC, rt side 5/5, LUE biceps/triceps 4+/5, HG 4+/5, RLE 5/5, LLE 4+/5, she states that she did not her head however her Dad in the room is unsure. Patient denies HA, CP, dizziness, N/V during exam and her vitals were taken during the episode were stable. Her cranial defect appears soft, stable. Patient is to get a CT Brain s/p fall. Notified Neurosurgery residents regarding plan. Spoke to father at bedside.

## 2020-02-26 NOTE — PROGRESS NOTE ADULT - SUBJECTIVE AND OBJECTIVE BOX
[facility] Division of Hospital Medicine  John Cervantes MD  Pager (M-F, 8A-5P): [phone]  Other Times:  [phone]    Patient is a 39y old  Female who presents with a chief complaint of SAH, IPH (26 Feb 2020 11:12)    SUBJECTIVE / OVERNIGHT EVENTS: pt fell this AM - was witnessed by father, fell onto rear, unsure but unlikely hit head. Was not wearing helmet. Pt recounted event was able to remember. Mild R ankle pain  ADDITIONAL REVIEW OF SYSTEMS: pt was c/o nausea prior to event    MEDICATIONS  (STANDING):  chlorhexidine 2% Cloths 1 Application(s) Topical daily  heparin  Injectable 5000 Unit(s) SubCutaneous every 12 hours  ondansetron Injectable 4 milliGRAM(s) IV Push once    MEDICATIONS  (PRN):  acetaminophen   Tablet .. 650 milliGRAM(s) Oral every 6 hours PRN Temp greater or equal to 38C (100.4F), Mild Pain (1 - 3)  albuterol/ipratropium for Nebulization. 3 milliLiter(s) Nebulizer every 6 hours PRN Shortness of Breath and/or Wheezing  artificial tears (preservative free) Ophthalmic Solution 1 Drop(s) Both EYES three times a day PRN Dry Eyes  calcium carbonate    500 mG (Tums) Chewable 1 Tablet(s) Chew every 4 hours PRN Dyspepsia  nystatin Powder 1 Application(s) Topical three times a day PRN incontinence care  sodium chloride 0.9% lock flush 10 milliLiter(s) IV Push every 1 hour PRN Pre/post blood products, medications, blood draw, and to maintain line patency      CAPILLARY BLOOD GLUCOSE        I&O's Summary    26 Feb 2020 07:01  -  26 Feb 2020 11:59  --------------------------------------------------------  IN: 240 mL / OUT: 0 mL / NET: 240 mL        PHYSICAL EXAM:  Vital Signs Last 24 Hrs  T(C): 37 (26 Feb 2020 08:00), Max: 37.4 (25 Feb 2020 16:00)  T(F): 98.6 (26 Feb 2020 08:00), Max: 99.3 (25 Feb 2020 16:00)  HR: 95 (26 Feb 2020 08:00) (76 - 98)  BP: 141/81 (26 Feb 2020 08:00) (117/78 - 142/85)  BP(mean): --  RR: 18 (26 Feb 2020 08:00) (15 - 18)  SpO2: 100% (26 Feb 2020 08:00) (98% - 100%)  GENERAL: NAD, well-developed  HEAD: s/p R craniectomy w/o sig swelling, incision clean  EYES:  conjunctiva and sclera clear  NECK: Supple, No JVD  CHEST/LUNG: Clear to auscultation bilaterally; No wheeze  HEART: Regular rate and rhythm; No murmurs, rubs, or gallops  ABDOMEN: Soft, Nontender, Nondistended; Bowel sounds present  EXTREMITIES:  2+ Peripheral Pulses, No clubbing, cyanosis, or edema  NEUROLOGY:  AAOx3 no lethargy   SKIN: RT PICC site is clean with no tenderness and scalp surgical site is clean with no erythema    LABS:                        10.9   6.56  )-----------( 359      ( 26 Feb 2020 07:03 )             33.3     02-26    131<L>  |  94<L>  |  14  ----------------------------<  106<H>  3.9   |  25  |  0.49<L>    Ca    9.1      26 Feb 2020 06:58    isaac pending    RADIOLOGY & ADDITIONAL TESTS:  Results Reviewed:   Imaging Personally Reviewed: CTH ordered  Electrocardiogram Personally Reviewed:    COORDINATION OF CARE:  Care Discussed with Consultants/Other Providers [Y/N]: PAUL weston  Prior or Outpatient Records Reviewed [Y/N]:

## 2020-02-26 NOTE — PROGRESS NOTE ADULT - PROBLEM SELECTOR PLAN 3
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

## 2020-02-26 NOTE — PROGRESS NOTE ADULT - ASSESSMENT
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 She arrived intubated and sedated. 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 Post op CT/ CTA with shunt tract heme. CTA with no aneurysm . s/p Cerebral angio 2/5. Good clipping No vasospasm EVD clamped 2/12, Fevers 2/12 Fever work up negative , EVD d/c 2/16. Persistent fevers Work up w E coli urine. Completed  Levaquin.      PLAN:   -Neuro: CT head pending. Neurosurgery team aware of fall. Endorsed to patient/family importance of helmet when OOB.  -Hyponatremia (131). Spoke to hospitalist and will FWR 1.2L and repeat bmp in am. Urine sodium pending  -Patient remains afebrile.   -Continue dysphagia diet   -DVT ppx: venodynes and sqh   -PT/OT: acute TBI when stable      Will discuss above with Dr. Bruno Suarez #[id number]     Assessment:  Please Check When Present   []  GCS  E   V  M     Heart Failure: []Acute, [] acute on chronic , []chronic  Heart Failure:  [] Diastolic (HFpEF), [] Systolic (HFrEF), []Combined (HFpEF and HFrEF), [] RHF, [] Pulm HTN, [] Other    [] GAURAV, [] ATN, [] AIN, [] other  [] CKD1, [] CKD2, [] CKD 3, [] CKD 4, [] CKD 5, []ESRD    Encephalopathy: [] Metabolic, [] Hepatic, [] toxic, [] Neurological, [] Other    Abnormal Nurtitional Status: [] malnurtition (see nutrition note), [ ]underweight: BMI < 19, [] morbid obesity: BMI >40, [] Cachexia    [] Sepsis  [] hypovolemic shock,[] cardiogenic shock, [] hemorrhagic shock, [] neuogenic shock  [] Acute Respiratory Failure  []Cerebral edema, [] Brain compression/ herniation,   [] Functional quadriplegia  [] Acute blood loss anemia

## 2020-02-26 NOTE — PROGRESS NOTE ADULT - SUBJECTIVE AND OBJECTIVE BOX
SUBJECTIVE: Patient seen and examined at bedside with dad. She is s/p fall while attempting to go to bathroom without helmet. Denies hitting her head and remains at neuro baseline. Her cranial defect appears soft, stable. Neurosurgery team notified and CT scan pending.   Denies any headache at this time however does endorse some nausea but no vomiting.     OVERNIGHT EVENTS: none    Vital Signs Last 24 Hrs  T(C): 37 (26 Feb 2020 08:00), Max: 37.4 (25 Feb 2020 16:00)  T(F): 98.6 (26 Feb 2020 08:00), Max: 99.3 (25 Feb 2020 16:00)  HR: 95 (26 Feb 2020 08:00) (76 - 98)  BP: 141/81 (26 Feb 2020 08:00) (117/78 - 142/85)  BP(mean): --  RR: 18 (26 Feb 2020 08:00) (15 - 18)  SpO2: 100% (26 Feb 2020 08:00) (98% - 100%)    PHYSICAL EXAM:    Constitutional: No Acute Distress     Neurological: Awake alert Ox3, Speech clear Following Commands, Moving all Extremities LUE 4+/5 HG 4/5  LLE 4+/5 RUE/ RLE 5/5 R cranial defect slightly sunken     Pulmonary: Clear to Auscultation,     Cardiovascular: S1, S2, Regular rate and rhythm     Gastrointestinal: Soft, Non-tender, Non-distended     Extremities: No calf tenderness       LABS:                                              10.9   6.56  )-----------( 359      ( 26 Feb 2020 07:03 )             33.3   02-26    131<L>  |  94<L>  |  14  ----------------------------<  106<H>  3.9   |  25  |  0.49<L>    Ca    9.1      26 Feb 2020 06:58      DIET: Dysphagia 1 diet; 1.2 L FWR       IMAGING: CT head pending

## 2020-02-26 NOTE — PROGRESS NOTE ADULT - ASSESSMENT
39F no PMHx a/w R temporal parenchymal hemorrhage with diffuse SAH on CTH s/p craniectomy with evacuation of ICH and clipping of Pcomm aneurysm on 2/3. Hospital course c/b fevers with superficial thrombophlebitis treated with IV vancomycin and was treated with  Levaquin for E coli UTI.  Patient remains febrile with no clear source of infection - now resolving. Fall this morning while not wearing helmet, did not appear to hit head but unsure, no focal changes on exam

## 2020-02-27 LAB
ANION GAP SERPL CALC-SCNC: 12 MMOL/L — SIGNIFICANT CHANGE UP (ref 5–17)
BUN SERPL-MCNC: 14 MG/DL — SIGNIFICANT CHANGE UP (ref 7–23)
CALCIUM SERPL-MCNC: 9.1 MG/DL — SIGNIFICANT CHANGE UP (ref 8.4–10.5)
CHLORIDE SERPL-SCNC: 96 MMOL/L — SIGNIFICANT CHANGE UP (ref 96–108)
CO2 SERPL-SCNC: 27 MMOL/L — SIGNIFICANT CHANGE UP (ref 22–31)
CREAT SERPL-MCNC: 0.49 MG/DL — LOW (ref 0.5–1.3)
GLUCOSE SERPL-MCNC: 117 MG/DL — HIGH (ref 70–99)
HCT VFR BLD CALC: 34.2 % — LOW (ref 34.5–45)
HGB BLD-MCNC: 11.1 G/DL — LOW (ref 11.5–15.5)
MCHC RBC-ENTMCNC: 29.8 PG — SIGNIFICANT CHANGE UP (ref 27–34)
MCHC RBC-ENTMCNC: 32.5 GM/DL — SIGNIFICANT CHANGE UP (ref 32–36)
MCV RBC AUTO: 91.7 FL — SIGNIFICANT CHANGE UP (ref 80–100)
NRBC # BLD: 0 /100 WBCS — SIGNIFICANT CHANGE UP (ref 0–0)
PLATELET # BLD AUTO: 345 K/UL — SIGNIFICANT CHANGE UP (ref 150–400)
POTASSIUM SERPL-MCNC: 3.7 MMOL/L — SIGNIFICANT CHANGE UP (ref 3.5–5.3)
POTASSIUM SERPL-SCNC: 3.7 MMOL/L — SIGNIFICANT CHANGE UP (ref 3.5–5.3)
RBC # BLD: 3.73 M/UL — LOW (ref 3.8–5.2)
RBC # FLD: 13.6 % — SIGNIFICANT CHANGE UP (ref 10.3–14.5)
SODIUM SERPL-SCNC: 135 MMOL/L — SIGNIFICANT CHANGE UP (ref 135–145)
WBC # BLD: 6.47 K/UL — SIGNIFICANT CHANGE UP (ref 3.8–10.5)
WBC # FLD AUTO: 6.47 K/UL — SIGNIFICANT CHANGE UP (ref 3.8–10.5)

## 2020-02-27 PROCEDURE — 99232 SBSQ HOSP IP/OBS MODERATE 35: CPT

## 2020-02-27 RX ADMIN — CHLORHEXIDINE GLUCONATE 1 APPLICATION(S): 213 SOLUTION TOPICAL at 12:19

## 2020-02-27 RX ADMIN — Medication 650 MILLIGRAM(S): at 22:18

## 2020-02-27 RX ADMIN — Medication 650 MILLIGRAM(S): at 12:45

## 2020-02-27 RX ADMIN — Medication 650 MILLIGRAM(S): at 03:08

## 2020-02-27 RX ADMIN — HEPARIN SODIUM 5000 UNIT(S): 5000 INJECTION INTRAVENOUS; SUBCUTANEOUS at 21:00

## 2020-02-27 RX ADMIN — HEPARIN SODIUM 5000 UNIT(S): 5000 INJECTION INTRAVENOUS; SUBCUTANEOUS at 09:14

## 2020-02-27 RX ADMIN — Medication 650 MILLIGRAM(S): at 12:15

## 2020-02-27 NOTE — PROGRESS NOTE ADULT - PROBLEM SELECTOR PLAN 2
imaging d/w nsgy - no concerning changes  no apparent injuries  advised to wear helmet at all times when OOB

## 2020-02-27 NOTE — PROGRESS NOTE ADULT - ASSESSMENT
Visit Information Date & Time Provider Department Dept. Phone Encounter #  
 5/8/2017  4:00 PM Denver Romero MD [facility] [phone] [id number] Follow-up Instructions Return in about 1 year (around 5/8/2018) for Celeste Tracy Upcoming Health Maintenance Date Due  
 MEDICARE YEARLY EXAM 4/8/2017 BREAST CANCER SCRN MAMMOGRAM 7/26/2017 INFLUENZA AGE 9 TO ADULT 8/1/2017 GLAUCOMA SCREENING Q2Y 12/11/2017 DTaP/Tdap/Td series (2 - Td) 6/5/2025 COLONOSCOPY 4/27/2027 Allergies as of 5/8/2017  Review Complete On: 5/8/2017 By: Denver Romero MD  
  
 Severity Noted Reaction Type Reactions Sulfa (Sulfonamide Antibiotics) High 01/25/2010    Rash, Itching Actonel [Risedronate]  01/25/2010    Other (comments) Gi upset Erythromycin  01/25/2010    Other (comments) GI upset Pcn [Penicillins]  01/25/2010    Other (comments) Pain in her hands Current Immunizations  Reviewed on 6/5/2015 Name Date Influenza High Dose Vaccine PF 10/19/2016, 10/2/2015 Influenza Vaccine 10/13/2014, 10/11/2013 Influenza Vaccine Split 10/23/2012, 11/1/2011, 10/10/2010 PPD 1/17/2011 Pneumococcal Conjugate (PCV-13) 3/17/2015 Pneumococcal Vaccine (Unspecified Type) 12/15/2011, 12/1/2006 TD Vaccine 1/19/2011, 5/3/2001 Tdap 6/5/2015 Not reviewed this visit You Were Diagnosed With   
  
 Codes Comments Routine general medical examination at a health care facility     ICD-10-CM: Z00.00 ICD-9-CM: V70.0 Screening for alcoholism     ICD-10-CM: Z13.89 ICD-9-CM: V79.1 Vitals BP Pulse Temp Resp Height(growth percentile) Weight(growth percentile) 144/75 (BP 1 Location: Left arm, BP Patient Position: Sitting) 79 97.4 °F (36.3 °C) 18 5' 4\" (1.626 m) 206 lb 12.8 oz (93.8 kg) SpO2 BMI OB Status Smoking Status 95% 35.5 kg/m2 Postmenopausal Never Smoker Vitals History BMI and BSA Data Body Mass Index Body Surface Area 35.5 kg/m 2 2.06 m 2 Preferred Pharmacy Pharmacy Name Phone [id number], [address] [phone] Your Updated Medication List  
  
   
This list is accurate as of: 5/8/17  4:32 PM.  Always use your most recent med list.  
  
  
  
  
 aspirin delayed-release 81 mg tablet Take 81 mg by mouth daily. m-w-f  
  
 atenolol 25 mg tablet Commonly known as:  TENORMIN  
TAKE 1 TABLET DAILY  
  
 atorvastatin 40 mg tablet Commonly known as:  LIPITOR Take 1 Tab by mouth daily. Biotin 2,500 mcg Cap Take 5,000 mcg by mouth daily. econazole nitrate 1 % topical cream  
Commonly known as:  Vertis Eric Apply  to affected area two (2) times a day. fexofenadine 180 mg tablet Commonly known as:  Gris Loop Take 180 mg by mouth. Take half tablet as needed  
  
 indapamide 1.25 mg tablet Commonly known as:  Cuate Bough Take 1 Tab by mouth daily. MULTIPLE VITAMIN PO Take  by mouth daily. raloxifene 60 mg tablet Commonly known as:  EVISTA TAKE 1 TABLET DAILY  
  
 VITAMIN D 2,000 unit Cap capsule Generic drug:  Cholecalciferol (Vitamin D3) Take 2,000 Units by mouth two (2) times a day. Follow-up Instructions Return in about 1 year (around 5/8/2018) for [address] [place] [address] & HEALTH SERVICES! Dear Korey Rojas: Thank you for requesting a [employer] account. Our records indicate that you already have an active [employer] account. You can access your account anytime at [email or web address] Did you know that you can access your hospital and ER discharge instructions at any time in [employer]? You can also review all of your test results from your hospital stay or ER visit. Additional Information If you have questions, please visit the Frequently Asked Questions section of the [employer] website at [email or web address] 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 She arrived intubated and sedated. 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 Post op CT/ CTA with shunt tract heme. CTA with no aneurysm . s/p Cerebral angio 2/5. Good clipping No vasospasm EVD clamped 2/12, Fevers 2/12 Fever work up negative , EVD d/c 2/16. Persistent fevers Work up w E coli urine. Completed  Levaquin.  s/p fall 2/26    Plan    Neuro stable . Helmet when OOB.   Vitals stable   ID- No fevers   Tolerating Dysphagia diet   DVT ppx  PT/OT- a/c TBI rehab . Awaiting close observation bed at  rehab Remember, [facility] is NOT to be used for urgent needs. For medical emergencies, dial 911. Now available from your iPhone and Android! Please provide this summary of care documentation to your next provider. Your primary care clinician is listed as [id number] If you have any questions after today's visit, please call [phone].

## 2020-02-27 NOTE — PROGRESS NOTE ADULT - SUBJECTIVE AND OBJECTIVE BOX
[facility] Division of Hospital Medicine  John Cervantes MD  Pager (M-F, 8A-5P): [phone]  Other Times:  [phone]    Patient is a 39y old  Female who presents with a chief complaint of ALEX[place]RIA PARRY (26 Feb 2020 13:54)    SUBJECTIVE / OVERNIGHT EVENTS: no new complaints. ankle pain resolved.   ADDITIONAL REVIEW OF SYSTEMS:    MEDICATIONS  (STANDING):  chlorhexidine 2% Cloths 1 Application(s) Topical daily  heparin  Injectable 5000 Unit(s) SubCutaneous every 12 hours  ondansetron Injectable 4 milliGRAM(s) IV Push once    MEDICATIONS  (PRN):  acetaminophen   Tablet .. 650 milliGRAM(s) Oral every 6 hours PRN Temp greater or equal to 38C (100.4F), Mild Pain (1 - 3)  albuterol/ipratropium for Nebulization. 3 milliLiter(s) Nebulizer every 6 hours PRN Shortness of Breath and/or Wheezing  artificial tears (preservative free) Ophthalmic Solution 1 Drop(s) Both EYES three times a day PRN Dry Eyes  calcium carbonate    500 mG (Tums) Chewable 1 Tablet(s) Chew every 4 hours PRN Dyspepsia  nystatin Powder 1 Application(s) Topical three times a day PRN incontinence care  sodium chloride 0.9% lock flush 10 milliLiter(s) IV Push every 1 hour PRN Pre/post blood products, medications, blood draw, and to maintain line patency      CAPILLARY BLOOD GLUCOSE    I&O's Summary    26 Feb 2020 07:01  -  27 Feb 2020 07:00  --------------------------------------------------------  IN: 580 mL / OUT: 0 mL / NET: 580 mL    27 Feb 2020 07:01  -  27 Feb 2020 12:36  --------------------------------------------------------  IN: 120 mL / OUT: 0 mL / NET: 120 mL    PHYSICAL EXAM:  Vital Signs Last 24 Hrs  T(C): 37 (27 Feb 2020 11:01), Max: 37.4 (26 Feb 2020 17:02)  T(F): 98.6 (27 Feb 2020 11:01), Max: 99.3 (26 Feb 2020 17:02)  HR: 98 (27 Feb 2020 11:01) (81 - 98)  BP: 142/90 (27 Feb 2020 11:01) (125/72 - 142/90)  BP(mean): --  RR: 18 (27 Feb 2020 11:01) (18 - 18)  SpO2: 100% (27 Feb 2020 11:01) (99% - 100%)  GENERAL: NAD, well-developed  HEAD: s/p R craniectomy w defect w/o sig swelling, incision clean  EYES:  conjunctiva and sclera clear  NECK: Supple, No JVD  CHEST/LUNG: Clear to auscultation bilaterally; No wheeze  HEART: Regular rate and rhythm; No murmurs, rubs, or gallops  ABDOMEN: Soft, Nontender, Nondistended; Bowel sounds present  EXTREMITIES:  2+ Peripheral Pulses, No clubbing, cyanosis, or edema  NEUROLOGY:  AAOx3 no lethargy   SKIN: RT PICC site is clean with no tenderness and scalp surgical site is clean with no erythema    LABS:                        11.1   6.47  )-----------( 345      ( 27 Feb 2020 07:49 )             34.2     02-27    135  |  96  |  14  ----------------------------<  117<H>  3.7   |  27  |  0.49<L>    Ca    9.1      27 Feb 2020 07:49          RADIOLOGY & ADDITIONAL TESTS:  Results Reviewed:   Imaging Personally Reviewed: < from: CT Head No Cont (02.26.20 @ 15:08) >    IMPRESSION:  Decreased right basal gangliaand left shunt tract parenchymal hemorrhages status post right hemicraniectomy and clipping of a right P-comm aneurysm compared with 2/23/2020. No new hemorrhage or extra-axial collections.Right hemicraniectomy.    < end of copied text >    Electrocardiogram Personally Reviewed:    COORDINATION OF CARE:  Care Discussed with Consultants/Other Providers [Y/N]: PAUL barth plan of care  Prior or Outpatient Records Reviewed [Y/N]:

## 2020-02-27 NOTE — PROGRESS NOTE ADULT - PROBLEM SELECTOR PLAN 9
Transitions of Care Status:  1.  Name of PCP: Dr Walker [phone]  2.  PCP Contacted on Admission: [ ] Y    [x ] N    3.  PCP contacted at Discharge: [x ] Y    [ ] N    [ ] N/A  4.  Post-Discharge Appointment Date and Location:  5.  Summary of Handoff given to PCP: hospital course - pt will establish care w/physician

## 2020-02-27 NOTE — PROGRESS NOTE ADULT - SUBJECTIVE AND OBJECTIVE BOX
SUBJECTIVE:   No complaints. OOB to chair w helmet   OVERNIGHT EVENTS: none    Vital Signs Last 24 Hrs  T(C): 36.6 (27 Feb 2020 16:25), Max: 37.2 (27 Feb 2020 13:14)  T(F): 97.8 (27 Feb 2020 16:25), Max: 99 (27 Feb 2020 13:14)  HR: 76 (27 Feb 2020 16:25) (76 - 98)  BP: 134/85 (27 Feb 2020 16:25) (125/72 - 142/90)  BP(mean): --  RR: 17 (27 Feb 2020 16:25) (16 - 18)  SpO2: 100% (27 Feb 2020 16:25) (99% - 100%)    PHYSICAL EXAM:    Constitutional: No Acute Distress     Neurological: Awake alert Ox3, Speech clear Following Commands, Moving all Extremities LUE 4+/5 HG 4/5  LLE 4+/5 RUE/ RLE 5/5 R cranial defect slightly sunken     Pulmonary: Clear to Auscultation,     Cardiovascular: S1, S2, Regular rate and rhythm     Gastrointestinal: Soft, Non-tender, Non-distended     Extremities: No calf tenderness       LABS:                        11.1   6.47  )-----------( 345      ( 27 Feb 2020 07:49 )             34.2    02-27    135  |  96  |  14  ----------------------------<  117<H>  3.7   |  27  |  0.49<L>    Ca    9.1      27 Feb 2020 07:49        IMAGING:         MEDICATIONS:    acetaminophen   Tablet .. 650 milliGRAM(s) Oral every 6 hours PRN Temp greater or equal to 38C (100.4F), Mild Pain (1 - 3)  ondansetron Injectable 4 milliGRAM(s) IV Push once  albuterol/ipratropium for Nebulization. 3 milliLiter(s) Nebulizer every 6 hours PRN Shortness of Breath and/or Wheezing  calcium carbonate    500 mG (Tums) Chewable 1 Tablet(s) Chew every 4 hours PRN Dyspepsia  artificial tears (preservative free) Ophthalmic Solution 1 Drop(s) Both EYES three times a day PRN Dry Eyes  chlorhexidine 2% Cloths 1 Application(s) Topical daily  heparin  Injectable 5000 Unit(s) SubCutaneous every 12 hours  nystatin Powder 1 Application(s) Topical three times a day PRN incontinence care  sodium chloride 0.9% lock flush 10 milliLiter(s) IV Push every 1 hour PRN Pre/post blood products, medications, blood draw, and to maintain line patency      DIET:     Assessment:  Please Check When Present   []  GCS  E   V  M     Heart Failure: []Acute, [] acute on chronic , []chronic  Heart Failure:  [] Diastolic (HFpEF), [] Systolic (HFrEF), []Combined (HFpEF and HFrEF), [] RHF, [] Pulm HTN, [] Other    [] GAURAV, [] ATN, [] AIN, [] other  [] CKD1, [] CKD2, [] CKD 3, [] CKD 4, [] CKD 5, []ESRD    Encephalopathy: [] Metabolic, [] Hepatic, [] toxic, [] Neurological, [] Other    Abnormal Nurtitional Status: [] malnurtition (see nutrition note), [ ]underweight: BMI < 19, [] morbid obesity: BMI >40, [] Cachexia    [] Sepsis  [] hypovolemic shock,[] cardiogenic shock, [] hemorrhagic shock, [] neuogenic shock  [] Acute Respiratory Failure  []Cerebral edema, [] Brain compression/ herniation,   [] Functional quadriplegia  [] Acute blood loss anemia

## 2020-02-28 ENCOUNTER — TRANSCRIPTION ENCOUNTER (OUTPATIENT)
Age: 40
End: 2020-02-28

## 2020-02-28 VITALS
OXYGEN SATURATION: 98 % | TEMPERATURE: 98 F | DIASTOLIC BLOOD PRESSURE: 91 MMHG | HEART RATE: 88 BPM | SYSTOLIC BLOOD PRESSURE: 129 MMHG | RESPIRATION RATE: 15 BRPM

## 2020-02-28 LAB
CULTURE RESULTS: SIGNIFICANT CHANGE UP
CULTURE RESULTS: SIGNIFICANT CHANGE UP
SPECIMEN SOURCE: SIGNIFICANT CHANGE UP
SPECIMEN SOURCE: SIGNIFICANT CHANGE UP

## 2020-02-28 PROCEDURE — 76377 3D RENDER W/INTRP POSTPROCES: CPT

## 2020-02-28 PROCEDURE — 80061 LIPID PANEL: CPT

## 2020-02-28 PROCEDURE — 93888 INTRACRANIAL LIMITED STUDY: CPT

## 2020-02-28 PROCEDURE — 70450 CT HEAD/BRAIN W/O DYE: CPT

## 2020-02-28 PROCEDURE — 94003 VENT MGMT INPAT SUBQ DAY: CPT

## 2020-02-28 PROCEDURE — 97129 THER IVNTJ 1ST 15 MIN: CPT

## 2020-02-28 PROCEDURE — 83605 ASSAY OF LACTIC ACID: CPT

## 2020-02-28 PROCEDURE — 97760 ORTHOTIC MGMT&TRAING 1ST ENC: CPT

## 2020-02-28 PROCEDURE — 93005 ELECTROCARDIOGRAM TRACING: CPT

## 2020-02-28 PROCEDURE — 86923 COMPATIBILITY TEST ELECTRIC: CPT

## 2020-02-28 PROCEDURE — 74230 X-RAY XM SWLNG FUNCJ C+: CPT

## 2020-02-28 PROCEDURE — 84100 ASSAY OF PHOSPHORUS: CPT

## 2020-02-28 PROCEDURE — 85730 THROMBOPLASTIN TIME PARTIAL: CPT

## 2020-02-28 PROCEDURE — 87186 SC STD MICRODIL/AGAR DIL: CPT

## 2020-02-28 PROCEDURE — 84702 CHORIONIC GONADOTROPIN TEST: CPT

## 2020-02-28 PROCEDURE — 93306 TTE W/DOPPLER COMPLETE: CPT

## 2020-02-28 PROCEDURE — 84157 ASSAY OF PROTEIN OTHER: CPT

## 2020-02-28 PROCEDURE — 82565 ASSAY OF CREATININE: CPT

## 2020-02-28 PROCEDURE — 85014 HEMATOCRIT: CPT

## 2020-02-28 PROCEDURE — 97535 SELF CARE MNGMENT TRAINING: CPT

## 2020-02-28 PROCEDURE — 97162 PT EVAL MOD COMPLEX 30 MIN: CPT

## 2020-02-28 PROCEDURE — 84132 ASSAY OF SERUM POTASSIUM: CPT

## 2020-02-28 PROCEDURE — 93970 EXTREMITY STUDY: CPT

## 2020-02-28 PROCEDURE — 72125 CT NECK SPINE W/O DYE: CPT

## 2020-02-28 PROCEDURE — 80202 ASSAY OF VANCOMYCIN: CPT

## 2020-02-28 PROCEDURE — 97130 THER IVNTJ EA ADDL 15 MIN: CPT

## 2020-02-28 PROCEDURE — 97530 THERAPEUTIC ACTIVITIES: CPT

## 2020-02-28 PROCEDURE — 84300 ASSAY OF URINE SODIUM: CPT

## 2020-02-28 PROCEDURE — 82803 BLOOD GASES ANY COMBINATION: CPT

## 2020-02-28 PROCEDURE — 85610 PROTHROMBIN TIME: CPT

## 2020-02-28 PROCEDURE — 74177 CT ABD & PELVIS W/CONTRAST: CPT

## 2020-02-28 PROCEDURE — 71260 CT THORAX DX C+: CPT

## 2020-02-28 PROCEDURE — 82945 GLUCOSE OTHER FLUID: CPT

## 2020-02-28 PROCEDURE — 99232 SBSQ HOSP IP/OBS MODERATE 35: CPT

## 2020-02-28 PROCEDURE — 86900 BLOOD TYPING SEROLOGIC ABO: CPT

## 2020-02-28 PROCEDURE — P9016: CPT

## 2020-02-28 PROCEDURE — 36226 PLACE CATH VERTEBRAL ART: CPT

## 2020-02-28 PROCEDURE — C1769: CPT

## 2020-02-28 PROCEDURE — 80307 DRUG TEST PRSMV CHEM ANLYZR: CPT

## 2020-02-28 PROCEDURE — 82550 ASSAY OF CK (CPK): CPT

## 2020-02-28 PROCEDURE — 89051 BODY FLUID CELL COUNT: CPT

## 2020-02-28 PROCEDURE — 99285 EMERGENCY DEPT VISIT HI MDM: CPT | Mod: 25

## 2020-02-28 PROCEDURE — C1887: CPT

## 2020-02-28 PROCEDURE — 85027 COMPLETE CBC AUTOMATED: CPT

## 2020-02-28 PROCEDURE — 86850 RBC ANTIBODY SCREEN: CPT

## 2020-02-28 PROCEDURE — 80076 HEPATIC FUNCTION PANEL: CPT

## 2020-02-28 PROCEDURE — 70250 X-RAY EXAM OF SKULL: CPT

## 2020-02-28 PROCEDURE — 70496 CT ANGIOGRAPHY HEAD: CPT

## 2020-02-28 PROCEDURE — 36224 PLACE CATH CAROTD ART: CPT

## 2020-02-28 PROCEDURE — 82553 CREATINE MB FRACTION: CPT

## 2020-02-28 PROCEDURE — 87040 BLOOD CULTURE FOR BACTERIA: CPT

## 2020-02-28 PROCEDURE — 94002 VENT MGMT INPAT INIT DAY: CPT

## 2020-02-28 PROCEDURE — 97116 GAIT TRAINING THERAPY: CPT

## 2020-02-28 PROCEDURE — 92523 SPEECH SOUND LANG COMPREHEN: CPT

## 2020-02-28 PROCEDURE — C1729: CPT

## 2020-02-28 PROCEDURE — 36227 PLACE CATH XTRNL CAROTID: CPT

## 2020-02-28 PROCEDURE — C1751: CPT

## 2020-02-28 PROCEDURE — 82330 ASSAY OF CALCIUM: CPT

## 2020-02-28 PROCEDURE — 36569 INSJ PICC 5 YR+ W/O IMAGING: CPT

## 2020-02-28 PROCEDURE — 82947 ASSAY GLUCOSE BLOOD QUANT: CPT

## 2020-02-28 PROCEDURE — 87205 SMEAR GRAM STAIN: CPT

## 2020-02-28 PROCEDURE — 82435 ASSAY OF BLOOD CHLORIDE: CPT

## 2020-02-28 PROCEDURE — 84145 PROCALCITONIN (PCT): CPT

## 2020-02-28 PROCEDURE — 86901 BLOOD TYPING SEROLOGIC RH(D): CPT

## 2020-02-28 PROCEDURE — 83735 ASSAY OF MAGNESIUM: CPT

## 2020-02-28 PROCEDURE — 71045 X-RAY EXAM CHEST 1 VIEW: CPT

## 2020-02-28 PROCEDURE — C1894: CPT

## 2020-02-28 PROCEDURE — 92611 MOTION FLUOROSCOPY/SWALLOW: CPT

## 2020-02-28 PROCEDURE — 83036 HEMOGLOBIN GLYCOSYLATED A1C: CPT

## 2020-02-28 PROCEDURE — 92610 EVALUATE SWALLOWING FUNCTION: CPT

## 2020-02-28 PROCEDURE — 87086 URINE CULTURE/COLONY COUNT: CPT

## 2020-02-28 PROCEDURE — 92526 ORAL FUNCTION THERAPY: CPT

## 2020-02-28 PROCEDURE — 92507 TX SP LANG VOICE COMM INDIV: CPT

## 2020-02-28 PROCEDURE — C1889: CPT

## 2020-02-28 PROCEDURE — 87070 CULTURE OTHR SPECIMN AEROBIC: CPT

## 2020-02-28 PROCEDURE — 80048 BASIC METABOLIC PNL TOTAL CA: CPT

## 2020-02-28 PROCEDURE — 84295 ASSAY OF SERUM SODIUM: CPT

## 2020-02-28 PROCEDURE — 83690 ASSAY OF LIPASE: CPT

## 2020-02-28 PROCEDURE — 81001 URINALYSIS AUTO W/SCOPE: CPT

## 2020-02-28 PROCEDURE — 84484 ASSAY OF TROPONIN QUANT: CPT

## 2020-02-28 PROCEDURE — 80053 COMPREHEN METABOLIC PANEL: CPT

## 2020-02-28 PROCEDURE — 97166 OT EVAL MOD COMPLEX 45 MIN: CPT

## 2020-02-28 PROCEDURE — 94640 AIRWAY INHALATION TREATMENT: CPT

## 2020-02-28 PROCEDURE — 70498 CT ANGIOGRAPHY NECK: CPT

## 2020-02-28 RX ORDER — IPRATROPIUM/ALBUTEROL SULFATE 18-103MCG
3 AEROSOL WITH ADAPTER (GRAM) INHALATION
Qty: 0 | Refills: 0 | DISCHARGE
Start: 2020-02-28

## 2020-02-28 RX ORDER — HEPARIN SODIUM 5000 [USP'U]/ML
5000 INJECTION INTRAVENOUS; SUBCUTANEOUS
Qty: 0 | Refills: 0 | DISCHARGE
Start: 2020-02-28

## 2020-02-28 RX ORDER — CHLORHEXIDINE GLUCONATE 213 G/1000ML
1 SOLUTION TOPICAL
Qty: 0 | Refills: 0 | DISCHARGE
Start: 2020-02-28

## 2020-02-28 RX ORDER — NYSTATIN CREAM 100000 [USP'U]/G
1 CREAM TOPICAL
Qty: 0 | Refills: 0 | DISCHARGE
Start: 2020-02-28

## 2020-02-28 RX ORDER — CALCIUM CARBONATE 500(1250)
1 TABLET ORAL
Qty: 0 | Refills: 0 | DISCHARGE
Start: 2020-02-28

## 2020-02-28 RX ORDER — ACETAMINOPHEN 500 MG
2 TABLET ORAL
Qty: 0 | Refills: 0 | DISCHARGE
Start: 2020-02-28

## 2020-02-28 RX ADMIN — HEPARIN SODIUM 5000 UNIT(S): 5000 INJECTION INTRAVENOUS; SUBCUTANEOUS at 08:16

## 2020-02-28 RX ADMIN — Medication 650 MILLIGRAM(S): at 12:47

## 2020-02-28 RX ADMIN — CHLORHEXIDINE GLUCONATE 1 APPLICATION(S): 213 SOLUTION TOPICAL at 11:42

## 2020-02-28 RX ADMIN — Medication 650 MILLIGRAM(S): at 13:17

## 2020-02-28 NOTE — DISCHARGE NOTE PROVIDER - NSDCFUADDINST_GEN_ALL_CORE_FT
Please make all necessary appointments and follow up. Please do NOT take any Aspirin or NSAIDs (Ibuprofen, Advil, Aleve, Motrin) until cleared by your Neurosurgeon.   Please wear your helmet at all times whenever you  are out of bed.   Please go to the emergency room for any of the following: altered mental status, seizures, pain uncontrolled by pain medications, chest pain and shortness of breath. Please make all necessary appointments and follow up. Please do NOT take any Aspirin or NSAIDs (Ibuprofen, Advil, Aleve, Motrin) until cleared by your Neurosurgeon.   Please wear your helmet at all times whenever you  are out of bed. Avoid sleeping on the side of your cranial defect.  Please go to the emergency room for any of the following: altered mental status, seizures, pain uncontrolled by pain medications, leaking / bleeding from surgical incision site, chest pain and shortness of breath.

## 2020-02-28 NOTE — DISCHARGE NOTE PROVIDER - NSDCCPCAREPLAN_GEN_ALL_CORE_FT
PRINCIPAL DISCHARGE DIAGNOSIS  Diagnosis: Cerebral hemorrhage  Assessment and Plan of Treatment: s/p craniectomy, evacuation of ICH and clipping of PCOMM aneurysm, no bone flap 2/3/20, s/p angio: clip in good position, no vasospasm  Please make an appointment and follow up with Dr. Ordonez in one week after discharge from rehab. Please wear your helmet when out of bed at ALL TIMES. Patient may shower however please do NOT scrub at surgical incision and pat dry only.  Please make an appointment and follow up with your primary care provider Dr. Walker in one week after discharge from rehab.  Patient will be going to rehab with her right PICC per the request of the rehab facility for access, when it is no longer needed can be removed there. Please keep dressing nice and clean.      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 Argueta. PRINCIPAL DISCHARGE DIAGNOSIS  Diagnosis: Cerebral hemorrhage  Assessment and Plan of Treatment: s/p craniectomy, evacuation of ICH and clipping of PCOMM aneurysm, no bone flap 2/3/20, s/p angio: clip in good position, no vasospasm  Please make an appointment and follow up with Dr. Ordonez in one week after discharge from rehab. Please wear your helmet when out of bed at ALL TIMES. Patient may shower however please do NOT scrub at surgical incision and pat dry only. Avoid sleeping on the side of  your cranial defect.  Please make an appointment and follow up with your primary care provider Dr. Walker in one week after discharge from rehab.  Patient will be going to rehab with her right PICC per the request of the rehab facility for access, when it is no longer needed can be removed there. Please keep dressing nice and clean.      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 Argueta.

## 2020-02-28 NOTE — DISCHARGE NOTE PROVIDER - NSDCFUSCHEDAPPT_GEN_ALL_CORE_FT
[facility] ; 03/05/2020 ; [facility] PST-Presurgtest  [facility]ER WALTERS ; 03/19/2020 ; [facility] PreAdmits EDER WALTERS ; 03/05/2020 ; [facility] PST-Presurgtest  [facility]ER WALTERS ; 03/19/2020 ; [facility] PreAdmits EDER WALTERS ; 03/05/2020 ; [facility] PST-Presurgtest  [facility]ER WALTERS ; 03/19/2020 ; [facility] PreAdmits

## 2020-02-28 NOTE — PROGRESS NOTE ADULT - ASSESSMENT
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) POD 25, s/p angio: clip check in good position and no vasospasm PAD 23    NEURO:   - Continue Neuro checks q 4  - Continue pain control w/ tylenol prn  - Helmet at all times when OOB   - Artifical tears for prn dry eyes    PULM:   - Incentive spirometry  - Duonebs prn     CV:  - -160    ENDO:   - Euglycemia    HEME/ONC:            CBC stable 2/27          DVT ppx: On SQH, 2/21 uppers and lowers neg DVT, superficial thrombophlebitis of rt basilic and rt/lt cephalic veins     RENAL:   - IVL    ID:   - Afebrile  - To d/c PICC before discharge    GI:    - Continue tums prn   - Last BM 2/28    DISCHARGE PLANNING:   PT/OT/PMR - acute TBI rehab, enhanced supervision bed - [facility]    Assessment:  Please Check When Present   []  GCS  E   V  M     Heart Failure: []Acute, [] acute on chronic , []chronic  Heart Failure:  [] Diastolic (HFpEF), [] Systolic (HFrEF), []Combined (HFpEF and HFrEF), [] RHF, [] Pulm HTN, [] Other    [] GAURAV, [] ATN, [] AIN, [] other  [] CKD1, [] CKD2, [] CKD 3, [] CKD 4, [] CKD 5, []ESRD    Encephalopathy: [] Metabolic, [] Hepatic, [] toxic, [] Neurological, [] Other    Abnormal Nurtitional Status: [] malnutrition (see nutrition note), [ ]underweight: BMI < 19, [] morbid obesity: BMI >40, [] Cachexia    [] Sepsis  [] hypovolemic shock,[] cardiogenic shock, [] hemorrhagic shock, [] neuogenic shock  [] Acute Respiratory Failure  []Cerebral edema, [] Brain compression/ herniation,   [] Functional quadriplegia  [] Acute blood loss anemia    To be discussed w/ Dr. Ordonez  [id number]

## 2020-02-28 NOTE — DISCHARGE NOTE PROVIDER - HOSPITAL COURSE
39 year old female, with no pertinent past medical history, found unresponsive in an airplane wherein she was taken to [facility] where she was found to have a diffuse subarachnoid hemorrhage and a right temporal clot. 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 Ordonez's service and taken to the OR for s/p right craniectomy for evacuation of ICH and clipping of posterior communicating aneurysm (no bone flap). Patient tolerated procedure well and came to the Neuro ICU wherein she was extubated on 2/4/20. Patient then went to IR on 2/5/20 for s/p angio: clip check which shows in good position and no vasospasm. Patient stayed in the ICU for vasospasm watch, she had transcranial dopplers which were mildly elevated (120's) with no exam change and by 2/13 they were within normal limits. Patient's course was also complicated by fevers for which she was found to have right basilic and right and left cephalic superficial thrombophlebitis and also a UTI - EColi that she was treated with Levaquin, was being followed by Infectious disease team. Patient underwent a clamp trial for which she passed / neurologically stable her EVD was removed on 2/15 had a tract hemorrhage on post-pull CT Brain however was stable on follow up, patient remained neurologically stable. Patient was transitioned to [facility] on 2/19. Patient continued to work with Physical Therapy / Occupational Therapy and PM&R and deemed a candidate for acute TBI rehab. Patient was found to be impulsive on the floor and that resulted on a fall without her helmet on, she had a CT Brain afterwards which was stable. Due to impulsivity patient requires enhanced supervision. 39 year old female, with no pertinent past medical history, found unresponsive in an airplane wherein she was taken to [facility] where she was found to have a diffuse subarachnoid hemorrhage and a right temporal clot. 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 Ordonez's service and taken to the OR for s/p right craniectomy for evacuation of ICH and clipping of posterior communicating aneurysm (no bone flap). Patient tolerated procedure well and came to the Neuro ICU wherein she was extubated on 2/4/20. Patient then went to IR on 2/5/20 for s/p angio: clip check which shows in good position and no vasospasm. 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 Patient stayed in the ICU for vasospasm watch, she had transcranial dopplers which were mildly elevated (120's) with no exam change and by 2/13 they were within normal limits. Patient's course was also complicated by fevers for which she was found to have right basilic and right and left cephalic superficial thrombophlebitis and also a UTI - EColi that she was treated with Levaquin, was being followed by Infectious disease team. Patient underwent a clamp trial for which she passed / neurologically stable her EVD was removed on 2/15 had a tract hemorrhage on post-pull CT Brain however was stable on follow up, patient remained neurologically stable. Patient was transitioned to [facility] on 2/19. Patient continued to work with Physical Therapy / Occupational Therapy and PM&R and deemed a candidate for acute TBI rehab. Patient was found to be impulsive on the floor and that resulted on a fall without her helmet on, she had a CT Brain afterwards which was stable. Due to impulsivity patient requires enhanced supervision. On 2/28 patient is neurologically and hemodynamically stable for discharge to rehab, [facility]. 39 year old female, with no pertinent past medical history, found unresponsive in an airplane wherein she was taken to [facility] where she was found to have a diffuse subarachnoid hemorrhage and a right temporal clot. She was then transferred to 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 Ordonez's service and taken to the OR for s/p right craniectomy for evacuation of ICH and clipping of posterior communicating aneurysm (no bone flap). Patient tolerated procedure well and came to the Neuro ICU wherein she was extubated on 2/4/20. Patient then went to IR on 2/5/20 for s/p angio: clip check which shows in good position and no vasospasm. 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 Patient stayed in the ICU for vasospasm watch, she had transcranial dopplers which were mildly elevated (120's) with no exam change and by 2/13 they were within normal limits. Patient's course was also complicated by fevers for which she was found to have right basilic and right and left cephalic superficial thrombophlebitis and also a UTI - EColi that she was treated with Levaquin, was being followed by Infectious disease team. Patient underwent a clamp trial for which she passed / neurologically stable her EVD was removed on 2/15 had a tract hemorrhage on post-pull CT Brain however was stable on follow up, patient remained neurologically stable. Patient was transitioned to [facility] on 2/19. Patient continued to work with Physical Therapy / Occupational Therapy and PM&R and deemed a candidate for acute TBI rehab. Patient was found to be impulsive on the floor and that resulted on a fall without her helmet on, she had a CT Brain afterwards which was stable. Due to impulsivity patient requires enhanced supervision. On 2/28 patient is neurologically and hemodynamically stable for discharge to rehab, she has been accepted to [facility].

## 2020-02-28 NOTE — DISCHARGE NOTE PROVIDER - NSDCMRMEDTOKEN_GEN_ALL_CORE_FT
Helmet: Please wear Helmet at all times when patient is OOB.    Dx: s/p craniectomy, evacuation of ICH and clipping of pcomm aneurysm  probiotic:   vitamin b:   Vitamin C: acetaminophen 325 mg oral tablet: 2 tab(s) orally every 6 hours, As needed, Temp greater or equal to 38C (100.4F), Mild Pain (1 - 3)  calcium carbonate 500 mg (200 mg elemental calcium) oral tablet, chewable: 1 tab(s) orally every 4 hours, As needed, Dyspepsia  chlorhexidine 2% topical pad: 1 application topically once a day  Helmet: Please wear Helmet at all times when patient is OOB.    Dx: s/p craniectomy, evacuation of ICH and clipping of pcomm aneurysm  heparin: 5000 unit(s) subcutaneous 2 times a day  ipratropium-albuterol 0.5 mg-2.5 mg/3 mLinhalation solution: 3 milliliter(s) inhaled every 6 hours, As needed, Shortness of Breath and/or Wheezing  nystatin 100,000 units/g topical powder: 1 application topically 3 times a day, As needed, incontinence care  ocular lubricant ophthalmic solution: 1 drop(s) to each affected eye 3 times a day, As needed, Dry Eyes  probiotic:   vitamin b:   Vitamin C:

## 2020-02-28 NOTE — PROGRESS NOTE ADULT - SUBJECTIVE AND OBJECTIVE BOX
SUBJECTIVE: HPI:  39F unknown medical Hx, found unresponsive on airplane, transferred to [place], subsequently transferred to [facility].    Arrive intubated and sedated, no family or medical Hx available. (03 Feb 2020 14:10)      OVERNIGHT EVENTS:     Vital Signs Last 24 Hrs  T(C): 36.6 (28 Feb 2020 08:30), Max: 37.2 (27 Feb 2020 13:14)  T(F): 97.9 (28 Feb 2020 08:30), Max: 99 (27 Feb 2020 13:14)  HR: 92 (28 Feb 2020 08:30) (76 - 98)  BP: 123/82 (28 Feb 2020 08:30) (123/82 - 142/90)  BP(mean): --  RR: 16 (28 Feb 2020 08:30) (16 - 18)  SpO2: 99% (28 Feb 2020 08:30) (98% - 100%)    PHYSICAL EXAM:    General: No Acute Distress     Neurological:     Pulmonary: Clear to Auscultation, No Rales, No Rhonchi, No Wheezes     Cardiovascular: S1, S2, Regular Rate and Rhythm     Gastrointestinal: Soft, Nontender, Nondistended     Incision:     LABS:                        11.1   6.47  )-----------( 345      ( 27 Feb 2020 07:49 )             34.2    02-27    135  |  96  |  14  ----------------------------<  117<H>  3.7   |  27  |  0.49<L>    Ca    9.1      27 Feb 2020 07:49      Hemoglobin A1C, Whole Blood: 5.3 % (02-04 @ 01:05)      02-27 @ 07:01  -  02-28 @ 07:00  --------------------------------------------------------  IN: 780 mL / OUT: 0 mL / NET: 780 mL    02-28 @ 07:01  -  02-28 @ 09:18  --------------------------------------------------------  IN: 120 mL / OUT: 0 mL / NET: 120 mL      DRAINS:     MEDICATIONS:  Antibiotics:    Neuro:  acetaminophen   Tablet .. 650 milliGRAM(s) Oral every 6 hours PRN Temp greater or equal to 38C (100.4F), Mild Pain (1 - 3)  ondansetron Injectable 4 milliGRAM(s) IV Push once    Cardiac:    Pulm:  albuterol/ipratropium for Nebulization. 3 milliLiter(s) Nebulizer every 6 hours PRN Shortness of Breath and/or Wheezing    GI/:  calcium carbonate    500 mG (Tums) Chewable 1 Tablet(s) Chew every 4 hours PRN Dyspepsia    Other:   artificial tears (preservative free) Ophthalmic Solution 1 Drop(s) Both EYES three times a day PRN Dry Eyes  chlorhexidine 2% Cloths 1 Application(s) Topical daily  heparin  Injectable 5000 Unit(s) SubCutaneous every 12 hours  nystatin Powder 1 Application(s) Topical three times a day PRN incontinence care  sodium chloride 0.9% lock flush 10 milliLiter(s) IV Push every 1 hour PRN Pre/post blood products, medications, blood draw, and to maintain line patency    DIET: [] Regular [] CCD [] Renal [] Puree [] Dysphagia [] Tube Feeds:     IMAGING: SUBJECTIVE: HPI:  39F unknown medical Hx, found unresponsive on airplane, transferred to [place], subsequently transferred to [facility].    Arrive intubated and sedated, no family or medical Hx available. (03 Feb 2020 14:10)      OVERNIGHT EVENTS: No acute events overnight, patient had a hx of fall secondary to impulsivity 2 days ago. Has since been stable. Patient seen and evaluated at bedside doing well, no complaints of pain.     Vital Signs Last 24 Hrs  T(C): 36.6 (28 Feb 2020 08:30), Max: 37.2 (27 Feb 2020 13:14)  T(F): 97.9 (28 Feb 2020 08:30), Max: 99 (27 Feb 2020 13:14)  HR: 92 (28 Feb 2020 08:30) (76 - 98)  BP: 123/82 (28 Feb 2020 08:30) (123/82 - 142/90)  BP(mean): --  RR: 16 (28 Feb 2020 08:30) (16 - 18)  SpO2: 99% (28 Feb 2020 08:30) (98% - 100%)    PHYSICAL EXAM:    General: No Acute Distress     Neurological: Awake, alert, OX3, PERRL, EOMI, following commands, RT side 5/5, no drift, LUE biceps / triceps 5/5 with HG 4+/5, lowers 5/5    Pulmonary: Clear to Auscultation, No Rales, No Rhonchi, No Wheezes     Cardiovascular: S1, S2, Regular Rate and Rhythm     Gastrointestinal: Soft, Nontender, Nondistended     Incision: Crani defect soft, stable    LABS:                        11.1   6.47  )-----------( 345      ( 27 Feb 2020 07:49 )             34.2    02-27    135  |  96  |  14  ----------------------------<  117<H>  3.7   |  27  |  0.49<L>    Ca    9.1      27 Feb 2020 07:49      Hemoglobin A1C, Whole Blood: 5.3 % (02-04 @ 01:05)      02-27 @ 07:01  -  02-28 @ 07:00  --------------------------------------------------------  IN: 780 mL / OUT: 0 mL / NET: 780 mL    02-28 @ 07:01  - 02-28 @ 09:18  --------------------------------------------------------  IN: 120 mL / OUT: 0 mL / NET: 120 mL      DRAINS: None    MEDICATIONS:  Antibiotics:    Neuro:  acetaminophen   Tablet .. 650 milliGRAM(s) Oral every 6 hours PRN Temp greater or equal to 38C (100.4F), Mild Pain (1 - 3)  ondansetron Injectable 4 milliGRAM(s) IV Push once    Cardiac:    Pulm:  albuterol/ipratropium for Nebulization. 3 milliLiter(s) Nebulizer every 6 hours PRN Shortness of Breath and/or Wheezing    GI/:  calcium carbonate    500 mG (Tums) Chewable 1 Tablet(s) Chew every 4 hours PRN Dyspepsia    Other:   artificial tears (preservative free) Ophthalmic Solution 1 Drop(s) Both EYES three times a day PRN Dry Eyes  chlorhexidine 2% Cloths 1 Application(s) Topical daily  heparin  Injectable 5000 Unit(s) SubCutaneous every 12 hours  nystatin Powder 1 Application(s) Topical three times a day PRN incontinence care  sodium chloride 0.9% lock flush 10 milliLiter(s) IV Push every 1 hour PRN Pre/post blood products, medications, blood draw, and to maintain line patency    DIET: [] Regular [] CCD [] Renal [] Puree [x - 1.2L fluid restriction] Dysphagia [] Tube Feeds:     IMAGING:   < from: CT Head No Cont (02.26.20 @ 15:08) >    IMPRESSION:  Decreased right basal gangliaand left shunt tract parenchymal hemorrhages status post right hemicraniectomy and clipping of a right P-comm aneurysm compared with 2/23/2020. No new hemorrhage or extra-axial collections.Right hemicraniectomy.      UMM LUIS M.D., RADIOLOGY FELLOW  This document has been electronically signed.  ISACC BRADSHAW M.D., ATTENDING RADIOLOGIST  This document has been electronically signed. Feb 26 2020  4:11PM    < end of copied text >

## 2020-02-28 NOTE — PROGRESS NOTE ADULT - PROBLEM SELECTOR PROBLEM 8
Prophylactic measure
Tachycardia

## 2020-02-28 NOTE — PROGRESS NOTE ADULT - PROBLEM SELECTOR PLAN 9
Transitions of Care Status:  1.  Name of PCP: Dr Walker [phone]  2.  PCP Contacted on Admission: [ ] Y    [x ] N    3.  PCP contacted at Discharge: [x ] Y    [ ] N    [ ] N/A  4.  Post-Discharge Appointment Date and Location:  5.  Summary of Handoff given to PCP: hospital course - pt will establish care w/physician

## 2020-02-28 NOTE — DISCHARGE NOTE PROVIDER - CARE PROVIDER_API CALL
Nery Ordonez)  Neurosurgery  [facility]  [facility], [address] [place] [address]  Phone: [phone]  Fax: [phone]  Follow Up Time:     Sylvia Argueta)  Otolaryngology  [address] [place] [address] [address]  [place] [address]  Phone: [phone]  Fax: [phone]  Follow Up Time:

## 2020-02-28 NOTE — PROGRESS NOTE ADULT - SUBJECTIVE AND OBJECTIVE BOX
[facility] Division of Hospital Medicine  John Cervantes MD  Pager (M-F, 8A-5P): [phone]  Other Times:  [phone]    Patient is a 39y old  Female who presents with a chief complaint of ALEXANDRI[place] PARRY (28 Feb 2020 10:36)    SUBJECTIVE / OVERNIGHT EVENTS: no complaints   ADDITIONAL REVIEW OF SYSTEMS:    MEDICATIONS  (STANDING):  chlorhexidine 2% Cloths 1 Application(s) Topical daily  heparin  Injectable 5000 Unit(s) SubCutaneous every 12 hours  ondansetron Injectable 4 milliGRAM(s) IV Push once    MEDICATIONS  (PRN):  acetaminophen   Tablet .. 650 milliGRAM(s) Oral every 6 hours PRN Temp greater or equal to 38C (100.4F), Mild Pain (1 - 3)  albuterol/ipratropium for Nebulization. 3 milliLiter(s) Nebulizer every 6 hours PRN Shortness of Breath and/or Wheezing  artificial tears (preservative free) Ophthalmic Solution 1 Drop(s) Both EYES three times a day PRN Dry Eyes  calcium carbonate    500 mG (Tums) Chewable 1 Tablet(s) Chew every 4 hours PRN Dyspepsia  nystatin Powder 1 Application(s) Topical three times a day PRN incontinence care  sodium chloride 0.9% lock flush 10 milliLiter(s) IV Push every 1 hour PRN Pre/post blood products, medications, blood draw, and to maintain line patency      CAPILLARY BLOOD GLUCOSE        I&O's Summary    27 Feb 2020 07:01  -  28 Feb 2020 07:00  --------------------------------------------------------  IN: 780 mL / OUT: 0 mL / NET: 780 mL    28 Feb 2020 07:01  -  28 Feb 2020 13:42  --------------------------------------------------------  IN: 120 mL / OUT: 0 mL / NET: 120 mL        PHYSICAL EXAM:  Vital Signs Last 24 Hrs  T(C): 36.7 (28 Feb 2020 12:32), Max: 37.2 (27 Feb 2020 19:09)  T(F): 98 (28 Feb 2020 12:32), Max: 98.9 (27 Feb 2020 19:09)  HR: 84 (28 Feb 2020 12:32) (76 - 98)  BP: 136/84 (28 Feb 2020 12:32) (123/82 - 141/88)  BP(mean): --  RR: 18 (28 Feb 2020 12:32) (16 - 18)  SpO2: 98% (28 Feb 2020 12:32) (98% - 100%)  GENERAL: NAD, well-developed  HEAD: s/p R craniectomy w defect w/o sig swelling, incision clean  EYES:  conjunctiva and sclera clear  NECK: Supple, No JVD  EXTREMITIES:  2+ Peripheral Pulses, No clubbing, cyanosis, or edema  NEUROLOGY:  AAOx3 no lethargy   SKIN: scalp surgical site is clean with no erythema  LABS:                        11.1   6.47  )-----------( 345      ( 27 Feb 2020 07:49 )             34.2     02-27    135  |  96  |  14  ----------------------------<  117<H>  3.7   |  27  |  0.49<L>    Ca    9.1      27 Feb 2020 07:49                  RADIOLOGY & ADDITIONAL TESTS:  Results Reviewed:   Imaging Personally Reviewed:  Electrocardiogram Personally Reviewed:    COORDINATION OF CARE:  Care Discussed with Consultants/Other Providers [Y/N]:  Prior or Outpatient Records Reviewed [Y/N]:

## 2020-02-28 NOTE — PROGRESS NOTE ADULT - REASON FOR ADMISSION
SAH, IPH

## 2020-02-28 NOTE — DISCHARGE NOTE PROVIDER - CARE PROVIDERS DIRECT ADDRESSES
[email or web address]

## 2020-03-02 PROBLEM — Z00.00 ENCOUNTER FOR PREVENTIVE HEALTH EXAMINATION: Noted: 2020-03-02

## 2020-03-09 ENCOUNTER — APPOINTMENT (OUTPATIENT)
Dept: NEUROSURGERY | Facility: CLINIC | Age: 40
End: 2020-03-09

## 2020-03-19 ENCOUNTER — APPOINTMENT (OUTPATIENT)
Dept: NEUROSURGERY | Facility: HOSPITAL | Age: 40
End: 2020-03-19

## 2020-03-19 ENCOUNTER — APPOINTMENT (OUTPATIENT)
Dept: OTOLARYNGOLOGY | Facility: HOSPITAL | Age: 40
End: 2020-03-19

## 2020-04-16 ENCOUNTER — APPOINTMENT (OUTPATIENT)
Dept: NEUROSURGERY | Facility: CLINIC | Age: 40
End: 2020-04-16

## 2020-04-16 ENCOUNTER — APPOINTMENT (OUTPATIENT)
Dept: NEUROSURGERY | Facility: HOSPITAL | Age: 40
End: 2020-04-16

## 2020-05-08 ENCOUNTER — APPOINTMENT (OUTPATIENT)
Dept: NEUROSURGERY | Facility: CLINIC | Age: 40
End: 2020-05-08
Payer: COMMERCIAL

## 2020-05-08 PROCEDURE — 99213 OFFICE O/P EST LOW 20 MIN: CPT | Mod: 95

## 2020-05-12 NOTE — PHYSICAL EXAM
[General Appearance - Alert] : alert [General Appearance - In No Acute Distress] : in no acute distress [General Appearance - Well Nourished] : well nourished [General Appearance - Well Developed] : well developed [] : normal voice and communication [General Appearance - Well-Appearing] : healthy appearing [Oriented To Time, Place, And Person] : oriented to person, place, and time [Impaired Insight] : insight and judgment were intact [Affect] : the affect was normal [Memory Recent] : recent memory was not impaired [Person] : oriented to person [Place] : oriented to place [Time] : oriented to time [FreeTextEntry5] : left facial droop

## 2020-05-12 NOTE — ASSESSMENT
[FreeTextEntry1] : IMPRESSION:\par 1. Pt is pending cranioplasty.  \par \par PLAN:\par 1. Cranioplasty will be done as soon as Covid-19 crisis permits.\par 2. Advised patient to keep wearing helmet until surgery.\par

## 2020-05-12 NOTE — HISTORY OF PRESENT ILLNESS
[Home] : at home, [unfilled] , at the time of the visit. [Mother] : mother [Medical Office: ([facility])___] : at the medical office located in  [Patient] : the patient [Self] : self [FreeTextEntry1] : Hospital Course: \par Discharge Date	28-Feb-2020 \par Admission Date	03-Feb-2020 13:09 \par Reason for Admission	SAH, IPH \par Hospital Course	 \par 39 year old female, with no pertinent past medical history, found unresponsive in an airplane wherein she was taken to [facility] where she was found to have a diffuse subarachnoid hemorrhage and a right temporal clot. She was then transferred to 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 Ordonez's service and taken to the OR for s/p right craniectomy for evacuation of ICH and clipping of posterior communicating aneurysm (no bone flap). Patient tolerated procedure well and came to the Neuro ICU wherein she was extubated on 2/4/20. Patient then went to IR on 2/5/20 for s/p angio: clip check which shows in good position and no vasospasm. 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 Patient stayed in the ICU for vasospasm watch, she had transcranial dopplers which were mildly elevated (120's) with no exam change and by 2/13 they were within normal limits. Patient's course was also complicated by fevers \par for which she was found to have right basilic and right and left cephalic superficial thrombophlebitis and also a UTI - EColi that she was treated with Levaquin, was being followed by Infectious disease team. Patient underwent a clamp trial for which she passed / neurologically stable her EVD was removed on \par 2/15 had a tract hemorrhage on post-pull CT Brain however was stable on follow up, patient remained neurologically stable. Patient was transitioned to [facility] on 2/19. Patient continued to work with Physical Therapy / Occupational Therapy and PM&R and deemed a candidate for acute TBI rehab. Patient was found to be impulsive on the floor and that resulted on a fall without her helmet on, she had a CT Brain afterwards which was stable. Due to impulsivity patient requires enhanced supervision. On 2/28 patient is neurologically and hemodynamically stable for discharge to rehab, she has been accepted to [facility]. \par \par Today she is participating in a telehealth visit with parents.  She states that she feels well.  Left facial droop present. She is looking forward to having cranioplasty done and to begin exercising again.\par \par telehealth visit started at 12:47 - 1 pm.\par

## 2020-06-12 ENCOUNTER — OUTPATIENT (OUTPATIENT)
Dept: OUTPATIENT SERVICES | Facility: HOSPITAL | Age: 40
LOS: 1 days | End: 2020-06-12
Payer: COMMERCIAL

## 2020-06-12 VITALS
HEIGHT: 63 IN | HEART RATE: 76 BPM | SYSTOLIC BLOOD PRESSURE: 136 MMHG | RESPIRATION RATE: 15 BRPM | DIASTOLIC BLOOD PRESSURE: 82 MMHG | OXYGEN SATURATION: 97 % | WEIGHT: 121.03 LBS | TEMPERATURE: 99 F

## 2020-06-12 DIAGNOSIS — Z01.818 ENCOUNTER FOR OTHER PREPROCEDURAL EXAMINATION: ICD-10-CM

## 2020-06-12 DIAGNOSIS — I61.9 NONTRAUMATIC INTRACEREBRAL HEMORRHAGE, UNSPECIFIED: ICD-10-CM

## 2020-06-12 DIAGNOSIS — Z98.890 OTHER SPECIFIED POSTPROCEDURAL STATES: Chronic | ICD-10-CM

## 2020-06-12 PROCEDURE — 86850 RBC ANTIBODY SCREEN: CPT

## 2020-06-12 PROCEDURE — 86901 BLOOD TYPING SEROLOGIC RH(D): CPT

## 2020-06-12 PROCEDURE — 85027 COMPLETE CBC AUTOMATED: CPT

## 2020-06-12 PROCEDURE — U0003: CPT

## 2020-06-12 PROCEDURE — 87641 MR-STAPH DNA AMP PROBE: CPT

## 2020-06-12 PROCEDURE — 80048 BASIC METABOLIC PNL TOTAL CA: CPT

## 2020-06-12 PROCEDURE — G0463: CPT

## 2020-06-12 PROCEDURE — 87640 STAPH A DNA AMP PROBE: CPT

## 2020-06-12 PROCEDURE — 86900 BLOOD TYPING SEROLOGIC ABO: CPT

## 2020-06-12 RX ORDER — SODIUM CHLORIDE 9 MG/ML
3 INJECTION INTRAMUSCULAR; INTRAVENOUS; SUBCUTANEOUS EVERY 8 HOURS
Refills: 0 | Status: DISCONTINUED | OUTPATIENT
Start: 2020-06-15 | End: 2020-06-15

## 2020-06-12 RX ORDER — CHLORHEXIDINE GLUCONATE 213 G/1000ML
1 SOLUTION TOPICAL ONCE
Refills: 0 | Status: DISCONTINUED | OUTPATIENT
Start: 2020-06-15 | End: 2020-06-17

## 2020-06-12 RX ORDER — VANCOMYCIN HCL 1 G
750 VIAL (EA) INTRAVENOUS ONCE
Refills: 0 | Status: DISCONTINUED | OUTPATIENT
Start: 2020-06-15 | End: 2020-06-17

## 2020-06-12 RX ORDER — LIDOCAINE HCL 20 MG/ML
0.2 VIAL (ML) INJECTION ONCE
Refills: 0 | Status: DISCONTINUED | OUTPATIENT
Start: 2020-06-15 | End: 2020-06-15

## 2020-06-12 RX ORDER — ASCORBIC ACID 60 MG
0 TABLET,CHEWABLE ORAL
Qty: 0 | Refills: 0 | DISCHARGE

## 2020-06-12 NOTE — H&P PST ADULT - ASSESSMENT
CAPRINI SCORE [CLOT]    AGE RELATED RISK FACTORS                                                       MOBILITY RELATED FACTORS  [ ] Age 41-60 years                                            (1 Point)                  [ ] Bed rest                                                        (1 Point)  [ ] Age: 61-74 years                                           (2 Points)                 [ ] Plaster cast                                                   (2 Points)  [ ] Age= 75 years                                              (3 Points)                 [ ] Bed bound for more than 72 hours                 (2 Points)    DISEASE RELATED RISK FACTORS                                               GENDER SPECIFIC FACTORS  [ ] Edema in the lower extremities                       (1 Point)                  [ ] Pregnancy                                                     (1 Point)  [ ] Varicose veins                                               (1 Point)                  [ ] Post-partum < 6 weeks                                   (1 Point)             [ ] BMI > 25 Kg/m2                                            (1 Point)                  [ ] Hormonal therapy  or oral contraception          (1 Point)                 [ ] Sepsis (in the previous month)                        (1 Point)                  [ ] History of pregnancy complications                 (1 point)  [ ] Pneumonia or serious lung disease                                               [ ] Unexplained or recurrent                     (1 Point)           (in the previous month)                               (1 Point)  [ ] Abnormal pulmonary function test                     (1 Point)                 SURGERY RELATED RISK FACTORS  [ ] Acute myocardial infarction                              (1 Point)                 [ ]  Section                                             (1 Point)  [ ] Congestive heart failure (in the previous month)  (1 Point)               [ ] Minor surgery                                                  (1 Point)   [ ] Inflammatory bowel disease                             (1 Point)                 [ ] Arthroscopic surgery                                        (2 Points)  [ ] Central venous access                                      (2 Points)                [x ] General surgery lasting more than 45 minutes   (2 Points)       [ ] Stroke (in the previous month)                          (5 Points)               [ ] Elective arthroplasty                                         (5 Points)                                                                                                                                               HEMATOLOGY RELATED FACTORS                                                 TRAUMA RELATED RISK FACTORS  [ ] Prior episodes of VTE                                     (3 Points)                 [ ] Fracture of the hip, pelvis, or leg                       (5 Points)  [ ] Positive family history for VTE                         (3 Points)                 [ ] Acute spinal cord injury (in the previous month)  (5 Points)  [ ] Prothrombin 44234 A                                     (3 Points)                 [ ] Paralysis  (less than 1 month)                             (5 Points)  [ ] Factor V Leiden                                             (3 Points)                  [ ] Multiple Trauma within 1 month                        (5 Points)  [ ] Lupus anticoagulants                                     (3 Points)                                                           [ ] Anticardiolipin antibodies                               (3 Points)                                                       [ ] High homocysteine in the blood                      (3 Points)                                             [ ] Other congenital or acquired thrombophilia      (3 Points)                                                [ ] Heparin induced thrombocytopenia                  (3 Points)                                          Total Score [         2 ]  The Caprini score indicates that this patient is low risk for a VTE event (score 0-2).    VTE prophylaxis should focus on early ambulation.    Intermittent compression devices (IPC) may be of benefit to some patients.

## 2020-06-12 NOTE — H&P PST ADULT - NSICDXPASTMEDICALHX_GEN_ALL_CORE_FT
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

## 2020-06-12 NOTE — H&P PST ADULT - HISTORY OF PRESENT ILLNESS
40 year old female in 2/2020 collapsed in airport sent to ER dx with cerebral hemorrhage. Work dx with aneurysm had surgery and clip.  Sent to [facility] for rehab. Now s/p inpatient rehab living in [place]  with parents.  For cranioplasty.    ***COVID   denies s/s   states no one in family has s/s of COVID   travel to and from [place]  (parents drive her back and forth)    sent culture  today 40 year old female in 2/2020 collapsed in airport sent to ER dx with cerebral hemorrhage. Work up  dx with aneurysm had surgery and clip.  Sent to [facility] for rehab. Now s/p inpatient rehab living in [place]  with parents.  For cranioplasty.    ***COVID   denies s/s   states no one in family has s/s of COVID   travel to and from [place]  (parents drive her back and forth)    sent culture  today

## 2020-06-12 NOTE — H&P PST ADULT - NSICDXPROBLEM_GEN_ALL_CORE_FT
PROBLEM DIAGNOSES  Problem: Nontraumatic intracerebral hemorrhage  Assessment and Plan: Right Cranioplasty with  [place] Flap

## 2020-06-12 NOTE — H&P PST ADULT - NSANTHOSAYNRD_GEN_A_CORE
No. PAM screening performed.  STOP BANG Legend: 0-2 = LOW Risk; 3-4 = INTERMEDIATE Risk; 5-8 = HIGH Risk

## 2020-06-12 NOTE — H&P PST ADULT - VISION (WITH CORRECTIVE LENSES IF THE PATIENT USUALLY WEARS THEM):
Patient would like communication of their results via:      Cell Phone:   Telephone Information:   Mobile [phone]     Okay to leave a message containing results? Yes     There are no preventive care reminders to display for this patient.  Patient is up to date, no discussion needed..                   Normal vision: sees adequately in most situations; can see medication labels, newsprint

## 2020-06-14 ENCOUNTER — TRANSCRIPTION ENCOUNTER (OUTPATIENT)
Age: 40
End: 2020-06-14

## 2020-06-15 ENCOUNTER — INPATIENT (INPATIENT)
Facility: HOSPITAL | Age: 40
LOS: 1 days | Discharge: HOME | DRG: 517 | End: 2020-06-17
Attending: NEUROLOGICAL SURGERY | Admitting: NEUROLOGICAL SURGERY
Payer: COMMERCIAL

## 2020-06-15 ENCOUNTER — APPOINTMENT (OUTPATIENT)
Dept: NEUROSURGERY | Facility: CLINIC | Age: 40
End: 2020-06-15

## 2020-06-15 VITALS
OXYGEN SATURATION: 98 % | DIASTOLIC BLOOD PRESSURE: 92 MMHG | SYSTOLIC BLOOD PRESSURE: 130 MMHG | WEIGHT: 121.03 LBS | HEART RATE: 70 BPM | HEIGHT: 63 IN | TEMPERATURE: 98 F | RESPIRATION RATE: 16 BRPM

## 2020-06-15 DIAGNOSIS — Z01.818 ENCOUNTER FOR OTHER PREPROCEDURAL EXAMINATION: ICD-10-CM

## 2020-06-15 DIAGNOSIS — Z98.890 OTHER SPECIFIED POSTPROCEDURAL STATES: Chronic | ICD-10-CM

## 2020-06-15 DIAGNOSIS — I61.9 NONTRAUMATIC INTRACEREBRAL HEMORRHAGE, UNSPECIFIED: ICD-10-CM

## 2020-06-15 LAB
APTT BLD: 36.4 SEC — HIGH (ref 27.5–36.3)
INR BLD: 1.03 RATIO — SIGNIFICANT CHANGE UP (ref 0.88–1.16)
PROTHROM AB SERPL-ACNC: 11.7 SEC — SIGNIFICANT CHANGE UP (ref 10–12.9)

## 2020-06-15 PROCEDURE — 62141 CRNOP SKULL DEFECT>5 CM DIAM: CPT

## 2020-06-15 RX ORDER — FENTANYL CITRATE 50 UG/ML
25 INJECTION INTRAVENOUS
Refills: 0 | Status: DISCONTINUED | OUTPATIENT
Start: 2020-06-15 | End: 2020-06-16

## 2020-06-15 RX ORDER — OXYCODONE HYDROCHLORIDE 5 MG/1
5 TABLET ORAL EVERY 6 HOURS
Refills: 0 | Status: DISCONTINUED | OUTPATIENT
Start: 2020-06-15 | End: 2020-06-17

## 2020-06-15 RX ORDER — FENTANYL CITRATE 50 UG/ML
50 INJECTION INTRAVENOUS ONCE
Refills: 0 | Status: DISCONTINUED | OUTPATIENT
Start: 2020-06-15 | End: 2020-06-15

## 2020-06-15 RX ORDER — SODIUM CHLORIDE 9 MG/ML
1000 INJECTION INTRAMUSCULAR; INTRAVENOUS; SUBCUTANEOUS
Refills: 0 | Status: DISCONTINUED | OUTPATIENT
Start: 2020-06-15 | End: 2020-06-16

## 2020-06-15 RX ORDER — OXYCODONE HYDROCHLORIDE 5 MG/1
5 TABLET ORAL ONCE
Refills: 0 | Status: DISCONTINUED | OUTPATIENT
Start: 2020-06-15 | End: 2020-06-16

## 2020-06-15 RX ORDER — ACETAMINOPHEN 500 MG
325 TABLET ORAL EVERY 4 HOURS
Refills: 0 | Status: DISCONTINUED | OUTPATIENT
Start: 2020-06-15 | End: 2020-06-17

## 2020-06-15 RX ORDER — ONDANSETRON 8 MG/1
4 TABLET, FILM COATED ORAL ONCE
Refills: 0 | Status: DISCONTINUED | OUTPATIENT
Start: 2020-06-15 | End: 2020-06-16

## 2020-06-15 RX ORDER — OXYCODONE HYDROCHLORIDE 5 MG/1
10 TABLET ORAL EVERY 6 HOURS
Refills: 0 | Status: DISCONTINUED | OUTPATIENT
Start: 2020-06-15 | End: 2020-06-17

## 2020-06-15 RX ADMIN — OXYCODONE HYDROCHLORIDE 10 MILLIGRAM(S): 5 TABLET ORAL at 21:11

## 2020-06-15 RX ADMIN — FENTANYL CITRATE 50 MICROGRAM(S): 50 INJECTION INTRAVENOUS at 22:30

## 2020-06-15 RX ADMIN — SODIUM CHLORIDE 75 MILLILITER(S): 9 INJECTION INTRAMUSCULAR; INTRAVENOUS; SUBCUTANEOUS at 21:11

## 2020-06-15 NOTE — BRIEF OPERATIVE NOTE - NSICDXBRIEFPROCEDURE_GEN_ALL_CORE_FT
PROCEDURES:  Cranioplasty for skull defect larger than 5 cm diameter 15-Mike-2020 20:11:27  Malik Herr

## 2020-06-16 LAB
ANION GAP SERPL CALC-SCNC: 9 MMOL/L — SIGNIFICANT CHANGE UP (ref 5–17)
BUN SERPL-MCNC: 10 MG/DL — SIGNIFICANT CHANGE UP (ref 7–23)
CALCIUM SERPL-MCNC: 8.3 MG/DL — LOW (ref 8.4–10.5)
CHLORIDE SERPL-SCNC: 105 MMOL/L — SIGNIFICANT CHANGE UP (ref 96–108)
CO2 SERPL-SCNC: 22 MMOL/L — SIGNIFICANT CHANGE UP (ref 22–31)
CREAT SERPL-MCNC: 0.6 MG/DL — SIGNIFICANT CHANGE UP (ref 0.5–1.3)
GLUCOSE SERPL-MCNC: 113 MG/DL — HIGH (ref 70–99)
HCT VFR BLD CALC: 39.4 % — SIGNIFICANT CHANGE UP (ref 34.5–45)
HGB BLD-MCNC: 12.6 G/DL — SIGNIFICANT CHANGE UP (ref 11.5–15.5)
MCHC RBC-ENTMCNC: 27.6 PG — SIGNIFICANT CHANGE UP (ref 27–34)
MCHC RBC-ENTMCNC: 32 GM/DL — SIGNIFICANT CHANGE UP (ref 32–36)
MCV RBC AUTO: 86.4 FL — SIGNIFICANT CHANGE UP (ref 80–100)
NRBC # BLD: 0 /100 WBCS — SIGNIFICANT CHANGE UP (ref 0–0)
PLATELET # BLD AUTO: 233 K/UL — SIGNIFICANT CHANGE UP (ref 150–400)
POTASSIUM SERPL-MCNC: 4.3 MMOL/L — SIGNIFICANT CHANGE UP (ref 3.5–5.3)
POTASSIUM SERPL-SCNC: 4.3 MMOL/L — SIGNIFICANT CHANGE UP (ref 3.5–5.3)
RBC # BLD: 4.56 M/UL — SIGNIFICANT CHANGE UP (ref 3.8–5.2)
RBC # FLD: 11.9 % — SIGNIFICANT CHANGE UP (ref 10.3–14.5)
SODIUM SERPL-SCNC: 136 MMOL/L — SIGNIFICANT CHANGE UP (ref 135–145)
WBC # BLD: 8.39 K/UL — SIGNIFICANT CHANGE UP (ref 3.8–10.5)
WBC # FLD AUTO: 8.39 K/UL — SIGNIFICANT CHANGE UP (ref 3.8–10.5)

## 2020-06-16 PROCEDURE — 70450 CT HEAD/BRAIN W/O DYE: CPT | Mod: 26

## 2020-06-16 RX ORDER — ONDANSETRON 8 MG/1
4 TABLET, FILM COATED ORAL EVERY 6 HOURS
Refills: 0 | Status: DISCONTINUED | OUTPATIENT
Start: 2020-06-16 | End: 2020-06-17

## 2020-06-16 RX ORDER — SENNA PLUS 8.6 MG/1
2 TABLET ORAL AT BEDTIME
Refills: 0 | Status: DISCONTINUED | OUTPATIENT
Start: 2020-06-16 | End: 2020-06-17

## 2020-06-16 RX ORDER — CALCIUM GLUCONATE 100 MG/ML
1 VIAL (ML) INTRAVENOUS ONCE
Refills: 0 | Status: COMPLETED | OUTPATIENT
Start: 2020-06-16 | End: 2020-06-16

## 2020-06-16 RX ORDER — ONDANSETRON 8 MG/1
4 TABLET, FILM COATED ORAL EVERY 6 HOURS
Refills: 0 | Status: DISCONTINUED | OUTPATIENT
Start: 2020-06-16 | End: 2020-06-16

## 2020-06-16 RX ORDER — POLYETHYLENE GLYCOL 3350 17 G/17G
17 POWDER, FOR SOLUTION ORAL
Refills: 0 | Status: DISCONTINUED | OUTPATIENT
Start: 2020-06-16 | End: 2020-06-17

## 2020-06-16 RX ADMIN — ONDANSETRON 4 MILLIGRAM(S): 8 TABLET, FILM COATED ORAL at 02:00

## 2020-06-16 RX ADMIN — OXYCODONE HYDROCHLORIDE 10 MILLIGRAM(S): 5 TABLET ORAL at 22:34

## 2020-06-16 RX ADMIN — ONDANSETRON 4 MILLIGRAM(S): 8 TABLET, FILM COATED ORAL at 16:40

## 2020-06-16 RX ADMIN — ONDANSETRON 4 MILLIGRAM(S): 8 TABLET, FILM COATED ORAL at 23:12

## 2020-06-16 RX ADMIN — Medication 100 GRAM(S): at 14:04

## 2020-06-16 RX ADMIN — POLYETHYLENE GLYCOL 3350 17 GRAM(S): 17 POWDER, FOR SOLUTION ORAL at 17:40

## 2020-06-16 RX ADMIN — ONDANSETRON 4 MILLIGRAM(S): 8 TABLET, FILM COATED ORAL at 10:54

## 2020-06-16 RX ADMIN — OXYCODONE HYDROCHLORIDE 10 MILLIGRAM(S): 5 TABLET ORAL at 04:51

## 2020-06-16 RX ADMIN — OXYCODONE HYDROCHLORIDE 10 MILLIGRAM(S): 5 TABLET ORAL at 16:33

## 2020-06-16 NOTE — PHYSICAL THERAPY INITIAL EVALUATION ADULT - ADDITIONAL COMMENTS
currently living with parents in [place] in private house with 2 steps to enter without rail, 1 flight inside with 1 rail, right hand dominant was going for out patient OT and Speech therapy

## 2020-06-16 NOTE — PROGRESS NOTE ADULT - SUBJECTIVE AND OBJECTIVE BOX
Patient was seen at bedside this am.    OVERNIGHT EVENTS:  - s/p right side cranioplasty last night, CT head post op done    Vital Signs Last 24 Hrs  T(C): 36.8 (16 Jun 2020 07:08), Max: 36.8 (15 Mike 2020 11:58)  T(F): 98.3 (16 Jun 2020 07:08), Max: 98.3 (16 Jun 2020 07:08)  HR: 75 (16 Jun 2020 07:08) (63 - 78)  BP: 117/73 (16 Jun 2020 07:08) (117/70 - 181/72)  BP(mean): 96 (16 Jun 2020 00:25) (87 - 103)  RR: 18 (16 Jun 2020 07:08) (14 - 188)  SpO2: 98% (16 Jun 2020 07:08) (96% - 100%)    I&O's Detail    15 Mike 2020 07:01  -  16 Jun 2020 07:00  --------------------------------------------------------  IN:    Oral Fluid: 240 mL    sodium chloride 0.9%.: 825 mL  Total IN: 1065 mL    OUT:    Bulb: 170 mL  Total OUT: 170 mL    Total NET: 895 mL        I&O's Summary    15 Mike 2020 07:01  -  16 Jun 2020 07:00  --------------------------------------------------------  IN: 1065 mL / OUT: 170 mL / NET: 895 mL        PHYSICAL EXAM:  Neurological:  Cardiovascular: +s1, s2  Respiratory: clear to auscultation b/l  Gastrointestinal: soft, non-distended, non-tender  Extremities:  Incision/Wound:        LABS:          PT/INR - ( 15 Mike 2020 12:03 )   PT: 11.7 sec;   INR: 1.03 ratio         PTT - ( 15 Mike 2020 12:03 )  PTT:36.4 sec        CAPILLARY BLOOD GLUCOSE          Drug Levels: [] N/A    CSF Analysis: [] N/A      Allergies    clindamycin (Anaphylaxis; Hives)  penicillins (Anaphylaxis; Hives)  shellfish (Anaphylaxis)  sulfa drugs (Unknown)    Intolerances    Gluten (Stomach Upset)  Soy (Unknown)  tuna (Unknown)    MEDICATIONS:  Antibiotics:  vancomycin  IVPB 750 milliGRAM(s) IV Intermittent once    Neuro:  acetaminophen   Tablet .. 325 milliGRAM(s) Oral every 4 hours PRN  ondansetron Injectable 4 milliGRAM(s) IV Push every 6 hours  oxyCODONE    IR 5 milliGRAM(s) Oral every 6 hours PRN  oxyCODONE    IR 10 milliGRAM(s) Oral every 6 hours PRN    Anticoagulation:    OTHER:  chlorhexidine 2% Cloths 1 Application(s) Topical once    IVF:  sodium chloride 0.9%. 1000 milliLiter(s) IV Continuous <Continuous>    CULTURES:    RADIOLOGY & ADDITIONAL TESTS: Patient was seen at bedside this am. Patient c/o headache but got controlled with current pain regiments. Denied any new focal weakness, cp, sob, n/v, or abd pain.    OVERNIGHT EVENTS:  - s/p right side cranioplasty last night, CT head post op done    Vital Signs Last 24 Hrs  T(C): 36.8 (16 Jun 2020 07:08), Max: 36.8 (15 Mike 2020 11:58)  T(F): 98.3 (16 Jun 2020 07:08), Max: 98.3 (16 Jun 2020 07:08)  HR: 75 (16 Jun 2020 07:08) (63 - 78)  BP: 117/73 (16 Jun 2020 07:08) (117/70 - 181/72)  BP(mean): 96 (16 Jun 2020 00:25) (87 - 103)  RR: 18 (16 Jun 2020 07:08) (14 - 188)  SpO2: 98% (16 Jun 2020 07:08) (96% - 100%)    I&O's Detail    15 Mike 2020 07:01  -  16 Jun 2020 07:00  --------------------------------------------------------  IN:    Oral Fluid: 240 mL    sodium chloride 0.9%.: 825 mL  Total IN: 1065 mL    OUT:    Bulb: 170 mL  Total OUT: 170 mL    Total NET: 895 mL        I&O's Summary    15 Mike 2020 07:01  -  16 Jun 2020 07:00  --------------------------------------------------------  IN: 1065 mL / OUT: 170 mL / NET: 895 mL        PHYSICAL EXAM:  Neurological: Awake, alert, oriented to person, place, and date, PERRL, Lt facial, following commands, no drift, moving all extremities 5/5, left hand decrease sensation and hypersensitivity to heat at baseline, otherwise sensation intact to light touch  Cardiovascular: +s1, s2  Respiratory: clear to auscultation b/l  Gastrointestinal: soft, non-distended, non-tender  Extremities: warm, dry, no calf tendernss  Incision/Wound: incision site dressing C/D/I, +SG FELICIA        LABS:          PT/INR - ( 15 Mike 2020 12:03 )   PT: 11.7 sec;   INR: 1.03 ratio         PTT - ( 15 Mike 2020 12:03 )  PTT:36.4 sec        CAPILLARY BLOOD GLUCOSE          Drug Levels: [] N/A    CSF Analysis: [] N/A      Allergies    clindamycin (Anaphylaxis; Hives)  penicillins (Anaphylaxis; Hives)  shellfish (Anaphylaxis)  sulfa drugs (Unknown)    Intolerances    Gluten (Stomach Upset)  Soy (Unknown)  tuna (Unknown)    MEDICATIONS:  Antibiotics:  vancomycin  IVPB 750 milliGRAM(s) IV Intermittent once    Neuro:  acetaminophen   Tablet .. 325 milliGRAM(s) Oral every 4 hours PRN  ondansetron Injectable 4 milliGRAM(s) IV Push every 6 hours  oxyCODONE    IR 5 milliGRAM(s) Oral every 6 hours PRN  oxyCODONE    IR 10 milliGRAM(s) Oral every 6 hours PRN    Anticoagulation:    OTHER:  chlorhexidine 2% Cloths 1 Application(s) Topical once    IVF:  sodium chloride 0.9%. 1000 milliLiter(s) IV Continuous <Continuous>    CULTURES:    RADIOLOGY & ADDITIONAL TESTS:

## 2020-06-16 NOTE — PROGRESS NOTE ADULT - ASSESSMENT
HPI:  40 year old female in 2/2020 collapsed in airport sent to ER dx with cerebral hemorrhage. Work up  dx with aneurysm had surgery and clip.  Sent to [facility] for rehab. Now s/p inpatient rehab living in [place]  with parents.  For cranioplasty.    ***COVID   denies s/s   states no one in family has s/s of COVID   travel to and from [place]  (parents drive her back and forth)    sent culture  today (12 Jun 2020 13:24)    PROCEDURE:   s/p right side cranioplasty  POD#1    PLAN:  - CT head post op showed very small extra-axial heme along the right frontal convexity but otherwise stable  - pain control with prn tylenol and oxycodone  - monitor drain output, last 24 hour output 170ml  - will f/u postop labs today  - regular diet, will IVL today  - senna, miralax for bowel regiments  - encourage activity increase as tolerated  - incentive spirometer  - DVT ppx: b/l SCDs, will start DVT ppx if post op H/H stable  Discharge planning: PT/OT pending    will discuss with Dr. Ordonez    [facility] [id number] HPI:  40 year old female in 2/2020 collapsed in airport sent to ER dx with cerebral hemorrhage. Work up  dx with aneurysm had surgery and clip.  Sent to [facility] for rehab. Now s/p inpatient rehab living in [place]  with parents.  For cranioplasty.    ***COVID   denies s/s   states no one in family has s/s of COVID   travel to and from [place]  (parents drive her back and forth)    sent culture  today (12 Jun 2020 13:24)    PROCEDURE:   s/p right side cranioplasty  POD#1    PLAN:  - CT head post op showed very small extra-axial heme along the right frontal convexity but otherwise stable, will repeat CT head tomorrow  - pain control with prn tylenol and oxycodone  - monitor drain output, last 24 hour output 170ml  - regular diet, will IVL today  - senna, miralax for bowel regiments  - encourage activity mobilize as tolerated  - incentive spirometer  - DVT ppx: b/l SCDs, given extra axial heme, will hold off on SQL   - PT/OT- outpatient OT and speech therapy  Discharge planning: likely discharge tomorrow if CT head stable; Patient is from [place], she will be going to hotel for 1 to 2 days before family drives her back home.    Plan discussed with Dr. Ordonez    [facility] [id number]

## 2020-06-16 NOTE — PHYSICAL THERAPY INITIAL EVALUATION ADULT - PRECAUTIONS/LIMITATIONS, REHAB EVAL
fall precautions fall precautions/CTH:  Interval right-sided cranioplasty changes with a very small amount of subjacent extra-axial hemorrhage along the right frontal convexity. Resolution of previously seen hemorrhage within the right ventral thalamus and basal ganglia with development of encephalomalacia and gliosis.

## 2020-06-16 NOTE — PHYSICAL THERAPY INITIAL EVALUATION ADULT - PERTINENT HX OF CURRENT PROBLEM, REHAB EVAL
pt known from previous admission where she was found unresponsive on airplane, CT 2/3: R temporal parenchymal hemorrhage with diffuse SAH slightly greater on R, Slight prominence of the L temporal horn. CTA 2/3: R posterior communicating artery aneurysm. s/p emergent R craniectomy, pcom aneurysm clipped, removal of hematoma 2/3, s/p L EVD 2/3. pt d/c'd to [facility] in patient rehab & has been home in [place] with parents. pt now s/p R custom cranioplasty 6/15

## 2020-06-16 NOTE — CHART NOTE - NSCHARTNOTEFT_GEN_A_CORE
CAPRINI SCORE [CLOT] Score on Admission for     AGE RELATED RISK FACTORS                                                       MOBILITY RELATED FACTORS  [ ] Age 41-60 years                                            (1 Point)                  [ ] Bed rest                                                        (1 Point)  [ ] Age: 61-74 years                                           (2 Points)                 [ ] Plaster cast                                                   (2 Points)  [ ] Age= 75 years                                              (3 Points)                 [ ] Bed bound for more than 72 hours                 (2 Points)    DISEASE RELATED RISK FACTORS                                               GENDER SPECIFIC FACTORS  [ ] Edema in the lower extremities                       (1 Point)                  [ ] Pregnancy                                                     (1 Point)  [ ] Varicose veins                                               (1 Point)                  [ ] Post-partum < 6 weeks                                   (1 Point)             [ ] BMI > 25 Kg/m2                                            (1 Point)                  [ ] Hormonal therapy  or oral contraception          (1 Point)                 [ ] Sepsis (in the previous month)                        (1 Point)                  [ ] History of pregnancy complications                 (1 point)  [ ] Pneumonia or serious lung disease                                               [ ] Unexplained or recurrent                     (1 Point)           (in the previous month)                               (1 Point)  [ ] Abnormal pulmonary function test                     (1 Point)                 SURGERY RELATED RISK FACTORS (include planned surgeries)  [ ] Acute myocardial infarction                              (1 Point)                 [ ]  Section                                             (1 Point)  [ ] Congestive heart failure (in the previous month)  (1 Point)         [ ] Minor surgery                                                  (1 Point)   [ ] Inflammatory bowel disease                             (1 Point)                 [ ] Arthroscopic surgery                                        (2 Points)  [ ] Central venous access                                      (2 Points)                [ x] General surgery lasting more than 45 minutes   (2 Points)       [ ] Stroke (in the previous month)                          (5 Points)               [ ] Elective arthroplasty                                         (5 Points)            [ ] current or past malignancy                              (2 Points)                                                                                                       HEMATOLOGY RELATED FACTORS                                                 TRAUMA RELATED RISK FACTORS  [ ] Prior episodes of VTE                                     (3 Points)                [ ] Fracture of the hip, pelvis, or leg                       (5 Points)  [ ] Positive family history for VTE                         (3 Points)                 [ ] Acute spinal cord injury (in the previous month)  (5 Points)  [ ] Prothrombin 48035 A                                     (3 Points)                 [ ] Paralysis  (less than 1 month)                             (5 Points)  [ ] Factor V Leiden                                             (3 Points)                  [ ] Multiple Trauma within 1 month                        (5 Points)  [ ] Lupus anticoagulants                                     (3 Points)                                                           [ ] Anticardiolipin antibodies                               (3 Points)                                                       [ ] High homocysteine in the blood                      (3 Points)                                             [ ] Other congenital or acquired thrombophilia      (3 Points)                                                [ ] Heparin induced thrombocytopenia                  (3 Points)                                          Total Score [     2     ]    Risk:  Very low 0   Low 1 to 2   Moderate 3 to 4   High =5       VTE Prophylasix Recommednations:  [x ] mechanical pneumatic compression devices                                      [ ] contraindicated: _____________________  [ ] chemo prophylasix                                                                                   [ x] contraindicated _fresh post op____________________    **** HIGH LIKELIHOOD DVT PRESENT ON ADMISSION  [ ] (please order LE dopplers within 24 hours of admission)

## 2020-06-17 ENCOUNTER — TRANSCRIPTION ENCOUNTER (OUTPATIENT)
Age: 40
End: 2020-06-17

## 2020-06-17 VITALS
TEMPERATURE: 98 F | RESPIRATION RATE: 16 BRPM | DIASTOLIC BLOOD PRESSURE: 83 MMHG | OXYGEN SATURATION: 98 % | HEART RATE: 73 BPM | SYSTOLIC BLOOD PRESSURE: 117 MMHG

## 2020-06-17 PROBLEM — R20.3 HYPERESTHESIA: Chronic | Status: ACTIVE | Noted: 2020-06-12

## 2020-06-17 PROBLEM — I67.1 CEREBRAL ANEURYSM, NONRUPTURED: Chronic | Status: ACTIVE | Noted: 2020-06-12

## 2020-06-17 PROBLEM — I61.9 NONTRAUMATIC INTRACEREBRAL HEMORRHAGE, UNSPECIFIED: Chronic | Status: ACTIVE | Noted: 2020-06-12

## 2020-06-17 PROCEDURE — 70450 CT HEAD/BRAIN W/O DYE: CPT

## 2020-06-17 PROCEDURE — 85730 THROMBOPLASTIN TIME PARTIAL: CPT

## 2020-06-17 PROCEDURE — 85027 COMPLETE CBC AUTOMATED: CPT

## 2020-06-17 PROCEDURE — C1713: CPT

## 2020-06-17 PROCEDURE — 97165 OT EVAL LOW COMPLEX 30 MIN: CPT

## 2020-06-17 PROCEDURE — 70450 CT HEAD/BRAIN W/O DYE: CPT | Mod: 26

## 2020-06-17 PROCEDURE — C1889: CPT

## 2020-06-17 PROCEDURE — 80048 BASIC METABOLIC PNL TOTAL CA: CPT

## 2020-06-17 PROCEDURE — 97161 PT EVAL LOW COMPLEX 20 MIN: CPT

## 2020-06-17 PROCEDURE — 85610 PROTHROMBIN TIME: CPT

## 2020-06-17 PROCEDURE — 81025 URINE PREGNANCY TEST: CPT

## 2020-06-17 RX ORDER — FLUCONAZOLE 150 MG/1
1 TABLET ORAL
Qty: 0 | Refills: 0 | DISCHARGE

## 2020-06-17 RX ORDER — OXYCODONE HYDROCHLORIDE 5 MG/1
1 TABLET ORAL
Qty: 10 | Refills: 0
Start: 2020-06-17

## 2020-06-17 RX ADMIN — ONDANSETRON 4 MILLIGRAM(S): 8 TABLET, FILM COATED ORAL at 09:56

## 2020-06-17 RX ADMIN — POLYETHYLENE GLYCOL 3350 17 GRAM(S): 17 POWDER, FOR SOLUTION ORAL at 05:21

## 2020-06-17 RX ADMIN — OXYCODONE HYDROCHLORIDE 10 MILLIGRAM(S): 5 TABLET ORAL at 09:55

## 2020-06-17 NOTE — DISCHARGE NOTE PROVIDER - CARE PROVIDER_API CALL
Nery Ordonez  NEUROSURGERY - GENERAL  [address] [place] [address], [address] [place] [address]  Phone: [phone]  Fax: [phone]  Scheduled Appointment: 06/26/2020 12:00 PM

## 2020-06-17 NOTE — OCCUPATIONAL THERAPY INITIAL EVALUATION ADULT - PERTINENT HX OF CURRENT PROBLEM, REHAB EVAL
41yo F in 2/2020 collapsed in airport sent to ER dx with cerebral hemorrhage. Work up  dx with aneurysm had surgery and clip.  Sent to [facility] for rehab march-june 2020. Now s/p inpatient rehab living in [place]  with parents. Now s/p R side cranioplasty 39yo F in 2/2020 collapsed in airport sent to ER dx with cerebral hemorrhage. Work up  dx with aneurysm had surgery and clip.  Sent to [facility] for rehab, then was staying in [place] with parents march-june 2020.  Now s/p R side cranioplasty.

## 2020-06-17 NOTE — DISCHARGE NOTE PROVIDER - NSDCACTIVITY_GEN_ALL_CORE
Walking - Indoors allowed/No heavy lifting/straining/Showering allowed/Do not drive or operate machinery/Walking - Outdoors allowed/Stairs allowed

## 2020-06-17 NOTE — OCCUPATIONAL THERAPY INITIAL EVALUATION ADULT - IADL RETRAINING, OT EVAL
1: complete medication management independently within 4 weeks. 2: complete financial management independently within 4 weeks.

## 2020-06-17 NOTE — PROGRESS NOTE ADULT - SUBJECTIVE AND OBJECTIVE BOX
SUBJECTIVE: Pt seen and examined, resting comfortably in bed, right periorbital edema but able to open eye    OVERNIGHT EVENTS: none    Vital Signs Last 24 Hrs  T(C): 36.7 (17 Jun 2020 08:55), Max: 37 (17 Jun 2020 04:24)  T(F): 98.1 (17 Jun 2020 08:55), Max: 98.6 (17 Jun 2020 04:24)  HR: 73 (17 Jun 2020 08:55) (73 - 85)  BP: 117/83 (17 Jun 2020 08:55) (117/56 - 140/71)  BP(mean): --  RR: 16 (17 Jun 2020 08:55) (16 - 18)  SpO2: 98% (17 Jun 2020 08:55) (97% - 100%)    PHYSICAL EXAM:    General: No Acute Distress     Neurological: Awake, alert oriented to person, place and time, left facial, Following Commands, rt periorbital edema, left hand decreased sensation and hypersensitivity to heat at baseline, Speech Fluent, Moving all extremities, Muscle Strength normal in all four extremities, No Drift, Sensation to Light Touch Intact    Pulmonary: Clear to Auscultation, No Rales, No Rhonchi, No Wheezes     Cardiovascular: S1, S2, Regular Rate and Rhythm     Gastrointestinal: Soft, Nontender, Nondistended     Incision: right crani absorbable sutures, 1 staple placed at drain site    LABS:                        12.6   8.39  )-----------( 233      ( 16 Jun 2020 09:19 )             39.4    06-16    136  |  105  |  10  ----------------------------<  113<H>  4.3   |  22  |  0.60    Ca    8.3<L>      16 Jun 2020 09:19    PT/INR - ( 15 Mike 2020 12:03 )   PT: 11.7 sec;   INR: 1.03 ratio         PTT - ( 15 Mike 2020 12:03 )  PTT:36.4 sec      06-16 @ 07:01  -  06-17 @ 07:00  --------------------------------------------------------  IN: 480 mL / OUT: 130 mL / NET: 350 mL      DRAINS: FELICIA drain removed, 1 staple placed    MEDICATIONS:  Antibiotics:  vancomycin  IVPB 750 milliGRAM(s) IV Intermittent once    Neuro:  acetaminophen   Tablet .. 325 milliGRAM(s) Oral every 4 hours PRN Temp greater or equal to 38.5C (101.3F), Mild Pain (1 - 3)  ondansetron Injectable 4 milliGRAM(s) IV Push every 6 hours  oxyCODONE    IR 5 milliGRAM(s) Oral every 6 hours PRN Moderate Pain (4 - 6)  oxyCODONE    IR 10 milliGRAM(s) Oral every 6 hours PRN Severe Pain (7 - 10)    Cardiac:    Pulm:    GI/:  polyethylene glycol 3350 17 Gram(s) Oral two times a day  senna 2 Tablet(s) Oral at bedtime    Other:   chlorhexidine 2% Cloths 1 Application(s) Topical once    DIET: [x] Regular [] CCD [] Renal [] Puree [] Dysphagia [] Tube Feeds:     IMAGING: SUBJECTIVE: Pt seen and examined, resting comfortably in bed, right periorbital edema but able to open eye    OVERNIGHT EVENTS: none    Vital Signs Last 24 Hrs  T(C): 36.7 (17 Jun 2020 08:55), Max: 37 (17 Jun 2020 04:24)  T(F): 98.1 (17 Jun 2020 08:55), Max: 98.6 (17 Jun 2020 04:24)  HR: 73 (17 Jun 2020 08:55) (73 - 85)  BP: 117/83 (17 Jun 2020 08:55) (117/56 - 140/71)  BP(mean): --  RR: 16 (17 Jun 2020 08:55) (16 - 18)  SpO2: 98% (17 Jun 2020 08:55) (97% - 100%)    PHYSICAL EXAM:    General: No Acute Distress     Neurological: Awake, alert oriented to person, place and time, left facial, Following Commands, rt periorbital edema, left hand decreased sensation and hypersensitivity to heat at baseline, Speech Fluent, Moving all extremities, Muscle Strength normal in all four extremities, No Drift, Sensation to Light Touch Intact    Pulmonary: Clear to Auscultation, No Rales, No Rhonchi, No Wheezes     Cardiovascular: S1, S2, Regular Rate and Rhythm     Gastrointestinal: Soft, Nontender, Nondistended     Incision: right crani absorbable sutures, 1 staple placed at drain site    LABS:                        12.6   8.39  )-----------( 233      ( 16 Jun 2020 09:19 )             39.4    06-16    136  |  105  |  10  ----------------------------<  113<H>  4.3   |  22  |  0.60    Ca    8.3<L>      16 Jun 2020 09:19    PT/INR - ( 15 Mike 2020 12:03 )   PT: 11.7 sec;   INR: 1.03 ratio         PTT - ( 15 Mike 2020 12:03 )  PTT:36.4 sec      06-16 @ 07:01  -  06-17 @ 07:00  --------------------------------------------------------  IN: 480 mL / OUT: 130 mL / NET: 350 mL      DRAINS: FELICIA drain removed, 1 staple placed    MEDICATIONS:  Antibiotics:  vancomycin  IVPB 750 milliGRAM(s) IV Intermittent once    Neuro:  acetaminophen   Tablet .. 325 milliGRAM(s) Oral every 4 hours PRN Temp greater or equal to 38.5C (101.3F), Mild Pain (1 - 3)  ondansetron Injectable 4 milliGRAM(s) IV Push every 6 hours  oxyCODONE    IR 5 milliGRAM(s) Oral every 6 hours PRN Moderate Pain (4 - 6)  oxyCODONE    IR 10 milliGRAM(s) Oral every 6 hours PRN Severe Pain (7 - 10)    Cardiac:    Pulm:    GI/:  polyethylene glycol 3350 17 Gram(s) Oral two times a day  senna 2 Tablet(s) Oral at bedtime    Other:   chlorhexidine 2% Cloths 1 Application(s) Topical once    DIET: [x] Regular [] CCD [] Renal [] Puree [] Dysphagia [] Tube Feeds:     IMAGING:   < from: CT Head No Cont (06.17.20 @ 08:15) >    Postop changes compatible with a right temporal frontal cranioplasty is again identified. Aneurysm clip just lateral tothe right sella turcica is again seen. Extra-axial air fluid and acute hemorrhage is seen adjacent to this area. The hemorrhagic component appears unchanged in size and configuration when allowing for differences in technique.    Area of encephalomalacia and gliosis involving the right temporal frontal and anterior right thalamic region is again seen with ex vacuo dilatation of the right temporal horn. This is unchanged as well.    Extracalvarial soft tissue swelling and drain is identified.    IMPRESSION: Postop changes again seen as described above.      < end of copied text >

## 2020-06-17 NOTE — DISCHARGE NOTE PROVIDER - NSDCCPCAREPLAN_GEN_ALL_CORE_FT
PRINCIPAL DISCHARGE DIAGNOSIS  Diagnosis: History of cranial surgery  Assessment and Plan of Treatment: 6/15 s/p cranioplasty. Follow up with Dr Ordonez in 10-14 days.      SECONDARY DISCHARGE DIAGNOSES  Diagnosis: H/O cerebral aneurysm repair  Assessment and Plan of Treatment: hx of ICH and clipping of PCOMM aneurysm PRINCIPAL DISCHARGE DIAGNOSIS  Diagnosis: History of cranial surgery  Assessment and Plan of Treatment: 6/15 s/p cranioplasty. Follow up with Dr Ordonez in 10-14 days. You can apply ice to right eye swelling as needed.      SECONDARY DISCHARGE DIAGNOSES  Diagnosis: H/O cerebral aneurysm repair  Assessment and Plan of Treatment: hx of ICH and clipping of PCOMM aneurysm

## 2020-06-17 NOTE — OCCUPATIONAL THERAPY INITIAL EVALUATION ADULT - LIVES WITH, PROFILE
normally lives alone in an apartment in [place], works as a PT in her own outpatient clinic. Since march has been living in [place] with her parents who assist her with IADLs and provide supervision for bathing.

## 2020-06-17 NOTE — PROGRESS NOTE ADULT - ASSESSMENT
40 year old female in 2/2020 collapsed in airport sent to ER dx with cerebral hemorrhage. Work up  dx with aneurysm had surgery and clip.  Sent to [facility] for rehab. Now s/p inpatient rehab living in [place]  with parents.  For cranioplasty.    PROCEDURE:   s/p right side cranioplasty  POD#1    PLAN:  - CT head post op showed very small extra-axial heme along the right frontal convexity but otherwise stable, CT head today remains stable   - FELICIA drain removed, 1 staple placed  - pain control with prn tylenol and oxycodone  - regular diet,   - senna, miralax for bowel regiments  - encourage activity mobilize as tolerated  - incentive spirometer  - DVT ppx: b/l SCDs, given extra axial heme, will hold off on SQL   - PT/OT- outpatient OT and speech therapy  Discharge planning: discharge today,  Patient has been living in [place] with her mother, she will be going to hotel for 1 to 2 days after discharge. She has an apartment in [facility] [id number]

## 2020-06-17 NOTE — DISCHARGE NOTE PROVIDER - NSDCFUADDINST_GEN_ALL_CORE_FT
Return to ER for fever, chills, nausea or vomiting, severe headache or pain not relieved with pain medication, sluggishness or change in mental status, any bleeding or drainage from wound,  or any weakness of extremities.   You may shower and wash your hair on post op day #4. No rubbing or scrubbing of incision. Keep incision clean and dry. Do not apply creams or lotions to incision.   No driving until cleared by your surgeon.   Do not return to work until cleared by surgeon. Do not take advil, aleve or ibuprofen as they can cause bleeding.

## 2020-06-17 NOTE — OCCUPATIONAL THERAPY INITIAL EVALUATION ADULT - PREDICTED DURATION OF THERAPY (DAYS/WKS), OT EVAL
4 weeks [Fever] : no fever [Chills] : no chills [Feeling Poorly] : feeling poorly [Feeling Tired] : feeling tired [Recent Weight Gain (___ Lbs)] : no recent weight gain [Recent Weight Loss (___ Lbs)] : recent [unfilled] ~Ulb weight loss [Eye Pain] : eye pain [Loss Of Hearing] : no hearing loss [Chest Pain] : no chest pain [Palpitations] : no palpitations [Cough] : no cough [Arthralgias] : arthralgias [Neck Pain] : neck pain [Skin Lesions] : no skin lesions [Skin Wound] : no skin wound [Itching] : no itching [Dizziness] : dizziness [Sleep Disturbances] : sleep disturbances [Swollen Glands] : no swollen glands [Swollen Glands In The Neck] : no swollen glands in the neck

## 2020-06-17 NOTE — DISCHARGE NOTE NURSING/CASE MANAGEMENT/SOCIAL WORK - PATIENT PORTAL LINK FT
You can access the FollowMyHealth Patient Portal offered by [facility] by registering at the following website: [email or web address]. By joining [facility]’s FollowMyHealth portal, you will also be able to view your health information using other applications (apps) compatible with our system.

## 2020-06-17 NOTE — DISCHARGE NOTE PROVIDER - NSDCMRMEDTOKEN_GEN_ALL_CORE_FT
Claritin 10 mg oral tablet: 1 tab(s) orally once a day  oxyCODONE 5 mg oral tablet: 1 tab(s) orally every 6 hours, As needed, Moderate Pain (4 - 6) MDD:3 tabs  Probiotic Formula oral capsule: 1 cap(s) orally once a day  Tylenol 500 mg oral tablet: 2 tab(s) orally every 6 hours, As Needed

## 2020-06-24 NOTE — SWALLOW FEES ASSESSMENT ADULT - CONSISTENCIES ADMINISTERED
CC:  Tracheostomy and ventilator dependent    ALLERGIES:  Patient has no known allergies.    Referring Physician:  Conrad Renteria M.D.   [address] [place] [id number]     SUBJECTIVE:   Aba Harkins is a 4 y.o. female with Thoracic insufficiency syndrome accompanied by her mother.    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 Neri 20.    Last hospitalization:  [20]    Cough frequency: rare, baseline  Cough character: productive  Sputum quantity: baseline  Sputum color: clear   sucitoning only 1-2 times per day, patient asks when she needs it.  Able to tolerate up to 1 hour off ventilator at home but doesn't have all of the official supplies/trach collar. Has in line HME only.  Wheezing: never  Shortness of breath: never  Nasal Congestion: never  Nasal Drainage: none  On budesonide once a day     Respiratory:  Oxygen: 1 LPM night only  Respiratory assist: Trilogy Rate 12, IP 20, Peep 6, PS 14, Ti 0.6 seconds.  Trach size: 4.0 Bivona TTS  Humidification: Yes  HME: Yes    Activity / Energy: normal for age   Change in activity/energy: increased     Medications:      Current Outpatient Medications:   •  albuterol, 2.5 mg, Nebulization, DAILY,  "PRN  •  Pediatric Multivitamins-Iron (POLY-VI-SOL/IRON PO), 1 mL, Oral, DAILY, Taking      Review of System:    Feedings: All solids by mouth, very few liquids PO. Still gets 3 cans of formula per day.  Seeing dietician  GI symptoms: none  All other systems reviewed and negative    OBJECTIVE:  Physical Exam:  Pulse 115   Temp 36.7 °C (98.1 °F) (Temporal)   Resp 40   Ht 0.902 m (2' 11.5\")   Wt 12.4 kg (27 lb 4 oz)   SpO2 99% Comment: on vent, no O2  BMI 15.20 kg/m²      GENERAL: well appearing, well nourished, no respiratory distress and normal affect   EARS: bilateral TM's and external ear canals normal   NOSE: no audible congestion and no discharge   MOUTH/THROAT: normal oropharynx and mucous membranes moist   NECK: normal, supple full range of motion and trachea midline   CHEST: normal A-P diameter   LUNGS: clear to auscultation and normal air exchange   HEART: regular rate and rhythm and no murmurs   ABDOMEN: soft, non-tender and non-distended  : not examined  BACK: not examined   SKIN: normal color   EXTREMITIES: no clubbing, cyanosis, or inflammation   NEURO: gross motor exam normal by observation     ASSESSMENT:  1. TIS (thoracic insufficiency syndrome)  Improving rapidly after VEPTR expansion.    2. Tracheostomy dependent (HCC)  No change planned until off mechanical ventilator    3. Congenital scoliosis due to anomaly of vertebra  Continue follow up with orthopedics    4. Ventilator dependence (HCC)  Tried SBT trial in clinic for 15-20 minutes on PS only: no change in RR or SpO2, remained very comfortable throughout.  Will not change home vent settings for now.  Will order trach collar and HME supplies for home.  Will bring them to next clinic visit in 2 weeks where we will trial HME only x 1 hour.  If tolerated well, can start sprints off ventilator at home.  Once she is off vent all day, we can admit her to PICU at night to trial off vent at night for 2 nights.       Seen by Respiratory Therapy:  " Yes    Seen by Dietician:  No  Seen by :  No    Face-to-face total Attending time: 45 minutes  Care coordination and counseling time:  35 minutes regarding all above issues.    Follow up in 2 weeks    Electronically signed by   Mandy Gordon M.D.   Pediatric Pulmonology          puree thin honey thick soft solid

## 2020-06-26 ENCOUNTER — APPOINTMENT (OUTPATIENT)
Dept: NEUROSURGERY | Facility: CLINIC | Age: 40
End: 2020-06-26
Payer: COMMERCIAL

## 2020-06-26 VITALS
WEIGHT: 121 LBS | BODY MASS INDEX: 21.44 KG/M2 | TEMPERATURE: 98.7 F | RESPIRATION RATE: 17 BRPM | DIASTOLIC BLOOD PRESSURE: 67 MMHG | SYSTOLIC BLOOD PRESSURE: 104 MMHG | OXYGEN SATURATION: 96 % | HEIGHT: 63 IN | HEART RATE: 76 BPM

## 2020-06-26 DIAGNOSIS — I60.7 NONTRAUMATIC SUBARACHNOID HEMORRHAGE FROM UNSPECIFIED INTRACRANIAL ARTERY: ICD-10-CM

## 2020-06-26 PROCEDURE — 99024 POSTOP FOLLOW-UP VISIT: CPT

## 2020-06-26 RX ORDER — LORATADINE 5 MG/5 ML
SOLUTION, ORAL ORAL
Refills: 0 | Status: ACTIVE | COMMUNITY

## 2020-06-26 RX ORDER — ASCORBIC ACID 500 MG
TABLET ORAL
Refills: 0 | Status: ACTIVE | COMMUNITY

## 2020-06-28 NOTE — ASSESSMENT
[FreeTextEntry1] : IMPRESSION:\par 11. pt is recovering well s/p 6/15/20 right cranioplasty by [place] premodulated 3-D flap.\par \par \par PLAN:\par 1. F/U with Dr. lorenzo and NP in 4 weeks.\par 2. OK to wash hair with baby shampoo and pat dry incision thoroughly.\par 3. F/U with neuro psychologist as planned.\par 4. F/U with PCP

## 2020-06-28 NOTE — PHYSICAL EXAM
[General Appearance - In No Acute Distress] : in no acute distress [General Appearance - Alert] : alert [General Appearance - Well Nourished] : well nourished [General Appearance - Well-Appearing] : healthy appearing [General Appearance - Well Developed] : well developed [Dry] : dry [Clean] : clean [Sutures Intact] : closed with intact sutures [Healing Well] : healing well [No Drainage] : without drainage [Oriented To Time, Place, And Person] : oriented to person, place, and time [Affect] : the affect was normal [Impaired Insight] : insight and judgment were intact [Mood] : the mood was normal [Memory Recent] : recent memory was not impaired [Place] : oriented to place [Person] : oriented to person [Cranial Nerves Oculomotor (III)] : extraocular motion intact [Cranial Nerves Optic (II)] : visual acuity intact bilaterally,  pupils equal round and reactive to light [Time] : oriented to time [Cranial Nerves Facial (VII)] : face symmetrical [Cranial Nerves Trigeminal (V)] : facial sensation intact symmetrically [Cranial Nerves Glossopharyngeal (IX)] : tongue and palate midline [Cranial Nerves Vestibulocochlear (VIII)] : hearing was intact bilaterally [Cranial Nerves Accessory (XI - Cranial And Spinal)] : head turning and shoulder shrug symmetric [Cranial Nerves Hypoglossal (XII)] : there was no tongue deviation with protrusion [Respiration, Rhythm And Depth] : normal respiratory rhythm and effort [] : no respiratory distress [Heart Rate And Rhythm] : heart rate was normal and rhythm regular [Exaggerated Use Of Accessory Muscles For Inspiration] : no accessory muscle use [Tender] : not tender [Indurated] : not indurated [Fluctuant] : not fluctuant [FreeTextEntry1] : right temporal area

## 2020-06-28 NOTE — HISTORY OF PRESENT ILLNESS
[FreeTextEntry1] : We operated in 02/2020 on this 40-year-old patient who presented in comatose with a large temporal hematoma and the ruptured PCOM aneurysm.  A craniectomy and clipping of the aneurysm was performed and the patient improved  significantly to and almost normal neurological exam.  At this point, we decided for repair of cranial defect by a preformatted [place] 3-D based cranial flap.  \par \par On 6/15/20 she underwent a right cranioplasty by [place] premodulated 3-D flap.\par \par Today she presents for her first post op visit after cranioplasty.  She is neurologically intact. Right temporal incision is well approximated without any evidence of redness, drainage and she denies fever.  Left temporal indentation present and per patient she has discussed with Dr. Ordonez.  She denies headaches, seizures and is ready to exercise. \par

## 2020-08-05 ENCOUNTER — APPOINTMENT (OUTPATIENT)
Dept: NEUROSURGERY | Facility: CLINIC | Age: 40
End: 2020-08-05
Payer: COMMERCIAL

## 2020-08-05 PROCEDURE — 99024 POSTOP FOLLOW-UP VISIT: CPT

## 2020-08-11 NOTE — HISTORY OF PRESENT ILLNESS
[Home] : at home, [unfilled] , at the time of the visit. [Medical Office: ([facility])___] : at the medical office located in  [Family Member] : family member [Verbal consent obtained from patient] : the patient, [unfilled] [FreeTextEntry1] : 40 year old female, with no pertinent past medical history, found unresponsive in an airplane wherein she was taken to [facility] where she was found to have a diffuse subarachnoid hemorrhage and a right temporal clot. She was then transferred to [facility] for Neurosurgery \par evaluation on 2/3/20 and CTA Brain revealed right posterior communicating artery aneurysm, patient was admitted under Dr. Ordonez's service and taken to the OR for s/p right craniectomy for evacuation of ICH and clipping of posterior communicating aneurysm (no bone flap). Patient tolerated procedure well and came to the Neuro ICU wherein she was extubated on 2/4/20. Patient then went to IR on 2/5/20 for s/p angio: clip check which shows in good position and no vasospasm.\par \par She has made a remarkable recovery and is participating in a telehealth visit today.  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  Per patient surgical incision is mostly healed.\par \par Telehealth  started at 3 PM - 3:10 pm.

## 2020-08-11 NOTE — ASSESSMENT
[FreeTextEntry1] : IMPRESSION:\par 1. Pt has recovered well s/p 2/3/20 right frontotemporoparietal decompressive craniectomy.\par  Evacuation of temporal hematoma and clipping of a right posterior communicating artery aneurysm and 6/15/20 Right cranioplasty by [place] premodulated 3-D flap.\par \par \par PLAN:\par 1. CTA in 1 year after surgery 2/2021.\par 2. f/u after CTA.\par

## 2020-08-11 NOTE — PHYSICAL EXAM
[General Appearance - Alert] : alert [General Appearance - In No Acute Distress] : in no acute distress [General Appearance - Well Nourished] : well nourished [General Appearance - Well Developed] : well developed [General Appearance - Well-Appearing] : healthy appearing [Oriented To Time, Place, And Person] : oriented to person, place, and time [Impaired Insight] : insight and judgment were intact [Affect] : the affect was normal [Memory Recent] : recent memory was not impaired [Person] : oriented to person [Place] : oriented to place [Time] : oriented to time [] : no respiratory distress [Respiration, Rhythm And Depth] : normal respiratory rhythm and effort [Exaggerated Use Of Accessory Muscles For Inspiration] : no accessory muscle use [Healing Well] : healing well [FreeTextEntry1] : right cranioplasty [FreeTextEntry6] : r

## 2020-09-25 NOTE — PROGRESS NOTE ADULT - PROBLEM SELECTOR PROBLEM 3
He had a bump up in his H/H which I would expect a drop with dapsone.      Pt told me that he stopped it d/t fatigue.  Can we verify this with the LTC? It he's willing to continue at 25mg of dapsone daily that'd be great (and would need another 2 week CBC).  If not please notify me and we can start colchicine. SAH (subarachnoid hemorrhage)

## 2020-10-27 NOTE — PROGRESS NOTE ADULT - ASSESSMENT
The patient is a 80y Female complaining of hematuria. ASSESSMENT/PLAN: HH5, MF 3 subarachnoid hemorrhage, post-bleed day 2  s/p emergent R craniectomy and removal of hematoma, and clipping of PCOM aneurysm (2/3/2020)    NEURO:  q1h neuro checks  EVD in place for monitoring  Repeat angio today per NSGY  Stroke code measures: A1c, LDL- as above   Delayed Cerebral Ischemia: TCDs, euvolemia, Nimodipine  Given Keppra 1g, and placed on Keppra 500 BID   Sedation  Precedex if needed  POT/OT - splint LUE and LLE  Check TOX screen    PULM:  Extubated overnight    CARDS:  -160mmHg  Nicardipine gtt as needed  TTE- P   EKG, Trop    RENAL:  IVF- total fluids at 70 cc/hr     GASTRO:  NPO  Bowel regimen  ---> Stress ulcer prophylaxis:  PPI    HEME:  monitor H/H - No CBC    ---> DVT prophylaxis: SCDs, hold anticoagulation given fresh post-op- start after CT in am     ENDO:  euglycemia    ID:  Monitor for fevers    Code status:  Full code  Disposition:  ICU    Updated family at bedside.    This patient was at high risk of neurologic deterioration and/or death due to: re-hemorrhage, seizures, DCI.     Time spent:  45 minutes

## 2021-02-16 NOTE — PROGRESS NOTE ADULT - ASSESSMENT
39F no PMHx found unresponsive on airplane coming back from [place] found to have R temporal parenchymal hemorrhage with diffuse SAH on CTH s/p craniectomy with evacuation of ICH and clipping of Pcomm aneurysm on 2/3. Hospital course c/b fevers with superficial thrombophlebitis treated with IV vancomycin and now UTI on levaquin. No

## 2021-03-01 ENCOUNTER — RESULT REVIEW (OUTPATIENT)
Age: 41
End: 2021-03-01

## 2021-03-01 ENCOUNTER — OUTPATIENT (OUTPATIENT)
Dept: OUTPATIENT SERVICES | Facility: HOSPITAL | Age: 41
LOS: 1 days | End: 2021-03-01

## 2021-03-01 ENCOUNTER — APPOINTMENT (OUTPATIENT)
Dept: CT IMAGING | Facility: CLINIC | Age: 41
End: 2021-03-01
Payer: COMMERCIAL

## 2021-03-01 DIAGNOSIS — Z98.890 OTHER SPECIFIED POSTPROCEDURAL STATES: Chronic | ICD-10-CM

## 2021-03-01 PROCEDURE — 70496 CT ANGIOGRAPHY HEAD: CPT | Mod: 26

## 2021-03-05 NOTE — PHYSICAL THERAPY INITIAL EVALUATION ADULT - THERAPY FREQUENCY, PT EVAL
· 2-3x/week

## 2021-03-10 ENCOUNTER — APPOINTMENT (OUTPATIENT)
Dept: NEUROSURGERY | Facility: HOSPITAL | Age: 41
End: 2021-03-10
Payer: COMMERCIAL

## 2021-03-10 DIAGNOSIS — I60.9 NONTRAUMATIC SUBARACHNOID HEMORRHAGE, UNSPECIFIED: ICD-10-CM

## 2021-03-10 PROCEDURE — 99212 OFFICE O/P EST SF 10 MIN: CPT | Mod: 95

## 2021-03-11 PROBLEM — I60.9 HEMORRHAGE, SUBARACHNOID, NONTRAUMATIC: Status: RESOLVED | Noted: 2021-03-11 | Resolved: 2021-03-11

## 2021-03-11 NOTE — HISTORY OF PRESENT ILLNESS
[Home] : at home, [unfilled] , at the time of the visit. [Medical Office: ([facility])___] : at the medical office located in  [Verbal consent obtained from patient] : the patient, [unfilled] [Other:____] : [unfilled] [FreeTextEntry1] : 40 year old female, with no pertinent past medical history, found unresponsive in an airplane in 2020 wherein she was taken to [facility] where she was found to have a diffuse subarachnoid hemorrhage and a right temporal clot. She was then transferred to [facility] for Neurosurgery \par evaluation and CTA Brain revealed right posterior communicating artery aneurysm, patient was admitted under Dr. Ordonez's service and taken to the OR for s/p right craniectomy for evacuation of ICH and clipping of posterior communicating aneurysm (no bone flap). Patient tolerated procedure well and came to the Neuro ICU wherein she was extubated on 2/4/20. Patient then went to IR on 2/5/20 for s/p angio: clip check which shows in good position and no vasospasm.  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  States that some days she feels tired but rests on these occasions. \par  \par

## 2021-03-11 NOTE — DATA REVIEWED
[de-identified] : \par EXAM: CT ANGIO BRAIN (W)AW IC\par \par PROCEDURE DATE: 03/01/2021\par \par \par \par \par INTERPRETATION: PROCEDURE: CTA brain with and without intravenous contrast.\par \par INDICATION: Follow-up of right P-comm aneurysm status post clipping.\par \par TECHNIQUE: Multiple axial thin section were obtained through the [facility] [place] following the intravenous bolus injection of Optiray-350. Multiple 3-D reformatted images were generated from the axial cuts. Post-processing was performed on a independent workstation.\par \par COMPARISON: CTA head and neck dated 2/3/2020. CT head dated 6/17/2020 and multiple additional prior studies dating back to 2/3/2020.\par \par FINDINGS: Patient is status post right frontal parietal temporal craniectomy and cranioplasty. Encephalomalacia involving the right temporal and frontal lobes with ex vacuo dilatation of the right lateral ventricle which is more pronounced than on prior examination. There is been resolution of the previously demonstrated right cerebral convexity extra-axial fluid collection.\par \par The CTA exam of the [place] [facility] [place] demonstrates the internal carotid arteries to be normal in caliber. There is a normal bifurcation into the A1 and M1 segments. The visualized vertebral arteries appear normal in caliber. The basilar artery appears normal. There is again an aneurysm clip just lateral to the right sella from prior right P-comm aneurysm clipping. There is no evidence of residual aneurysm. The right posterior communicating artery is patent and dominant with fetal origin of the right posterior cerebral artery. There is a hypoplastic right P1 segment. There is normal bifurcation into the left posterior cerebral arteries. There are no areas of stenosis, dilatation or aneurysm.\par \par Impression: Status post right P-comm aneurysm clipping without evidence of recurrent aneurysm or new aneurysm. No hemodynamically significant stenosis.\par \par \par \par \par \par AYAAN ARMANDO M.D., RADIOLOGY RESIDENT\par This document has been electronically signed.\par CAROL FISHER MD; Attending Radiologist\par This document has been electronically signed. Mar 2 2021 3:42PM\par \par \par \par    \par \par \par \par \par \par \par \par \par \par \par \par \par \par \par \par   \par  \par  \par \par \par Notes

## 2021-03-11 NOTE — ASSESSMENT
[FreeTextEntry1] : IMPRESSION:\par \par 1. Status post right P-comm aneurysm clipping without evidence of recurrent aneurysm or new aneurysm. No hemodynamically significant stenosis.\par \par   \par PLAN:\par 1. F/U with CTA  in 3-5 years.\par 2. F/U after CTA.\par \par

## 2021-03-11 NOTE — PHYSICAL EXAM
[General Appearance - Alert] : alert [General Appearance - In No Acute Distress] : in no acute distress [General Appearance - Well Nourished] : well nourished [General Appearance - Well Developed] : well developed [General Appearance - Well-Appearing] : healthy appearing [Oriented To Time, Place, And Person] : oriented to person, place, and time [Impaired Insight] : insight and judgment were intact [Affect] : the affect was normal [Memory Recent] : recent memory was not impaired [Person] : oriented to person [Place] : oriented to place [Time] : oriented to time [] : no respiratory distress

## 2021-03-21 ENCOUNTER — APPOINTMENT (OUTPATIENT)
Age: 41
End: 2021-03-21

## 2021-07-26 NOTE — ED PROVIDER NOTE - PMH
Clean wound on right plantar foot with normal saline or mild soap and water.  Apply betadine to the wound bed and let dry. Cover with alginate (cut to the size of the wound). Cover with gauze and aurea (gauze roll). Change dressing daily. Keep pressure off of the wound bed at all times. Wear your offloading shoe at all times when walking.    Elevate both legs as much as possible for control of swelling.  Follow a nutritious diet (increase protein, Vitamin A & C, & Zinc) and maintain normal blood sugar levels for optimal wound healing.    Monitor for signs of infection and impaired circulation.    No pertinent past medical history <<----- Click to add NO pertinent Past Medical History

## 2021-09-22 NOTE — PATIENT PROFILE ADULT - NSPROPTRIGHTREPPHONE_GEN_A_NUR
Patient has US scheduled 10/4  to check IUD position and follow up with you  after. You placed her IUD in 2018. She has been doing well, until recently - has had some cramping.  I was able to see IUD strings, but she is worried they are shorter than they were previously.     Let me know if you have any questions.   [phone]

## 2021-11-30 NOTE — PROGRESS NOTE ADULT - SUBJECTIVE AND OBJECTIVE BOX
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 She arrived intubated and sedated. After coming to [facility] ED, she underwent repeat imaging and was taken to the OR emergently for craniectomy and clipping of right posterior communicating artery aneurysm.     ADMISSION SCORES:  GCS: 3T  Hunt-Gonzalez: 5  modified Couch: 3  ICH score: 2      Allergies and Intolerances:        Allergies:  	sulfa drugs: Drug Category, Unknown    Home Medications:   * Outpatient Medication Status not yet specified  .    Patient History:    Tobacco Screening:  · Core Measure Site	No  · Has the patient used tobacco in the past 30 days?	Unable to assess due to patient's cognitive impairment    Risk Assessment:    Present on Admission:  Deep Venous Thrombosis	no  Pulmonary Embolus	no     Heart Failure:  Does this patient have a history of or has been diagnosed with heart failure? unknown.    HIV Screen (per [facility] Department of Health, HIV screening must be offered to every individual between ages 13 and 64)	Unable to offer due to clinical condition      HOSPITAL COURSE:  2/3- Clipping R PCOMM              EVD placed   2/5- Extubated overnight  2/6- 500cc Bolus  2/7- Following commands on all extremities  2/8- R  cm2 from 129  2/11- failed FEEST   2/11- Superficial thrombophlebitis seen on doppler;   2/11- overnight EVD stopped working, no change in neuro exam    2/12- EVD clamped; no change in neuro exam  2/12- Febrile overnight (TMax 39.1); CXR shows clear lungs; Blood cultures pending  2/13- EVD clamped; stable neuro exam  2/13- Positive U/A; Blood cultures negative at 24h  2/13- drop in sodium to 132; started on 2% NaCl 50ml/hr  2/14- EVD remains clamped; stable neuro exam  2/15: EVD remains clamped, the IPD is stable       T(C): 37.9 (02-16-20 @ 07:00), Max: 37.9 (02-16-20 @ 07:00)  HR: 112 (02-16-20 @ 09:00) (73 - 112)  BP: 152/95 (02-16-20 @ 09:00) (128/100 - 161/88)  RR: 18 (02-16-20 @ 09:00) (15 - 24)  SpO2: 100% (02-16-20 @ 09:00) (100% - 100%)  02-15-20 @ 07:01  -  02-16-20 @ 07:00  --------------------------------------------------------  IN: 2500 mL / OUT: 1850 mL / NET: 650 mL    02-16-20 @ 07:01  -  02-16-20 @ 09:47  --------------------------------------------------------  IN: 140 mL / OUT: 0 mL / NET: 140 mL    acetaminophen    Suspension .. 650 milliGRAM(s) Oral every 6 hours PRN  albuterol/ipratropium for Nebulization. 3 milliLiter(s) Nebulizer every 6 hours PRN  artificial tears (preservative free) Ophthalmic Solution 1 Drop(s) Both EYES three times a day PRN  chlorhexidine 4% Liquid 1 Application(s) Topical <User Schedule>  fludroCORTISONE 0.1 milliGRAM(s) Oral every 12 hours  niMODipine Oral Solution 60 milliGRAM(s) Oral every 4 hours  ondansetron Injectable 4 milliGRAM(s) IV Push every 8 hours PRN  oxyCODONE    IR 5 milliGRAM(s) Oral every 4 hours PRN  oxyCODONE    IR 10 milliGRAM(s) Oral every 4 hours PRN  pantoprazole  Injectable 40 milliGRAM(s) IV Push daily  sodium chloride 2 Gram(s) Oral every 6 hours    EXAMINATION:  General:  Calm.   HEENT:  MMM. EO spontaneously.   Neuro: A&O x3. L facial droop. EOMI. Dysarthria - improving.   RUE  5/5  LUE drift; LUE 5/5 flexion, 4+/5 extension, 4+/5 HG with incremental improvement.  RLE  5/5  LLE - 4+/5 hip flexion, 4+/5 knee extension, 4+/5 DF and PF  Cards:  Normal S1/S2 with rate and rhythm. No murmur.   Respiratory: Clear lung fields BL  Abdomen:  soft, +BS  Extremities:  no edema. LUE - erythema, swelling. Cord palpated.  RUE - erythema - marked.       LABS:  Na: 140 (02-15 @ 22:27), 138 (02-15 @ 16:55), 139 (02-15 @ 09:01), 139 (02-15 @ 02:28), 134 (02-14 @ 18:46), 136 (02-14 @ 12:32), 132 (02-14 @ 03:35), 133 (02-14 @ 02:31)  K: 4.0 (02-15 @ 22:27), 3.9 (02-15 @ 16:55), 3.8 (02-15 @ 09:01), 3.8 (02-15 @ 02:28), 4.0 (02-14 @ 18:46), 3.9 (02-14 @ 12:32), 3.9 (02-14 @ 03:35), 3.7 (02-14 @ 02:31)  Cl: 105 (02-15 @ 22:27), 104 (02-15 @ 16:55), 105 (02-15 @ 09:01), 104 (02-15 @ 02:28), 101 (02-14 @ 18:46), 100 (02-14 @ 12:32), 95 (02-14 @ 03:35), 98 (02-14 @ 02:31)  CO2: 23 (02-15 @ 22:27), 21 (02-15 @ 16:55), 24 (02-15 @ 09:01), 22 (02-15 @ 02:28), 21 (02-14 @ 18:46), 23 (02-14 @ 12:32), 24 (02-14 @ 03:35), 23 (02-14 @ 02:31)  BUN: 20 (02-15 @ 22:27), 18 (02-15 @ 16:55), 16 (02-15 @ 09:01), 16 (02-15 @ 02:28), 18 (02-14 @ 18:46), 19 (02-14 @ 12:32), 20 (02-14 @ 03:35), 19 (02-14 @ 02:31)  Cr: 0.44 (02-15 @ 22:27), 0.41 (02-15 @ 16:55), 0.41 (02-15 @ 09:01), 0.44 (02-15 @ 02:28), 0.46 (02-14 @ 18:46), 0.47 (02-14 @ 12:32), 0.44 (02-14 @ 03:35), 0.43 (02-14 @ 02:31)  Glu: 108(02-15 @ 22:27), 111(02-15 @ 16:55), 115(02-15 @ 09:01), 117(02-15 @ 02:28), 125(02-14 @ 18:46), 116(02-14 @ 12:32), 115(02-14 @ 03:35), 115(02-14 @ 02:31)    Hgb: 10.6 (02-15 @ 22:27), 10.9 (02-15 @ 02:28), 11.2 (02-14 @ 02:31)  Hct: 33.5 (02-15 @ 22:27), 32.5 (02-15 @ 02:28), 34.9 (02-14 @ 02:31)  WBC: 12.45 (02-15 @ 22:27), 14.86 (02-15 @ 02:28), 17.79 (02-14 @ 02:31)  Plt: 418 (02-15 @ 22:27), 383 (02-15 @ 02:28), 355 (02-14 @ 02:31)    INR: 1.14 02-15-20 @ 16:55  PTT: 33.3 02-15-20 @ 16:55    CT head today   IMPRESSION: Hemorrhage within the right basal ganglia and along the tract of the recently removed left frontal  shunt catheter is grossly unchanged. Adjacent vasogenic edema within the left frontal lobe adjacent to the tract of hemorrhage also appears similar. No midline shift or evidence of herniation.    Redemonstration of right hemicraniectomy with extension of brain parenchyma through the craniectomy site.    Redemonstration of right-sided P-comm aneurysm clipping.      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)    NEURO:  q4h neuro checks  EVD removed yesterday   stable IPH pericatheter  repeat head CT today stabe  Delayed Cerebral Ischemia: TCD, euvolemia, Nimodipine  Pain control w/ Tylenol prn, Oxy 5/10 prn  OOB with helmet/PT/OT    PULM:  Extubated 2/5  SpO2 > 92%  Incentive spirometry, if able    CARDS:  - 160 mHg  TTE- Nl LV function, no wall motion abnormalities    GASTRO:  TF  Speech/Swallow eval- failed FEEST 2/11   last BM - 2/15/20  ---> Stress ulcer prophylaxis:  Not indicated    RENAL:  Goal: Eunatremia; Siadh  salt tablets   BMP Q 12 HR     ENDO:  euglycemia    HEME:  monitor H/H;  ---> DVT prophylaxis: SCDs, heparin 5000 units q 12hr start tomorrow ( 24 hrs after stability CT head )   ID:  afebrile       Code status:  Full code  Disposition:  floor   moderate complex medical decisions

## 2022-03-03 ENCOUNTER — OUTPATIENT (OUTPATIENT)
Dept: OUTPATIENT SERVICES | Facility: HOSPITAL | Age: 42
LOS: 1 days | End: 2022-03-03
Payer: COMMERCIAL

## 2022-03-03 DIAGNOSIS — Z01.818 ENCOUNTER FOR OTHER PREPROCEDURAL EXAMINATION: ICD-10-CM

## 2022-03-03 DIAGNOSIS — Z98.890 OTHER SPECIFIED POSTPROCEDURAL STATES: Chronic | ICD-10-CM

## 2022-03-03 DIAGNOSIS — Z11.52 ENCOUNTER FOR SCREENING FOR COVID-19: ICD-10-CM

## 2022-03-03 DIAGNOSIS — S01.409A UNSPECIFIED OPEN WOUND OF UNSPECIFIED CHEEK AND TEMPOROMANDIBULAR AREA, INITIAL ENCOUNTER: ICD-10-CM

## 2022-03-03 LAB
HCT VFR BLD CALC: 44.9 % — SIGNIFICANT CHANGE UP (ref 34.5–45)
HGB BLD-MCNC: 14.3 G/DL — SIGNIFICANT CHANGE UP (ref 11.5–15.5)
MCHC RBC-ENTMCNC: 28.7 PG — SIGNIFICANT CHANGE UP (ref 27–34)
MCHC RBC-ENTMCNC: 31.8 GM/DL — LOW (ref 32–36)
MCV RBC AUTO: 90 FL — SIGNIFICANT CHANGE UP (ref 80–100)
NRBC # BLD: 0 /100 WBCS — SIGNIFICANT CHANGE UP (ref 0–0)
PLATELET # BLD AUTO: 249 K/UL — SIGNIFICANT CHANGE UP (ref 150–400)
RBC # BLD: 4.99 M/UL — SIGNIFICANT CHANGE UP (ref 3.8–5.2)
RBC # FLD: 12 % — SIGNIFICANT CHANGE UP (ref 10.3–14.5)
SARS-COV-2 RNA SPEC QL NAA+PROBE: SIGNIFICANT CHANGE UP
WBC # BLD: 5.39 K/UL — SIGNIFICANT CHANGE UP (ref 3.8–10.5)
WBC # FLD AUTO: 5.39 K/UL — SIGNIFICANT CHANGE UP (ref 3.8–10.5)

## 2022-03-03 PROCEDURE — C9803: CPT

## 2022-03-03 PROCEDURE — G0463: CPT

## 2022-03-03 PROCEDURE — U0005: CPT

## 2022-03-03 PROCEDURE — 85027 COMPLETE CBC AUTOMATED: CPT

## 2022-03-03 PROCEDURE — U0003: CPT

## 2022-03-03 RX ORDER — LORATADINE 10 MG/1
1 TABLET ORAL
Qty: 0 | Refills: 0 | DISCHARGE

## 2022-03-03 RX ORDER — SODIUM CHLORIDE 9 MG/ML
1000 INJECTION, SOLUTION INTRAVENOUS
Refills: 0 | Status: DISCONTINUED | OUTPATIENT
Start: 2022-03-07 | End: 2022-03-21

## 2022-03-06 ENCOUNTER — TRANSCRIPTION ENCOUNTER (OUTPATIENT)
Age: 42
End: 2022-03-06

## 2022-03-07 ENCOUNTER — OUTPATIENT (OUTPATIENT)
Dept: OUTPATIENT SERVICES | Facility: HOSPITAL | Age: 42
LOS: 1 days | End: 2022-03-07
Payer: COMMERCIAL

## 2022-03-07 VITALS
DIASTOLIC BLOOD PRESSURE: 76 MMHG | OXYGEN SATURATION: 100 % | SYSTOLIC BLOOD PRESSURE: 120 MMHG | RESPIRATION RATE: 15 BRPM | HEART RATE: 66 BPM

## 2022-03-07 VITALS
HEIGHT: 63 IN | TEMPERATURE: 98 F | OXYGEN SATURATION: 98 % | HEART RATE: 65 BPM | WEIGHT: 125 LBS | SYSTOLIC BLOOD PRESSURE: 117 MMHG | RESPIRATION RATE: 16 BRPM | DIASTOLIC BLOOD PRESSURE: 67 MMHG

## 2022-03-07 DIAGNOSIS — S01.409A UNSPECIFIED OPEN WOUND OF UNSPECIFIED CHEEK AND TEMPOROMANDIBULAR AREA, INITIAL ENCOUNTER: ICD-10-CM

## 2022-03-07 DIAGNOSIS — Z98.890 OTHER SPECIFIED POSTPROCEDURAL STATES: Chronic | ICD-10-CM

## 2022-03-07 PROCEDURE — 15773 GRFG AUTOL FAT LIPO 25 CC/<: CPT

## 2022-03-07 RX ORDER — ONDANSETRON 8 MG/1
4 TABLET, FILM COATED ORAL ONCE
Refills: 0 | Status: DISCONTINUED | OUTPATIENT
Start: 2022-03-07 | End: 2022-03-21

## 2022-03-07 RX ORDER — L.ACIDOPH/B.ANIMALIS/B.LONGUM 15B CELL
1 CAPSULE ORAL
Qty: 0 | Refills: 0 | DISCHARGE

## 2022-03-07 RX ORDER — ACETAMINOPHEN 500 MG
2 TABLET ORAL
Qty: 0 | Refills: 0 | DISCHARGE

## 2022-03-07 RX ORDER — FENTANYL CITRATE 50 UG/ML
25 INJECTION INTRAVENOUS
Refills: 0 | Status: DISCONTINUED | OUTPATIENT
Start: 2022-03-07 | End: 2022-03-07

## 2022-03-07 RX ORDER — OXYCODONE HYDROCHLORIDE 5 MG/1
5 TABLET ORAL ONCE
Refills: 0 | Status: DISCONTINUED | OUTPATIENT
Start: 2022-03-07 | End: 2022-03-07

## 2022-03-07 RX ADMIN — FENTANYL CITRATE 25 MICROGRAM(S): 50 INJECTION INTRAVENOUS at 12:35

## 2022-03-07 RX ADMIN — FENTANYL CITRATE 25 MICROGRAM(S): 50 INJECTION INTRAVENOUS at 12:25

## 2022-03-07 RX ADMIN — SODIUM CHLORIDE 100 MILLILITER(S): 9 INJECTION, SOLUTION INTRAVENOUS at 10:11

## 2022-03-07 NOTE — ASU PATIENT PROFILE, ADULT - FALL HARM RISK - UNIVERSAL INTERVENTIONS
Bed in lowest position, wheels locked, appropriate side rails in place/Call bell, personal items and telephone in reach/Instruct patient to call for assistance before getting out of bed or chair/Non-slip footwear when patient is out of bed/[place] to call system/Physically safe environment - no spills, clutter or unnecessary equipment/Purposeful Proactive Rounding/Room/bathroom lighting operational, light cord in reach

## 2022-03-07 NOTE — ASU PATIENT PROFILE, ADULT - NSICDXPASTMEDICALHX_GEN_ALL_CORE_FT
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

## 2022-03-07 NOTE — PRE-ANESTHESIA EVALUATION ADULT - NSANTHPMHFT_GEN_ALL_CORE
s/p ruptured aneurysm 2020. Craniotomy/clipping, cranioplasty. Has mild deficit of coordination of LUE & LUE hyperesthesia

## 2022-03-07 NOTE — ASU PATIENT PROFILE, ADULT - NSICDXPASTSURGICALHX_GEN_ALL_CORE_FT
PAST SURGICAL HISTORY:  History of cranioplasty 6/15/2020    S/P craniotomy surgery and clip for aneurysm 2/3/2020

## 2022-04-07 NOTE — PROGRESS NOTE ADULT - PROBLEM SELECTOR PROBLEM 1
Fever, unspecified fever cause Bed in lowest position, wheels locked, appropriate side rails in place/Call bell, personal items and telephone in reach/Instruct patient to call for assistance before getting out of bed or chair/Non-slip footwear when patient is out of bed/[place] to call system/Physically safe environment - no spills, clutter or unnecessary equipment/Purposeful Proactive Rounding/Room/bathroom lighting operational, light cord in reach

## 2023-09-08 NOTE — DISCHARGE NOTE PROVIDER - HOSPITAL COURSE
40 year old female in 2/2020 collapsed in airport sent to ER dx with cerebral hemorrhage. Work up  dx with aneurysm had surgery and clipping or PCOMM aneurysm.  Sent to [place] for rehab. Now s/p inpatient rehab living in [place]  with parents.  Returns  For cranioplasty.        6/15/20 s/p right side cranioplasty, 6/17 FELICIA drain removed.     CT head post op showed very small extra-axial heme along the right frontal convexity but otherwise stable, repeat CT head remains stable. On day of discharge patient is medically and neurologically stable. No

## 2023-10-17 NOTE — OCCUPATIONAL THERAPY INITIAL EVALUATION ADULT - MD ORDER
"Pt to the ED for suicidal ideation with auditory hallucinations. No visual hallucinations. Pt states,\" I have always been suicidal, but it has been worsening.\" \"They are telling to do bad things\". Patient denies visual hallucinations. Pt endorsing a plan to hurt herself but was not forth coming with details. Pt compliant with medications. Currently living in a group home. Denies any significant even that has worsening her thoughts.   " OT evaluate and treat  Activity: ambulate as tolerated

## 2024-04-04 NOTE — PROGRESS NOTE ADULT - PROBLEM SELECTOR PROBLEM 3
Physical Therapy Daily Progress Note    Subjective   Jackie Cordova reports: she feels most limited by her left knee at this point. She has pain behind the knee and up into her groin. Some days it feels better than others.       Objective   See Exercise, Manual, and Modality Logs for complete treatment.       Assessment/Plan  Pt tolerated treatment well. Her progress is limited by her left knee pain. She was unable to perform sit to stands without assistance today. She was instructed to bring up her knee concerns with her PCP to get an X ray. Pt would benefit from continued skilled PT.    Progress per Plan of Care           Manual Therapy:         mins  30284;  Therapeutic Exercise:    12     mins  89794;     Neuromuscular Dima:    12    mins  13152;    Therapeutic Activity:          mins  76496;     Gait Training:           mins  22263;     Ultrasound:          mins  21452;    Electrical Stimulation:         mins  83860 ( );  E-Stim Attended:         mins  13271  Iontophoresis          mins 26698   Traction          mins  12869  Fluidotherapy          mins  70797  Dry Needling          mins self-pay - No Charge  Paraffin          mins  34284    Timed Treatment:  24    mins   Total Treatment:     56   mins    Mare Arceo, PT, DPT  Physical Therapist   Hyponatremia

## 2024-04-08 ENCOUNTER — NON-APPOINTMENT (OUTPATIENT)
Age: 44
End: 2024-04-08

## 2024-04-09 ENCOUNTER — APPOINTMENT (OUTPATIENT)
Dept: CT IMAGING | Facility: CLINIC | Age: 44
End: 2024-04-09
Payer: SELF-PAY

## 2024-04-09 ENCOUNTER — OUTPATIENT (OUTPATIENT)
Dept: OUTPATIENT SERVICES | Facility: HOSPITAL | Age: 44
LOS: 1 days | End: 2024-04-09

## 2024-04-09 DIAGNOSIS — Z98.890 OTHER SPECIFIED POSTPROCEDURAL STATES: Chronic | ICD-10-CM

## 2024-04-09 PROCEDURE — 70496 CT ANGIOGRAPHY HEAD: CPT | Mod: 26

## 2024-04-11 NOTE — REASON FOR VISIT
[Home] : at home, [unfilled] , at the time of the visit. [Medical Office: ([facility])___] : at the medical office located in  [Verbal consent obtained from patient] : the patient, [unfilled]

## 2024-04-12 ENCOUNTER — APPOINTMENT (OUTPATIENT)
Dept: NEUROSURGERY | Facility: CLINIC | Age: 44
End: 2024-04-12
Payer: SELF-PAY

## 2024-04-12 DIAGNOSIS — I60.7 NONTRAUMATIC SUBARACHNOID HEMORRHAGE FROM UNSPECIFIED INTRACRANIAL ARTERY: ICD-10-CM

## 2024-04-12 DIAGNOSIS — Z78.9 OTHER SPECIFIED HEALTH STATUS: ICD-10-CM

## 2024-04-12 PROCEDURE — 99442: CPT

## 2024-04-15 PROBLEM — I60.7 RUPTURED ANEURYSM OF INTRACRANIAL ARTERY: Status: ACTIVE | Noted: 2020-05-08

## 2024-04-15 NOTE — HISTORY OF PRESENT ILLNESS
[FreeTextEntry1] : 43 year old female, with no pertinent past medical history, found unresponsive in an airplane in 2020 wherein she was taken to [facility] where she was found to have a diffuse subarachnoid hemorrhage and a right temporal clot. She was then transferred to [facility] for Neurosurgery  Evaluation and CTA Brain revealed right posterior communicating artery aneurysm, patient was admitted under Dr. Ordonez's service and taken to the OR for s/p right craniectomy for evacuation of ICH and clipping of posterior communicating aneurysm (no bone flap). Patient tolerated procedure well and came to the Neuro ICU wherein she was extubated on 2/4/20. Patient then went to IR on 2/5/20 for s/p angio: clip check which shows in good position and no vasospasm.  On 6/15/20  for right cranial defect post craniectomy after clipping of ruptured posterior communicating artery aneurysm.  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 States that some days she feels tired but rests on these occasions.  Pt had follow up CTA brain that demonstrated status post clipping of right ICA aneurysm. No evidence of recurrent aneurysm.

## 2024-04-15 NOTE — ASSESSMENT
[FreeTextEntry1] : IMPRESSION:  CTA BRAIN S/P Status post clipping of right ICA aneurysm. No evidence of recurrent aneurysm. f/p 6/15/23 - Right cranial defect post craniectomy after clipping of ruptured posterior communicating artery aneurysm.   PLAN: 1 F/U with NSG PRN 2. No additional imaging needed for NSG.

## 2024-09-30 NOTE — H&P PST ADULT - IS PATIENT PREGNANT?
Adjust carb ratio as discussed and written  Message in a week to review clarity data  Call or message with any concerns  Follow up in 4 months  
no

## 2025-01-27 NOTE — PROGRESS NOTE ADULT - PROBLEM SELECTOR PROBLEM 7
Name: Frankie Hernández  : 1941  MRN: [id number]    Date: 2025    Benefits of immediately proceeding with Radiology exam outweigh the risks and therefore the following is being waived:      [x] Pregnancy test    [x] Protocol for Iodine allergy    [x] MRI questionnaire    [x] BUN/Creatinine        Stanton Wilder MD         Discharge planning issues

## 2025-03-12 NOTE — OCCUPATIONAL THERAPY INITIAL EVALUATION ADULT - IMPAIRED TRANSFERS: BED/CHAIR, REHAB EVAL
[FreeTextEntry1] : His estradiol is too low so we will have him take the anastrozole at 0.5 mg the day that he does the injection.  He will continue with the 75 mg xyosted, he will continue with the 100 mg of sildenafil As far as orgasm I have no answer hopefully the neurologist will be able to come up with something.  He knows I am retiring he will get blood in 2-1/2 months, and he will follow up with a Doctor in [place]  I will refill the Xyosted as he only gets 4 weeks at a time and he may not get to urologist in [place] for a month or 2 impaired balance/decreased ROM/cognition/impaired coordination/decreased strength

## 2025-04-27 NOTE — PHYSICAL THERAPY INITIAL EVALUATION ADULT - WEIGHT-BEARING RESTRICTIONS: GAIT, REHAB EVAL
full weight-bearing bilateral upper extremity Active ROM was WFL (within functional limits)/bilateral  lower extremity Active ROM was WFL (within functional limits)